# Patient Record
Sex: FEMALE | Race: WHITE | NOT HISPANIC OR LATINO | Employment: OTHER | URBAN - METROPOLITAN AREA
[De-identification: names, ages, dates, MRNs, and addresses within clinical notes are randomized per-mention and may not be internally consistent; named-entity substitution may affect disease eponyms.]

---

## 2017-01-17 ENCOUNTER — TELEPHONE (OUTPATIENT)
Dept: INTERNAL MEDICINE | Facility: CLINIC | Age: 82
End: 2017-01-17

## 2017-01-17 DIAGNOSIS — R35.0 URINARY FREQUENCY: Primary | ICD-10-CM

## 2017-01-17 NOTE — TELEPHONE ENCOUNTER
Has worn a pad for a long time to help with urine incontinence.  Usually goes thru 1 pad a day.    Lately going thru 2-3 pads. Seems to leak when bends  Over.    Ok to try rx for this?  Other?  Pharmacy verified.  She's not due back till April for ck up.    Please advise.  Thanks benedicto

## 2017-01-17 NOTE — TELEPHONE ENCOUNTER
----- Message from Nemesio De La Rosa sent at 1/17/2017  2:27 PM CST -----  Contact: Pt at [phone]  Patient would like to get medical advice.  Symptoms (please be specific):  Urine leakage  How long has patient had these symptoms:  About 6 months  Pharmacy name and phone #:  [facility]  [phone] (Phone)  [phone] (Fax)  Any drug allergies:  Tetanus Vaccines And ToxoidSwelling  Comments:

## 2017-01-19 ENCOUNTER — LAB VISIT (OUTPATIENT)
Dept: LAB | Facility: HOSPITAL | Age: 82
End: 2017-01-19
Attending: INTERNAL MEDICINE
Payer: MEDICARE

## 2017-01-19 DIAGNOSIS — R35.0 URINARY FREQUENCY: ICD-10-CM

## 2017-01-19 LAB
BACTERIA #/AREA URNS AUTO: NORMAL /HPF
BILIRUB UR QL STRIP: NEGATIVE
CLARITY UR REFRACT.AUTO: CLEAR
COLOR UR AUTO: YELLOW
GLUCOSE UR QL STRIP: NEGATIVE
HGB UR QL STRIP: NEGATIVE
KETONES UR QL STRIP: NEGATIVE
LEUKOCYTE ESTERASE UR QL STRIP: ABNORMAL
MICROSCOPIC COMMENT: NORMAL
NITRITE UR QL STRIP: NEGATIVE
NON-SQ EPI CELLS #/AREA URNS AUTO: <1 /HPF
PH UR STRIP: 6 [PH] (ref 5–8)
PROT UR QL STRIP: ABNORMAL
RBC #/AREA URNS AUTO: 2 /HPF (ref 0–4)
SP GR UR STRIP: 1.02 (ref 1–1.03)
SQUAMOUS #/AREA URNS AUTO: 2 /HPF
URN SPEC COLLECT METH UR: ABNORMAL
UROBILINOGEN UR STRIP-ACNC: NEGATIVE EU/DL
WBC #/AREA URNS AUTO: 4 /HPF (ref 0–5)

## 2017-01-19 PROCEDURE — 87086 URINE CULTURE/COLONY COUNT: CPT

## 2017-01-19 PROCEDURE — 81001 URINALYSIS AUTO W/SCOPE: CPT

## 2017-01-20 LAB — BACTERIA UR CULT: NORMAL

## 2017-01-23 ENCOUNTER — OFFICE VISIT (OUTPATIENT)
Dept: INTERNAL MEDICINE | Facility: CLINIC | Age: 82
End: 2017-01-23
Payer: MEDICARE

## 2017-01-23 VITALS
HEART RATE: 84 BPM | TEMPERATURE: 98 F | DIASTOLIC BLOOD PRESSURE: 62 MMHG | SYSTOLIC BLOOD PRESSURE: 114 MMHG | RESPIRATION RATE: 16 BRPM | BODY MASS INDEX: 25.2 KG/M2 | WEIGHT: 142.19 LBS | HEIGHT: 63 IN

## 2017-01-23 DIAGNOSIS — R32 INCONTINENCE OF URINE IN FEMALE: Primary | ICD-10-CM

## 2017-01-23 PROCEDURE — 1159F MED LIST DOCD IN RCRD: CPT | Mod: S$GLB,,, | Performed by: INTERNAL MEDICINE

## 2017-01-23 PROCEDURE — 99214 OFFICE O/P EST MOD 30 MIN: CPT | Mod: S$GLB,,, | Performed by: INTERNAL MEDICINE

## 2017-01-23 PROCEDURE — 1157F ADVNC CARE PLAN IN RCRD: CPT | Mod: S$GLB,,, | Performed by: INTERNAL MEDICINE

## 2017-01-23 PROCEDURE — 99999 PR PBB SHADOW E&M-EST. PATIENT-LVL III: CPT | Mod: PBBFAC,,, | Performed by: INTERNAL MEDICINE

## 2017-01-23 PROCEDURE — 1126F AMNT PAIN NOTED NONE PRSNT: CPT | Mod: S$GLB,,, | Performed by: INTERNAL MEDICINE

## 2017-01-23 PROCEDURE — 1160F RVW MEDS BY RX/DR IN RCRD: CPT | Mod: S$GLB,,, | Performed by: INTERNAL MEDICINE

## 2017-01-23 RX ORDER — ACETAMINOPHEN 325 MG/1
325 TABLET ORAL EVERY 6 HOURS PRN
COMMUNITY
End: 2018-07-05

## 2017-01-23 RX ORDER — OXYBUTYNIN CHLORIDE 5 MG/1
5 TABLET, EXTENDED RELEASE ORAL DAILY
Qty: 30 TABLET | Refills: 1 | Status: SHIPPED | OUTPATIENT
Start: 2017-01-23 | End: 2017-02-23 | Stop reason: SDUPTHER

## 2017-01-23 NOTE — PATIENT INSTRUCTIONS
Treating Incontinence in Women: Non-surgical Methods  The best treatment for you will depend on the type of incontinence you have. Your symptoms, age, and any underlying problems that are found also affect your treatment. While some types of incontinence may eventually require surgery, non-surgical treatments may be effective in many cases. Non-surgical treatments include lifestyle changes, muscle-strengthening exercises, and medications.  Treatment recommendations for stress urinary incontinence  Guidelines from the American College of Physicians for treating stress urinary incontinence include:  · Pelvic floor strengthening exercises (called Kegel exercises)  · Kegel exercises and bladder training  · Medications for urgency incontinence if bladder training has not helped  · Lifestyle changes such as weight loss and increased activity if incontinence is due to being overweight    Lifestyle changes  · Quitting smoking. Smoking can lead to a chronic cough that strains pelvic floor muscles. Smoking may also damage the bladder and urethra.  · Losing weight. Excess weight puts extra pressure on the pelvic floor muscles. Exercising and eating right can help you lose weight. This helps other treatments work better.  · Making certain diet changes. Some foods may make you need to urinate more, so it may be good to avoid them. These include caffeinated drinks and alcohol. Ask your doctor whether these or other diet changes might be helpful.    Kegel exercises  Kegel exercises help strengthen the pelvic floor muscles. The pelvic floor muscles act as a sling to help hold the bladder and urethra in place. These muscles also help keep the urethra closed. Weak pelvic floor muscles may allow urine to leak. To strengthen the pelvic floor muscles, do Kegel exercises daily. In a few months, the muscles will be stronger and tighter. This can help prevent urine leakage.  © 3798-2118 [facility]. [address] [place] [address],  [place] [address]. All rights reserved. This information is not intended as a substitute for professional medical care. Always follow your healthcare professional's instructions.

## 2017-01-23 NOTE — MR AVS SNAPSHOT
[place] - Internal Medicine   [facility]  [place] [address]  Phone: [phone]  Fax: [phone]                  Pau Cook   2017 9:40 AM   Office Visit    Description:  Female : 1929   Provider:  Getachew Orlando MD   Department:  [place] - Internal Medicine           Reason for Visit     Urinary Incontinence           Diagnoses this Visit        Comments    Incontinence of urine in female    -  Primary            To Do List           Goals (5 Years of Data)     None       These Medications        Disp Refills Start End    oxybutynin (DITROPAN-XL) 5 MG TR24 30 tablet 1 2017    Take 1 tablet (5 mg total) by mouth once daily. - Oral    Pharmacy: [facility]/pharmacy #[id number] - [place] - [address]  #: [phone]         [facility]     [facility] On Call Nurse Care Line -  Assistance  Registered nurses in the [facility] On Call Center provide clinical advisement, health education, appointment booking, and other advisory services.  Call for this free service at [phone].             Medications           Message regarding Medications     Verify the changes and/or additions to your medication regime listed below are the same as discussed with your clinician today.  If any of these changes or additions are incorrect, please notify your healthcare provider.        START taking these NEW medications        Refills    oxybutynin (DITROPAN-XL) 5 MG TR24 1    Sig: Take 1 tablet (5 mg total) by mouth once daily.    Class: Normal    Route: Oral           Verify that the below list of medications is an accurate representation of the medications you are currently taking.  If none reported, the list may be blank. If incorrect, please contact your healthcare provider. Carry this list with you in case of emergency.           Current Medications     acetaminophen (TYLENOL) 325 MG tablet Take 325 mg by mouth every 6 (six) hours as needed for  "Pain.    calcium-vitamin D3 (CALCIUM 500 + D) 500 mg(1,250mg) -200 unit per tablet Take 1 tablet by mouth 2 (two) times daily with meals.    FLAXSEED ORAL Take 1,000 mg by mouth once daily.     ketotifen (ZADITOR) 0.025 % (0.035 %) ophthalmic solution Place 1 drop into both eyes 2 (two) times daily.    MULTIVITAMIN W-MINERALS/LUTEIN (CENTRUM SILVER ORAL) Take by mouth.    NEXIUM 20 mg capsule Take 20 mg by mouth before breakfast.     psyllium (METAMUCIL) packet Take 1 packet by mouth once daily.    carboxymethylcellulose (REFRESH PLUS) 0.5 % Dpet 1 drop daily as needed.    estradiol (ESTRACE) 0.01 % (0.1 mg/gram) vaginal cream Place 1 gram in the nightly for one week, the twice a week.    hydrocodone-acetaminophen 5-325mg (NORCO) 5-325 mg per tablet Take 1 tablet by mouth every 6 (six) hours as needed for Pain.    oxybutynin (DITROPAN-XL) 5 MG TR24 Take 1 tablet (5 mg total) by mouth once daily.           Clinical Reference Information           Vital Signs - Last Recorded  Most recent update: 1/23/2017  9:30 AM by Kristina Dougherty LPN    BP Pulse Temp Resp Ht Wt    114/62 (BP Location: Right arm, Patient Position: Sitting, BP Method: Manual) 84 97.5 °F (36.4 °C) (Oral) 16 5' 3" (1.6 m) 64.5 kg (142 lb 3.2 oz)    BMI                25.19 kg/m2          Blood Pressure          Most Recent Value    BP  114/62      Allergies as of 1/23/2017     Tetanus Vaccines And Toxoid      Immunizations Administered on Date of Encounter - 1/23/2017     None      Instructions      Treating Incontinence in Women: Non-surgical Methods  The best treatment for you will depend on the type of incontinence you have. Your symptoms, age, and any underlying problems that are found also affect your treatment. While some types of incontinence may eventually require surgery, non-surgical treatments may be effective in many cases. Non-surgical treatments include lifestyle changes, muscle-strengthening exercises, and medications.  Treatment " recommendations for stress urinary incontinence  Guidelines from the American College of Physicians for treating stress urinary incontinence include:  · Pelvic floor strengthening exercises (called Kegel exercises)  · Kegel exercises and bladder training  · Medications for urgency incontinence if bladder training has not helped  · Lifestyle changes such as weight loss and increased activity if incontinence is due to being overweight    Lifestyle changes  · Quitting smoking. Smoking can lead to a chronic cough that strains pelvic floor muscles. Smoking may also damage the bladder and urethra.  · Losing weight. Excess weight puts extra pressure on the pelvic floor muscles. Exercising and eating right can help you lose weight. This helps other treatments work better.  · Making certain diet changes. Some foods may make you need to urinate more, so it may be good to avoid them. These include caffeinated drinks and alcohol. Ask your doctor whether these or other diet changes might be helpful.    Kegel exercises  Kegel exercises help strengthen the pelvic floor muscles. The pelvic floor muscles act as a sling to help hold the bladder and urethra in place. These muscles also help keep the urethra closed. Weak pelvic floor muscles may allow urine to leak. To strengthen the pelvic floor muscles, do Kegel exercises daily. In a few months, the muscles will be stronger and tighter. This can help prevent urine leakage.  © 3792-3698 [employer]. [address] [place] [address], [place] [address]. All rights reserved. This information is not intended as a substitute for professional medical care. Always follow your healthcare professional's instructions.

## 2017-01-24 ENCOUNTER — TELEPHONE (OUTPATIENT)
Dept: INTERNAL MEDICINE | Facility: CLINIC | Age: 82
End: 2017-01-24

## 2017-01-30 ENCOUNTER — TELEPHONE (OUTPATIENT)
Dept: INTERNAL MEDICINE | Facility: CLINIC | Age: 82
End: 2017-01-30

## 2017-01-30 DIAGNOSIS — G60.9 UNSPECIFIED HEREDITARY AND IDIOPATHIC PERIPHERAL NEUROPATHY: ICD-10-CM

## 2017-01-30 DIAGNOSIS — R39.81 FUNCTIONAL URINARY INCONTINENCE: ICD-10-CM

## 2017-01-30 DIAGNOSIS — K21.9 GASTROESOPHAGEAL REFLUX DISEASE, ESOPHAGITIS PRESENCE NOT SPECIFIED: Primary | ICD-10-CM

## 2017-01-30 DIAGNOSIS — E78.00 HIGH CHOLESTEROL: ICD-10-CM

## 2017-01-30 NOTE — TELEPHONE ENCOUNTER
----- Message from Nemesio De La Rosa sent at 1/30/2017  2:22 PM CST -----  Contact: Pt at [phone]  Doctor appointment and lab have been scheduled.  Please link lab orders to the lab appointment.  Date of doctor appointment:  4/17/17  Physical or EP:  physical  Date of lab appointment:  4/17/17  Comments: Pt requested to complete same day if possible.

## 2017-02-06 NOTE — PROGRESS NOTES
Subjective:       Patient ID: Pau Cook is a 87 y.o. female.    Chief Complaint: Urinary Incontinence  HISTORY OF PRESENT ILLNESS:  An 87-year-old white female patient of mine coming   in for incontinence that has gotten markedly more severe over the last two   months.  The patient is wanting to get on medication for this.  We did a urine   showing minimal reactive changes, no infection.  She has awareness that she is   urinating, but has no control.  She is due her full physical in a month.    PHYSICAL EXAMINATION:  VITAL SIGNS:  Today shows normal vital signs.  BACK:  No CVA tenderness.  ABDOMEN:  Not distended.  She has no suprapubic urgency or fullness with   pressure.    IMPRESSION:  Incontinence, probably related to her age.  I put her on Ditropan 5   as an experiment.  She had no infection and I will do a minimal pelvic exam on   the next visit to see if she has a urethral caruncle or evidence of prolapse.      OLIVER/SAMM  dd: 02/06/2017 00:56:43 (CST)  td: 02/06/2017 01:42:12 (CST)  Doc ID   #[id number]  Job ID #[id number]    CC:     [facility] [id number]  HPI  Review of Systems   Constitutional: Negative.    HENT: Negative for congestion, hearing loss, sinus pressure, sneezing, sore throat and voice change.    Eyes: Negative for visual disturbance.   Respiratory: Negative for cough, chest tightness and shortness of breath.    Cardiovascular: Negative.  Negative for chest pain, palpitations and leg swelling.   Gastrointestinal: Negative.    Genitourinary: Positive for enuresis and frequency. Negative for difficulty urinating, dysuria, flank pain, hematuria, menstrual problem, pelvic pain, urgency, vaginal bleeding and vaginal discharge.   Musculoskeletal: Negative.  Negative for neck pain and neck stiffness.   Skin: Negative.    Neurological: Negative.  Negative for dizziness, seizures, light-headedness, numbness and headaches.   Psychiatric/Behavioral: Negative for agitation, behavioral problems, confusion and sleep  disturbance. The patient is not nervous/anxious.        Objective:      Physical Exam   Constitutional: She is oriented to person, place, and time. She appears well-developed and well-nourished.   Eyes: EOM are normal. Pupils are equal, round, and reactive to light.   Neck: Normal range of motion. Neck supple. No JVD present. No thyromegaly present.   Cardiovascular: Normal rate, regular rhythm, normal heart sounds and intact distal pulses.  Exam reveals no gallop.    No murmur heard.  Pulmonary/Chest: Breath sounds normal. She has no wheezes. She has no rales.   Abdominal: Soft. Bowel sounds are normal. She exhibits no mass. There is no tenderness.   Musculoskeletal: Normal range of motion.   Lymphadenopathy:     She has no cervical adenopathy.   Neurological: She is alert and oriented to person, place, and time. She has normal reflexes. No cranial nerve deficit.   Skin: No rash noted. No erythema.   Psychiatric: She has a normal mood and affect. Judgment normal.       Assessment:       1. Incontinence of urine in female        Plan:

## 2017-02-21 ENCOUNTER — TELEPHONE (OUTPATIENT)
Dept: INTERNAL MEDICINE | Facility: CLINIC | Age: 82
End: 2017-02-21

## 2017-02-21 NOTE — TELEPHONE ENCOUNTER
Has been on ditropan for a month now.  Not helping urine incontinence on 5mg.   Had dry mouth before started meds and seems same.    Increase?  Change rx?  [facility].  Needs small pill to swallow.  Can't swallow large pills.    Please advise.    Thanks benedicto

## 2017-02-21 NOTE — TELEPHONE ENCOUNTER
----- Message from Holli Harmon sent at 2/21/2017  9:22 AM CST -----  Contact: patient [phone]  Pt would like to speak with  about the medication you prescribed 1 month ago for urine leakage. Pt states it is not helping at all.

## 2017-02-23 RX ORDER — OXYBUTYNIN CHLORIDE 10 MG/1
10 TABLET, EXTENDED RELEASE ORAL DAILY
Qty: 30 TABLET | Refills: 1 | Status: SHIPPED | OUTPATIENT
Start: 2017-02-23 | End: 2017-04-12

## 2017-02-23 NOTE — TELEPHONE ENCOUNTER
Left msg on her recorder with her voice/name.  Said  doubled dose of rx and sent to pharmacy.  Let us know if not h elping after 2 weeks.

## 2017-04-12 ENCOUNTER — OFFICE VISIT (OUTPATIENT)
Dept: INTERNAL MEDICINE | Facility: CLINIC | Age: 82
End: 2017-04-12
Payer: MEDICARE

## 2017-04-12 VITALS
RESPIRATION RATE: 15 BRPM | TEMPERATURE: 98 F | BODY MASS INDEX: 24.8 KG/M2 | HEART RATE: 82 BPM | DIASTOLIC BLOOD PRESSURE: 60 MMHG | SYSTOLIC BLOOD PRESSURE: 110 MMHG | WEIGHT: 140 LBS | HEIGHT: 63 IN

## 2017-04-12 DIAGNOSIS — Z00.00 ENCOUNTER FOR PREVENTIVE HEALTH EXAMINATION: Primary | ICD-10-CM

## 2017-04-12 DIAGNOSIS — M15.9 PRIMARY OSTEOARTHRITIS INVOLVING MULTIPLE JOINTS: ICD-10-CM

## 2017-04-12 DIAGNOSIS — I49.9 IRREGULAR HEART RHYTHM: ICD-10-CM

## 2017-04-12 DIAGNOSIS — R35.0 URINARY FREQUENCY: ICD-10-CM

## 2017-04-12 DIAGNOSIS — M85.80 OSTEOPENIA, UNSPECIFIED LOCATION: ICD-10-CM

## 2017-04-12 DIAGNOSIS — K21.9 GASTROESOPHAGEAL REFLUX DISEASE WITHOUT ESOPHAGITIS: ICD-10-CM

## 2017-04-12 DIAGNOSIS — G60.9 IDIOPATHIC PERIPHERAL NEUROPATHY: ICD-10-CM

## 2017-04-12 PROCEDURE — 99999 PR PBB SHADOW E&M-EST. PATIENT-LVL IV: CPT | Mod: PBBFAC,,, | Performed by: NURSE PRACTITIONER

## 2017-04-12 PROCEDURE — G0439 PPPS, SUBSEQ VISIT: HCPCS | Mod: S$GLB,,, | Performed by: NURSE PRACTITIONER

## 2017-04-12 NOTE — Clinical Note
Primary Care Providers: Getachew Orlando MD, MD (General)  Your patient was seen today for a HRA visit.  NO  Gap(s) in care (HEDIS gaps) have been identified during this visit that require additional testing and possible follow up.  No orders of the defined types were placed in this encounter.   . I have included a copy of my visit note; please review the note and feel free to contact me with any questions.   Thank you for allowing me to participate in the care of your patients. Anna Rubalcava NP

## 2017-04-12 NOTE — MR AVS SNAPSHOT
[facility] Internal Medicine   [facility]  [place] [address]  Phone: [phone]  Fax: [phone]                  Pau Cook   2017 8:30 AM   Office Visit    Description:  Female : 1929   Provider:  HRA, MET 1   Department:  [place] - Internal Medicine           Reason for Visit     Health Risk Assessment           Diagnoses this Visit        Comments    Encounter for preventive health examination    -  Primary     Gastroesophageal reflux disease without esophagitis         Idiopathic peripheral neuropathy         Irregular heart rhythm         Primary osteoarthritis involving multiple joints         Osteopenia, unspecified location         Urinary frequency                To Do List           Future Appointments        Provider Department Dept Phone    2017 8:30 AM LAB, [facility] - [facility] [phone]    2017 9:00 AM Getachew Orlando MD [facility] Medicine [phone]      Goals (5 Years of Data)     None      Follow-Up and Disposition     Return in about 3 months (around 2017).      [facility] On Call     [facility] On Call Nurse Care Line -  Assistance  Unless otherwise directed by your provider, please contact [facility] On-Call, our nurse care line that is available for  assistance.     Registered nurses in the [facility] On Call Center provide: appointment scheduling, clinical advisement, health education, and other advisory services.  Call: [phone] (toll free)               Medications           Message regarding Medications     Verify the changes and/or additions to your medication regime listed below are the same as discussed with your clinician today.  If any of these changes or additions are incorrect, please notify your healthcare provider.        STOP taking these medications     estradiol (ESTRACE) 0.01 % (0.1 mg/gram) vaginal cream Place 1 gram in the nightly for one week, the twice a week.     "hydrocodone-acetaminophen 5-325mg (NORCO) 5-325 mg per tablet Take 1 tablet by mouth every 6 (six) hours as needed for Pain.    oxybutynin (DITROPAN-XL) 10 MG 24 hr tablet Take 1 tablet (10 mg total) by mouth once daily.           Verify that the below list of medications is an accurate representation of the medications you are currently taking.  If none reported, the list may be blank. If incorrect, please contact your healthcare provider. Carry this list with you in case of emergency.           Current Medications     acetaminophen (TYLENOL) 325 MG tablet Take 325 mg by mouth every 6 (six) hours as needed for Pain.    calcium-vitamin D3 (CALCIUM 500 + D) 500 mg(1,250mg) -200 unit per tablet Take 1 tablet by mouth 2 (two) times daily with meals.    carboxymethylcellulose (REFRESH PLUS) 0.5 % Dpet 1 drop daily as needed.    conjugated estrogens (PREMARIN) vaginal cream Place 0.5 g vaginally once daily.    FLAXSEED ORAL Take 1,000 mg by mouth once daily.     ketotifen (ZADITOR) 0.025 % (0.035 %) ophthalmic solution Place 1 drop into both eyes 2 (two) times daily.    MULTIVITAMIN W-MINERALS/LUTEIN (CENTRUM SILVER ORAL) Take by mouth.    NEXIUM 20 mg capsule Take 20 mg by mouth before breakfast.     psyllium (METAMUCIL) packet Take 1 packet by mouth once daily.           Clinical Reference Information           Your Vitals Were     BP Pulse Temp Resp Height Weight    110/60 82 97.5 °F (36.4 °C) (Oral) 15 5' 3" (1.6 m) 63.5 kg (140 lb)    BMI                24.8 kg/m2          Blood Pressure          Most Recent Value    BP  110/60      Allergies as of 4/12/2017     Tetanus Vaccines And Toxoid      Immunizations Administered on Date of Encounter - 4/12/2017     None      [facility] Sign-Up     Activating your [facility] account is as easy as 1-2-3!     1) Visit [email or web address], select Sign Up Now, enter this activation code and your date of birth, then select Next.  Activation code not generated  Current Patient Portal " Status: Account disabled      2) Create a username and password to use when you visit [facility] in the future and select a security question in case you lose your password and select Next.    3) Enter your e-mail address and click Sign Up!    Additional Information  If you have questions, please e-mail [email or web address] or call [phone] to talk to our [facility] staff. Remember, [facility] is NOT to be used for urgent needs. For medical emergencies, dial 911.         Instructions      Counseling and Referral of Other Preventative  (Italic type indicates deductible and co-insurance are waived)    Patient Name: Pau Cook  Today's Date: 4/12/2017      SERVICE LIMITATIONS RECOMMENDATION    Vaccines    · Pneumococcal (once after 65)    · Influenza (annually)    · Hepatitis B (if medium/high risk)    · Prevnar 13      Hepatitis B medium/high risk factors:       - End-stage renal disease       - Hemophiliacs who received Factor VII or         IX concentrates       - Clients of institutions for the mentally             retarded       - Persons who live in the same house as          a HepB carrier       - Homosexual men       - Illicit injectable drug abusers     Pneumococcal: CURRENT     Influenza: CURRENT     Hepatitis B: CDC HANDOUT GIVEN     Prevnar 13: CURRENT    Mammogram (biennial age 50-74)  Annually (age 40 or over)  DECLINED    Pap (up to age 70 and after 70 if unknown history or abnormal study last 10 years)         N/A    Colorectal cancer screening (to age 75)    · Fecal occult blood test (annual)  · Flexible sigmoidoscopy (5y)  · Screening colonoscopy (10y)  · Barium enema   n/a- LAST ON 2/6/12    Diabetes self-management training (no USPSTF recommendations)  Requires referral by treating physician for patient with diabetes or renal disease. 10 hours of initial DSMT sessions of no less than 30 minutes each in a continuous 12-month period. 2 hours of follow-up DSMT in subsequent years.  n/a     Bone mass measurements (age 65 & older, biennial)  Requires diagnosis related to osteoporosis or estrogen deficiency. Biennial benefit unless patient has history of long-term glucocorticoid  LAST 8/2017- DUE IN 2017    Glaucoma screening (no USPSTF recommendation)  Diabetes mellitus, family history   , age 50 or over    American, age 65 or over  CURRENT -BY EXTERNAL EYE md    Medical nutrition therapy for diabetes or renal disease (no recommended schedule)  Requires referral by treating physician for patient with diabetes or renal disease or kidney transplant within the past 3 years.  Can be provided in same year as diabetes self-management training (DSMT), and CMS recommends medical nutrition therapy take place after DSMT. Up to 3 hours for initial year and 2 hours in subsequent years.  n/a    Cardiovascular screening blood tests (every 5 years)  · Fasting lipid panel  Order as a panel if possible  CURRENT    Diabetes screening tests (at least every 3 years, Medicare covers annually or at 6-month intervals for prediabetic patients)  · Fasting blood sugar (FBS) or glucose tolerance test (GTT)  Patient must be diagnosed with one of the following:       - Hypertension       - Dyslipidemia       - Obesity (BMI 30kg/m2)       - Previous elevated impaired FBS or GTT       ... or any two of the following:       - Overweight (BMI 25 but <30)       - Family history of diabetes       - Age 65 or older       - History of gestational diabetes or birth of baby weighing more than 9 pounds  CURRENT    Abdominal aortic aneurysm screening (once)  · Sonogram   Limited to patients who meet one of the following criteria:       - Men who are 65-75 years old and have smoked more than 100 cigarette in their lifetime       - Anyone with a family history of abdominal aortic aneurysm       - Anyone recommended for screening by the USPSTF  N/A- UNKNOWN    HIV screening (annually for increased risk patients)  · HIV-1  and HIV-2 by EIA, or RAAD, rapid antibody test or oral mucosa transudate  Patients must be at increased risk for HIV infection per USPSTF guidelines or pregnant. Tests covered annually for patient at increased risk or as requested by the patient. Pregnant patients may receive up to 3 tests during pregnancy.  Risks discussed, screening is DECLINED    Smoking cessation counseling (up to 8 sessions per year)  Patients must be asymptomatic of tobacco-related conditions to receive as a preventative service.  n/a    Subsequent annual wellness visit  At least 12 months since last AWV  Return in one year     The following information is provided to all patients.  This information is to help you find resources for any of the problems found today that may be affecting your health:                Living healthy guide: www.[facility].[place].[facility]      Understanding Diabetes: www.diabetes.org      Eating healthy: www.cdc.gov/healthyweight      [facility] home safety checklist: www.cdc.gov/steadi/patient.html      Agency on Aging: www.goea.[place].[facility]      Alcoholics anonymous (AA): www.aa.org      Physical Activity: www.gavino.nih.gov/ka7xyve      Tobacco use: www.quitwithusla.org          Language Assistance Services     ATTENTION: Language assistance services are available, free of charge. Please call [phone].      ATENCIÓN: Si habla español, tiene a ellis disposición servicios gratuitos de asistencia lingüística. Llame al [phone].     [facility] Ý: N?u b?n nói Ti?ng Vi?t, có các d?ch v? h? tr? ngôn ng? mi?n phí dành cho b?n. G?i s? [phone].         [facility] [facility] complies with applicable Federal civil rights laws and does not discriminate on the basis of race, color, national origin, age, disability, or sex.

## 2017-04-12 NOTE — PATIENT INSTRUCTIONS
Counseling and Referral of Other Preventative  (Italic type indicates deductible and co-insurance are waived)    Patient Name: Pau Cook  Today's Date: 4/12/2017      SERVICE LIMITATIONS RECOMMENDATION    Vaccines    · Pneumococcal (once after 65)    · Influenza (annually)    · Hepatitis B (if medium/high risk)    · Prevnar 13      Hepatitis B medium/high risk factors:       - End-stage renal disease       - Hemophiliacs who received Factor VII or         IX concentrates       - Clients of institutions for the mentally             retarded       - Persons who live in the same house as          a HepB carrier       - Homosexual men       - Illicit injectable drug abusers     Pneumococcal: CURRENT     Influenza: CURRENT     Hepatitis B: [facility] HANDOUT GIVEN     Prevnar 13: CURRENT    Mammogram (biennial age 50-74)  Annually (age 40 or over)  DECLINED    Pap (up to age 70 and after 70 if unknown history or abnormal study last 10 years)         N/A    Colorectal cancer screening (to age 75)    · Fecal occult blood test (annual)  · Flexible sigmoidoscopy (5y)  · Screening colonoscopy (10y)  · Barium enema   n/a- LAST ON 2/6/12    Diabetes self-management training (no USPSTF recommendations)  Requires referral by treating physician for patient with diabetes or renal disease. 10 hours of initial DSMT sessions of no less than 30 minutes each in a continuous 12-month period. 2 hours of follow-up DSMT in subsequent years.  n/a    Bone mass measurements (age 65 & older, biennial)  Requires diagnosis related to osteoporosis or estrogen deficiency. Biennial benefit unless patient has history of long-term glucocorticoid  LAST 8/2017- DUE IN 2017    Glaucoma screening (no USPSTF recommendation)  Diabetes mellitus, family history   , age 50 or over    American, age 65 or over  CURRENT -BY EXTERNAL EYE md    Medical nutrition therapy for diabetes or renal disease (no recommended schedule)  Requires  referral by treating physician for patient with diabetes or renal disease or kidney transplant within the past 3 years.  Can be provided in same year as diabetes self-management training (DSMT), and CMS recommends medical nutrition therapy take place after DSMT. Up to 3 hours for initial year and 2 hours in subsequent years.  n/a    Cardiovascular screening blood tests (every 5 years)  · Fasting lipid panel  Order as a panel if possible  CURRENT    Diabetes screening tests (at least every 3 years, Medicare covers annually or at 6-month intervals for prediabetic patients)  · Fasting blood sugar (FBS) or glucose tolerance test (GTT)  Patient must be diagnosed with one of the following:       - Hypertension       - Dyslipidemia       - Obesity (BMI 30kg/m2)       - Previous elevated impaired FBS or GTT       ... or any two of the following:       - Overweight (BMI 25 but <30)       - Family history of diabetes       - Age 65 or older       - History of gestational diabetes or birth of baby weighing more than 9 pounds  CURRENT    Abdominal aortic aneurysm screening (once)  · Sonogram   Limited to patients who meet one of the following criteria:       - Men who are 65-75 years old and have smoked more than 100 cigarette in their lifetime       - Anyone with a family history of abdominal aortic aneurysm       - Anyone recommended for screening by the USPSTF  N/A- UNKNOWN    HIV screening (annually for increased risk patients)  · HIV-1 and HIV-2 by EIA, or RAAD, rapid antibody test or oral mucosa transudate  Patients must be at increased risk for HIV infection per USPSTF guidelines or pregnant. Tests covered annually for patient at increased risk or as requested by the patient. Pregnant patients may receive up to 3 tests during pregnancy.  Risks discussed, screening is DECLINED    Smoking cessation counseling (up to 8 sessions per year)  Patients must be asymptomatic of tobacco-related conditions to receive as a  preventative service.  n/a    Subsequent annual wellness visit  At least 12 months since last AWV  Return in one year     The following information is provided to all patients.  This information is to help you find resources for any of the problems found today that may be affecting your health:                Living healthy guide: www.[facility].[place].[facility]      Understanding Diabetes: www.diabetes.org      Eating healthy: www.cdc.gov/healthyweight      CDC home safety checklist: www.cdc.gov/steadi/patient.html      Agency on Aging: www.goea.[place].[facility]      Alcoholics anonymous (AA): www.aa.org      Physical Activity: www.gavino.nih.gov/we4argp      Tobacco use: www.quitwithusla.org

## 2017-04-12 NOTE — PROGRESS NOTES
"Pau Cook presented for a  Medicare AWV and comprehensive Health Risk Assessment today. The following components were reviewed and updated:    · Medical history  · Family History  · Social history  · Allergies and Current Medications  · Health Risk Assessment  · Health Maintenance  · Care Team     Followed by external eye MD      ** See Completed Assessments for Annual Wellness Visit within the encounter summary.**       The following assessments were completed:  · Living Situation  · CAGE  · Depression Screening  · Timed Get Up and Go  · Whisper Test  · Cognitive Function Screening  · Nutrition Screening  · ADL Screening  · PAQ Screening    Vitals:    04/12/17 0835   BP: 110/60   Pulse: 82   Resp: 15   Temp: 97.5 °F (36.4 °C)   TempSrc: Oral   Weight: 63.5 kg (140 lb)   Height: 5' 3" (1.6 m)     Body mass index is 24.8 kg/(m^2).  Physical Exam   Constitutional: She is oriented to person, place, and time. She appears well-developed and well-nourished.   HENT:   Head: Normocephalic and atraumatic.   Cardiovascular: Normal rate, regular rhythm and normal heart sounds.    No murmur heard.  Pulmonary/Chest: Effort normal and breath sounds normal.   Abdominal: Soft. Bowel sounds are normal. There is no tenderness.   Musculoskeletal: She exhibits no edema.   Neurological: She is alert and oriented to person, place, and time.   Skin: Skin is warm and dry.   Psychiatric: She has a normal mood and affect. Her behavior is normal. Judgment and thought content normal.   Nursing note and vitals reviewed.        Diagnoses and health risks identified today and associated recommendations/orders:    1. Encounter for preventive health examination  Assessments completed  Preventative health recommendations reviewed       2. Gastroesophageal reflux disease without esophagitis  Stable-followed by PCP      3. Idiopathic peripheral neuropathy  Stable-followed by PCP    4. Irregular heart rhythm  Stable-followed by PCP    5. " Primary osteoarthritis involving multiple joints  Stable-followed by PCP    6. Osteopenia, unspecified location  Stable-followed by PCP    7. Urinary frequency  Stable-followed by PCP/GYN      Provided Pau NELSON with a 5-10 year written screening schedule and personal prevention plan. Recommendations were developed using the USPSTF age appropriate recommendations. Education, counseling, and referrals were provided as needed. After Visit Summary printed and given to patient which includes a list of additional screenings\tests needed.    Return in about 3 months (around 7/12/2017).    Anna Rubalcava NP

## 2017-04-17 ENCOUNTER — OFFICE VISIT (OUTPATIENT)
Dept: INTERNAL MEDICINE | Facility: CLINIC | Age: 82
End: 2017-04-17
Payer: MEDICARE

## 2017-04-17 ENCOUNTER — LAB VISIT (OUTPATIENT)
Dept: LAB | Facility: HOSPITAL | Age: 82
End: 2017-04-17
Attending: INTERNAL MEDICINE
Payer: MEDICARE

## 2017-04-17 ENCOUNTER — OFFICE VISIT (OUTPATIENT)
Dept: UROLOGY | Facility: CLINIC | Age: 82
End: 2017-04-17
Payer: MEDICARE

## 2017-04-17 VITALS
DIASTOLIC BLOOD PRESSURE: 68 MMHG | HEART RATE: 64 BPM | HEIGHT: 63 IN | SYSTOLIC BLOOD PRESSURE: 130 MMHG | WEIGHT: 142 LBS | BODY MASS INDEX: 25.16 KG/M2

## 2017-04-17 VITALS
DIASTOLIC BLOOD PRESSURE: 68 MMHG | HEART RATE: 64 BPM | WEIGHT: 142.63 LBS | HEIGHT: 63 IN | SYSTOLIC BLOOD PRESSURE: 130 MMHG | BODY MASS INDEX: 25.27 KG/M2 | TEMPERATURE: 98 F

## 2017-04-17 DIAGNOSIS — N39.3 SUI (STRESS URINARY INCONTINENCE, FEMALE): Primary | ICD-10-CM

## 2017-04-17 DIAGNOSIS — R35.1 NOCTURIA: ICD-10-CM

## 2017-04-17 DIAGNOSIS — R32 INCONTINENCE OF URINE IN FEMALE: ICD-10-CM

## 2017-04-17 DIAGNOSIS — R39.81 FUNCTIONAL URINARY INCONTINENCE: ICD-10-CM

## 2017-04-17 DIAGNOSIS — R35.0 URINARY FREQUENCY: ICD-10-CM

## 2017-04-17 DIAGNOSIS — G60.9 HEREDITARY AND IDIOPATHIC PERIPHERAL NEUROPATHY: ICD-10-CM

## 2017-04-17 DIAGNOSIS — E78.00 HIGH CHOLESTEROL: ICD-10-CM

## 2017-04-17 DIAGNOSIS — Z00.00 ANNUAL PHYSICAL EXAM: Primary | ICD-10-CM

## 2017-04-17 DIAGNOSIS — K21.9 GASTROESOPHAGEAL REFLUX DISEASE, ESOPHAGITIS PRESENCE NOT SPECIFIED: ICD-10-CM

## 2017-04-17 LAB
ALBUMIN SERPL BCP-MCNC: 3.8 G/DL
ALP SERPL-CCNC: 78 U/L
ALT SERPL W/O P-5'-P-CCNC: 29 U/L
ANION GAP SERPL CALC-SCNC: 8 MMOL/L
AST SERPL-CCNC: 42 U/L
BASOPHILS # BLD AUTO: 0.03 K/UL
BASOPHILS NFR BLD: 0.5 %
BILIRUB SERPL-MCNC: 0.5 MG/DL
BUN SERPL-MCNC: 13 MG/DL
CALCIUM SERPL-MCNC: 9 MG/DL
CHLORIDE SERPL-SCNC: 100 MMOL/L
CHOLEST/HDLC SERPL: 2.6 {RATIO}
CO2 SERPL-SCNC: 27 MMOL/L
CREAT SERPL-MCNC: 0.8 MG/DL
DIFFERENTIAL METHOD: NORMAL
EOSINOPHIL # BLD AUTO: 0.1 K/UL
EOSINOPHIL NFR BLD: 1.2 %
ERYTHROCYTE [DISTWIDTH] IN BLOOD BY AUTOMATED COUNT: 12.7 %
EST. GFR  (AFRICAN AMERICAN): >60 ML/MIN/1.73 M^2
EST. GFR  (NON AFRICAN AMERICAN): >60 ML/MIN/1.73 M^2
GLUCOSE SERPL-MCNC: 88 MG/DL
HCT VFR BLD AUTO: 37 %
HDL/CHOLESTEROL RATIO: 39 %
HDLC SERPL-MCNC: 182 MG/DL
HDLC SERPL-MCNC: 71 MG/DL
HGB BLD-MCNC: 12.4 G/DL
LDLC SERPL CALC-MCNC: 95.8 MG/DL
LYMPHOCYTES # BLD AUTO: 1.4 K/UL
LYMPHOCYTES NFR BLD: 22.8 %
MCH RBC QN AUTO: 28.8 PG
MCHC RBC AUTO-ENTMCNC: 33.5 %
MCV RBC AUTO: 86 FL
MONOCYTES # BLD AUTO: 0.5 K/UL
MONOCYTES NFR BLD: 9 %
NEUTROPHILS # BLD AUTO: 4 K/UL
NEUTROPHILS NFR BLD: 66.3 %
NONHDLC SERPL-MCNC: 111 MG/DL
PLATELET # BLD AUTO: 239 K/UL
PMV BLD AUTO: 9.3 FL
POTASSIUM SERPL-SCNC: 4.1 MMOL/L
PROT SERPL-MCNC: 7.4 G/DL
RBC # BLD AUTO: 4.31 M/UL
SODIUM SERPL-SCNC: 135 MMOL/L
T4 FREE SERPL-MCNC: 0.93 NG/DL
TRIGL SERPL-MCNC: 76 MG/DL
TSH SERPL DL<=0.005 MIU/L-ACNC: 1.25 UIU/ML
WBC # BLD AUTO: 6.02 K/UL

## 2017-04-17 PROCEDURE — 99999 PR PBB SHADOW E&M-EST. PATIENT-LVL III: CPT | Mod: PBBFAC,,, | Performed by: INTERNAL MEDICINE

## 2017-04-17 PROCEDURE — 1160F RVW MEDS BY RX/DR IN RCRD: CPT | Mod: S$GLB,,, | Performed by: UROLOGY

## 2017-04-17 PROCEDURE — 1157F ADVNC CARE PLAN IN RCRD: CPT | Mod: S$GLB,,, | Performed by: UROLOGY

## 2017-04-17 PROCEDURE — 1126F AMNT PAIN NOTED NONE PRSNT: CPT | Mod: S$GLB,,, | Performed by: UROLOGY

## 2017-04-17 PROCEDURE — 1159F MED LIST DOCD IN RCRD: CPT | Mod: S$GLB,,, | Performed by: UROLOGY

## 2017-04-17 PROCEDURE — 99204 OFFICE O/P NEW MOD 45 MIN: CPT | Mod: S$GLB,,, | Performed by: UROLOGY

## 2017-04-17 PROCEDURE — 99397 PER PM REEVAL EST PAT 65+ YR: CPT | Mod: S$GLB,,, | Performed by: INTERNAL MEDICINE

## 2017-04-17 PROCEDURE — 99999 PR PBB SHADOW E&M-EST. PATIENT-LVL III: CPT | Mod: PBBFAC,,, | Performed by: UROLOGY

## 2017-04-17 RX ORDER — DULOXETIN HYDROCHLORIDE 30 MG/1
30 CAPSULE, DELAYED RELEASE ORAL DAILY
Qty: 30 CAPSULE | Refills: 3 | Status: SHIPPED | OUTPATIENT
Start: 2017-04-17 | End: 2017-05-18

## 2017-04-17 NOTE — PATIENT INSTRUCTIONS
Kegel Exercises  Kegel exercises dont need special clothing or equipment. Theyre easy to learn and simple to do. And if you do them right, no one can tell youre doing them, so they can be done almost anywhere. Your healthcare provider, nurse, or physical therapist can answer any questions you have and help you get started.    A weak pelvic floor   The pelvic floor muscles may weaken due to aging, pregnancy and vaginal childbirth, injury, surgery, chronic cough, or lack of exercise. If the pelvic floor is weak, your bladder and other pelvic organs may sag out of place. The urethra may also open too easily and allow urine to leak out. Kegel exercises can help you strengthen your pelvic floor muscles. Then they can better support the pelvic organs and control urine flow.  How Kegel exercises are done  Try each of the Kegel exercises described below. When youre doing them, try not to move your leg, buttock, or stomach muscles.  · Contract as if you were stopping your urine stream. But do it when youre not urinating.  · Tighten your rectum as if trying not to pass gas. Contract your anus, but dont move your buttocks.  · You may place a finger or 2 in the vagina and squeeze your finger with your vagina to learn which muscles to tighten.  Try to hold each Kegel for a slow count to 5. You probably wont be able to hold them for that long at first. But keep practicing. It will get easier as your pelvic floor gets stronger. Eventually, special weights that you place in your vagina may be recommended to help make your Kegels even more effective. Visit your healthcare provider if you have difficulties doing Kegel exercises.  Helpful hints  Here are some tips to follow:  · Do your Kegels as often as you can. The more you do them, the faster youll feel the results.  · Pick an activity you do often as a reminder. For instance, do your Kegels every time you sit down.  · Tighten your pelvic floor before you sneeze, get up  from a chair, cough, laugh, or lift. This protects your pelvic floor from injury and can help prevent urine leakage.

## 2017-04-17 NOTE — MR AVS SNAPSHOT
[facility]   [facility]  [place] [address]  Phone: [phone]                  Pau Cook   2017 3:20 PM   Office Visit    Description:  Female : 1929   Provider:  Anurag Jasso MD   Department:  [facility]           Reason for Visit     Urinary Incontinence           Diagnoses this Visit        Comments    MUNIR (stress urinary incontinence, female)    -  Primary            To Do List           Goals (5 Years of Data)     None      Follow-Up and Disposition     Return for suds cysto.       These Medications        Disp Refills Start End    duloxetine (CYMBALTA) 30 MG capsule 30 capsule 3 2017    Take 1 capsule (30 mg total) by mouth once daily. - Oral    [id number] [address]  #: [phone]         [facility]     [facility] Care Line -  Assistance  Unless otherwise directed by your provider, please contact [facility], our nurse care line that is available for  assistance.     Registered nurses in the [facility] Center provide: appointment scheduling, clinical advisement, health education, and other advisory services.  Call: [phone] (toll free)               Medications           Message regarding Medications     Verify the changes and/or additions to your medication regime listed below are the same as discussed with your clinician today.  If any of these changes or additions are incorrect, please notify your healthcare provider.        START taking these NEW medications        Refills    duloxetine (CYMBALTA) 30 MG capsule 3    Sig: Take 1 capsule (30 mg total) by mouth once daily.    Class: Normal    Route: Oral           Verify that the below list of medications is an accurate representation of the medications you are currently taking.  If none reported, the list may be blank. If incorrect, please contact your healthcare provider. Carry  "this list with you in case of emergency.           Current Medications     acetaminophen (TYLENOL) 325 MG tablet Take 325 mg by mouth every 6 (six) hours as needed for Pain.    calcium-vitamin D3 (CALCIUM 500 + D) 500 mg(1,250mg) -200 unit per tablet Take 1 tablet by mouth 2 (two) times daily with meals.    carboxymethylcellulose (REFRESH PLUS) 0.5 % Dpet 1 drop daily as needed.    conjugated estrogens (PREMARIN) vaginal cream Place 0.5 g vaginally once daily.    FLAXSEED ORAL Take 1,000 mg by mouth once daily.     ketotifen (ZADITOR) 0.025 % (0.035 %) ophthalmic solution Place 1 drop into both eyes 2 (two) times daily.    MULTIVITAMIN W-MINERALS/LUTEIN (CENTRUM SILVER ORAL) Take by mouth.    NEXIUM 20 mg capsule Take 20 mg by mouth before breakfast.     psyllium (METAMUCIL) packet Take 1 packet by mouth once daily.    duloxetine (CYMBALTA) 30 MG capsule Take 1 capsule (30 mg total) by mouth once daily.           Clinical Reference Information           Your Vitals Were     BP Pulse Height Weight BMI    130/68 64 5' 3" (1.6 m) 64.4 kg (142 lb) 25.15 kg/m2      Blood Pressure          Most Recent Value    BP  130/68      Allergies as of 4/17/2017     Tetanus Vaccines And Toxoid      Immunizations Administered on Date of Encounter - 4/17/2017     None      [facility] Sign-Up     Activating your [facility] account is as easy as 1-2-3!     1) Visit [email or web address], select Sign Up Now, enter this activation code and your date of birth, then select Next.  Activation code not generated  Current Patient Portal Status: Account disabled      2) Create a username and password to use when you visit [facility] in the future and select a security question in case you lose your password and select Next.    3) Enter your e-mail address and click Sign Up!    Additional Information  If you have questions, please e-mail [email or web address] or call [phone] to talk to our [facility] staff. Remember, [facility] is NOT to be used for " urgent needs. For medical emergencies, dial 911.         Instructions    Kegel Exercises  Kegel exercises dont need special clothing or equipment. Theyre easy to learn and simple to do. And if you do them right, no one can tell youre doing them, so they can be done almost anywhere. Your healthcare provider, nurse, or physical therapist can answer any questions you have and help you get started.    A weak pelvic floor   The pelvic floor muscles may weaken due to aging, pregnancy and vaginal childbirth, injury, surgery, chronic cough, or lack of exercise. If the pelvic floor is weak, your bladder and other pelvic organs may sag out of place. The urethra may also open too easily and allow urine to leak out. Kegel exercises can help you strengthen your pelvic floor muscles. Then they can better support the pelvic organs and control urine flow.  How Kegel exercises are done  Try each of the Kegel exercises described below. When youre doing them, try not to move your leg, buttock, or stomach muscles.  · Contract as if you were stopping your urine stream. But do it when youre not urinating.  · Tighten your rectum as if trying not to pass gas. Contract your anus, but dont move your buttocks.  · You may place a finger or 2 in the vagina and squeeze your finger with your vagina to learn which muscles to tighten.  Try to hold each Kegel for a slow count to 5. You probably wont be able to hold them for that long at first. But keep practicing. It will get easier as your pelvic floor gets stronger. Eventually, special weights that you place in your vagina may be recommended to help make your Kegels even more effective. Visit your healthcare provider if you have difficulties doing Kegel exercises.  Helpful hints  Here are some tips to follow:  · Do your Kegels as often as you can. The more you do them, the faster youll feel the results.  · Pick an activity you do often as a reminder. For instance, do your Kegels every time  you sit down.  · Tighten your pelvic floor before you sneeze, get up from a chair, cough, laugh, or lift. This protects your pelvic floor from injury and can help prevent urine leakage.            Language Assistance Services     ATTENTION: Language assistance services are available, free of charge. Please call [phone].      ATENCIÓN: Si habla español, tiene a ellis disposición servicios gratuitos de asistencia lingüística. Llame al [phone].     CHÚ Ý: N?u b?n nói Ti?ng Vi?t, có các d?ch v? h? tr? ngôn ng? mi?n phí dành cho b?n. G?i s? [phone].         [facility] complies with applicable Federal civil rights laws and does not discriminate on the basis of race, color, national origin, age, disability, or sex.

## 2017-04-17 NOTE — PROGRESS NOTES
CC: urinary incontinence    Pau Cook is a 87 y.o. woman who is here for the evaluation of Urinary Incontinence (worse when she bends over. started around 2016. wears mini pads--2-3 a day.  took  oxybutynin 10mg day with no help-it did give her dry mouth )  a new pt referred by her PCP Dr. Orlando.  C/o 6 months hx of worsening MUNIR with bending over,  things and movement.  Denies urgency, frequency or urge incontinence.  Has been doing Kegel exercise and tried oxybutynin without much success.  Oxybutynin only resulted in severe dry mouth.   , vaginal delivery.  No prior hysterectomy.  Has two daughters who are in Medicare age.  Urination symptoms: Negative for frequency, urgency and straining.  Denies flank pain, dysuira, hematuria     Past Medical History:   Diagnosis Date    Allergy     Arthritis     Foot pain     Joint pain     Squamous cell carcinoma 2016    Right Lower Leg    MUNIR (stress urinary incontinence, female) 2017     Past Surgical History:   Procedure Laterality Date    APPENDECTOMY      COLON SURGERY      hemorhids       Social History   Substance Use Topics    Smoking status: Never Smoker    Smokeless tobacco: Never Used    Alcohol use No      Comment: rarely     Family History   Problem Relation Age of Onset    Adopted: Yes    Cancer Mother     Stroke Father     Cancer Sister     No Known Problems Daughter     No Known Problems Brother     No Known Problems Daughter     Diabetes Brother     Melanoma Neg Hx      Allergy:  Review of patient's allergies indicates:   Allergen Reactions    Tetanus vaccines and toxoid Swelling     Outpatient Encounter Prescriptions as of 2017   Medication Sig Dispense Refill    acetaminophen (TYLENOL) 325 MG tablet Take 325 mg by mouth every 6 (six) hours as needed for Pain.      calcium-vitamin D3 (CALCIUM 500 + D) 500 mg(1,250mg) -200 unit per tablet Take 1 tablet by mouth 2 (two) times daily with meals.       carboxymethylcellulose (REFRESH PLUS) 0.5 % Dpet 1 drop daily as needed.      conjugated estrogens (PREMARIN) vaginal cream Place 0.5 g vaginally once daily.      duloxetine (CYMBALTA) 30 MG capsule Take 1 capsule (30 mg total) by mouth once daily. 30 capsule 3    FLAXSEED ORAL Take 1,000 mg by mouth once daily.       ketotifen (ZADITOR) 0.025 % (0.035 %) ophthalmic solution Place 1 drop into both eyes 2 (two) times daily.      MULTIVITAMIN W-MINERALS/LUTEIN (CENTRUM SILVER ORAL) Take by mouth.      NEXIUM 20 mg capsule Take 20 mg by mouth before breakfast.       psyllium (METAMUCIL) packet Take 1 packet by mouth once daily.       No facility-administered encounter medications on file as of 4/17/2017.      Review of Systems   General ROS: GENERAL:  No weight gain or loss  SKIN:  No rashes or lacerations  HEAD:  No headaches  EYES:  No exophthalmos, jaundice or blindness  EARS:  No dizziness, tinnitus or hearing loss  NOSE:  No changes in smell  MOUTH & THROAT:  No dyskinetic movements or obvious goiter  CHEST:  No shortness of breath, hyperventilation or cough  CARDIOVASCULAR:  No tachycardia or chest pain  ABDOMEN:  No nausea, vomiting, pain, constipation or diarrhea  URINARY:  No frequency, dysuria or sexual dysfunction  ENDOCRINE:  No polydipsia, polyuria  MUSCULOSKELETAL:  No pain or stiffness of the joints  NEUROLOGIC:  No weakness, sensory changes, seizures, confusion, memory loss, tremor or other abnormal movements  Physical Exam     Vitals:    04/17/17 1506   BP: 130/68   Pulse: 64     Physical Examination:   General appearance - alert, well appearing, and in no distress  Mental status - alert, oriented to person, place, and time  Eyes - pupils equal and reactive, extraocular eye movements intact  Ears - bilateral TM's and external ear canals normal  Nose - normal and patent, no erythema, discharge or polyps  Mouth - mucous membranes moist, pharynx normal without lesions  Neck - supple, no  significant adenopathy  Chest - clear to auscultation, no wheezes, rales or rhonchi, symmetric air entry  Breast- no mass or lumps or tenderness  Heart - normal rate, regular rhythm, normal S1, S2, no murmurs, rubs, clicks or gallops  Abdomen - soft, nontender, nondistended, no masses or organomegaly of liver and spleen, no hernia noted.  Pelvic - normal external genitalia   Urethra normal meatus with no lesion or prolapse. No tenderness or discharge  Bladder- no tenderness  atrophic mucosa  Rectal - normal rectal, no masses  Back exam - full range of motion, no tenderness, palpable spasm or pain on motion  LE - No edema noted.  Neurological - alert, oriented, normal speech, no focal findings or movement disorder noted  Musculoskeletal - no joint tenderness, deformity or swelling    LABS:  Lab Results   Component Value Date    CREATININE 0.8 04/17/2017    CREATININE 0.9 07/05/2016    CREATININE 0.8 04/12/2016    CREATININE 0.8 04/12/2016     Urine Culture, Routine   Date Value Ref Range Status   01/19/2017 No significant growth  Final     UA clear    Assessment and Plan:  Pau NELSON was seen today for urinary incontinence.    Diagnoses and all orders for this visit:    MUNIR (stress urinary incontinence, female)  -     duloxetine (CYMBALTA) 30 MG capsule; Take 1 capsule (30 mg total) by mouth once daily.  -     Simple Urodynamics w/ Cysto; Future    continue Kegel exercise.  Stop oxybutynin.  Start cymbalta as MUNIR meds.  Further evaluation with SUDS cysto.  Continue premarin cream to the urethra.    Follow-up:  Return for suds cysto.

## 2017-04-17 NOTE — LETTER
April 17, 2017      Getachew Orlando MD  [address]  [place] [address]           [facility] [facility]  [address]  [place] [address]  Phone: [phone]          Patient: Pau Cook   MR Number: [id number]   YOB: 1929   Date of Visit: 4/17/2017       Dear Dr. Getachew Orlando:    Thank you for referring Pau Cook to me for evaluation. Attached you will find relevant portions of my assessment and plan of care.    If you have questions, please do not hesitate to call me. I look forward to following Pau Cook along with you.    Sincerely,    Anurag Jasso MD    Enclosure  CC:  No Recipients    If you would like to receive this communication electronically, please contact [email or web address] or [phone] to request more information on [employer] Link access.    For providers and/or their staff who would like to refer a patient to [facility], please contact us through our one-stop-shop provider referral line, [facility], at [phone].    If you feel you have received this communication in error or would no longer like to receive these types of communications, please e-mail [email or web address]

## 2017-04-17 NOTE — PROGRESS NOTES
Subjective:       Patient ID: Pau Cook is a 87 y.o. female.    Chief Complaint: No chief complaint on file.  An 87-year-old white female coming in for an annual review, and she is doing   well.  She has no complaints, except for problems with her urination which she   has had for some time.  She was tried on oxybutynin without any real   improvement, even on 10 mg.  She has a combination of nocturia and incontinence.    The patient finds that her sleep is disturbed as a result of it and she has   got early fatigue during the day.    PHYSICAL EXAMINATION:  VITAL SIGNS:  Normal.  SKIN:  Fair and healthy.  HEENT:  Clear.  NECK:  Shows no adenopathy, thyroid enlargement or bruit.  CHEST:  Clear.  HEART:  There is no murmur or gallop.  ABDOMEN:  Nontender, without any organomegaly.  EXTREMITIES:  Show normal muscles, joints, pulses.  NEUROLOGIC:  Normal.    IMPRESSION:  1.  Annual exam looks excellent.  She just did her blood work.  I added a urine   culture.  2.  Combination of incontinence and nocturia.  I have made arrangements for her   to see Dr. Jasso.  The patient does see Gynecology here.      JDC/HN  dd: 04/17/2017 09:40:09 (CDT)  td: 04/18/2017 01:17:07 (CDT)  Doc ID   #[id number]  Job ID #[id number]    CC:     [facility] [id number]  HPI  Review of Systems   Constitutional: Negative.    HENT: Negative for congestion, hearing loss, sinus pressure, sneezing, sore throat and voice change.    Eyes: Negative for visual disturbance.   Respiratory: Negative for cough, chest tightness and shortness of breath.    Cardiovascular: Negative.  Negative for chest pain, palpitations and leg swelling.   Gastrointestinal: Negative.    Genitourinary: Positive for difficulty urinating, enuresis, frequency and urgency. Negative for dysuria, flank pain, hematuria, menstrual problem, pelvic pain, vaginal bleeding and vaginal discharge.   Musculoskeletal: Negative.  Negative for neck pain and neck stiffness.   Skin: Negative.     Neurological: Negative.  Negative for dizziness, seizures, light-headedness, numbness and headaches.   Psychiatric/Behavioral: Positive for sleep disturbance. Negative for agitation, behavioral problems and confusion. The patient is not nervous/anxious.        Objective:      Physical Exam   Constitutional: She is oriented to person, place, and time. She appears well-developed and well-nourished.   Eyes: EOM are normal. Pupils are equal, round, and reactive to light.   Neck: Normal range of motion. Neck supple. No JVD present. No thyromegaly present.   Cardiovascular: Normal rate, regular rhythm, normal heart sounds and intact distal pulses.  Exam reveals no gallop.    No murmur heard.  Pulmonary/Chest: Breath sounds normal. She has no wheezes. She has no rales.   Abdominal: Soft. Bowel sounds are normal. She exhibits no mass. There is no tenderness.   Musculoskeletal: Normal range of motion.   Lymphadenopathy:     She has no cervical adenopathy.   Neurological: She is alert and oriented to person, place, and time. She has normal reflexes. No cranial nerve deficit.   Skin: No rash noted. No erythema.   Psychiatric: She has a normal mood and affect. Judgment normal.       Assessment:       1. Annual physical exam    2. Nocturia    3. Incontinence of urine in female    4. Urinary frequency        Plan:

## 2017-05-18 ENCOUNTER — PROCEDURE VISIT (OUTPATIENT)
Dept: UROLOGY | Facility: CLINIC | Age: 82
End: 2017-05-18
Payer: MEDICARE

## 2017-05-18 VITALS
DIASTOLIC BLOOD PRESSURE: 60 MMHG | HEIGHT: 64 IN | WEIGHT: 140 LBS | SYSTOLIC BLOOD PRESSURE: 140 MMHG | HEART RATE: 76 BPM | BODY MASS INDEX: 23.9 KG/M2 | TEMPERATURE: 98 F

## 2017-05-18 DIAGNOSIS — R33.9 INCOMPLETE BLADDER EMPTYING: Primary | ICD-10-CM

## 2017-05-18 DIAGNOSIS — N39.3 SUI (STRESS URINARY INCONTINENCE, FEMALE): ICD-10-CM

## 2017-05-18 PROCEDURE — 51797 INTRAABDOMINAL PRESSURE TEST: CPT | Mod: S$GLB,,, | Performed by: UROLOGY

## 2017-05-18 PROCEDURE — 52000 CYSTOURETHROSCOPY: CPT | Mod: 59,S$GLB,, | Performed by: UROLOGY

## 2017-05-18 PROCEDURE — 51784 ANAL/URINARY MUSCLE STUDY: CPT | Mod: 51,S$GLB,, | Performed by: UROLOGY

## 2017-05-18 PROCEDURE — 51741 ELECTRO-UROFLOWMETRY FIRST: CPT | Mod: 51,S$GLB,, | Performed by: UROLOGY

## 2017-05-18 PROCEDURE — 51728 CYSTOMETROGRAM W/VP: CPT | Mod: S$GLB,,, | Performed by: UROLOGY

## 2017-05-18 PROCEDURE — 87086 URINE CULTURE/COLONY COUNT: CPT

## 2017-05-18 RX ORDER — LIDOCAINE HYDROCHLORIDE 20 MG/ML
JELLY TOPICAL ONCE
Status: COMPLETED | OUTPATIENT
Start: 2017-05-18 | End: 2017-05-18

## 2017-05-18 RX ORDER — CIPROFLOXACIN 250 MG/1
500 TABLET, FILM COATED ORAL ONCE
Status: COMPLETED | OUTPATIENT
Start: 2017-05-18 | End: 2017-05-18

## 2017-05-18 RX ORDER — DOXAZOSIN 1 MG/1
1 TABLET ORAL NIGHTLY
Qty: 30 TABLET | Refills: 11 | Status: SHIPPED | OUTPATIENT
Start: 2017-05-18 | End: 2018-04-24

## 2017-05-18 RX ADMIN — LIDOCAINE HYDROCHLORIDE: 20 JELLY TOPICAL at 09:05

## 2017-05-18 RX ADMIN — CIPROFLOXACIN 500 MG: 250 TABLET, FILM COATED ORAL at 10:05

## 2017-05-18 NOTE — MR AVS SNAPSHOT
[facility] - Urology 4th Floor  [address]  [facility] [address]  Phone: [phone]                  Pau Cook   2017 8:00 AM   Procedure visit    Description:  Female : 1929   Provider:  SUDS, UROLOGY   Department:  [facility] - Urology 4th Floor           Diagnoses this Visit        Comments    MUNIR (stress urinary incontinence, female)                To Do List           Goals (5 Years of Data)     None      [facility]     [facility] Nurse Care Line -  Assistance  Unless otherwise directed by your provider, please contact [facility], our nurse care line that is available for  assistance.     Registered nurses in the [facility] provide: appointment scheduling, clinical advisement, health education, and other advisory services.  Call: [phone] (toll free)               Medications           Message regarding Medications     Verify the changes and/or additions to your medication regime listed below are the same as discussed with your clinician today.  If any of these changes or additions are incorrect, please notify your healthcare provider.        These medications were administered today        Dose Freq    lidocaine HCl 2% urojet  Once    Sig: Place into the urethra once.    Class: Normal    Route: Urethral           Verify that the below list of medications is an accurate representation of the medications you are currently taking.  If none reported, the list may be blank. If incorrect, please contact your healthcare provider. Carry this list with you in case of emergency.           Current Medications     acetaminophen (TYLENOL) 325 MG tablet Take 325 mg by mouth every 6 (six) hours as needed for Pain.    calcium-vitamin D3 (CALCIUM 500 + D) 500 mg(1,250mg) -200 unit per tablet Take 1 tablet by mouth 2 (two) times daily with meals.    carboxymethylcellulose (REFRESH PLUS) 0.5 % Dpet 1 drop daily as needed.    conjugated estrogens  "(PREMARIN) vaginal cream Place 0.5 g vaginally once daily.    FLAXSEED ORAL Take 1,000 mg by mouth once daily.     ketotifen (ZADITOR) 0.025 % (0.035 %) ophthalmic solution Place 1 drop into both eyes 2 (two) times daily.    MULTIVITAMIN W-MINERALS/LUTEIN (CENTRUM SILVER ORAL) Take by mouth.    NEXIUM 20 mg capsule Take 20 mg by mouth before breakfast.     psyllium (METAMUCIL) packet Take 1 packet by mouth once daily.    duloxetine (CYMBALTA) 30 MG capsule Take 1 capsule (30 mg total) by mouth once daily.           Clinical Reference Information           Your Vitals Were     BP Pulse Temp Height Weight BMI    126/62 84 97.9 °F (36.6 °C) (Oral) 5' 4" (1.626 m) 63.5 kg (140 lb) 24.03 kg/m2      Blood Pressure          Most Recent Value    BP  (P) 126/62      Allergies as of 5/18/2017     Tetanus Vaccines And Toxoid      Immunizations Administered on Date of Encounter - 5/18/2017     None      Orders Placed During Today's Visit      Normal Orders This Visit    Ambulatory Referral to Physical/Occupational Therapy     Simple Urodynamics w/ Cysto     Future Labs/Procedures Expected by Expires    Cystoscopy  As directed 5/18/2018      Administrations This Visit     lidocaine HCl 2% urojet     Admin Date Action Dose Route Administered By             05/18/2017 Given   Urethral LADONNA Landonsrobin Sign-Up     Activating your [facility] account is as easy as 1-2-3!     1) Visit [email or web address], select Sign Up Now, enter this activation code and your date of birth, then select Next.  Activation code not generated  Current Patient Portal Status: Account disabled      2) Create a username and password to use when you visit [facility] in the future and select a security question in case you lose your password and select Next.    3) Enter your e-mail address and click Sign Up!    Additional Information  If you have questions, please e-mail [email or web address] or call [phone] to talk to our " [facility] staff. Remember, [facility] is NOT to be used for urgent needs. For medical emergencies, dial 911.         Instructions    SIMPLE URODYNAMIC STUDY (SUDS) & CYSTOSCOPY  UROLOGY CLINIC DISCHARGE INSTRUCTIONS    You have had a procedure that will require time to properly heal. Follow the instructions you have been given on how to care for yourself once you are home. Below is additional information to help in your recovery.    ACTIVITY  · There are no restrictions in activity. Start doing again the things you did before the procedure.  · You may experience a slight burning sensation. You may notice a small amount of blood in your urine. This will clear up within a day. Call the clinic if this continues beyond 48 hours.    DIET  · Continue your normal diet. You may eat the same foods you ate before your procedure.  · Drink plenty of fluids during the first 24-48 hours following your procedure.    MEDICATIONS  · Resume all other previous medications from your prescribing physician.  · Continue any pre=procedure antibiotics until they are all gone.    SIGNS AND SYMPTOMS TO REPORT TO THE DOCTOR  · Chills or fever greater than 101° F within 24 hours of procedure.  · Changes in urination, such as increased bleeding, foul smell, cloudy urine, or painful urination.  · Call your doctor with any questions or concerns.    For any emergency situation, call 911 immediately or go to your nearest emergency room.    [facility]  [phone]      Instructed by_________________________________          Patient Signature___________________________________                                                                                                                                                                                             If any problems after hours or weekends, you may call [phone] and ask for the urology resident on call.       Language Assistance Services     ATTENTION: Language assistance  services are available, free of charge. Please call [phone].      ATENCIÓN: Si habla español, tiene a ellis disposición servicios gratuitos de asistencia lingüística. Llame al [phone].     CHÚ Ý: N?u b?n nói Ti?ng Vi?t, có các d?ch v? h? tr? ngôn ng? mi?n phí dành cho b?n. G?i s? [phone].         [facility] - Urology 4th Floor complies with applicable Federal civil rights laws and does not discriminate on the basis of race, color, national origin, age, disability, or sex.

## 2017-05-18 NOTE — PROCEDURES
Simple Urodynamics w/ Cysto  Date/Time: 5/18/2017 11:43 AM  Performed by: HIRAM HORNE.  Authorized by: HIRAM HORNE.   Comments: Procedure Date:  05/18/2017    Procedure:   Diagnostic Cystourethroscopy   Complex Cystometrogram   Voiding / Pressure Study with Intrarectal Balloon   Complex Uroflow   Electromyogram of Anal Sphincter.     Pre-OP Diagnosis:   Stress urinary incontinence   Post-OP Diagnosis:   same   Anesthesia:   Anesthesia Administered:   Intraurethral instillation of 10 mL 2% lidocaine (Xylocaine) jelly.   Findings:   --- Bladder ---   CYSTOMETROGRAM ( Filling Phase ):   Cystometric Numeric Data:   - First Desire (Sensation): 74 mL at 3cm of water.   - Normal Desire: 91mL at 4 cm of water.   - Strong Desire: 129 mL at 3 cm of water.   - Urgency (Imminent Void) : 130 mL at 12 cm of water.   - Maximum Cystometric Capacity: 322 mL.   Compliance:   - normal.   Leak Point Pressure:   - Valsalva ( Abdominal ) Leak Point Pressure: none.   UROFLOW:   - Voided Volume: 293 mL.   - Residual Urine: 100 mL.   - Maximum Flow Rate: 15 mL/sec.   - Flow Pattern: intermittent flow  VOIDING PRESSURE STUDY ( Voiding Phase ):   Detrusor Pressure:   - Maximum Detrusor Pressure: 25cm of water.   - Detrusor Pressure at Maximum Flow: 11 cm of water.   - Detrusor Contraction Characteristics: no significant Sustained contraction(s).   ELECTROMYOGRAM:   - incomplete relaxation of the sphincterl.     ---Diagnostic Cystourethroscopy ---   Normal urethra.   Width of Bladder Neck Opening: Approximately 18 Fr.   Normal bladder.   Normal ureteral orifices bilaterally.       Description of Procedure:   Informed Consent:   - Risks, benefits and alternatives of procedure discussed with   patient and informed consent obtained.   Patient Position:   - Supine.   --- Bladder ---   Prep and Drape:   - Patient prepped and draped in usual sterile fashion using povidone   iodine (Betadine).   --- Diagnostic Cystourethroscopy ---   Instruments:    - 16 Fr flexible cystoscope with 0 degree lens.   Procedure Details:   - Cystoscope passed under vision into bladder.   - Bladder and urethra examined in their entirety with findings as   above.   --- Urodynamic Studies ---   Procedure Details:   Cystometrogram:   - Catheter(s) passed into the bladder.   - Rectal balloon inserted.   - Catheter(s) connected to infusion medium and to pressure recording   device.   - Infusion Rate: 30 mL / min.   Electromyogram:   - Perineal electromyogram pad placed and connected to electromyogram   recording device.   Equipment:   - Catheters: Double lumen catheter.   - Medium: Liquid.   - Pressure Recording Device: Calibrated electronic equipment.   Complications:   No immediate complications.    CONCLUSIONS:   1. No demonstrable urine shown during the test.  No MUNIR seen.  Physical Therapy to see whether she can control her urine leak with bending and changing her position.    2. Sphincter dysfunction and weak detrusor contraction.  May try Flomax?  I will give her cardura 1 mg instead since she is very sensitive to medication ( unable to tolerate cymbalta).    Follow up in 4 months    Post-OP Plan:   Patient was discharged home in a stable condition.  Medications: cipro  Follow up:  4 months

## 2017-05-18 NOTE — PATIENT INSTRUCTIONS
SIMPLE URODYNAMIC STUDY (SUDS) & CYSTOSCOPY  UROLOGY CLINIC DISCHARGE INSTRUCTIONS    You have had a procedure that will require time to properly heal. Follow the instructions you have been given on how to care for yourself once you are home. Below is additional information to help in your recovery.    ACTIVITY  · There are no restrictions in activity. Start doing again the things you did before the procedure.  · You may experience a slight burning sensation. You may notice a small amount of blood in your urine. This will clear up within a day. Call the clinic if this continues beyond 48 hours.    DIET  · Continue your normal diet. You may eat the same foods you ate before your procedure.  · Drink plenty of fluids during the first 24-48 hours following your procedure.    MEDICATIONS  · Resume all other previous medications from your prescribing physician.  · Continue any pre=procedure antibiotics until they are all gone.    SIGNS AND SYMPTOMS TO REPORT TO THE DOCTOR  · Chills or fever greater than 101° F within 24 hours of procedure.  · Changes in urination, such as increased bleeding, foul smell, cloudy urine, or painful urination.  · Call your doctor with any questions or concerns.    For any emergency situation, call 231 immediately or go to your nearest emergency room.    [facility]  [phone]      Instructed by_________________________________          Patient Signature___________________________________                                                                                                                                                                                             If any problems after hours or weekends, you may call [phone] and ask for the urology resident on call.

## 2017-05-19 LAB — BACTERIA UR CULT: NORMAL

## 2017-06-08 ENCOUNTER — CLINICAL SUPPORT (OUTPATIENT)
Dept: REHABILITATION | Facility: HOSPITAL | Age: 82
End: 2017-06-08
Attending: UROLOGY
Payer: MEDICARE

## 2017-06-08 DIAGNOSIS — R53.1 WEAKNESS: ICD-10-CM

## 2017-06-08 DIAGNOSIS — N39.3 SUI (STRESS URINARY INCONTINENCE, FEMALE): Primary | ICD-10-CM

## 2017-06-08 PROCEDURE — G8990 OTHER PT/OT CURRENT STATUS: HCPCS | Mod: CJ,PO | Performed by: PHYSICAL THERAPIST

## 2017-06-08 PROCEDURE — 97110 THERAPEUTIC EXERCISES: CPT | Mod: PO | Performed by: PHYSICAL THERAPIST

## 2017-06-08 PROCEDURE — 97162 PT EVAL MOD COMPLEX 30 MIN: CPT | Mod: PO | Performed by: PHYSICAL THERAPIST

## 2017-06-08 PROCEDURE — G8991 OTHER PT/OT GOAL STATUS: HCPCS | Mod: CJ,PO | Performed by: PHYSICAL THERAPIST

## 2017-06-08 NOTE — PATIENT INSTRUCTIONS
"Home Exercise Program: 06/08/2017    JONH WHILE LAYING DOWN OR SITTING  1. Lay on your back OR sit comfortably with legs and buttocks relaxed.  2. Contract and LIFT the pelvic floor muscles as if you're trying to stop the stream of urine and passage of gas - doing a 50% squeeze.   3. Hold LIFT for 10 seconds without holding breath. You should be able to talk out loud the entire time.  4. Focus on squeezing around the vaginal opening (as if you're trying to close the vaginal opening).  5. Release and DROP the pelvic floor muscles for 10 seconds.  6. Repeat 10 times, 8-10 sets per day. Spread throughout the day.      FROM HEP2GO  Hooklying ER GTB 2x10  Hooklying add 2x10  Modified tandem stance 3x30" bilat    "

## 2017-06-08 NOTE — PROGRESS NOTES
"Patient: Pau Cook "Lotty"  Clinic #:  [id number]    Date of treatment: 06/08/2017   Time in: 10:00  Time out: 11:00  # Visits: 1/6  Auth: (25) 12/31/17  POC expiration: 9/8/17    Outpatient Physical Therapy   Initial Evaluation    Pau NELSON is a 87 y.o. female evaluated on 06/08/2017    Physician:  Anurag Jasso MD   Diagnosis:   Encounter Diagnoses   Name Primary?    Weakness     MUNIR (stress urinary incontinence, female) Yes        Treatment ordered: Physical Therapy     History     Medical History:   Past Medical History:   Diagnosis Date    Allergy     Arthritis     Foot pain     Joint pain     Squamous cell carcinoma 04/2016    Right Lower Leg    MUNIR (stress urinary incontinence, female) 4/17/2017        Surgical History:   Past Surgical History:   Procedure Laterality Date    APPENDECTOMY      COLON SURGERY      hemorhids          Medications:   Current Outpatient Prescriptions   Medication Sig    acetaminophen (TYLENOL) 325 MG tablet Take 325 mg by mouth every 6 (six) hours as needed for Pain.    calcium-vitamin D3 (CALCIUM 500 + D) 500 mg(1,250mg) -200 unit per tablet Take 1 tablet by mouth 2 (two) times daily with meals.    carboxymethylcellulose (REFRESH PLUS) 0.5 % Dpet 1 drop daily as needed.    conjugated estrogens (PREMARIN) vaginal cream Place 0.5 g vaginally twice a week.    doxazosin (CARDURA) 1 MG tablet Take 1 tablet (1 mg total) by mouth every evening.    FLAXSEED ORAL Take 1,000 mg by mouth once daily.     ketotifen (ZADITOR) 0.025 % (0.035 %) ophthalmic solution Place 1 drop into both eyes 2 (two) times daily.    MULTIVITAMIN W-MINERALS/LUTEIN (CENTRUM SILVER ORAL) Take by mouth.    NEXIUM 20 mg capsule Take 20 mg by mouth before breakfast.     psyllium (METAMUCIL) packet Take 1 packet by mouth once daily.     No current facility-administered medications for this visit.        Allergies:   Review of patient's allergies indicates:   Allergen Reactions    Tetanus " vaccines and toxoid Swelling        OB/GYN History: childbirth vaginal delivery, episiotomy, pushing phase over 2 hours, vaginal dryness and menopause,   Bladder/Bowel History: trouble initiating urine stream and trouble emptying bladder completely    Precautions: universal        Subjective     For a long time pt would have 1-2 drops of urinary leakage, but around 2016, it started significantly increasing. She only has MUNIR with bending over, like picking something up from the floor. No MUNIR with cough, sneeze, laugh. Pt denies urinary frequency, urgency, UUI. Pt has been doing kegels per the physician recommendation with minimal success. Pt states she drinks a lot of water.     Pain  Current: 0/10  Worst: 0/10  Best: 0/10    Previous treatment included none. No PT this year.     Frequency of Urination: Daytime: every 2 hours        Nighttime: 3, unsure if wakes to urge, pt wakes wet    Urine Stream: weak to strong    Bladder Leakage: Yes  Frequency of incidents: daily  Amount Leaked: teaspoons    Frequency of Bowel Movements: once a day  [place] Stool Chart: Type(s) 4 but can vary greatly     Colon Leakage: No    Form of Protection: pantyliner  Number of Pads required in 24 hours: 2 (1 during day and 1 over night)    Types of Fluid Intake: water, coffee   Current Exercise: stationary bike x1/week, walk 1 block x2-3/week, sit ups x3/week    Occupation: Pt is retired. 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     PLOF: Pt was independent with all ADLs and iADLs without pain, ambulating without pain or deviation, driving independently, trace UI.     Patient's Goals: resolve MUNIR with bending over     Objective     ORTHO SCREEN  Posture: decreased lumbar lordosis, forward head and flexed posture     Pelvic Alignment: pelvic obliquity noted: L ASIS elevated compared to R  SFMA Screen: Notable for gait - pt amb with wide UE, small steps, cautious and decreased vicenta, wide lateral  sway    ABDOMINALS  Scarring: midline vertical scar below umbilicus with superficial (inf>sup) and deep restrictions (inf glide). R ant lat abdomen scar without restriction.    Palpation: WNL     Diastasis: 1.5/1.5/1.5 finger widths   Abdominal strength: Rectus 2/5     Transverse palpable       VAGINAL PELVIC FLOOR EXAM  EXTERNAL ASSESSMENT  Skin Condition: redness noted throughout vulva  Scarring: none  Sensation: WNL  Pain: none  Introitus: stenotic   Voluntary Contraction: visible lift  Voluntary Relaxation: visible drop  Involuntary Contraction: bulge  Bearing Down: excursion  Perineal Descent: present      INTERNAL ASSESSMENT  Palpation: de-estrogenated vaginal tissues, restricted introitus mobility in all directions without significant pain  Pain: none  Sensation: able to sense generalized pressure, unable to identify location    Vaginal Vault: roomy  Muscle Bulk: hypertonus (min), muscle atrophy with small muscle belly  Muscle Power: 3/5 of iliococcygeus only, 2/5 of pubococcygeus/puborectalis with tapping  Muscle Endurance: 10 sec  # Reps To Fatigue: DNA    Fast Contractions: DNA     Involuntary Contraction: DNA  Bearing Down: excursion  Quality of Contraction: slow rise, decreased hold, slow relaxation and incomplete relaxation  Specificity: patient contracts: add, gluts   Coordination: tends to hold breath during PFM contration  Prolapse Check: stage 1 urethrocystocele        SEMG EVALUATION: Deferred due to time constraints        Functional Limitations Reports - MIGUELINA-6  Score: 7/18  Current: 39%  Goal: 4/18   <22%  Category: Other    FOTO COMPLETED    G-Code Modifiers  CH  0% Impaired, limited, or restricted    CI  19% - 1%  Impaired, limited, or restricted    CJ  20% - 39% Impaired, limited, or restricted    CK  40% - 59% Impaired, limited, or restricted    CL  60% - 79% Impaired, limited, or restricted    CM  80% - 99% Impaired, limited, or restricted    CN  100% Impaired, limited, or restricted   "      TREATMENT  Therapeutic Exercise x10 min  Kegel in supine 15x10" - 50% contraction with tactile cuing of prox pubococcygeus and verbal cueing "squeeze vaginal opening"  Hooklying ER GTB   Hooklying add  Modified tandem stance 1x30" bilat      Education: Instructed on general anatomy/physiology of urinary/bowel system; discussed plan of care with patient; instructed in purpose of physical therapy and the  benefits/risks of treatment; instructed in risks of refusing treatment. Patient agreed to treatment plan. Haircurt OMAR demonstrated and verbalized understanding of all instruction and was provided with a handout of HEP (see Patient Instructions). Provided pt with GTB for HEP compliance.     Will instruct double voiding next visit.    Assessment     This is a 87 y.o. female referred to outpatient physical therapy and presents with a medical diagnosis of MUNIR and demonstrates limitations as described in the problem list. Pt presented today with significant weakness of pubococcygeus and poor coordination of PFMs. Pt will benefit from physcial therapy services in order to maximize pain free functional independence. The following goals were discussed with the patient and patient is in agreement with them as to be addressed in the treatment plan. Pt was given a HEP as described in Patient Instruction. Pt verbally understood the instructions as they were given and demonstrated proper form and technique during therapy. Pt was advise to perform these exercises free of pain and to stop performing them if pain occurs.     Prognosis: good    Medical necessity is demonstrated by the following IMPAIRMENTS/PROBLEM LIST:   altered posture, pelvid asymmetry, adhered abdominal scar, poor trunk stability, decreased pelvic muscle strength, decreased endurance of the pelvic muscles, decreased phasic ability of the pelvid cmuscles, increased tension of the pelvic muscles, poor quality of pelvic muscle contraction and poor coordination " of pelvic floor muscles during ADL's leading to urinary or fecal leakage    Co-morbidities and personal factors: advanced age and vaginal atrophy , hemorrhoidectomy   Activity Limitations: bending over  Participation restrictions: home chores, dressing shoes/socks/compression stockings  Clinical presentation: evolving  Complexity: moderate    Barriers to Learning: none  Educational/Spiritual/Cultural needs: none  Environmental Barriers: none noted  Abuse/Neglect: no signs  Nutritional Status: well developed, well nourished  Fall Risk: present    GOALS  Short Term Goals: 1 month  1. Pt will verbalize improved awareness of PFM activity as palpated by PT in order to improve activity involvement with HEP.  2. Pt will be able to maintain a normal breathing pattern during PFM contractions to prevent adverse affects to adjacent structures.   3. Pt will tolerate HEP to improve impairments and independence with ADL's.    Long Term Goals: 3 months  1. Pt will report <22% on MIGUELINA-6 to demonstrate a decrease in disability and improvement in independence with functional activities.   2. Patient able to perform basic ADL with less leaking.,   3. Pt will be independent in double voiding to ensure full bladder emptying.   4. Pt to be I with self mgmt. of symptoms.    5. Pt to be I with HEP.    Plan     Pt will be treated by physical therapy 1 time(s) every 2 weeks for 3 months for Pt Education, HEP, therapeutic exercises, neuromuscular re-education, therapeutic activity, gait training, manual therapy, and modalities (including SEMG) PRN to achieve established goals.

## 2017-06-12 PROBLEM — R53.1 WEAKNESS: Status: ACTIVE | Noted: 2017-06-12

## 2017-06-23 ENCOUNTER — CLINICAL SUPPORT (OUTPATIENT)
Dept: REHABILITATION | Facility: HOSPITAL | Age: 82
End: 2017-06-23
Attending: UROLOGY
Payer: MEDICARE

## 2017-06-23 DIAGNOSIS — N39.3 SUI (STRESS URINARY INCONTINENCE, FEMALE): ICD-10-CM

## 2017-06-23 DIAGNOSIS — R53.1 WEAKNESS: ICD-10-CM

## 2017-06-23 PROCEDURE — 97110 THERAPEUTIC EXERCISES: CPT | Mod: PO | Performed by: PHYSICAL THERAPIST

## 2017-06-23 PROCEDURE — 97112 NEUROMUSCULAR REEDUCATION: CPT | Mod: PO | Performed by: PHYSICAL THERAPIST

## 2017-07-07 ENCOUNTER — CLINICAL SUPPORT (OUTPATIENT)
Dept: REHABILITATION | Facility: HOSPITAL | Age: 82
End: 2017-07-07
Attending: UROLOGY
Payer: MEDICARE

## 2017-07-07 DIAGNOSIS — N39.3 SUI (STRESS URINARY INCONTINENCE, FEMALE): ICD-10-CM

## 2017-07-07 DIAGNOSIS — R53.1 WEAKNESS: ICD-10-CM

## 2017-07-07 PROCEDURE — G8991 OTHER PT/OT GOAL STATUS: HCPCS | Mod: CJ,PO | Performed by: PHYSICAL THERAPIST

## 2017-07-07 PROCEDURE — G8990 OTHER PT/OT CURRENT STATUS: HCPCS | Mod: CJ,PO | Performed by: PHYSICAL THERAPIST

## 2017-07-07 PROCEDURE — 97112 NEUROMUSCULAR REEDUCATION: CPT | Mod: PO | Performed by: PHYSICAL THERAPIST

## 2017-07-07 PROCEDURE — 97110 THERAPEUTIC EXERCISES: CPT | Mod: PO | Performed by: PHYSICAL THERAPIST

## 2017-07-07 NOTE — PATIENT INSTRUCTIONS
Home Exercise Program: 07/07/2017    JONH WHILE SITTING  1. Sit comfortably with legs and buttocks relaxed.  2. Contract and LIFT the pelvic floor muscles as if you're trying to stop the stream of urine and passage of gas - doing a 50% squeeze.   3. Hold LIFT for 10 seconds without holding breath. You should be able to talk out loud the entire time.  4. Focus on squeezing around the vaginal opening (as if you're trying to close the vaginal opening).  5. Release and DROP the pelvic floor muscles for 10 seconds.  6. Repeat 10 times, 8-10 sets per day. Spread throughout the day.

## 2017-07-07 NOTE — PROGRESS NOTES
"Patient: Pau Cook "Lotty"  Clinic #: [id number]   Diagnosis:   Encounter Diagnoses   Name Primary?    Weakness     MUNIR (stress urinary incontinence, female)       Date of start of care: 6/8/17 (PN 7/7/17)  Date of treatment: 07/07/2017   Time in: 8:55  Time out: 9:50  Billable time: 55 min  # Visits: 3/6  Auth: (25) 12/31/17  POC expiration: 9/8/17  Outpatient Physical Therapy   Progress Note    Subjective     Pt finds that her "flow is a little better" (stronger urine stream). Pt still finds that her urinary leakage is about the same with bending over being the worst. Pt feels as though she's emptying her bladder all the way with double voiding      Pain: Current: 0/10 vaginal    Objective     Functional Limitations Reports - MIGUELINA-6  Score: 7/18  Current: 39%  Goal: 4/18   <22%  Category: Other     G-Code Modifiers  CH  0% Impaired, limited, or restricted    CI  19% - 1%  Impaired, limited, or restricted    CJ  20% - 39% Impaired, limited, or restricted    CK  40% - 59% Impaired, limited, or restricted    CL  60% - 79% Impaired, limited, or restricted    CM  80% - 99% Impaired, limited, or restricted    CN  100% Impaired, limited, or restricted            TREATMENT  Therapeutic Exercise x40 min  Hooklying bilat ER GTB 15x5" - pt count out loud  Hooklying add with pillow 15x5" - pt count out loud    WITH SEMG  PF Electrode: external perianal  Pt. Position: semi-Merchant's and unsupported sitting  Kegels 10"W/10"R x10 in each position          Neuromuscular Reeducation x10 min  Narrowed modified tandem stance 3x45" bilat - close SBA         Education: Discussed progression of plan of care with patient; educated pt in activity modification; reviewed HEP with pt. Pt demonstrated and verbalized understanding of all instruction and was provided with a handout of HEP (see Patient Instructions).    Assessment     Pt tolerated treatment well with no visible adverse affects. No skin irritation, errythemia, or other " adverse affects observed from electrode placement. Kegels performed with good WR rise, fair/good holding, very timely. Pt demo'd elevated resting phase in sitting, however this was more related to movement of the electrodes due to pt frequently changing positions. Pt had greatly improved in her ability to maintain normal breathing pattern during kegels. Pt was very fatigued by today's exercises, requiring increased rest breaks between exercises. Encouraged pt to perform entire HEP daily for improvements. Will continue to progress pt to tolerance to improve PFM strength, control, and endurance in order to improve her urinary incontinence. Pt met 3/3 STGs with remaining goals still appropriate.     GOALS  Short Term Goals: 1 month  1. Pt will verbalize improved awareness of PFM activity as palpated by PT in order to improve activity involvement with HEP.  2. Pt will be able to maintain a normal breathing pattern during PFM contractions to prevent adverse affects to adjacent structures.   3. Pt will tolerate HEP to improve impairments and independence with ADL's.  ALL GOALS MET 7/7/17     Long Term Goals: 3 months  1. Pt will report <22% on MIGUELINA-6 to demonstrate a decrease in disability and improvement in independence with functional activities.   2. Patient able to perform basic ADL with less leaking.,   3. Pt will be independent in double voiding to ensure full bladder emptying.   4. Pt to be I with self mgmt. of symptoms.    5. Pt to be I with HEP.    Plan     Continue with established POC, working toward PT goals.

## 2017-07-21 ENCOUNTER — CLINICAL SUPPORT (OUTPATIENT)
Dept: REHABILITATION | Facility: HOSPITAL | Age: 82
End: 2017-07-21
Attending: UROLOGY
Payer: MEDICARE

## 2017-07-21 DIAGNOSIS — N39.3 SUI (STRESS URINARY INCONTINENCE, FEMALE): ICD-10-CM

## 2017-07-21 DIAGNOSIS — R53.1 WEAKNESS: ICD-10-CM

## 2017-07-21 PROCEDURE — 97110 THERAPEUTIC EXERCISES: CPT | Mod: PO | Performed by: PHYSICAL THERAPIST

## 2017-07-21 NOTE — PATIENT INSTRUCTIONS
Home Exercise Program: 07/21/2017    JONH WHILE STANDING  1. Stand comfortably with legs and buttocks relaxed.  2. Contract and LIFT the pelvic floor muscles as if you're trying to stop the stream of urine and passage of gas - doing a 50% squeeze.   3. Hold LIFT for 10 seconds without holding breath. You should be able to talk out loud the entire time.  4. Focus on squeezing around the vaginal opening (as if you're trying to close the vaginal opening).  5. Release and DROP the pelvic floor muscles for 10 seconds.  6. Repeat 10 times, 8-10 sets per day. Spread throughout the day.

## 2017-07-21 NOTE — PROGRESS NOTES
"Patient: Pau Cook "Lotty"  Clinic #: [id number]   Diagnosis:   Encounter Diagnoses   Name Primary?    Weakness     MUNIR (stress urinary incontinence, female)       Date of start of care: 6/8/17 (PN 7/7/17)  Date of treatment: 07/21/2017   Time in: 9:00  Time out: 9:45  Billable time: 45 min  # Visits: 4/6  Auth: (25) 12/31/17  POC expiration: 9/8/17  Outpatient Physical Therapy   Treatment Note    Subjective     Pt reports that she has "definitely" noticed an improvement. She isn't leaking as much during the day and even less at night.     Pain: Current: 0/10 vaginal    Objective     TREATMENT  Therapeutic Exercise with SEMG x30 min  PF Electrode: external perianal  Pt. Position: unsupported sitting, supported standing, unsupported standing  Kegels 10"W/10"R x10 in each position            Did not perform:  Hooklying bilat ER GTB 15x5" - pt count out loud  Hooklying add with pillow 15x5" - pt count out loud  Narrowed modified tandem stance 3x45" bilat - close SBA       Education: Discussed progression of plan of care with patient; educated pt in activity modification; reviewed HEP with pt. Pt demonstrated and verbalized understanding of all instruction and was provided with a handout of HEP (see Patient Instructions).    Assessment     Pt tolerated treatment well with no visible adverse affects. No skin irritation, errythemia, or other adverse affects observed from electrode placement. Kegels performed with good WR rise, fair/good holding, very timely. When cued to correct pt's flexed posture in standing, pt demo'd a slightly elevated PFM resting baseline. Pt denied urinary incontinence with today's exercises. Pt had better endurance with today's exercises than at previous visit. Will continue to progress pt to tolerance to improve PFM strength, control, and endurance in order to improve her urinary incontinence.     Plan     Continue with established POC, working toward PT goals.        "

## 2017-08-04 ENCOUNTER — CLINICAL SUPPORT (OUTPATIENT)
Dept: REHABILITATION | Facility: HOSPITAL | Age: 82
End: 2017-08-04
Attending: UROLOGY
Payer: MEDICARE

## 2017-08-04 DIAGNOSIS — N39.3 SUI (STRESS URINARY INCONTINENCE, FEMALE): ICD-10-CM

## 2017-08-04 DIAGNOSIS — R53.1 WEAKNESS: ICD-10-CM

## 2017-08-04 PROCEDURE — 97110 THERAPEUTIC EXERCISES: CPT | Mod: PO | Performed by: PHYSICAL THERAPIST

## 2017-08-04 PROCEDURE — G8991 OTHER PT/OT GOAL STATUS: HCPCS | Mod: CJ,PO | Performed by: PHYSICAL THERAPIST

## 2017-08-04 PROCEDURE — G8992 OTHER PT/OT  D/C STATUS: HCPCS | Mod: CJ,PO | Performed by: PHYSICAL THERAPIST

## 2017-08-04 NOTE — PROGRESS NOTES
"Patient: Pau Cook "Lotty"  Clinic #: [id number]   Diagnosis:   Encounter Diagnoses   Name Primary?    Weakness     MUNIR (stress urinary incontinence, female)       Date of start of care: 6/8/17 (PN 7/7/17, 8/4/17)  Date of treatment: 08/04/2017   Time in: 9:00  Time out: 9:45  Billable time: 45 min  # Visits: 5/7  Auth: (25) 12/31/17  POC expiration: 9/8/17  Outpatient Physical Therapy   Discharge Note    Subjective     Pt reports she has improved so much that she is back to using the thin incontinence pads instead of the thick ones she has been using. She still finds that she is going to the bathroom every 2 hours. Pt doesn't think she's emptying all the way because she will feel a little drop come out sometimes. She thinks this could also be some of the premarin cream coming off with the urine - pt is unsure. Pt thinks she can continue these exercises on her own and would like today to be her last visit.     Pain: Current: 0/10 vaginal    Objective     Functional Limitations Reports - MIGUELINA-6  Score: 6/18  Current: 33%  Goal: 4/18   <22%  Category: Other     G-Code Modifiers  CH  0% Impaired, limited, or restricted    CI  19% - 1%  Impaired, limited, or restricted    CJ  20% - 39% Impaired, limited, or restricted    CK  40% - 59% Impaired, limited, or restricted    CL  60% - 79% Impaired, limited, or restricted    CM  80% - 99% Impaired, limited, or restricted    CN  100% Impaired, limited, or restricted         TREATMENT  Therapeutic Exercise with SEMG x40 min  PF Electrode: external perianal  Pt. Position: unsupported standing  Kegels 10"W/10"R x5 in supported sitting  Kegel x5 sitting in chair bending over  Kegels 10"W/10"R x5 in unsupported standing        Kegels x5 standing and bending over  Kegels with plie x10       Did not perform:  Hooklying bilat ER GTB 15x5" - pt count out loud  Hooklying add with pillow 15x5" - pt count out loud  Narrowed modified tandem stance 3x45" bilat - close SBA "       Education: Discussed progression of plan of care with patient; educated pt in activity modification; reviewed HEP with pt. Pt demonstrated and verbalized understanding of all instruction and was provided with a handout of HEP (see Patient Instructions).    Assessment     Pt tolerated treatment well with no visible adverse affects. No skin irritation, errythemia, or other adverse affects observed from electrode placement. Pt demo'd kegels with good WR rise, fair holding with movement, and timely and full derecruitment. Pt was able to verbalized appropriate awareness. Pt continues to demo accessory muscle use, worse of adductors, which made it difficult for pt to bend over or perform plie with good holding of PFMs. Pt reports confidence to continue HEP independently. Pt has met 3/3 STGs and 4/5 LTGs. With completion of full POC, it is believed that this pt would have achieved all goals. Will d/c at this time at pt's request.     GOALS  Short Term Goals: 1 month  1. Pt will verbalize improved awareness of PFM activity as palpated by PT in order to improve activity involvement with HEP.  2. Pt will be able to maintain a normal breathing pattern during PFM contractions to prevent adverse affects to adjacent structures.   3. Pt will tolerate HEP to improve impairments and independence with ADL's.  ALL GOALS MET 7/7/17     Long Term Goals: 3 months  1. Pt will report <22% on MIGUELINA-6 to demonstrate a decrease in disability and improvement in independence with functional activities. Not met  2. Patient able to perform basic ADL with less leaking.,   3. Pt will be independent in double voiding to ensure full bladder emptying.   4. Pt to be I with self mgmt. of symptoms.    5. Pt to be I with HEP.  ALL GOALS MET 8/4/17, except where indicated    Plan     D/C from PT.

## 2017-08-04 NOTE — PATIENT INSTRUCTIONS
Home Exercise Program: 08/04/2017    DOUBLE VOIDING - to be done after the initial urine stream has ended to try to get a second urine stream started    1. Sit comfortably on toilet.  2. Do a body scan - make sure your legs, buttocks, and abdominals are relaxed.  3. Take a couple deep, slow breaths to encourage your pelvic floor muscles to DROP.  4. Wait at least 2 minutes to see if a second urine stream begins.     Do not stop your urine stream or push/strain!      KEGELS IN STANDING  1. Stand comfortably with legs and buttocks relaxed.  2. Contract and LIFT the pelvic floor muscles as if you're trying to stop the stream of urine and passage of gas.  3. Hold LIFT for 10 seconds without holding breath. You should be able to talk out loud the entire time.  4. Release and DROP completely for 10 seconds.  5. Repeat 10 times, 4-6 sets per day. Spread throughout the day.    No Kegels while urinating!      Kegel + plie x10 TID

## 2017-08-22 ENCOUNTER — TELEPHONE (OUTPATIENT)
Dept: INTERNAL MEDICINE | Facility: CLINIC | Age: 82
End: 2017-08-22

## 2017-08-22 DIAGNOSIS — L82.1 SEBORRHEIC KERATOSES: Primary | ICD-10-CM

## 2017-08-22 NOTE — TELEPHONE ENCOUNTER
----- Message from Radha Oswald sent at 8/22/2017 10:33 AM CDT -----  Contact: Home: [phone]   Sophy patient want to know the name of the Dermatologist your  uses?

## 2017-08-22 NOTE — TELEPHONE ENCOUNTER
She use to see dr prakash but cost more out of pocket with [employer].   I gave her dr madsen name and put in referral for   Seborrheic keratoses

## 2017-08-31 ENCOUNTER — TELEPHONE (OUTPATIENT)
Dept: DERMATOLOGY | Facility: CLINIC | Age: 82
End: 2017-08-31

## 2017-08-31 NOTE — TELEPHONE ENCOUNTER
Left voicemail message for pt to give me a call back.    ----- Message from Aileen Garner sent at 8/31/2017  8:45 AM CDT -----  Contact: pt   SHAILApt- pt is calling to speak with the nurse pt needs to be seen before her 10-31 appt pt has a consult appt  pt has a sore on her back pt said it hurts and bothers her pt said its sore all around it. Can you please call pt at [phone] jc

## 2017-09-05 ENCOUNTER — TELEPHONE (OUTPATIENT)
Dept: DERMATOLOGY | Facility: CLINIC | Age: 82
End: 2017-09-05

## 2017-09-05 NOTE — TELEPHONE ENCOUNTER
Returned pt call. Informed her can get her on our wait list for a sooner appointment. Pt states she wants to be added to the wait list.     ----- Message from Aileen Garner sent at 8/31/2017  1:39 PM CDT -----  Contact: pt   ABRAM-pt- pt is returning the nurses phone call can you please call pt at [phone] jc

## 2017-09-28 ENCOUNTER — OFFICE VISIT (OUTPATIENT)
Dept: INTERNAL MEDICINE | Facility: CLINIC | Age: 82
End: 2017-09-28
Payer: MEDICARE

## 2017-09-28 VITALS
RESPIRATION RATE: 16 BRPM | BODY MASS INDEX: 23.54 KG/M2 | WEIGHT: 141.31 LBS | HEART RATE: 77 BPM | DIASTOLIC BLOOD PRESSURE: 68 MMHG | TEMPERATURE: 98 F | SYSTOLIC BLOOD PRESSURE: 123 MMHG | HEIGHT: 65 IN

## 2017-09-28 DIAGNOSIS — R35.0 URINARY FREQUENCY: ICD-10-CM

## 2017-09-28 DIAGNOSIS — R32 INCONTINENCE OF URINE IN FEMALE: ICD-10-CM

## 2017-09-28 DIAGNOSIS — H92.01 EAR PAIN, RIGHT: ICD-10-CM

## 2017-09-28 DIAGNOSIS — M54.50 CHRONIC MIDLINE LOW BACK PAIN WITHOUT SCIATICA: ICD-10-CM

## 2017-09-28 DIAGNOSIS — G89.29 CHRONIC MIDLINE LOW BACK PAIN WITHOUT SCIATICA: ICD-10-CM

## 2017-09-28 DIAGNOSIS — G60.9 IDIOPATHIC PERIPHERAL NEUROPATHY: ICD-10-CM

## 2017-09-28 DIAGNOSIS — L29.9 PRURITUS: Primary | ICD-10-CM

## 2017-09-28 PROCEDURE — 1126F AMNT PAIN NOTED NONE PRSNT: CPT | Mod: ,,, | Performed by: INTERNAL MEDICINE

## 2017-09-28 PROCEDURE — 99999 PR PBB SHADOW E&M-EST. PATIENT-LVL III: CPT | Mod: PBBFAC,,, | Performed by: INTERNAL MEDICINE

## 2017-09-28 PROCEDURE — 1159F MED LIST DOCD IN RCRD: CPT | Mod: ,,, | Performed by: INTERNAL MEDICINE

## 2017-09-28 PROCEDURE — 3008F BODY MASS INDEX DOCD: CPT | Mod: ,,, | Performed by: INTERNAL MEDICINE

## 2017-09-28 PROCEDURE — 99214 OFFICE O/P EST MOD 30 MIN: CPT | Mod: S$GLB,,, | Performed by: INTERNAL MEDICINE

## 2017-09-29 ENCOUNTER — TELEPHONE (OUTPATIENT)
Dept: INTERNAL MEDICINE | Facility: CLINIC | Age: 82
End: 2017-09-29

## 2017-09-29 DIAGNOSIS — L82.1 SEBORRHEIC KERATOSES: Primary | ICD-10-CM

## 2017-09-29 NOTE — TELEPHONE ENCOUNTER
----- Message from Holli Harmon sent at 9/29/2017 10:01 AM CDT -----  Contact: patient [phone]   told pt to purchase vitamin B-12 but she doesn't know what strength to get. Please call to clarify.

## 2017-09-29 NOTE — TELEPHONE ENCOUNTER
Instructed patient she can start with 100mg of B12 daily and will contact Dr Orlando for further instructions.

## 2017-10-01 PROBLEM — L29.9 PRURITUS: Status: ACTIVE | Noted: 2017-10-01

## 2017-10-01 PROBLEM — H92.01 EAR PAIN, RIGHT: Status: ACTIVE | Noted: 2017-10-01

## 2017-10-01 PROBLEM — R32 INCONTINENCE OF URINE IN FEMALE: Status: ACTIVE | Noted: 2017-10-01

## 2017-10-01 NOTE — PROGRESS NOTES
Subjective:       Patient ID: Pau Cook is a 88 y.o. female.    Chief Complaint: Rash and Back Pain  HISTORY OF PRESENT ILLNESS:  An 88-year-old white female who came in with a list   of things that were bothering her, primarily a rash on her back.  This has been   pruritic.  She has been scratching that area, but not wanting to.  The lesion   is around her bra line and the upper and lower part of the back.    She had seen her ENT for right earache and was told that exam was normal.  The   pain occurs generally when she sleeps on that side of her head and she awakens   with pain in the morning, goes away during the day.    She also has lower back pain, which she gets when she is up for long periods of   time and tends to get better when she sits or lies down, it goes away   completely.  She has had no injury to her back.  She has had a longstanding   idiopathic neuropathy in her feet that she again mentioned this time.    PHYSICAL EXAMINATION:  VITAL SIGNS:  Normal.  SKIN:  Shows some reddened areas from scratching or rubbing and then a few very   minimally denuded areas on her back that are widely spread.  There is no rash   elsewhere.  She has fair skin.  HEENT:  Right ear looks normal.  She has a tender prominence of calculi at the   base of the ear near the inferior portion of the ear canal that is tender and   reproduces the pain.  MUSCULOSKELETAL:  The patient has lordosis.  She tends to sit in a slumped   position having the back pain and feels better when she sits up as straight as   she can, though she is still lordotic.  Pain is in the paraspinal muscle area.    She has no deformity of her spine.  She has no radicular character.    IMPRESSION:  1.  Back pain secondary to a combination of her age, the lordosis and her   posture.  We talked about exercises, walking more upright.  2.  The pain in her ear is secondary to the cartilage that is prominent that she   lies on while she is sleeping.  3.   Problem with her skin is dry skin rash.  I talked to her about using coconut   oil nightly.  4.  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  I will see her again at   her regularly scheduled time.      JDC/HN  dd: 10/01/2017 16:20:27 (CDT)  td: 10/02/2017 02:56:21 (CDT)  Doc ID   #[id number]  Job ID #[id number]    CC:     Dict [id number]  HPI  Review of Systems   Constitutional: Negative.    HENT: Positive for ear pain. Negative for congestion, hearing loss, sinus pressure, sneezing, sore throat and voice change.    Eyes: Negative for visual disturbance.   Respiratory: Negative for cough, chest tightness and shortness of breath.    Cardiovascular: Negative.  Negative for chest pain, palpitations and leg swelling.   Gastrointestinal: Negative.    Genitourinary: Negative for difficulty urinating, dysuria, flank pain, frequency, hematuria, menstrual problem, pelvic pain, urgency, vaginal bleeding and vaginal discharge.   Musculoskeletal: Positive for back pain. Negative for neck pain and neck stiffness.   Skin: Positive for rash.   Neurological: Positive for numbness. Negative for dizziness, seizures, light-headedness and headaches.   Psychiatric/Behavioral: Negative for agitation, behavioral problems, confusion and sleep disturbance. The patient is not nervous/anxious.        Objective:      Physical Exam   Constitutional: She is oriented to person, place, and time. She appears well-developed and well-nourished.   Eyes: EOM are normal. Pupils are equal, round, and reactive to light.   Neck: Normal range of motion. Neck supple. No JVD present. No thyromegaly present.   Cardiovascular: Normal rate, regular rhythm, normal heart sounds and intact distal pulses.  Exam reveals no gallop.    No murmur heard.  Pulmonary/Chest: Breath sounds normal. She has no wheezes. She has no rales.   Abdominal: Soft. Bowel sounds are normal. She exhibits no mass. There is no tenderness.   Musculoskeletal: Normal range of motion. She exhibits  tenderness.   Lymphadenopathy:     She has no cervical adenopathy.   Neurological: She is alert and oriented to person, place, and time. She has normal reflexes. No cranial nerve deficit.   Skin: No rash noted. There is erythema.   Psychiatric: She has a normal mood and affect. Judgment normal.       Assessment:       No diagnosis found.    Plan:

## 2017-10-02 NOTE — TELEPHONE ENCOUNTER
I told her dr recommendation.  She is booked to see derm at [facility] at end of the month but wants to return to dr david prakash.  I told her i'd put in referral and let her know outcome of approval.    Sent daiana message to let patient know outcome.

## 2017-10-18 NOTE — TELEPHONE ENCOUNTER
Received phone call from Ellen Michel who stated the authorization request for the patient to see Dr Machelle Ponce was given to the medical director and denied. Dr Ponce is not in network with the patient's [facility] SNP plan. A denial letter is being sent to Dr Orlando and to the patient with an explanation of the reason for the denial and instructions for an appeal.

## 2017-11-06 ENCOUNTER — OFFICE VISIT (OUTPATIENT)
Dept: DERMATOLOGY | Facility: CLINIC | Age: 82
End: 2017-11-06
Payer: MEDICARE

## 2017-11-06 DIAGNOSIS — L82.1 SEBORRHEIC KERATOSES: Primary | ICD-10-CM

## 2017-11-06 DIAGNOSIS — L57.0 ACTINIC KERATOSIS: ICD-10-CM

## 2017-11-06 DIAGNOSIS — Z85.828 PERSONAL HISTORY OF MALIGNANT NEOPLASM OF SKIN: ICD-10-CM

## 2017-11-06 DIAGNOSIS — D48.5 NEOPLASM OF UNCERTAIN BEHAVIOR OF SKIN: ICD-10-CM

## 2017-11-06 PROCEDURE — 88341 IMHCHEM/IMCYTCHM EA ADD ANTB: CPT | Mod: 26,,, | Performed by: PATHOLOGY

## 2017-11-06 PROCEDURE — 99999 PR PBB SHADOW E&M-EST. PATIENT-LVL III: CPT | Mod: PBBFAC,,, | Performed by: DERMATOLOGY

## 2017-11-06 PROCEDURE — 17000 DESTRUCT PREMALG LESION: CPT | Mod: S$GLB,,, | Performed by: DERMATOLOGY

## 2017-11-06 PROCEDURE — 88342 IMHCHEM/IMCYTCHM 1ST ANTB: CPT | Mod: 26,,, | Performed by: PATHOLOGY

## 2017-11-06 PROCEDURE — 88305 TISSUE EXAM BY PATHOLOGIST: CPT | Performed by: PATHOLOGY

## 2017-11-06 PROCEDURE — 99213 OFFICE O/P EST LOW 20 MIN: CPT | Mod: 25,S$GLB,, | Performed by: DERMATOLOGY

## 2017-11-06 PROCEDURE — 11100 PR BIOPSY OF SKIN LESION: CPT | Mod: 59,S$GLB,, | Performed by: DERMATOLOGY

## 2017-11-06 RX ORDER — MULTIVIT WITH MINERALS/HERBS
1 TABLET ORAL DAILY
COMMUNITY
End: 2018-04-24

## 2017-11-06 NOTE — PROGRESS NOTES
Subjective:       Patient ID:  Pau Cook is a 88 y.o. female who presents for   Chief Complaint   Patient presents with    Mass     back, x 3-4 months, irritating, painful, itchy, tx cortisone cream     Mass  - Initial  Affected locations: back  Duration: 4 months  Signs / symptoms: itching, pain and irritated  Relieving factors/Treatments tried: OTC hydrocortisone      Derm Hx: SCC R lower leg in 4/2016  Past Medical History:   Diagnosis Date    Allergy     Arthritis     Foot pain     Joint pain     Squamous cell carcinoma 04/2016    Right Lower Leg    MUNIR (stress urinary incontinence, female) 4/17/2017     Review of Systems   Constitutional: Positive for fatigue. Negative for fever, chills and malaise.   Skin: Positive for activity-related sunscreen use. Negative for daily sunscreen use and recent sunburn.   Hematologic/Lymphatic: Does not bruise/bleed easily.        Objective:    Physical Exam   Constitutional: She appears well-developed and well-nourished. No distress.   Neurological: She is alert and oriented to person, place, and time. She is not disoriented.   Psychiatric: She has a normal mood and affect.   Skin:   Areas Examined (abnormalities noted in diagram):   Scalp / Hair Palpated and Inspected  Head / Face Inspection Performed  Neck Inspection Performed  Back Inspection Performed  RUE Inspected  LUE Inspection Performed                       Diagram Legend     Erythematous scaling macule/papule c/w actinic keratosis       Vascular papule c/w angioma      Pigmented verrucoid papule/plaque c/w seborrheic keratosis      Yellow umbilicated papule c/w sebaceous hyperplasia      Irregularly shaped tan macule c/w lentigo     1-2 mm smooth white papules consistent with Milia      Movable subcutaneous cyst with punctum c/w epidermal inclusion cyst      Subcutaneous movable cyst c/w pilar cyst      Firm pink to brown papule c/w dermatofibroma      Pedunculated fleshy papule(s) c/w skin tag(s)       Evenly pigmented macule c/w junctional nevus     Mildly variegated pigmented, slightly irregular-bordered macule c/w mildly atypical nevus      Flesh colored to evenly pigmented papule c/w intradermal nevus       Pink pearly papule/plaque c/w basal cell carcinoma      Erythematous hyperkeratotic cursted plaque c/w SCC      Surgical scar with no sign of skin cancer recurrence      Open and closed comedones      Inflammatory papules and pustules      Verrucoid papule consistent consistent with wart     Erythematous eczematous patches and plaques     Dystrophic onycholytic nail with subungual debris c/w onychomycosis     Umbilicated papule    Erythematous-base heme-crusted tan verrucoid plaque consistent with inflamed seborrheic keratosis     Erythematous Silvery Scaling Plaque c/w Psoriasis     See annotation      Assessment / Plan:      Pathology Orders:      Normal Orders This Visit    Tissue Specimen To Pathology, Dermatology     Questions:    Directional Terms:  Other(comment)    Clinical information:  ISK vs SCC vs other    Specific Site:  mid back        Neoplasm of uncertain behavior of skin  -     Tissue Specimen To Pathology, Dermatology  Shave biopsy procedure note:    Shave biopsy performed after verbal consent including risk of infection, scar, recurrence, need for additional treatment of site. Area prepped with alcohol, anesthetized with approximately 1.0cc of 1% lidocaine with epinephrine. Lesional tissue shaved with razor blade. Hemostasis achieved with application of aluminum chloride. No complications. Dressing applied. Wound care explained.    Seborrheic keratoses  These are benign inherited growths without a malignant potential. Reassurance given to patient. No treatment is necessary.     Personal history of malignant neoplasm of skin  Skin examination performed today including at least 6 points as noted in physical examination. 1 lesion suspicious for malignancy noted.    Actinic  keratosis  Cryosurgery Procedure Note    Verbal consent from the patient is obtained and the patient is aware of the precancerous quality and need for treatment of these lesions. Liquid nitrogen cryosurgery is applied to the 1 actinic keratosis, as detailed in the physical exam, to produce a freeze injury. The patient is aware that blisters may form and is instructed on wound care with gentle cleansing and use of vaseline ointment to keep moist until healed. The patient is supplied a handout on cryosurgery and is instructed to call if lesions do not completely resolve.           Return for pending Pathology.

## 2017-11-06 NOTE — LETTER
November 6, 2017      Getachew Orlando MD  [address] [facility] [address]  [place] [address]           [facility]  [address]  [place] [address]  Phone: [phone]  Fax: [phone]          Patient: Pau Cook   MR Number: [id number]   YOB: 1929   Date of Visit: 11/6/2017       Dear Dr. Getachew Orlando:    Thank you for referring Pau Cook to me for evaluation. Attached you will find relevant portions of my assessment and plan of care.    If you have questions, please do not hesitate to call me. I look forward to following Pau Cook along with you.    Sincerely,    Daiana Gray MD    Enclosure  CC:  No Recipients    If you would like to receive this communication electronically, please contact [email or web address] or [phone] to request more information on [employer] Link access.    For providers and/or their staff who would like to refer a patient to [facility], please contact us through our one-stop-shop provider referral line, [facility], at [phone].    If you feel you have received this communication in error or would no longer like to receive these types of communications, please e-mail [email or web address]

## 2017-11-23 ENCOUNTER — HOSPITAL ENCOUNTER (EMERGENCY)
Facility: HOSPITAL | Age: 82
Discharge: HOME | End: 2017-11-23
Attending: EMERGENCY MEDICINE
Payer: MEDICARE

## 2017-11-23 VITALS
HEART RATE: 79 BPM | RESPIRATION RATE: 18 BRPM | BODY MASS INDEX: 24.8 KG/M2 | OXYGEN SATURATION: 98 % | TEMPERATURE: 98 F | WEIGHT: 140 LBS | SYSTOLIC BLOOD PRESSURE: 153 MMHG | HEIGHT: 63 IN | DIASTOLIC BLOOD PRESSURE: 68 MMHG

## 2017-11-23 DIAGNOSIS — S00.81XA ABRASION OF FACE, INITIAL ENCOUNTER: Primary | ICD-10-CM

## 2017-11-23 PROCEDURE — 99283 EMERGENCY DEPT VISIT LOW MDM: CPT

## 2017-11-23 PROCEDURE — 25000003 PHARM REV CODE 250: Performed by: EMERGENCY MEDICINE

## 2017-11-23 RX ADMIN — BACITRACIN ZINC, NEOMYCIN SULFATE, POLYMYXIN B SULFATE 1 EACH: 3.5; 5000; 4 OINTMENT TOPICAL at 12:11

## 2017-11-23 NOTE — ED PROVIDER NOTES
Encounter Date: 11/23/2017       History     Chief Complaint   Patient presents with    Fall     Pt states was walking and tripped in driveway landing on concrete, states + glasses in place, cresent shaped abrasion/laceration noted to left orbit.  + skin tearing noted to area.  Denies LOC.      The history is provided by the patient.   Fall   The accident occurred just prior to arrival. The fall occurred while recreating/playing and while walking. She fell from a height of 3 to 5 ft. She landed on concrete. The point of impact was the face. The pain is present in the face. The pain is at a severity of 4/10. She was ambulatory at the scene. There was no entrapment after the fall. There was no drug use involved in the accident. There was no alcohol use involved in the accident. Pertinent negatives include no loss of consciousness.     Review of patient's allergies indicates:   Allergen Reactions    Tetanus vaccines and toxoid Swelling     Past Medical History:   Diagnosis Date    Allergy     Arthritis     Foot pain     Joint pain     Squamous cell carcinoma 04/2016    Right Lower Leg    MUNIR (stress urinary incontinence, female) 4/17/2017     Past Surgical History:   Procedure Laterality Date    APPENDECTOMY      COLON SURGERY      hemorhids       Family History   Problem Relation Age of Onset    Adopted: Yes    Cancer Mother     Stroke Father     Cancer Sister     No Known Problems Daughter     No Known Problems Brother     No Known Problems Daughter     Diabetes Brother     Melanoma Neg Hx      Social History   Substance Use Topics    Smoking status: Never Smoker    Smokeless tobacco: Never Used    Alcohol use No      Comment: rarely     Review of Systems   Skin: Positive for wound.   Neurological: Negative for loss of consciousness.   All other systems reviewed and are negative.      Physical Exam     Initial Vitals [11/23/17 1115]   BP Pulse Resp Temp SpO2   (!) 153/68 79 18 97.6 °F (36.4 °C)  98 %      MAP       96.33         Physical Exam    Nursing note and vitals reviewed.  Constitutional: She appears well-developed and well-nourished.   HENT:   Head: Normocephalic.       Eyes: EOM are normal.   Neck: Normal range of motion. Neck supple.   Cardiovascular: Normal rate, regular rhythm, normal heart sounds and intact distal pulses.   Pulmonary/Chest: Breath sounds normal.   Abdominal: Soft.   Musculoskeletal: Normal range of motion.   Neurological: She is alert and oriented to person, place, and time.   Skin: Skin is warm and dry. Capillary refill takes less than 2 seconds.   Psychiatric: She has a normal mood and affect. Her behavior is normal. Judgment and thought content normal.         ED Course   Procedures  Labs Reviewed - No data to display                            ED Course      Clinical Impression:   The encounter diagnosis was Abrasion of face, initial encounter.    Disposition:   Disposition: Discharged  Condition: Stable                        Tonya Field MD  11/23/17 1019

## 2017-12-13 ENCOUNTER — OFFICE VISIT (OUTPATIENT)
Dept: INTERNAL MEDICINE | Facility: CLINIC | Age: 82
End: 2017-12-13
Payer: MEDICARE

## 2017-12-13 ENCOUNTER — HOSPITAL ENCOUNTER (OUTPATIENT)
Dept: RADIOLOGY | Facility: HOSPITAL | Age: 82
Discharge: HOME | End: 2017-12-13
Attending: INTERNAL MEDICINE
Payer: MEDICARE

## 2017-12-13 VITALS
DIASTOLIC BLOOD PRESSURE: 61 MMHG | BODY MASS INDEX: 24.46 KG/M2 | HEIGHT: 64 IN | TEMPERATURE: 98 F | WEIGHT: 143.31 LBS | HEART RATE: 80 BPM | SYSTOLIC BLOOD PRESSURE: 127 MMHG | RESPIRATION RATE: 14 BRPM

## 2017-12-13 DIAGNOSIS — R29.3 POOR POSTURE: ICD-10-CM

## 2017-12-13 DIAGNOSIS — M54.50 CHRONIC MIDLINE LOW BACK PAIN WITHOUT SCIATICA: Primary | ICD-10-CM

## 2017-12-13 DIAGNOSIS — G89.29 CHRONIC MIDLINE LOW BACK PAIN WITHOUT SCIATICA: ICD-10-CM

## 2017-12-13 DIAGNOSIS — G89.29 CHRONIC MIDLINE LOW BACK PAIN WITHOUT SCIATICA: Primary | ICD-10-CM

## 2017-12-13 DIAGNOSIS — M54.50 CHRONIC MIDLINE LOW BACK PAIN WITHOUT SCIATICA: ICD-10-CM

## 2017-12-13 PROCEDURE — 99999 PR PBB SHADOW E&M-EST. PATIENT-LVL III: CPT | Mod: PBBFAC,,, | Performed by: INTERNAL MEDICINE

## 2017-12-13 PROCEDURE — 72100 X-RAY EXAM L-S SPINE 2/3 VWS: CPT | Mod: 26,,, | Performed by: RADIOLOGY

## 2017-12-13 PROCEDURE — 72100 X-RAY EXAM L-S SPINE 2/3 VWS: CPT | Mod: TC,PO

## 2017-12-13 PROCEDURE — 99214 OFFICE O/P EST MOD 30 MIN: CPT | Mod: S$GLB,,, | Performed by: INTERNAL MEDICINE

## 2017-12-13 RX ORDER — METHYLPREDNISOLONE 4 MG/1
TABLET ORAL
Qty: 21 TABLET | Refills: 0 | Status: SHIPPED | OUTPATIENT
Start: 2017-12-13 | End: 2018-01-23 | Stop reason: ALTCHOICE

## 2017-12-13 RX ORDER — TIZANIDINE 2 MG/1
2 TABLET ORAL EVERY 8 HOURS PRN
Qty: 30 TABLET | Refills: 0 | Status: SHIPPED | OUTPATIENT
Start: 2017-12-13 | End: 2017-12-23

## 2017-12-13 RX ORDER — DICLOFENAC SODIUM 10 MG/G
2 GEL TOPICAL 3 TIMES DAILY
Qty: 200 G | Refills: 1 | Status: SHIPPED | OUTPATIENT
Start: 2017-12-13 | End: 2018-07-05

## 2017-12-13 NOTE — PROGRESS NOTES
Subjective:       Patient ID: Pau Cook is a 88 y.o. female.    Chief Complaint: Back Pain    HPI     88-year-old female here for evaluation of back pain.  She has had an intermittent back ache for 6 months.  This morning, she had such severe pain that she was almost unable to get out of bed.  She fell on Thanksgiving day.  She had severe pain around to her breast.  The pain in the last 6 months has been in her hips bilaterally, and this am shot from her back into her chest.  She took an OT Tonny pill this am.  She used some penetrex as well.      Review of Systems    Objective:      Physical Exam   Constitutional: She is oriented to person, place, and time. She appears well-developed and well-nourished.   HENT:   Head: Normocephalic and atraumatic.   Mouth/Throat: No oropharyngeal exudate.   Eyes: EOM are normal. Pupils are equal, round, and reactive to light. Right eye exhibits no discharge. Left eye exhibits no discharge. No scleral icterus.   Neck: Normal range of motion. Neck supple. No tracheal deviation present. No thyromegaly present.   Cardiovascular: Normal rate, regular rhythm and normal heart sounds.  Exam reveals no gallop and no friction rub.    No murmur heard.  Pulmonary/Chest: Effort normal and breath sounds normal. No respiratory distress. She has no wheezes. She has no rales. She exhibits no tenderness.   Abdominal: Soft. Bowel sounds are normal. She exhibits no distension and no mass. There is no tenderness. There is no rebound and no guarding.   Musculoskeletal: She exhibits no edema or tenderness.        Right hip: She exhibits decreased range of motion.        Left hip: She exhibits decreased range of motion.        Lumbar back: She exhibits decreased range of motion.   Neurological: She is alert and oriented to person, place, and time.   Skin: Skin is warm and dry. No rash noted. No erythema. No pallor.   Psychiatric: She has a normal mood and affect. Her behavior is normal.   Vitals  reviewed.      Assessment:       1. Chronic midline low back pain without sciatica    2. Poor posture        Plan:       1/2.   Refer to physical therapy.  Check lumbar spine x-ray.  Zanaflex 2 mg 3 times a day when necessary.  Advised this can make her drowsy.  Medrol Dosepak prescribed.

## 2017-12-18 ENCOUNTER — TELEPHONE (OUTPATIENT)
Dept: INTERNAL MEDICINE | Facility: CLINIC | Age: 82
End: 2017-12-18

## 2017-12-18 DIAGNOSIS — R32 FEMALE INCONTINENCE: ICD-10-CM

## 2017-12-18 DIAGNOSIS — G62.9 PERIPHERAL POLYNEUROPATHY: ICD-10-CM

## 2017-12-18 DIAGNOSIS — R53.83 FATIGUE, UNSPECIFIED TYPE: ICD-10-CM

## 2017-12-18 DIAGNOSIS — R79.9 ABNORMAL FINDING OF BLOOD CHEMISTRY: ICD-10-CM

## 2017-12-18 DIAGNOSIS — K21.00 REFLUX ESOPHAGITIS: Primary | ICD-10-CM

## 2018-01-10 ENCOUNTER — OFFICE VISIT (OUTPATIENT)
Dept: INTERNAL MEDICINE | Facility: CLINIC | Age: 83
End: 2018-01-10
Payer: MEDICARE

## 2018-01-10 VITALS
OXYGEN SATURATION: 99 % | BODY MASS INDEX: 26.09 KG/M2 | DIASTOLIC BLOOD PRESSURE: 64 MMHG | HEIGHT: 62 IN | HEART RATE: 100 BPM | SYSTOLIC BLOOD PRESSURE: 102 MMHG | TEMPERATURE: 98 F | RESPIRATION RATE: 16 BRPM | WEIGHT: 141.75 LBS

## 2018-01-10 DIAGNOSIS — L03.032 PARONYCHIA OF TOENAIL OF LEFT FOOT: Primary | ICD-10-CM

## 2018-01-10 DIAGNOSIS — B35.1 ONYCHOMYCOSIS: ICD-10-CM

## 2018-01-10 PROCEDURE — 99214 OFFICE O/P EST MOD 30 MIN: CPT | Mod: S$GLB,,, | Performed by: INTERNAL MEDICINE

## 2018-01-10 PROCEDURE — 99999 PR PBB SHADOW E&M-EST. PATIENT-LVL IV: CPT | Mod: PBBFAC,,, | Performed by: INTERNAL MEDICINE

## 2018-01-10 RX ORDER — MUPIROCIN 20 MG/G
OINTMENT TOPICAL 3 TIMES DAILY
Qty: 30 G | Refills: 1 | Status: SHIPPED | OUTPATIENT
Start: 2018-01-10 | End: 2018-04-24

## 2018-01-10 NOTE — PROGRESS NOTES
Subjective:       Patient ID: Pau Cook is a 88 y.o. female.    Chief Complaint: Nail Problem (big toe LT foot)    HPI   Patient presenting with L big toe infection for last 2 days.  She notes it was red and bleeding so she soaked it last night.  She notes improvement this am.  She denies any fevers or chills.  She has tried neosporin with mild relief.  She notes thickening of nails.  She denies any trauma.        Review of Systems   Constitutional: Negative for activity change, chills and fever.   HENT: Negative for congestion, sinus pain, sinus pressure and sore throat.    Eyes: Negative for pain and redness.   Respiratory: Negative for cough and shortness of breath.    Cardiovascular: Negative for chest pain.   Gastrointestinal: Negative for abdominal pain, constipation, nausea and vomiting.   Genitourinary: Negative for difficulty urinating.   Musculoskeletal: Positive for arthralgias (toe pain). Negative for joint swelling.   Skin: Negative for rash.   Neurological: Negative for dizziness and light-headedness.   Psychiatric/Behavioral: Negative for dysphoric mood and sleep disturbance.     Objective:     Vitals:    01/10/18 0834   BP: 102/64   Pulse: 100   Resp: 16   Temp: 98.1 °F (36.7 °C)    Body mass index is 25.93 kg/m².  Physical Exam   Constitutional: She is oriented to person, place, and time. She appears well-developed and well-nourished.   HENT:   Head: Normocephalic and atraumatic.   Mouth/Throat: Oropharynx is clear and moist.   Eyes: Conjunctivae are normal. Pupils are equal, round, and reactive to light.   Neck: Normal range of motion. No tracheal deviation present. No thyromegaly present.   Musculoskeletal: She exhibits no edema.   Multiple superficial varicosities in b/l LE    L first toe with hyperkeratotic toenail with some necrotic nail and redness around the nail bed    No edema or redness beyond the nail bed, no warmth or tenderness    Thickening of all nails in feet    Neurological: She is alert and oriented to person, place, and time.   Skin: Skin is warm and dry. No rash noted.   Psychiatric: She has a normal mood and affect. Judgment normal.     Assessment:     1. Paronychia of toenail of left foot    2. Onychomycosis      Plan:   1. Paronychia of toenail of left foot  - continue soaks  - Ambulatory referral to Podiatry  - mupirocin (BACTROBAN) 2 % ointment; Apply topically 3 (three) times daily.  Dispense: 30 g; Refill: 1    2. Onychomycosis      Patient is to call or return to clinic if symptoms worsen or fail to improve.

## 2018-01-10 NOTE — PATIENT INSTRUCTIONS
Paronychia of the Finger or Toe  Paronychia is an infection near a fingernail or toenail. It usually occurs when an opening in the cuticle or an ingrown toenail lets bacteria under the skin.  The infection will need to be drained if pus is present. If the infection has been caught early, you may need only antibiotic treatment. Healing will take about 1 to 2 weeks.  Home care  Follow these guidelines when caring for yourself at home:  · Clean and soak the toe or finger. Do this 2 times a day for the first 3 days. To do so:  ¨ Soak your foot or hand in a tub of warm water for 5 minutes. Or hold your toe or finger under a faucet of warm running water for 5 minutes.  ¨ Clean any crust away with soap and water using a cotton swab.  ¨ Put antibiotic ointment on the infected area.  · Change the dressing daily or any time it gets dirty.  · If you were given antibiotics, take them as directed until they are all gone.  · If your infection is on a toe, wear comfortable shoes with a lot of toe room. You can also wear open-toed sandals while your toe heals.  · You may use over-the-counter medicine (acetaminophen or ibuprofen to help with pain, unless another medicine was prescribed. If you have chronic liver or kidney disease, talk with your healthcare provider before using these medicines. Also talk with your provider if you've had a stomach ulcer or GI (gastrointestinal) bleeding.  Prevention  The following can prevent paronychia:  · Avoid cutting or playing with your cuticles at home.  · Don't bite your nails.  · Don't suck on your thumbs or fingers.  Follow-up care  Follow up with your healthcare provider, or as advised.  When to seek medical advice  Call your healthcare provider right away if any of these occur:  · Redness, pain, or swelling of the finger or toe gets worse  · Red streaks in the skin leading away from the wound  · Pus or fluid draining from the nail area  · Fever of 100.4ºF (38ºC) or higher, or as directed  by your provider  Date Last Reviewed: 8/1/2016  © 4122-4902 [employer]. [address] [place] [address], [place] [address]. All rights reserved. This information is not intended as a substitute for professional medical care. Always follow your healthcare professional's instructions.

## 2018-01-15 ENCOUNTER — OFFICE VISIT (OUTPATIENT)
Dept: PODIATRY | Facility: CLINIC | Age: 83
End: 2018-01-15
Payer: MEDICARE

## 2018-01-15 VITALS — BODY MASS INDEX: 26.11 KG/M2 | HEIGHT: 62 IN | WEIGHT: 141.88 LBS

## 2018-01-15 DIAGNOSIS — I73.9 PERIPHERAL VASCULAR DISEASE: Primary | ICD-10-CM

## 2018-01-15 DIAGNOSIS — G60.9 IDIOPATHIC PERIPHERAL NEUROPATHY: ICD-10-CM

## 2018-01-15 DIAGNOSIS — B35.1 ONYCHOMYCOSIS DUE TO DERMATOPHYTE: ICD-10-CM

## 2018-01-15 PROCEDURE — 99499 UNLISTED E&M SERVICE: CPT | Mod: S$GLB,,,

## 2018-01-15 PROCEDURE — 11721 DEBRIDE NAIL 6 OR MORE: CPT | Mod: Q9,S$GLB,,

## 2018-01-15 PROCEDURE — 99213 OFFICE O/P EST LOW 20 MIN: CPT | Mod: 25,S$GLB,,

## 2018-01-15 PROCEDURE — 99999 PR PBB SHADOW E&M-EST. PATIENT-LVL III: CPT | Mod: PBBFAC,,,

## 2018-01-15 NOTE — LETTER
January 15, 2018      Anita Zaman MD  [address] [facility] [address]           [facility]  [address] [facility] [address]  Phone: [phone]          Patient: Pau Cook   MR Number: [id number]   YOB: 1929   Date of Visit: 1/15/2018       Dear Dr. Anita Zaman:    Thank you for referring Pau Cook to me for evaluation. Attached you will find relevant portions of my assessment and plan of care.    If you have questions, please do not hesitate to call me. I look forward to following Pau Cook along with you.    Sincerely,    Gael Prieto, JOANNA    Enclosure  CC:  No Recipients    If you would like to receive this communication electronically, please contact [email or web address] or [phone] to request more information on [email or web address] Link access.    For providers and/or their staff who would like to refer a patient to [facility], please contact us through our one-stop-shop provider referral line, [facility], at [phone].    If you feel you have received this communication in error or would no longer like to receive these types of communications, please e-mail [email or web address]

## 2018-01-23 ENCOUNTER — TELEPHONE (OUTPATIENT)
Dept: INTERNAL MEDICINE | Facility: CLINIC | Age: 83
End: 2018-01-23

## 2018-01-23 RX ORDER — CODEINE PHOSPHATE AND GUAIFENESIN 10; 100 MG/5ML; MG/5ML
5 SOLUTION ORAL 3 TIMES DAILY PRN
Qty: 240 ML | Refills: 0 | Status: SHIPPED | OUTPATIENT
Start: 2018-01-23 | End: 2018-02-02

## 2018-01-23 RX ORDER — AZITHROMYCIN 250 MG/1
TABLET, FILM COATED ORAL
Qty: 6 TABLET | Refills: 0 | Status: SHIPPED | OUTPATIENT
Start: 2018-01-23 | End: 2018-04-24

## 2018-01-23 NOTE — TELEPHONE ENCOUNTER
tussi organidin #[id number] to [phone] recorder as dr yen approved.    Patient advised what was sent pharmacy and be careful with use of tussi organdin, may make groggy and don't want her to fall.  She expressed understanding.

## 2018-01-23 NOTE — TELEPHONE ENCOUNTER
productive cough for a week. Drip in throat and headache from coughing so much.  denies fever  and wheezing.   On mucinex twice daily already.  She was coughing a lot on phone.  Needs syrup.    We made appt to see you Thursday and she is hoping you can rx over phone in meantime.  Didn't want to see anyone else.    Braxton buchanan cough syrup can be called in if ok to order with antibiotic.  Pharmacy verified.    Please advise today.  Thanks [email or web address]

## 2018-01-23 NOTE — TELEPHONE ENCOUNTER
----- Message from Trevin Oviedo sent at 1/23/2018 11:28 AM CST -----  Contact: self [phone]  Patient would like to get medical advice.  Symptoms (please be specific):  persistent cough   How long has patient had these symptoms: 1 week    Pharmacy name and phone #:  [facility] [id number]   [phone] (Phone)  [phone] (Fax)  Any drug allergies:  None   Comments:

## 2018-01-25 ENCOUNTER — TELEPHONE (OUTPATIENT)
Dept: INTERNAL MEDICINE | Facility: CLINIC | Age: 83
End: 2018-01-25

## 2018-01-25 ENCOUNTER — OFFICE VISIT (OUTPATIENT)
Dept: INTERNAL MEDICINE | Facility: CLINIC | Age: 83
End: 2018-01-25
Payer: MEDICARE

## 2018-01-25 ENCOUNTER — HOSPITAL ENCOUNTER (OUTPATIENT)
Dept: RADIOLOGY | Facility: HOSPITAL | Age: 83
Discharge: HOME | End: 2018-01-25
Attending: INTERNAL MEDICINE
Payer: MEDICARE

## 2018-01-25 VITALS
HEIGHT: 62 IN | WEIGHT: 141.31 LBS | HEART RATE: 90 BPM | BODY MASS INDEX: 26.01 KG/M2 | SYSTOLIC BLOOD PRESSURE: 121 MMHG | DIASTOLIC BLOOD PRESSURE: 72 MMHG | RESPIRATION RATE: 16 BRPM | TEMPERATURE: 98 F

## 2018-01-25 DIAGNOSIS — M54.50 ACUTE BILATERAL LOW BACK PAIN WITHOUT SCIATICA: ICD-10-CM

## 2018-01-25 DIAGNOSIS — S09.90XS: ICD-10-CM

## 2018-01-25 DIAGNOSIS — J11.1 FLU SYNDROME: ICD-10-CM

## 2018-01-25 DIAGNOSIS — J11.1 FLU SYNDROME: Primary | ICD-10-CM

## 2018-01-25 LAB
FLUAV AG SPEC QL IA: NEGATIVE
FLUBV AG SPEC QL IA: NEGATIVE
SPECIMEN SOURCE: NORMAL

## 2018-01-25 PROCEDURE — 99999 PR PBB SHADOW E&M-EST. PATIENT-LVL IV: CPT | Mod: PBBFAC,,, | Performed by: INTERNAL MEDICINE

## 2018-01-25 PROCEDURE — 71046 X-RAY EXAM CHEST 2 VIEWS: CPT | Mod: 26,,, | Performed by: RADIOLOGY

## 2018-01-25 PROCEDURE — 71046 X-RAY EXAM CHEST 2 VIEWS: CPT | Mod: TC,PO

## 2018-01-25 PROCEDURE — 87400 INFLUENZA A/B EACH AG IA: CPT | Mod: PO

## 2018-01-25 PROCEDURE — 99214 OFFICE O/P EST MOD 30 MIN: CPT | Mod: S$GLB,,, | Performed by: INTERNAL MEDICINE

## 2018-01-25 RX ORDER — OSELTAMIVIR PHOSPHATE 75 MG/1
75 CAPSULE ORAL 2 TIMES DAILY
Qty: 10 CAPSULE | Refills: 0 | Status: SHIPPED | OUTPATIENT
Start: 2018-01-25 | End: 2018-01-30

## 2018-01-25 NOTE — PROGRESS NOTES
Subjective:       Patient ID: Pau Cook is a 88 y.o. female.    Chief Complaint: Cough  An 88-year-old white female who comes in having started with a respiratory   infection approximately Thanksgiving, which is now almost two months ago.  She,   following that, fell on her face on a trip at [facility],  was taken to the Emergency   Room in [place], [place], had a laceration on her face on the left side from   her glasses cutting into her skin.  The patient then had low back problems for a   week later saw someone for that, received a steroid Dosepak and was advised but   did not do physical therapy.  The patient, I believe, saw Dr. Jerel Christianson for that   visit and since about a month ago, she once again developed respiratory   infection symptoms, was getting better, then 10 days ago, got worse with the   freeze in [place] at which time she lost her heat as well and her water.    Since she has developed this, she has been constipated and that is not normally   her.  She had gotten the flu shot.  We checked a flu prep on her when she was   here, which was negative and she had a chest x-ray done that showed no   pneumonia.    PHYSICAL EXAMINATION:  VITAL SIGNS:  Normal including temperature.  SKIN:  Fair and healthy.  She is coughing while I examined her.  HEENT:  Clear except for some wax in her ears.  NECK:  Showed no stiffness or adenopathy.  CHEST:  Clear.    IMPRESSION:  1.  Flu-like illness.  2.  Back pain secondary to a fall in November.  3.  Head injury involving the left face with good recovery on the same fall.      The patient was told the flu is around and I thought she should be treated for   both flu and bacterial infection.  She was placed on Z-DEACON prior to this visit   by me, given Robitussin-AC at that point and at the time of this visit, we   placed her on Tamiflu.  I also gave her instructions to drink plenty of fluids   and flush her nose with saline, use Mucinex.      OLIVER/SAMM  dd:  01/27/2018 14:02:04 (CST)  td: 01/28/2018 05:30:35 (CST)  Doc ID   #[id number]  Job ID #[id number]    CC:     [facility] [id number]  [facility]  Review of Systems   Constitutional: Positive for chills, fatigue and fever.   HENT: Positive for congestion, postnasal drip and sinus pressure. Negative for hearing loss, sneezing, sore throat and voice change.    Eyes: Negative for visual disturbance.   Respiratory: Positive for cough. Negative for chest tightness and shortness of breath.    Cardiovascular: Negative.  Negative for chest pain, palpitations and leg swelling.   Gastrointestinal: Positive for constipation.   Genitourinary: Negative for difficulty urinating, dysuria, flank pain, frequency, hematuria, menstrual problem, pelvic pain, urgency, vaginal bleeding and vaginal discharge.   Musculoskeletal: Positive for back pain. Negative for neck pain and neck stiffness.   Skin: Negative.    Neurological: Negative.  Negative for dizziness, seizures, light-headedness, numbness and headaches.   Psychiatric/Behavioral: Negative for agitation, behavioral problems, confusion and sleep disturbance. The patient is not nervous/anxious.        Objective:      Physical Exam   Constitutional: She is oriented to person, place, and time. She appears well-developed and well-nourished.   Eyes: EOM are normal. Pupils are equal, round, and reactive to light.   Neck: Normal range of motion. Neck supple. No JVD present. No thyromegaly present.   Cardiovascular: Normal rate, regular rhythm, normal heart sounds and intact distal pulses.  Exam reveals no gallop.    No murmur heard.  Pulmonary/Chest: Breath sounds normal. She has no wheezes. She has no rales.   Abdominal: Soft. Bowel sounds are normal. She exhibits no mass. There is no tenderness.   Musculoskeletal: Normal range of motion.   Lymphadenopathy:     She has no cervical adenopathy.   Neurological: She is alert and oriented to person, place, and time. She has normal reflexes. No cranial nerve deficit.    Skin: No rash noted. No erythema.   Psychiatric: She has a normal mood and affect. Judgment normal.       Assessment:       1. Flu syndrome    2. Acute bilateral low back pain without sciatica    3. Injury, head, sequela        Plan:

## 2018-01-25 NOTE — TELEPHONE ENCOUNTER
Flu and cxr are good/  I reviewed with dr yen.    Patient called and advised and told tamiflu rx not needed.   Just take meds as given. Let us know if not improving.  I told her to continue mucinex as long as congested.

## 2018-02-01 ENCOUNTER — TELEPHONE (OUTPATIENT)
Dept: INTERNAL MEDICINE | Facility: CLINIC | Age: 83
End: 2018-02-01

## 2018-02-01 NOTE — TELEPHONE ENCOUNTER
----- Message from Radha Oswald sent at 2/1/2018 10:59 AM CST -----  Contact: Self...Home: [phone]   Sophy patient want to speak with you to see if she should see a specialist for her cough.

## 2018-02-01 NOTE — TELEPHONE ENCOUNTER
"Spoke to pt. Pt stated that she has been having a cough for about 2 weeks. She was diagnosed with "flu-like illness".   Pt advised that the cough tends to linger a few weeks. May take a while for it to go away. Pt advised to continue present cough treatment. Advised that if cough continues past 6 weeks, then to let us know. May need to see specialist then.   "

## 2018-02-08 ENCOUNTER — TELEPHONE (OUTPATIENT)
Dept: INTERNAL MEDICINE | Facility: CLINIC | Age: 83
End: 2018-02-08

## 2018-02-08 RX ORDER — AZITHROMYCIN 250 MG/1
TABLET, FILM COATED ORAL
Qty: 6 TABLET | Refills: 0 | Status: SHIPPED | OUTPATIENT
Start: 2018-02-08 | End: 2018-04-24

## 2018-02-08 NOTE — TELEPHONE ENCOUNTER
----- Message from Krista Nuñez sent at 2/8/2018  9:41 AM CST -----  Contact: [phone]  Patient would like a call back from the nurse she want to know if she can do anything else for a cough that she's been having for three weeks. Please advise pt.

## 2018-02-08 NOTE — TELEPHONE ENCOUNTER
Finish zpac from visit on 1/25.  Nagging cough still lingering.   Still on mucuinex and saline spray. Pushing fluids too.    Never got totally well.  phlem is clear , no temp,   cough sometimes make her choke.     Rx?  Pharmacy correct.    Please advise.    Thanks benedicto

## 2018-03-06 ENCOUNTER — PES CALL (OUTPATIENT)
Dept: ADMINISTRATIVE | Facility: CLINIC | Age: 83
End: 2018-03-06

## 2018-04-12 ENCOUNTER — LAB VISIT (OUTPATIENT)
Dept: LAB | Facility: HOSPITAL | Age: 83
End: 2018-04-12
Attending: INTERNAL MEDICINE
Payer: MEDICARE

## 2018-04-12 DIAGNOSIS — K21.00 REFLUX ESOPHAGITIS: ICD-10-CM

## 2018-04-12 DIAGNOSIS — R79.9 ABNORMAL FINDING OF BLOOD CHEMISTRY: ICD-10-CM

## 2018-04-12 DIAGNOSIS — R53.83 FATIGUE, UNSPECIFIED TYPE: ICD-10-CM

## 2018-04-12 DIAGNOSIS — R32 FEMALE INCONTINENCE: ICD-10-CM

## 2018-04-12 LAB
ALBUMIN SERPL BCP-MCNC: 3.8 G/DL
ALP SERPL-CCNC: 70 U/L
ALT SERPL W/O P-5'-P-CCNC: 20 U/L
ANION GAP SERPL CALC-SCNC: 9 MMOL/L
AST SERPL-CCNC: 28 U/L
BASOPHILS # BLD AUTO: 0.04 K/UL
BASOPHILS NFR BLD: 0.8 %
BILIRUB SERPL-MCNC: 0.5 MG/DL
BUN SERPL-MCNC: 11 MG/DL
CALCIUM SERPL-MCNC: 9.3 MG/DL
CHLORIDE SERPL-SCNC: 102 MMOL/L
CHOLEST SERPL-MCNC: 189 MG/DL
CHOLEST/HDLC SERPL: 2.7 {RATIO}
CO2 SERPL-SCNC: 28 MMOL/L
CREAT SERPL-MCNC: 0.8 MG/DL
DIFFERENTIAL METHOD: ABNORMAL
EOSINOPHIL # BLD AUTO: 0.1 K/UL
EOSINOPHIL NFR BLD: 2.6 %
ERYTHROCYTE [DISTWIDTH] IN BLOOD BY AUTOMATED COUNT: 13.2 %
EST. GFR  (AFRICAN AMERICAN): >60 ML/MIN/1.73 M^2
EST. GFR  (NON AFRICAN AMERICAN): >60 ML/MIN/1.73 M^2
GLUCOSE SERPL-MCNC: 103 MG/DL
HCT VFR BLD AUTO: 40.4 %
HDLC SERPL-MCNC: 70 MG/DL
HDLC SERPL: 37 %
HGB BLD-MCNC: 12.7 G/DL
IMM GRANULOCYTES # BLD AUTO: 0 K/UL
IMM GRANULOCYTES NFR BLD AUTO: 0 %
LDLC SERPL CALC-MCNC: 105.6 MG/DL
LYMPHOCYTES # BLD AUTO: 1.7 K/UL
LYMPHOCYTES NFR BLD: 31.8 %
MCH RBC QN AUTO: 28.1 PG
MCHC RBC AUTO-ENTMCNC: 31.4 G/DL
MCV RBC AUTO: 89 FL
MONOCYTES # BLD AUTO: 0.5 K/UL
MONOCYTES NFR BLD: 8.9 %
NEUTROPHILS # BLD AUTO: 3 K/UL
NEUTROPHILS NFR BLD: 55.9 %
NONHDLC SERPL-MCNC: 119 MG/DL
NRBC BLD-RTO: 0 /100 WBC
PLATELET # BLD AUTO: 274 K/UL
PMV BLD AUTO: 9.5 FL
POTASSIUM SERPL-SCNC: 4.3 MMOL/L
PROT SERPL-MCNC: 7.3 G/DL
RBC # BLD AUTO: 4.52 M/UL
SODIUM SERPL-SCNC: 139 MMOL/L
T4 FREE SERPL-MCNC: 0.9 NG/DL
TRIGL SERPL-MCNC: 67 MG/DL
TSH SERPL DL<=0.005 MIU/L-ACNC: 1.3 UIU/ML
WBC # BLD AUTO: 5.31 K/UL

## 2018-04-12 PROCEDURE — 84439 ASSAY OF FREE THYROXINE: CPT

## 2018-04-12 PROCEDURE — 84443 ASSAY THYROID STIM HORMONE: CPT

## 2018-04-12 PROCEDURE — 36415 COLL VENOUS BLD VENIPUNCTURE: CPT | Mod: PO

## 2018-04-12 PROCEDURE — 85025 COMPLETE CBC W/AUTO DIFF WBC: CPT

## 2018-04-12 PROCEDURE — 80061 LIPID PANEL: CPT

## 2018-04-12 PROCEDURE — 80053 COMPREHEN METABOLIC PANEL: CPT

## 2018-04-24 ENCOUNTER — OFFICE VISIT (OUTPATIENT)
Dept: INTERNAL MEDICINE | Facility: CLINIC | Age: 83
End: 2018-04-24
Payer: MEDICARE

## 2018-04-24 VITALS
OXYGEN SATURATION: 97 % | RESPIRATION RATE: 15 BRPM | HEART RATE: 82 BPM | DIASTOLIC BLOOD PRESSURE: 60 MMHG | TEMPERATURE: 99 F | HEIGHT: 63 IN | BODY MASS INDEX: 23.92 KG/M2 | SYSTOLIC BLOOD PRESSURE: 108 MMHG | WEIGHT: 135 LBS

## 2018-04-24 VITALS
DIASTOLIC BLOOD PRESSURE: 70 MMHG | TEMPERATURE: 98 F | SYSTOLIC BLOOD PRESSURE: 144 MMHG | HEIGHT: 64 IN | RESPIRATION RATE: 18 BRPM | WEIGHT: 138.88 LBS | HEART RATE: 72 BPM | BODY MASS INDEX: 23.71 KG/M2

## 2018-04-24 DIAGNOSIS — M85.80 OSTEOPENIA, UNSPECIFIED LOCATION: ICD-10-CM

## 2018-04-24 DIAGNOSIS — Z00.00 ANNUAL PHYSICAL EXAM: ICD-10-CM

## 2018-04-24 DIAGNOSIS — M54.50 ACUTE BILATERAL LOW BACK PAIN WITHOUT SCIATICA: Primary | ICD-10-CM

## 2018-04-24 DIAGNOSIS — Z91.81 RISK FOR FALLS: ICD-10-CM

## 2018-04-24 DIAGNOSIS — K21.9 GASTROESOPHAGEAL REFLUX DISEASE WITHOUT ESOPHAGITIS: ICD-10-CM

## 2018-04-24 DIAGNOSIS — I70.0 ATHEROSCLEROSIS OF AORTA: ICD-10-CM

## 2018-04-24 DIAGNOSIS — I73.9 PERIPHERAL VASCULAR DISEASE: ICD-10-CM

## 2018-04-24 DIAGNOSIS — I49.9 IRREGULAR HEART RHYTHM: ICD-10-CM

## 2018-04-24 DIAGNOSIS — Z80.3 FAMILY HISTORY OF BREAST CANCER IN SISTER: ICD-10-CM

## 2018-04-24 DIAGNOSIS — M15.9 PRIMARY OSTEOARTHRITIS INVOLVING MULTIPLE JOINTS: ICD-10-CM

## 2018-04-24 DIAGNOSIS — L98.9 FINGER LESION: ICD-10-CM

## 2018-04-24 DIAGNOSIS — Z12.39 SCREENING FOR BREAST CANCER: ICD-10-CM

## 2018-04-24 DIAGNOSIS — L57.0 AK (ACTINIC KERATOSIS): ICD-10-CM

## 2018-04-24 DIAGNOSIS — M81.6 LOCALIZED OSTEOPOROSIS WITHOUT CURRENT PATHOLOGICAL FRACTURE: ICD-10-CM

## 2018-04-24 DIAGNOSIS — G60.9 IDIOPATHIC PERIPHERAL NEUROPATHY: ICD-10-CM

## 2018-04-24 DIAGNOSIS — M85.9 DISORDER OF BONE DENSITY AND STRUCTURE, UNSPECIFIED: ICD-10-CM

## 2018-04-24 DIAGNOSIS — Z12.39 BREAST CANCER SCREENING: ICD-10-CM

## 2018-04-24 DIAGNOSIS — M79.671 RIGHT FOOT PAIN: ICD-10-CM

## 2018-04-24 DIAGNOSIS — R32 INCONTINENCE OF URINE IN FEMALE: ICD-10-CM

## 2018-04-24 DIAGNOSIS — Z00.00 ENCOUNTER FOR PREVENTIVE HEALTH EXAMINATION: Primary | ICD-10-CM

## 2018-04-24 PROBLEM — R35.0 URINARY FREQUENCY: Status: RESOLVED | Noted: 2017-04-12 | Resolved: 2018-04-24

## 2018-04-24 PROBLEM — R33.9 INCOMPLETE BLADDER EMPTYING: Status: RESOLVED | Noted: 2017-05-18 | Resolved: 2018-04-24

## 2018-04-24 PROBLEM — L29.9 PRURITUS: Status: RESOLVED | Noted: 2017-10-01 | Resolved: 2018-04-24

## 2018-04-24 PROBLEM — H92.01 EAR PAIN, RIGHT: Status: RESOLVED | Noted: 2017-10-01 | Resolved: 2018-04-24

## 2018-04-24 PROCEDURE — 99397 PER PM REEVAL EST PAT 65+ YR: CPT | Mod: S$GLB,,, | Performed by: INTERNAL MEDICINE

## 2018-04-24 PROCEDURE — G0439 PPPS, SUBSEQ VISIT: HCPCS | Mod: S$GLB,,, | Performed by: NURSE PRACTITIONER

## 2018-04-24 PROCEDURE — 99999 PR PBB SHADOW E&M-EST. PATIENT-LVL IV: CPT | Mod: PBBFAC,,, | Performed by: INTERNAL MEDICINE

## 2018-04-24 PROCEDURE — 99499 UNLISTED E&M SERVICE: CPT | Mod: S$PBB,,, | Performed by: INTERNAL MEDICINE

## 2018-04-24 PROCEDURE — 99999 PR PBB SHADOW E&M-EST. PATIENT-LVL V: CPT | Mod: PBBFAC,,, | Performed by: NURSE PRACTITIONER

## 2018-04-24 PROCEDURE — 99499 UNLISTED E&M SERVICE: CPT | Mod: S$PBB,,, | Performed by: NURSE PRACTITIONER

## 2018-04-24 RX ORDER — ACETAMINOPHEN 500 MG/1
CAPSULE, LIQUID FILLED ORAL
COMMUNITY
Start: 2018-03-15 | End: 2018-07-05

## 2018-04-24 RX ORDER — LANOLIN ALCOHOL/MO/W.PET/CERES
500 CREAM (GRAM) TOPICAL DAILY
COMMUNITY

## 2018-04-24 RX ORDER — MENTHOL 5.8 MG/1
LOZENGE ORAL
COMMUNITY
Start: 2018-03-15 | End: 2018-04-24 | Stop reason: SDUPTHER

## 2018-04-24 NOTE — PATIENT INSTRUCTIONS
Counseling and Referral of Other Preventative  (Italic type indicates deductible and co-insurance are waived)    Patient Name: Pau Cook  Today's Date: 4/24/2018    Health Maintenance       Date Due Completion Date    Lipid Panel 04/12/2019 4/12/2018    TETANUS VACCINE 04/12/2027 4/12/2017 (Not Clinical)    Override on 4/12/2017: Not Clinically Appropriate (TETANUS ALLERGY)          Need mammogram & bone density -please schedule & follow up with Dr Orlando      The following information is provided to all patients.  This information is to help you find resources for any of the problems found today that may be affecting your health:                Living healthy guide: www.[facility].[facility]      Understanding Diabetes: www.diabetes.org      Eating healthy: www.cdc.gov/healthyweight      [facility] home safety checklist: www.cdc.gov/steadi/patient.html      Agency on Aging: www.goea.[place].[facility]      Alcoholics anonymous (AA): www.aa.org      Physical Activity: www.gavino.nih.gov/zb8gmrx      Tobacco use: www.quitwithusla.org     Preventing Falls in the Home  An adult or child can fall for many reasons. If you are an older adult, you may fall because your reaction time slows down. Your muscles and joints may get stiff, weak, or less flexible because of illness, medicines, or a physical condition. These things can also make a child more likely to fall or be injured in a fall.  Other health problems that make falls more likely include:  · Arthritis  · Dizziness or lightheadedness when you get out of bed (orthostatic hypotension)  · History of a stroke  · Dizziness  · Anemia  · Certain medicines taken for mental illness  · Problems with balance or gait  · History of falls with or without an injury  · Changes in vision (vision impairment)  · Changes in thinking skills and memory (cognitive impairment)  Injuries from a fall can include broken bones, dislocated joints, and cuts. When these injuries are serious enough,  they can make it impossible for you or a child who is injured in a fall to live on his or her own.  Prevention tips  To help prevent falls and fall-related injuries, follow the tips below.   Floors  Make floors safer by doing the following:   · Put nonskid pads under area rugs.  · Remove throw rugs.  · Replace worn floor coverings.  · Tack carpets firmly to each step on carpeted stairs. Put nonskid strips on the edges of uncarpeted stairs.  · Keep floors and stairs free of clutter and cords.  · Arrange furniture so there are clear pathways.  · Clean up any spills right away.  · Wear shoes that fit.  Bathrooms    Make bathrooms safer by doing the following:   · Install grab bars in the tub or shower.  · Apply nonskid strips or put a nonskid rubber mat in the tub or shower.  · Sit on a bath chair to bathe.  · Use bathmats with nonskid backing.  Lighting and the environment  Improve lighting in your home by doing the following:   · Keep a flashlight in each room. Or put a lamp next to the bed within easy reach.  · Put nightlights in the bedrooms, hallways, kitchen, and bathrooms.  · Make sure all stairways have good lighting.  · Take your time when going up and down stairs.  · Put handrails on both sides of stairs and in walkways for more support. To prevent injury to your wrist or arm, dont use handrails to pull yourself up.  · Install grab bars to pull yourself up.  · Move or rearrange items that you use often. This will make them easier to find or reach.  · Look at your home to find any safety hazards. Especially look at doorways, walkways, and the driveway. Remove or repair any safety problems that you find.  Date Last Reviewed: 8/1/2016  © 0266-0891 [employer]. [address] [place] [address], [place] [address]. All rights reserved. This information is not intended as a substitute for professional medical care. Always follow your healthcare professional's instructions.        Preventing Falls: Making  Changes in Your Living Space  Is your living space filled with hazards that could cause you to fall? Changes can make you safer. They could even save your life. Take a careful look around your home. Change what you can on your own. Hire someone or ask friends or family to help with harder tasks.      Be sure to add a nonslip mat to the inside of your shower or bathtub. Always keep a nightlight on. Keep a clear path from your bed to the bathroom. Move items from higher shelves to lower ones.   Remove hazards  · Remove things that can trip you, like throw rugs, boxes, piles of paper, or cords.  · Nail down rugs or carpeting if you don't want to remove them.  · Don't store items on stairs.  · Keep walkways clear.  · Clean up spills right away.  · Replace glass tables with wooden ones. They're safer if you fall.  Add safety devices  · Add handrails to both sides of stairs.  · Buy a raised toilet seat.  · Add grab bars near the toilet and in the shower.  · Get grabbers to help you reach things and avoid climbing.  Improve lighting  · Add nightlights to halls, bedrooms, and bathrooms.   · Put light switches at the top and bottom of stairs.  · Be sure each room and flight of stairs has proper lighting.  · Use shades or curtains to cut glare from windows.  · Put flashlights in each room. Replace burned-out bulbs.  · Get glowing light switches for room entrances.  Take other precautions  · Use nonskid floor wax.  · Buy a nonslip mat and a liquid soap dispenser for the shower.  · Tack down carpets or use slip-resistant backing.  · Put most-used items within easy reach.  · Add bright paint or tape on the top front edge of steps.  · Save big jobs, such as moving furniture or other heavy objects, for family or friends.  · Get professional help installing grab bars. They can be unsafe if not installed the right way.  Fix riskier rooms first  Don't tackle everything at once. Focus on one room at a time. The bathroom is a common  spot for falls, so you may start there. Or start with a room you spend lots of time in, such as your bedroom. Make only a few changes at once. This will give you time to adjust to them.     Outside your home  You might arrange for these changes yourself, or you might need to talk to your  or homeowners' association about them.  · Have loose boards on porches or damaged stairs repaired.  · Have rough edges, holes, or large cracks in sidewalks or driveways repaired.  · Have hazards that could trip you, such as hoses or kwame, removed.  · Use high-wattage light bulbs (100 or greater) near outside doors and stairs.  · Get handrails added to outside stairs. Have them extend beyond the bottom step.  · Get help in winter weather with ice or snow removal.   Date Last Reviewed: 6/12/2015  © 4758-2477 [employer]. [address] [place] [address], [place] [address]. All rights reserved. This information is not intended as a substitute for professional medical care. Always follow your healthcare professional's instructions.

## 2018-04-24 NOTE — Clinical Note
Primary Care Providers: Getachew Orlando MD, MD (General)  Your patient was seen today for a HRA visit. Gap(s) in care (HEDIS gaps) have been identified during this visit that require additional testing and possible follow up.  Orders Placed This Encounter     DXA Bone Density Spine And Hip         Standing Status: Future         Standing Expiration Date: 4/24/2021         Order Specific Question: May the Radiologist modify the order per protocol to meet the clinical needs of the patient?         Answer: Yes     Mammo Digital Screening Bilat with Tomosynthesis_CAD         Standing Status: Future         Standing Expiration Date: 6/24/2019         Order Specific Question: May the Radiologist modify the order per protocol to meet the clinical needs of the patient?         Answer: Yes     Ambulatory Referral to Dermatology         Referral Priority:Routine         Referral Type:Consultation         Referral Reason:Specialty Services Required         Requested Specialty:Dermatology         Nu

## 2018-04-24 NOTE — PROGRESS NOTES
"Pau Cook presented for a  Medicare AWV and comprehensive Health Risk Assessment today. The following components were reviewed and updated:    · Medical history  · Family History  · Social history  · Allergies and Current Medications  · Health Risk Assessment  · Health Maintenance  · Care Team     ** See Completed Assessments for Annual Wellness Visit within the encounter summary.**       The following assessments were completed:  · Living Situation  · CAGE  · Depression Screening  · Timed Get Up and Go  · Whisper Test  · Cognitive Function Screening  · Nutrition Screening  · ADL Screening  · PAQ Screening    Vitals:    04/24/18 1205   BP: 108/60   Pulse: 82   Resp: 15   Temp: 98.5 °F (36.9 °C)   TempSrc: Oral   SpO2: 97%   Weight: 61.2 kg (135 lb)   Height: 5' 3" (1.6 m)     Body mass index is 23.91 kg/m².  Physical Exam   Constitutional: She is oriented to person, place, and time. She appears well-developed and well-nourished.   HENT:   Head: Normocephalic and atraumatic.   Cardiovascular: Normal rate, regular rhythm and normal heart sounds.    No murmur heard.  Pulmonary/Chest: Effort normal and breath sounds normal.   Abdominal: Soft. Bowel sounds are normal. There is no tenderness.   Musculoskeletal: She exhibits no edema.   Neurological: She is alert and oriented to person, place, and time.   Skin: Skin is warm and dry.   Right 5th DIP region with papuletype lesion   Psychiatric: She has a normal mood and affect. Her behavior is normal. Judgment and thought content normal.   Nursing note and vitals reviewed.        Diagnoses and health risks identified today and associated recommendations/orders:    1. Encounter for preventive health examination  Assessments completed  Preventative health recommendations reviewed       2. Peripheral vascular disease  Stable- followed by PCP    3. Gastroesophageal reflux disease without esophagitis  Stable- followed by PCP    4. Primary osteoarthritis involving " multiple joints  Stable- followed by PCP    5. Idiopathic peripheral neuropathy  Stable- followed by PCP    6. Osteopenia, unspecified location  Stable- followed by PCP    7. Irregular heart rhythm  Stable- followed by PCP    8. Incontinence of urine in female  Stable- followed by urology,PCP    9. Atherosclerosis of aorta  Stable- followed by PCP    10. osteopenia  Stable- followed by PCP    11. Disorder of bone density and structure, unspecified  Stable- followed by PCP  - DXA Bone Density Spine And Hip; Future    12. Localized osteoporosis without current pathological fracture  Stable- followed by PCP  - DXA Bone Density Spine And Hip; Future    13. Family history of breast cancer in sister  Stable- followed by PCP  - Mammo Digital Screening Bilat with Tomosynthesis_CAD; Future    14. Screening for breast cancer  Stable- followed by PCP  - Mammo Digital Screening Bilat with Tomosynthesis_CAD; Future    15. Finger lesion- right hand 5th finger   Stable- followed by PCP- pt request referral for evaluation with specialist  - Ambulatory Referral to Dermatology    16. Right foot pain  Stable- followed by PCP    17. Risk for falls  Stable- followed by PCP      Provided Pau NELSON with a 5-10 year written screening schedule and personal prevention plan. Recommendations were developed using the USPSTF age appropriate recommendations. Education, counseling, and referrals were provided as needed. After Visit Summary printed and given to patient which includes a list of additional screenings\tests needed. She will schedule mammogram, DXA & referrals as requested. Better with urinary incontinence- using premarin & performing exercises-helping    Follow-up in about 1 year (around 4/24/2019) for HRA.    Anna Rubalcava NP

## 2018-04-26 NOTE — PROGRESS NOTES
Subjective:       Patient ID: Pau Cook is a 88 y.o. female.    Chief Complaint: Annual Exam and Foot Pain (right)  HISTORY OF PRESENT ILLNESS:  A 63-year-old white female well known to me, in for   an annual review and overall is doing well.  She does complain, however, of a   problem with her right fifth toe being painful when she just touches it or walks   on.  She has had a little nodule off to the side of her PIP joint.  The patient   had lab done prior to this showing reactive changes in her urine, for which a   urine culture was done and was negative.  The patient has normal thyroid   function, CBC, lipids, chemistries.    PHYSICAL EXAMINATION:  VITAL SIGNS:  Normal.  SKIN:  Fair, healthy.  HEENT:  Clear.  NECK:  Shows no adenopathy, thyroid enlargement or bruit.  CHEST:  Clear.  HEART:  There is no murmur or gallop.  BREASTS:  Examined.  She has no mass to palpation.  ABDOMEN:  Nontender without any organomegaly.  EXTREMITIES:  Show normal muscles, joints, pulses, but degenerative changes in   her joints.  She has a small nodule of the right fifth toe laterally that may be   ganglion cyst or a plantar's wart. I have made arrangements for her to see   Dermatology for that.      I ordered a mammogram.      I will see her again in one year.      JJANES/HN  dd: 04/29/2018 11:09:33 (CDT)  td: 04/30/2018 02:04:43 (CDT)  Doc ID   #[id number]  Job ID #[id number]    CC:     [facility] [id number]  [facility]  Review of Systems   Constitutional: Negative.    HENT: Negative for congestion, hearing loss, postnasal drip, sinus pressure, sneezing, sore throat and voice change.    Eyes: Negative for visual disturbance.   Respiratory: Negative for cough, chest tightness and shortness of breath.    Cardiovascular: Negative.  Negative for chest pain, palpitations and leg swelling.   Gastrointestinal: Negative.    Genitourinary: Negative for difficulty urinating, dysuria, flank pain, frequency, hematuria, menstrual problem, pelvic pain,  urgency, vaginal bleeding and vaginal discharge.   Musculoskeletal: Positive for arthralgias and joint swelling. Negative for neck pain and neck stiffness.   Skin: Negative.    Neurological: Negative.  Negative for dizziness, seizures, light-headedness, numbness and headaches.   Psychiatric/Behavioral: Negative for agitation, behavioral problems, confusion and sleep disturbance. The patient is not nervous/anxious.        Objective:      Physical Exam   Constitutional: She is oriented to person, place, and time. She appears well-developed and well-nourished.   Eyes: EOM are normal. Pupils are equal, round, and reactive to light.   Neck: Normal range of motion. Neck supple. No JVD present. No thyromegaly present.   Cardiovascular: Normal rate, regular rhythm, normal heart sounds and intact distal pulses.  Exam reveals no gallop.    No murmur heard.  Pulmonary/Chest: Breath sounds normal. She has no wheezes. She has no rales.   Abdominal: Soft. Bowel sounds are normal. She exhibits no mass. There is no tenderness.   Musculoskeletal: Normal range of motion.   Lymphadenopathy:     She has no cervical adenopathy.   Neurological: She is alert and oriented to person, place, and time. She has normal reflexes. No cranial nerve deficit.   Skin: No rash noted. No erythema.   Psychiatric: She has a normal mood and affect. Judgment normal.       Assessment:       1. Acute bilateral low back pain without sciatica    2. Annual physical exam    3. Atherosclerosis of aorta    4. AK (actinic keratosis)    5. Breast cancer screening        Plan:

## 2018-05-03 ENCOUNTER — HOSPITAL ENCOUNTER (OUTPATIENT)
Dept: RADIOLOGY | Facility: HOSPITAL | Age: 83
Discharge: HOME | End: 2018-05-03
Attending: INTERNAL MEDICINE
Payer: MEDICARE

## 2018-05-03 DIAGNOSIS — Z12.39 BREAST CANCER SCREENING: ICD-10-CM

## 2018-05-03 PROCEDURE — 77067 SCR MAMMO BI INCL CAD: CPT | Mod: 26,,, | Performed by: RADIOLOGY

## 2018-05-03 PROCEDURE — 77067 SCR MAMMO BI INCL CAD: CPT | Mod: TC,PO

## 2018-05-03 PROCEDURE — 77063 BREAST TOMOSYNTHESIS BI: CPT | Mod: 26,,, | Performed by: RADIOLOGY

## 2018-05-08 ENCOUNTER — TELEPHONE (OUTPATIENT)
Dept: INTERNAL MEDICINE | Facility: CLINIC | Age: 83
End: 2018-05-08

## 2018-05-08 NOTE — TELEPHONE ENCOUNTER
Pt notified of osteopenia on DXA- on calcium & vit d- recommended her to F/U with Dr Orlando t determine if additional treatment options necessary. She verbalizes understanding. Results mailed.

## 2018-05-09 ENCOUNTER — TELEPHONE (OUTPATIENT)
Dept: RADIOLOGY | Facility: HOSPITAL | Age: 83
End: 2018-05-09

## 2018-05-09 ENCOUNTER — TELEPHONE (OUTPATIENT)
Dept: INTERNAL MEDICINE | Facility: CLINIC | Age: 83
End: 2018-05-09

## 2018-05-09 NOTE — TELEPHONE ENCOUNTER
Spoke with patient and explained mammogram findings.Patient expressed understanding of results. Patient is scheduled for a abnormal mammogram follow up appointment at [facility] on 5/11/18.

## 2018-05-09 NOTE — TELEPHONE ENCOUNTER
She wanted to make sure we agreed she should have the other studies done since mammo abnormal.  I confirmed to do testing.  She was worried insurance wouldn't cover, I reassured her.    She has tansey phone to call and set up appts.  She expressed understanding.

## 2018-05-09 NOTE — TELEPHONE ENCOUNTER
----- Message from Sophycj Roach sent at 5/9/2018 10:16 AM CDT -----  Contact: patient- [phone]  Patient would like to speak with nurse about mammogram received a letter stating mammogram needed further study. Please call to advise.

## 2018-05-11 ENCOUNTER — HOSPITAL ENCOUNTER (OUTPATIENT)
Dept: RADIOLOGY | Facility: HOSPITAL | Age: 83
Discharge: HOME | End: 2018-05-11
Attending: INTERNAL MEDICINE
Payer: MEDICARE

## 2018-05-11 DIAGNOSIS — R92.8 ABNORMAL MAMMOGRAM: ICD-10-CM

## 2018-05-11 PROCEDURE — 77065 DX MAMMO INCL CAD UNI: CPT | Mod: 26,,, | Performed by: RADIOLOGY

## 2018-05-11 PROCEDURE — 77061 BREAST TOMOSYNTHESIS UNI: CPT | Mod: 26,,, | Performed by: RADIOLOGY

## 2018-05-11 PROCEDURE — 76642 ULTRASOUND BREAST LIMITED: CPT | Mod: 26,RT,, | Performed by: RADIOLOGY

## 2018-05-11 PROCEDURE — 77061 BREAST TOMOSYNTHESIS UNI: CPT | Mod: TC,PO

## 2018-05-11 PROCEDURE — 76642 ULTRASOUND BREAST LIMITED: CPT | Mod: TC,PO,RT

## 2018-05-11 PROCEDURE — 77065 DX MAMMO INCL CAD UNI: CPT | Mod: TC,PO

## 2018-05-16 ENCOUNTER — TELEPHONE (OUTPATIENT)
Dept: INTERNAL MEDICINE | Facility: CLINIC | Age: 83
End: 2018-05-16

## 2018-05-16 ENCOUNTER — OFFICE VISIT (OUTPATIENT)
Dept: OTOLARYNGOLOGY | Facility: CLINIC | Age: 83
End: 2018-05-16
Payer: MEDICARE

## 2018-05-16 VITALS — DIASTOLIC BLOOD PRESSURE: 75 MMHG | HEART RATE: 78 BPM | SYSTOLIC BLOOD PRESSURE: 124 MMHG

## 2018-05-16 DIAGNOSIS — K21.9 LPRD (LARYNGOPHARYNGEAL REFLUX DISEASE): Primary | ICD-10-CM

## 2018-05-16 PROCEDURE — 99204 OFFICE O/P NEW MOD 45 MIN: CPT | Mod: 25,S$GLB,, | Performed by: OTOLARYNGOLOGY

## 2018-05-16 PROCEDURE — 31575 DIAGNOSTIC LARYNGOSCOPY: CPT | Mod: S$GLB,,, | Performed by: OTOLARYNGOLOGY

## 2018-05-16 PROCEDURE — 99999 PR PBB SHADOW E&M-EST. PATIENT-LVL II: CPT | Mod: PBBFAC,,, | Performed by: OTOLARYNGOLOGY

## 2018-05-16 NOTE — TELEPHONE ENCOUNTER
----- Message from Jessica Chilel sent at 5/15/2018 12:58 PM CDT -----  Contact: [facility] [employer] [phone]  Will is calling in regarding a Mammo the patient will receive. Case # [id number]

## 2018-05-16 NOTE — TELEPHONE ENCOUNTER
left msg on abebe' confidential voicemail  - stated abnormal reading of mammo and reason for need of diag mammoand ultrasound.  I said to call if needs more information.

## 2018-05-17 NOTE — CONSULTS
Ms. Cook presents for consultation.    VITAL SIGNS:  Per nurses' notes.    CHIEF COMPLAINT:  Cough and globus sensation.    HISTORY OF PRESENT ILLNESS:  This is an 88-year-old white female who states that   over the last four to five months she has had increasing coughing and   occasional sinus congestion as well as a globus feeling, coughing up clear   mucus.  She also has some hoarseness.  She denies any history of sinus   infections.  No purulent discharge either through her nose or throat.  She does   have a history of reflux when she leans over and is on Nexium 20 mg a day.  In   addition, she has nasal obstruction, occasional snoring and difficulty sleeping.    REVIEW OF SYSTEMS:  CONSTITUTIONAL: Weight loss or weight gain: Negative.  ALLERGY/IMMUNOLOGIC: Negative.  ENT/Mouth:  Hearing Loss/Dizziness/Tinnitus: Negative.  Ear Infections/Otalgia: Negative.  Rhinitis/Sinusitis/Epistaxis: Negative.  Headache/Facial Pain: Negative.  Nasal Obstruction/Snoring/CHERY: Negative.  Throat: Infections/Pain: Negative.  Hoarseness/Speech Disturbance: Negative.  Salivary Glands Disorder: Negative.  Trauma: Hx: Negative.    Cardiovascular:  MI/Angina: Negative.  Hypertension: Negative.  Endo: DM/Steroids: Negative.  Eyes: Negative.  GI: Dysphagia/Reflux: Negative.  : GYN Pregnancy: Negative.      Renal: Dialysis: Negative.  Lymph: Neck Mass/Lymphadenopathy: Negative.  Musculoskeletal: Negative.  Hem: Bleeding Disorders/Anemia: Negative.  Neuro: Cranial/Neuralgia: Negative.  Pulm: Asthma/SOB/Cough: Negative.  Skin/Breast: Negative.    PAST MEDICAL/FAMILY/SOCIAL HISTORY:    Past Medical History   ENT Surgery: Negative.   Occupational Exposure: Negative.    Problems: Negative.   Cancer: Negative.   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  Past surgeries include colon   surgery, hemorrhoidectomy and appendectomy.    Past Family  History   Family history hearing loss: Negative.   Family history cancer: Positive for breast cancer.   Positive for diabetes and stroke.    Past Social History   Tobacco: She is a nonsmoker.   Alcohol: Rare social drinker.    MEDICATIONS:  Per MedCard.    ALLERGIES:  Per MedCard.    EXAMINATION:  General Appearance:  Well-developed, well-nourished elderly white female in no   apparent distress.  Communication Ability:  Good.  EARS, NOSE, THROAT, MOUTH;  EARS: Clear.   External auditory canals: Clear.   Hearing: Grossly Intact.   Tympanic membranes: Clear.  NOSE:   External: Normal.   Intranasal: She has a marked septal deviation to the right with 2+ turbinates   creating approximately 90% obstruction.  On flexible fiberoptic exam with scope   #[id number], her vocal cords show normal mobility and no masses or lesions.  She   does show marked edema and erythema of the aryepiglottic folds consistent with   LPR.  MOUTH:   Intraorally: Lips, teeth and gums: Normal.   Oropharynx: Normal.   Mucosa: Normal.  THROAT:   Tongue: Normal.   Palate: Normal.   Tonsils: Minimum.   Posterior pharynx: Normal.  HEAD/FACE INSPECTION: Normal and atraumatic.   Palpation/Percussion:  Non tender.   Facial Strength: Normal and symmetric.   Salivary glands: Normal.    NECK: Supple.  THYROID: No masses.  LYMPHATICS: No nodes.  RESPIRATORY:   Effort: Normal.  EYES:   Ocular Mobility: Normal.   Vision: Grossly intact.  NEURO/PSYCH:   Cranial nerves: 2-12 grossly intact.   Orientation: x3.   Mood/Affect: Normal.    IMPRESSION:  An 88-year-old white female with hoarseness, cough and globus   sensation with endoscopic evidence of laryngopharyngeal reflux.    RECOMMENDATION:  I have discussed my findings with her in detail as well as my   recommendations for treatment.  I have given her a reflux precaution sheet and   we have reviewed this.  We have also discussed changing her dosage of Nexium to   20 mg twice a day.  I have suggested that she follow  up with her GI physician to   follow her for this.  She will return to clinic [place]      HG/HN  dd: 05/16/2018 15:00:49 (CDT)  td: 05/17/2018 04:08:56 (CDT)  Doc ID   #[id number]  Job ID #[id number]    CC:

## 2018-06-07 ENCOUNTER — TELEPHONE (OUTPATIENT)
Dept: INTERNAL MEDICINE | Facility: CLINIC | Age: 83
End: 2018-06-07

## 2018-06-07 NOTE — TELEPHONE ENCOUNTER
I wasn't able to reach her yesterday. No answer or cell.  I reached her today.  Told her referral is already in for derm appt.

## 2018-06-07 NOTE — TELEPHONE ENCOUNTER
----- Message from Nakia Gutierres sent at 6/6/2018  3:31 PM CDT -----  Contact: Pt [phone]  Patient would like to speak with you about making an appointment for a dermatologist. I asked her if she would like to be transferred to the dermatology department but she said she would rather speak with you about if she should get a referral or not?

## 2018-06-11 ENCOUNTER — TELEPHONE (OUTPATIENT)
Dept: ORTHOPEDICS | Facility: CLINIC | Age: 83
End: 2018-06-11

## 2018-06-11 ENCOUNTER — TELEPHONE (OUTPATIENT)
Dept: INTERNAL MEDICINE | Facility: CLINIC | Age: 83
End: 2018-06-11

## 2018-06-11 ENCOUNTER — INITIAL CONSULT (OUTPATIENT)
Dept: DERMATOLOGY | Facility: CLINIC | Age: 83
End: 2018-06-11
Payer: MEDICARE

## 2018-06-11 ENCOUNTER — TELEPHONE (OUTPATIENT)
Dept: DERMATOLOGY | Facility: CLINIC | Age: 83
End: 2018-06-11

## 2018-06-11 VITALS — BODY MASS INDEX: 23.69 KG/M2 | WEIGHT: 138 LBS

## 2018-06-11 DIAGNOSIS — L82.1 SEBORRHEIC KERATOSES: Primary | ICD-10-CM

## 2018-06-11 DIAGNOSIS — M67.449 DIGITAL MUCOUS CYST: ICD-10-CM

## 2018-06-11 DIAGNOSIS — M79.641 RIGHT HAND PAIN: Primary | ICD-10-CM

## 2018-06-11 DIAGNOSIS — L57.0 MULTIPLE ACTINIC KERATOSES: ICD-10-CM

## 2018-06-11 PROCEDURE — 99999 PR PBB SHADOW E&M-EST. PATIENT-LVL III: CPT | Mod: PBBFAC,,, | Performed by: DERMATOLOGY

## 2018-06-11 PROCEDURE — 99213 OFFICE O/P EST LOW 20 MIN: CPT | Mod: 25,S$GLB,, | Performed by: DERMATOLOGY

## 2018-06-11 PROCEDURE — 17003 DESTRUCT PREMALG LES 2-14: CPT | Mod: S$GLB,,, | Performed by: DERMATOLOGY

## 2018-06-11 PROCEDURE — 17000 DESTRUCT PREMALG LESION: CPT | Mod: S$GLB,,, | Performed by: DERMATOLOGY

## 2018-06-11 RX ORDER — SULFAMETHOXAZOLE AND TRIMETHOPRIM 800; 160 MG/1; MG/1
TABLET ORAL
COMMUNITY
Start: 2018-06-05 | End: 2018-07-05 | Stop reason: ALTCHOICE

## 2018-06-11 NOTE — TELEPHONE ENCOUNTER
Spoke w/pt about scheduling an appointment to see Dr Gonzales. Pt does not want to come to see LS or PA at [facility]. Patient is okay with going to [facility], appt scheduled and added to wait list to see Dr Martinez in [place]. Appt slip in mail to patient.     10minutes and 20seconds spent on this call.

## 2018-06-11 NOTE — TELEPHONE ENCOUNTER
----- Message from Charis Hinds MD sent at 6/11/2018 10:44 AM CDT -----  Digital mucous cyst right 5th digit needs removal

## 2018-06-11 NOTE — TELEPHONE ENCOUNTER
----- Message from Charis Hinds MD sent at 6/11/2018 11:57 AM CDT -----  Contact: pt at [phone]  I sent in a referral for her, they will call her.  ----- Message -----  From: Prachi Zendejas MA  Sent: 6/11/2018  11:31 AM  To: MD Dr. Guzman White what's the hand Doctor name so I can give to Mrs. Cook  ----- Message -----  From: Amparo Corbett  Sent: 6/11/2018  11:19 AM  To: [facility] Staff    [facility] pt-pt was seen today and [facility] referred pt to a hand specialist and doesn't know who to see. Pt can be reached at above number.  Doesn't know who rto see.  Leave name of the docotr if she is not  There.  tryn to call pt before 1200 a she will be leaving at 1200.

## 2018-06-11 NOTE — PROGRESS NOTES
Subjective:       Patient ID:  Pua Cook is a 88 y.o. female who presents for   Chief Complaint   Patient presents with    Lesion     right forehead    Skin Check     UBSE     History of Present Illness: The patient presents with chief complaint of spot.  Location: right 5th digit  Duration: weeks  Signs/Symptoms: sore    Prior treatments: I and d elsewhere          Review of Systems   Constitutional: Negative for fever.   Skin: Negative for itching and rash.   Hematologic/Lymphatic: Does not bruise/bleed easily.        Objective:    Physical Exam   Constitutional: She appears well-developed and well-nourished. No distress.   Neurological: She is alert and oriented to person, place, and time. She is not disoriented.   Psychiatric: She has a normal mood and affect.   Skin:   Areas Examined (abnormalities noted in diagram):   Head / Face Inspection Performed  Neck Inspection Performed  Chest / Axilla Inspection Performed  Abdomen Inspection Performed  Back Inspection Performed  RUE Inspected  LUE Inspection Performed  RLE Inspected  LLE Inspection Performed  Nails and Digits Inspection Performed                       Diagram Legend     Erythematous scaling macule/papule c/w actinic keratosis       Vascular papule c/w angioma      Pigmented verrucoid papule/plaque c/w seborrheic keratosis      Yellow umbilicated papule c/w sebaceous hyperplasia      Irregularly shaped tan macule c/w lentigo     1-2 mm smooth white papules consistent with Milia      Movable subcutaneous cyst with punctum c/w epidermal inclusion cyst      Subcutaneous movable cyst c/w pilar cyst      Firm pink to brown papule c/w dermatofibroma      Pedunculated fleshy papule(s) c/w skin tag(s)      Evenly pigmented macule c/w junctional nevus     Mildly variegated pigmented, slightly irregular-bordered macule c/w mildly atypical nevus      Flesh colored to evenly pigmented papule c/w intradermal nevus       Pink pearly papule/plaque c/w  basal cell carcinoma      Erythematous hyperkeratotic cursted plaque c/w SCC      Surgical scar with no sign of skin cancer recurrence      Open and closed comedones      Inflammatory papules and pustules      Verrucoid papule consistent consistent with wart     Erythematous eczematous patches and plaques     Dystrophic onycholytic nail with subungual debris c/w onychomycosis     Umbilicated papule    Erythematous-base heme-crusted tan verrucoid plaque consistent with inflamed seborrheic keratosis     Erythematous Silvery Scaling Plaque c/w Psoriasis     See annotation      Assessment / Plan:        Seborrheic keratoses  reassurance      Multiple actinic keratoses   Cryosurgery Procedure Note    Verbal consent from the patient is obtained and the patient is aware of the precancerous quality and need for treatment of these lesions. Liquid nitrogen cryosurgery is applied to the 2 actinic keratoses, as detailed in the physical exam, to produce a freeze injury.      Digital mucous cyst  Refer for removal             Follow-up in about 6 months (around 12/11/2018).

## 2018-06-11 NOTE — LETTER
June 11, 2018      Getachew Orlando MD  [address]  [place] [address]           [place] [facility]  [address]  [place] [address]  Phone: [phone]  Fax: [phone]          Patient: Pau Cook   MR Number: [id number]   YOB: 1929   Date of Visit: 6/11/2018       Dear Dr. Getachew Orlando:    Thank you for referring Pau Cook to me for evaluation. Attached you will find relevant portions of my assessment and plan of care.    If you have questions, please do not hesitate to call me. I look forward to following Pau Cook along with you.    Sincerely,    Charis Hinds MD    Enclosure  CC:  No Recipients    If you would like to receive this communication electronically, please contact [email or web address] or [phone] to request more information on [employer] access.    For providers and/or their staff who would like to refer a patient to [facility], please contact us through our one-stop-shop provider referral line, [facility], at [phone].    If you feel you have received this communication in error or would no longer like to receive these types of communications, please e-mail [email or web address]

## 2018-06-19 ENCOUNTER — TELEPHONE (OUTPATIENT)
Dept: INTERNAL MEDICINE | Facility: CLINIC | Age: 83
End: 2018-06-19

## 2018-06-19 ENCOUNTER — TELEPHONE (OUTPATIENT)
Dept: ORTHOPEDICS | Facility: CLINIC | Age: 83
End: 2018-06-19

## 2018-06-19 DIAGNOSIS — M67.441 MUCOUS CYST OF DIGIT OF RIGHT HAND: Primary | ICD-10-CM

## 2018-06-19 DIAGNOSIS — M79.641 PAIN OF RIGHT HAND: ICD-10-CM

## 2018-06-19 NOTE — TELEPHONE ENCOUNTER
Cyst on finger.   She went to urgent care and they gave her bactrim and she finished. It is has not helped finger at all.    Dr mckeon saw her for this, Her 6/11/18 note says : erythematous tender papule mucus cyst      she told patient to see orthoped and dr ortiz can't see till next month. ( I sent staff msg to see if they can fit in sooner)    Any suggestions to take for pain?    Tried bactroban cream and hasn't helped.  Stomach ache and nausea on advil or tylenol.    Any suggestions?  Says it wakes her up.

## 2018-06-19 NOTE — TELEPHONE ENCOUNTER
----- Message from Holli Harmon sent at 6/19/2018  8:15 AM CDT -----  Contact: patient [phone]  Pt asked to speak with Sophy about a cyst on her pinky on r hand and it throbs all night and keeps her up . Pt isn't scheduled to see  until 7/16 and wants to speak with Sophy to ask if you can try to get her in sooner to see .

## 2018-06-19 NOTE — TELEPHONE ENCOUNTER
----- Message from Sophy Bagley MA sent at 6/19/2018 11:02 AM CDT -----  patient saw derm for cyst finger  and they referred to yall.  She has appt mid July.    Finger is really bothering her.  We are hoping dr ortiz or someone else can See her sooner.    She can't tolerate otc for pain.    Please contact patient with another option for sooner appt.    Thanks so much.  Sophy bagley w/ pcp dr vidal yen

## 2018-06-19 NOTE — TELEPHONE ENCOUNTER
Dr mckeon , since you saw this patient. Is this normal that it hurt so much? Any recommendations?  I have sent dr ortiz a message to see her sooner than mid July.    Please advise.  Thanks benedicto sheth/ dr vidal yen

## 2018-06-19 NOTE — TELEPHONE ENCOUNTER
Spoke with pt, stated surgeon was found elsewhere for cyst and wanted to stick with that person. Understanding verbalized.

## 2018-06-20 NOTE — TELEPHONE ENCOUNTER
She called me back.  She saw dr jara at  today.  She didn't think she needed a referral.  i told her I would put in a request and get it approved to try and avoid her getting billed since has [facility]medicare    Julia given referral print out.

## 2018-06-20 NOTE — TELEPHONE ENCOUNTER
----- Message from Mandie White sent at 6/20/2018 10:32 AM CDT -----  Contact: Self [phone] or [phone]  Patient would like a call back regarding her hand.  Patient has a few questions about getting a hand doctor.

## 2018-06-20 NOTE — TELEPHONE ENCOUNTER
Charis Hinds MD   You 16 hours ago (4:49 PM)      They can be painful, agree needs sooner appt in ortho.  (Routing comment       I left ms telling patient dr hinds comment.  Looks like she cancelled with dr ortiz when staff called her to move appt up stating she will see someone else..  I asked her to call me with physician name and phone so I can get a referral put in.

## 2018-06-22 ENCOUNTER — HOSPITAL ENCOUNTER (OUTPATIENT)
Dept: RADIOLOGY | Facility: HOSPITAL | Age: 83
Discharge: HOME | End: 2018-06-22
Attending: ORTHOPAEDIC SURGERY
Payer: MEDICARE

## 2018-06-22 DIAGNOSIS — D49.2 NEOPLASM OF UNSPECIFIED BEHAVIOR OF BONE, SOFT TISSUE, AND SKIN: ICD-10-CM

## 2018-06-22 LAB
CREAT SERPL-MCNC: 0.9 MG/DL (ref 0.5–1.4)
SAMPLE: NORMAL

## 2018-06-22 PROCEDURE — 25500020 PHARM REV CODE 255: Performed by: ORTHOPAEDIC SURGERY

## 2018-06-22 PROCEDURE — 73220 MRI UPPR EXTREMITY W/O&W/DYE: CPT | Mod: TC,RT

## 2018-06-22 PROCEDURE — 73220 MRI UPPR EXTREMITY W/O&W/DYE: CPT | Mod: 26,RT,, | Performed by: RADIOLOGY

## 2018-06-22 PROCEDURE — A9585 GADOBUTROL INJECTION: HCPCS | Performed by: ORTHOPAEDIC SURGERY

## 2018-06-22 RX ORDER — GADOBUTROL 604.72 MG/ML
6 INJECTION INTRAVENOUS
Status: COMPLETED | OUTPATIENT
Start: 2018-06-22 | End: 2018-06-22

## 2018-06-22 RX ADMIN — GADOBUTROL 6 ML: 604.72 INJECTION INTRAVENOUS at 02:06

## 2018-06-27 ENCOUNTER — TELEPHONE (OUTPATIENT)
Dept: INTERNAL MEDICINE | Facility: CLINIC | Age: 83
End: 2018-06-27

## 2018-06-27 NOTE — TELEPHONE ENCOUNTER
----- Message from Mandie White sent at 6/27/2018  8:55 AM CDT -----  Contact: Self [phone]  Patient would like a call back regarding her finger. Patient did not give any details. Patient stated Sophy would know what's the problem.

## 2018-06-27 NOTE — TELEPHONE ENCOUNTER
She saw derm and they sent her to hand dr.  Hand dr saw her once and ordered mri and has appt next week to see her.    It is hurting her and otc upsets her stomach.  antib given by urgent care didn't help either.    I told her to call their office and see if they can see her sooner or recommend what she can take.  She will be moving jan 2019 to [facility].

## 2018-06-29 ENCOUNTER — TELEPHONE (OUTPATIENT)
Dept: INTERNAL MEDICINE | Facility: CLINIC | Age: 83
End: 2018-06-29

## 2018-06-29 NOTE — TELEPHONE ENCOUNTER
----- Message from Armando Wallace sent at 6/29/2018 10:57 AM CDT -----  Contact: Self [phone]  Pt will like a call from the nurse in regarding right hand pinky finger.

## 2018-07-09 PROBLEM — D49.2 GENERALIZED SKIN TUMORS: Status: ACTIVE | Noted: 2018-07-09

## 2018-07-16 ENCOUNTER — TELEPHONE (OUTPATIENT)
Dept: INTERNAL MEDICINE | Facility: CLINIC | Age: 83
End: 2018-07-16

## 2018-08-13 ENCOUNTER — OFFICE VISIT (OUTPATIENT)
Dept: DERMATOLOGY | Facility: CLINIC | Age: 83
End: 2018-08-13
Payer: MEDICARE

## 2018-08-13 DIAGNOSIS — L57.0 ACTINIC KERATOSIS: Primary | ICD-10-CM

## 2018-08-13 PROCEDURE — 99999 PR PBB SHADOW E&M-EST. PATIENT-LVL III: CPT | Mod: PBBFAC,,, | Performed by: DERMATOLOGY

## 2018-08-13 PROCEDURE — 17000 DESTRUCT PREMALG LESION: CPT | Mod: S$GLB,,, | Performed by: DERMATOLOGY

## 2018-08-13 PROCEDURE — 99499 UNLISTED E&M SERVICE: CPT | Mod: S$GLB,,, | Performed by: DERMATOLOGY

## 2018-08-13 PROCEDURE — 17003 DESTRUCT PREMALG LES 2-14: CPT | Mod: S$GLB,,, | Performed by: DERMATOLOGY

## 2018-08-13 NOTE — PROGRESS NOTES
Subjective:       Patient ID:  Pau Cook is a 89 y.o. female who presents for   Chief Complaint   Patient presents with    Skin Check     TBSE    Lesion     left wrist     Pt is a pt of [facility] - last seen 6/11/18.   Patient complains of lesion(s)  Location: left wrist  Duration: 2 months  Symptoms: scales  Relieving factors/Previous treatments: cryo    H/o scc right 5th digit - s/p excision - now getting radiation    This is a high risk patient here to check for the development of new lesions.            Review of Systems   Skin: Positive for daily sunscreen use and activity-related sunscreen use. Negative for recent sunburn.   Hematologic/Lymphatic: Does not bruise/bleed easily.        Objective:    Physical Exam   Constitutional: She appears well-developed and well-nourished. No distress.   Neurological: She is alert and oriented to person, place, and time. She is not disoriented.   Psychiatric: She has a normal mood and affect.   Skin:   Areas Examined (abnormalities noted in diagram):   Head / Face Inspection Performed  LUE Inspection Performed  RLE Inspected  Nails and Digits Inspection Performed                   Diagram Legend     Erythematous scaling macule/papule c/w actinic keratosis       Vascular papule c/w angioma      Pigmented verrucoid papule/plaque c/w seborrheic keratosis      Yellow umbilicated papule c/w sebaceous hyperplasia      Irregularly shaped tan macule c/w lentigo     1-2 mm smooth white papules consistent with Milia      Movable subcutaneous cyst with punctum c/w epidermal inclusion cyst      Subcutaneous movable cyst c/w pilar cyst      Firm pink to brown papule c/w dermatofibroma      Pedunculated fleshy papule(s) c/w skin tag(s)      Evenly pigmented macule c/w junctional nevus     Mildly variegated pigmented, slightly irregular-bordered macule c/w mildly atypical nevus      Flesh colored to evenly pigmented papule c/w intradermal nevus       Pink pearly papule/plaque c/w  basal cell carcinoma      Erythematous hyperkeratotic cursted plaque c/w SCC      Surgical scar with no sign of skin cancer recurrence      Open and closed comedones      Inflammatory papules and pustules      Verrucoid papule consistent consistent with wart     Erythematous eczematous patches and plaques     Dystrophic onycholytic nail with subungual debris c/w onychomycosis     Umbilicated papule    Erythematous-base heme-crusted tan verrucoid plaque consistent with inflamed seborrheic keratosis     Erythematous Silvery Scaling Plaque c/w Psoriasis     See annotation      Assessment / Plan:        Actinic keratosis  Cryosurgery Procedure Note    Verbal consent from the patient is obtained including, but not limited to, risk of hypopigmentation/hyperpigmentation, scar, recurrence of lesion. The patient is aware of the precancerous quality and need for treatment of these lesions. Liquid nitrogen cryosurgery is applied to the 2 actinic keratoses, as detailed in the physical exam, to produce a freeze injury. The patient is aware that blisters may form and is instructed on wound care with gentle cleansing and use of vaseline ointment to keep moist until healed. The patient is supplied a handout on cryosurgery and is instructed to call if lesions do not completely resolve.               Follow-up in about 1 year (around 8/13/2019).

## 2018-08-13 NOTE — PATIENT INSTRUCTIONS

## 2018-09-20 ENCOUNTER — OFFICE VISIT (OUTPATIENT)
Dept: INTERNAL MEDICINE | Facility: CLINIC | Age: 83
End: 2018-09-20
Payer: MEDICARE

## 2018-09-20 VITALS
SYSTOLIC BLOOD PRESSURE: 124 MMHG | WEIGHT: 131.63 LBS | TEMPERATURE: 98 F | RESPIRATION RATE: 18 BRPM | HEIGHT: 63 IN | DIASTOLIC BLOOD PRESSURE: 70 MMHG | BODY MASS INDEX: 23.32 KG/M2 | HEART RATE: 79 BPM

## 2018-09-20 DIAGNOSIS — M67.441 MUCOUS CYST OF DIGIT OF RIGHT HAND: ICD-10-CM

## 2018-09-20 DIAGNOSIS — D04.61: ICD-10-CM

## 2018-09-20 DIAGNOSIS — T30.0 RADIATION BURN: Primary | ICD-10-CM

## 2018-09-20 PROCEDURE — 3288F FALL RISK ASSESSMENT DOCD: CPT | Mod: CPTII,,, | Performed by: INTERNAL MEDICINE

## 2018-09-20 PROCEDURE — 1100F PTFALLS ASSESS-DOCD GE2>/YR: CPT | Mod: CPTII,,, | Performed by: INTERNAL MEDICINE

## 2018-09-20 PROCEDURE — 99214 OFFICE O/P EST MOD 30 MIN: CPT | Mod: S$PBB,,, | Performed by: INTERNAL MEDICINE

## 2018-09-20 PROCEDURE — 99999 PR PBB SHADOW E&M-EST. PATIENT-LVL III: CPT | Mod: PBBFAC,,, | Performed by: INTERNAL MEDICINE

## 2018-09-20 PROCEDURE — 99213 OFFICE O/P EST LOW 20 MIN: CPT | Mod: PBBFAC,PO | Performed by: INTERNAL MEDICINE

## 2018-09-20 PROCEDURE — 90662 IIV NO PRSV INCREASED AG IM: CPT | Mod: PBBFAC,PO

## 2018-09-22 NOTE — PROGRESS NOTES
Subjective:       Patient ID: Pau Cook is a 89 y.o. female.    Chief Complaint: Finger Pain (pinky finger right hand)   HISTORY OF PRESENT ILLNESS:  An 89-year-old white female patient of mine who had   a cystic lesion on her right fifth finger, which was biopsied and found to be a   squamous cell cancer on 07/08 at [facility].  Surgery was   done by Dr. Seay.  She then went to [facility], got   20 treatments and finished that on 09/03/2018.  She saw Dr. Seay again a few   days prior to my seeing her.  She is seeing me because she has had fusiform   redness, swelling and pain in her finger, particularly in an area where it is   denuded.  The area where it is denuded is where she had her cystic lesion that   was radiated.    PHYSICAL EXAMINATION:  VITAL SIGNS:  Normal including temperature of the finger.  EXTREMITIES:  It is swollen, red and is tender.  Redness looks like burning with   fusiform swelling.  She has a Band-Aid covering a raw area on the dorsum   overlying the PIP joint with some serous coating.  She has intact sensation.    She can move the finger, though reduced because of the swelling.    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  I told her I thought that   was the problem.  She did not want any additional pain medication.  She did   want to get the flu shot, which is available, so we gave to her at her request.    I will see her again at regularly scheduled time.      [facility]/HN  dd: 09/23/2018 13:00:51 (CDT)  td: 09/24/2018 02:11:35 (CDT)  Doc ID   #[id number]  Job ID #[id number]    CC:     Dict [id number]  HPI  Review of Systems   Constitutional: Negative.    HENT: Negative for congestion, hearing loss, sinus pressure, sneezing, sore throat and voice change.    Eyes: Negative for visual disturbance.   Respiratory: Negative for cough, chest tightness and shortness of breath.    Cardiovascular: Negative.  Negative for chest pain,  palpitations and leg swelling.   Gastrointestinal: Negative.    Genitourinary: Negative for difficulty urinating, dysuria, flank pain, frequency, hematuria, menstrual problem, pelvic pain, urgency, vaginal bleeding and vaginal discharge.   Musculoskeletal: Positive for arthralgias, joint swelling and myalgias. Negative for neck pain and neck stiffness.   Skin: Positive for color change and wound.   Neurological: Negative.  Negative for dizziness, seizures, light-headedness, numbness and headaches.   Psychiatric/Behavioral: Negative for agitation, behavioral problems, confusion and sleep disturbance. The patient is not nervous/anxious.        Objective:      Physical Exam   Constitutional: She is oriented to person, place, and time. She appears well-developed and well-nourished.   Eyes: EOM are normal. Pupils are equal, round, and reactive to light.   Neck: Normal range of motion. Neck supple. No JVD present. No thyromegaly present.   Cardiovascular: Normal rate, regular rhythm, normal heart sounds and intact distal pulses. Exam reveals no gallop.   No murmur heard.  Pulmonary/Chest: Breath sounds normal. She has no wheezes. She has no rales.   Abdominal: Soft. Bowel sounds are normal. She exhibits no mass. There is no tenderness.   Musculoskeletal: Normal range of motion. She exhibits tenderness.   Lymphadenopathy:     She has no cervical adenopathy.   Neurological: She is alert and oriented to person, place, and time. She has normal reflexes. No cranial nerve deficit.   Skin: Rash noted. There is erythema.   Psychiatric: She has a normal mood and affect. Judgment normal.       Assessment:       No diagnosis found.    Plan:

## 2018-11-02 ENCOUNTER — TELEPHONE (OUTPATIENT)
Dept: INTERNAL MEDICINE | Facility: CLINIC | Age: 83
End: 2018-11-02

## 2018-11-02 DIAGNOSIS — C44.622 SQUAMOUS CELL CARCINOMA OF SKIN OF RIGHT UPPER LIMB, INCLUDING SHOULDER: Primary | ICD-10-CM

## 2018-11-13 ENCOUNTER — OFFICE VISIT (OUTPATIENT)
Dept: INTERNAL MEDICINE | Facility: CLINIC | Age: 83
End: 2018-11-13
Payer: MEDICARE

## 2018-11-13 VITALS
DIASTOLIC BLOOD PRESSURE: 64 MMHG | WEIGHT: 136.44 LBS | BODY MASS INDEX: 24.18 KG/M2 | SYSTOLIC BLOOD PRESSURE: 122 MMHG | HEIGHT: 63 IN | TEMPERATURE: 98 F | HEART RATE: 89 BPM | RESPIRATION RATE: 18 BRPM

## 2018-11-13 DIAGNOSIS — I70.0 ATHEROSCLEROSIS OF AORTA: ICD-10-CM

## 2018-11-13 DIAGNOSIS — Z11.1 SCREENING FOR TUBERCULOSIS: ICD-10-CM

## 2018-11-13 DIAGNOSIS — T30.0 RADIATION BURN: ICD-10-CM

## 2018-11-13 DIAGNOSIS — C44.622 SQUAMOUS CELL CARCINOMA OF SKIN OF RIGHT UPPER LIMB, INCLUDING SHOULDER: Primary | ICD-10-CM

## 2018-11-13 PROCEDURE — 1101F PT FALLS ASSESS-DOCD LE1/YR: CPT | Mod: CPTII,S$GLB,, | Performed by: INTERNAL MEDICINE

## 2018-11-13 PROCEDURE — 99215 OFFICE O/P EST HI 40 MIN: CPT | Mod: S$GLB,,, | Performed by: INTERNAL MEDICINE

## 2018-11-13 PROCEDURE — 86580 TB INTRADERMAL TEST: CPT | Mod: S$GLB,,, | Performed by: INTERNAL MEDICINE

## 2018-11-13 PROCEDURE — 99499 UNLISTED E&M SERVICE: CPT | Mod: S$GLB,,, | Performed by: INTERNAL MEDICINE

## 2018-11-13 PROCEDURE — 99999 PR PBB SHADOW E&M-EST. PATIENT-LVL III: CPT | Mod: PBBFAC,,, | Performed by: INTERNAL MEDICINE

## 2018-11-13 NOTE — PROGRESS NOTES
Subjective:       Patient ID: Pau Cook is a 89 y.o. female.    Chief Complaint: paperwork (going into an assisted living facility) and Foot Problem (keeps her awake at night)  HISTORY OF PRESENT ILLNESS:  An 89-year-old white female whom I had seen a   couple of months ago.  She had radiation treatment to a cancerous lesion on her   finger, quite a bit of reaction from that, but that has much improved.  She has   moved to an assisted living center at [facility] in [place].  She needs a TB   test for that experience.  The patient complains of some tightness in her feet   and tingling in her feet at night, but she wakes up in the morning that is not   present.  She does not have any ankle edema.    PHYSICAL EXAMINATION:  VITAL SIGNS:  Normal.  SKIN:  Fair, healthy.  CHEST:  Clear.  HEART:  There is no murmur or gallop.  ABDOMEN:  Nontender.  EXTREMITIES:  Show normal muscles, joints and pulses.  She has no edema.  She   has good pulses in her feet and good capillary filling.  She has no apparent   neurologic problems in her feet currently.    IMPRESSION:  1.  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  2.  Radiation burn that is improving.  3.  Aortic atherosclerosis.  4.  The sensation in her feet, probably she has some edema at the end of the   day.  I do not discern that currently.  TB test was placed.  She is going to   transfer to a doctor in [place] since she will be living there.      JDC/HN  dd: 11/28/2018 19:28:48 (CST)  td: 11/29/2018 12:11:50 (CST)  Doc ID   #[id number]  Job ID #[id number]    CC:     Dict [id number]  HPI  Review of Systems   Constitutional: Negative.    HENT: Negative for congestion, hearing loss, sinus pressure, sneezing, sore throat and voice change.    Eyes: Negative for visual disturbance.   Respiratory: Negative for cough, chest tightness and shortness of breath.    Cardiovascular: Negative.  Negative for chest pain, palpitations and leg swelling.   Gastrointestinal: Negative.     Genitourinary: Negative for difficulty urinating, dysuria, flank pain, frequency, hematuria, menstrual problem, pelvic pain, urgency, vaginal bleeding and vaginal discharge.   Musculoskeletal: Positive for arthralgias. Negative for neck pain and neck stiffness.   Skin: Positive for color change and wound.   Neurological: Negative.  Negative for dizziness, seizures, light-headedness, numbness and headaches.   Psychiatric/Behavioral: Negative for agitation, behavioral problems, confusion and sleep disturbance. The patient is not nervous/anxious.        Objective:      Physical Exam   Constitutional: She is oriented to person, place, and time. She appears well-developed and well-nourished.   Eyes: EOM are normal. Pupils are equal, round, and reactive to light.   Neck: Normal range of motion. Neck supple. No JVD present. No thyromegaly present.   Cardiovascular: Normal rate, regular rhythm, normal heart sounds and intact distal pulses. Exam reveals no gallop.   No murmur heard.  Pulmonary/Chest: Breath sounds normal. She has no wheezes. She has no rales.   Abdominal: Soft. Bowel sounds are normal. She exhibits no mass. There is no tenderness.   Musculoskeletal: Normal range of motion. She exhibits tenderness.   Lymphadenopathy:     She has no cervical adenopathy.   Neurological: She is alert and oriented to person, place, and time. She has normal reflexes. No cranial nerve deficit.   Skin: No rash noted. No erythema.   Psychiatric: She has a normal mood and affect. Judgment normal.       Assessment:       1. 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 Radiation burn    3. Atherosclerosis of aorta    4. Screening for tuberculosis        Plan:

## 2018-11-15 ENCOUNTER — CLINICAL SUPPORT (OUTPATIENT)
Dept: INTERNAL MEDICINE | Facility: CLINIC | Age: 83
End: 2018-11-15
Payer: MEDICARE

## 2018-11-15 LAB
TB INDURATION - 48 HR READ: 0 MM
TB INDURATION - 72 HR READ: 0 MM
TB SKIN TEST - 48 HR READ: NEGATIVE
TB SKIN TEST - 72 HR READ: NEGATIVE

## 2018-11-15 NOTE — PROGRESS NOTES
Tb skin test normal.  Read by dr yen.    Patient given original form for [facility] residence in [place].    I will fax paperwork to madeline zapata fax

## 2018-11-20 ENCOUNTER — OFFICE VISIT (OUTPATIENT)
Dept: OBSTETRICS AND GYNECOLOGY | Facility: CLINIC | Age: 83
End: 2018-11-20
Payer: MEDICARE

## 2018-11-20 ENCOUNTER — HOSPITAL ENCOUNTER (OUTPATIENT)
Dept: RADIOLOGY | Facility: HOSPITAL | Age: 83
Discharge: HOME | End: 2018-11-20
Attending: NURSE PRACTITIONER
Payer: MEDICARE

## 2018-11-20 ENCOUNTER — TELEPHONE (OUTPATIENT)
Dept: OBSTETRICS AND GYNECOLOGY | Facility: CLINIC | Age: 83
End: 2018-11-20

## 2018-11-20 VITALS
SYSTOLIC BLOOD PRESSURE: 118 MMHG | BODY MASS INDEX: 23.9 KG/M2 | HEIGHT: 63 IN | DIASTOLIC BLOOD PRESSURE: 72 MMHG | WEIGHT: 134.88 LBS

## 2018-11-20 DIAGNOSIS — N95.0 POSTMENOPAUSAL BLEEDING: ICD-10-CM

## 2018-11-20 DIAGNOSIS — N95.0 POSTMENOPAUSAL BLEEDING: Primary | ICD-10-CM

## 2018-11-20 DIAGNOSIS — N95.2 VAGINAL ATROPHY: ICD-10-CM

## 2018-11-20 DIAGNOSIS — N83.8 OVARIAN MASS, LEFT: Primary | ICD-10-CM

## 2018-11-20 PROCEDURE — 99213 OFFICE O/P EST LOW 20 MIN: CPT | Mod: S$GLB,,, | Performed by: NURSE PRACTITIONER

## 2018-11-20 PROCEDURE — 76830 TRANSVAGINAL US NON-OB: CPT | Mod: 26,,, | Performed by: RADIOLOGY

## 2018-11-20 PROCEDURE — 99999 PR PBB SHADOW E&M-EST. PATIENT-LVL III: CPT | Mod: PBBFAC,,, | Performed by: NURSE PRACTITIONER

## 2018-11-20 PROCEDURE — 76856 US EXAM PELVIC COMPLETE: CPT | Mod: TC

## 2018-11-20 PROCEDURE — 1101F PT FALLS ASSESS-DOCD LE1/YR: CPT | Mod: CPTII,S$GLB,, | Performed by: NURSE PRACTITIONER

## 2018-11-20 PROCEDURE — 76856 US EXAM PELVIC COMPLETE: CPT | Mod: 26,,, | Performed by: RADIOLOGY

## 2018-11-20 PROCEDURE — 76830 TRANSVAGINAL US NON-OB: CPT | Mod: TC

## 2018-11-20 NOTE — TELEPHONE ENCOUNTER
PHONE CALL: Advised of thickened EMS and left adnexal mass.  for Friday and come in Tuesday at 11 AM for embx. Ingrid Duarte NP

## 2018-11-20 NOTE — PROGRESS NOTES
HISTORY OF PRESENT ILLNESS:    Pau Cook is a 89 y.o. female , presents for a routine exam and reports vaginal bleeding since yesterday.  -Wears a daily pad and  noticed a few tiny drops of blood and then yesterday the drops were bigger.   -No bleeding today and is sure the blood was vaginal because she put tissue in her vagina to confirm it.  -Denies fever, cramping, vaginal pain, odor, itching, UTI sx.  -Has chronic MUNIR, unchanged, with bending over.  -No longer using Premarin cream.    Past Medical History:   Diagnosis Date    Allergy     Arthritis     Breast cyst     Foot pain     GERD (gastroesophageal reflux disease)     Joint pain     Peripheral vascular disease 1/15/2018    Squamous cell carcinoma 2016    Right Lower Leg    MUNIR (stress urinary incontinence, female) 2017       Past Surgical History:   Procedure Laterality Date    APPENDECTOMY      COLON SURGERY      removed about 10 inch    EXCISION, MASS, FINGER Right 2018    Performed by Fabio Seay MD at [facility]    FINGER MASS EXCISION Right 2018    Procedure: EXCISION, MASS, FINGER;  Surgeon: Fabio Seay MD;  Location: [facility] OR;  Service: Orthopedics;  Laterality: Right;    hemorhids          MEDICATIONS AND ALLERGIES:      Current Outpatient Medications:     acetaminophen (TYLENOL ARTHRITIS ORAL), Take 2 tablets by mouth every 8 (eight) hours as needed., Disp: , Rfl:     calcium citrate/vitamin D3 (CITRACAL REGULAR ORAL), Take by mouth., Disp: , Rfl:     calcium-vitamin D3 (CALCIUM 500 + D) 500 mg(1,250mg) -200 unit per tablet, Take 1 tablet by mouth 2 (two) times daily with meals., Disp: , Rfl:     carboxymethylcellulose (REFRESH PLUS) 0.5 % Dpet, 1 drop daily as needed., Disp: , Rfl:     conjugated estrogens (PREMARIN) vaginal cream, Place 0.5 g vaginally twice a week. (Patient taking differently: Place 0.5 g vaginally once as needed. ), Disp: 30 g, Rfl: 5    cyanocobalamin (VITAMIN  B-12) 1000 MCG tablet, Take 100 mcg by mouth once daily., Disp: , Rfl:     FLAXSEED ORAL, Take 1,000 mg by mouth once daily. , Disp: , Rfl:     ketotifen (ZADITOR) 0.025 % (0.035 %) ophthalmic solution, Place 1 drop into both eyes 2 (two) times daily as needed. , Disp: , Rfl:     MULTIVITAMIN W-MINERALS/LUTEIN (CENTRUM SILVER ORAL), Take by mouth., Disp: , Rfl:     NEXIUM 20 mg capsule, Take 20 mg by mouth before breakfast. , Disp: , Rfl:     Review of patient's allergies indicates:   Allergen Reactions    Pcn [penicillins]      Can not remember reaction     Tetanus vaccines and toxoid Swelling     Localized swelling to injection site       Family History   Adopted: Yes   Problem Relation Age of Onset    Cancer Mother     Stroke Father     Breast cancer Sister     No Known Problems Daughter     No Known Problems Brother     No Known Problems Daughter     Diabetes Brother     Colon cancer Neg Hx     Ovarian cancer Neg Hx        Social History     Socioeconomic History    Marital status:      Spouse name: Tegan    Number of children: Not on file    Years of education: Not on file    Highest education level: Not on file   Social Needs    Financial resource strain: Not on file    Food insecurity - worry: Not on file    Food insecurity - inability: Not on file    Transportation needs - medical: Not on file    Transportation needs - non-medical: Not on file   Occupational History    Occupation: retired   Tobacco Use    Smoking status: Never Smoker    Smokeless tobacco: Never Used   Substance and Sexual Activity    Alcohol use: No     Alcohol/week: 0.0 oz    Drug use: No    Sexual activity: No   Other Topics Concern    Are you pregnant or think you may be? Not Asked    Breast-feeding Not Asked   Social History Narrative    Not on file       OB HISTORY: Number of vaginal deliveries:2    COMPREHENSIVE GYN HISTORY:  PAP History:  Denies abnormal Paps.  Infection History: Denies STDs.  "Denies PID.  Benign History: Denies uterine fibroids. Denies ovarian cysts. Denies endometriosis. Denies other conditions.  Cancer History: Denies cervical cancer. Denies uterine cancer or hyperplasia. Denies ovarian cancer. Denies vulvar cancer or pre-cancer. Denies vaginal cancer or pre-cancer. Denies breast cancer. Denies colon cancer.  Sexual Activity History: Denies currently being sexually active  Menstrual History: Denies menses. Pt is  not on HRT.     ROS:  GENERAL: No weight changes. No swelling. No fatigue. No fever.  ABDOMEN: No pain. No nausea. No vomiting. No diarrhea. No constipation.  REPRODUCTIVE: + AUB.   VULVA: No pain. No lesions. No itching.  VAGINA: No relaxation. No itching. No odor. No discharge. No lesions.  URINARY: + MUNIR / PAD USE. No nocturia. No frequency. No dysuria.    /72   Ht 5' 3" (1.6 m)   Wt 61.2 kg (134 lb 14.4 oz)   BMI 23.90 kg/m²     PE:  APPEARANCE: Well nourished, well developed, in no acute distress.  AFFECT: WNL, alert and oriented x 3.  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 Normal hair distribution. Adequate perineal body, normal urethral meatus. VAGINA DRY / ATROPHIC without lesions, lacerations or discharge. NO BLOOD SEEN. CERVIX STENOTIC without lesions, discharge or tenderness. No significant cystocele or rectocele. Bimanual exam shows uterus to be normal size, regular, mobile and nontender. Adnexa without masses or tenderness.  RECTAL: Rectovaginal exam confirms above with normal sphincter tone, no masses.    DIAGNOSIS:  1. History of postmenopausal bleeding    2. Vaginal atrophy        PLAN:    Orders Placed This Encounter    US Pelvis Complete Non OB   Up to date on Mammogram, BMD and Colonoscopy      COUNSELING:  The patient was counseled today on:  -work up for  to include a pelvic ultrasound and embx if EMS thickened;  -bleeding possibly from atrophy and if EMS thin, will confirm this and should restart the " Premarin cream.    FOLLOW-UP with me pending test results.

## 2018-11-23 ENCOUNTER — LAB VISIT (OUTPATIENT)
Dept: LAB | Facility: HOSPITAL | Age: 83
End: 2018-11-23
Payer: MEDICARE

## 2018-11-23 ENCOUNTER — OFFICE VISIT (OUTPATIENT)
Dept: DERMATOLOGY | Facility: CLINIC | Age: 83
End: 2018-11-23
Payer: MEDICARE

## 2018-11-23 DIAGNOSIS — L57.0 ACTINIC KERATOSIS: Primary | ICD-10-CM

## 2018-11-23 DIAGNOSIS — N83.8 OVARIAN MASS, LEFT: ICD-10-CM

## 2018-11-23 LAB — CANCER AG125 SERPL-ACNC: 70 U/ML

## 2018-11-23 PROCEDURE — 86304 IMMUNOASSAY TUMOR CA 125: CPT

## 2018-11-23 PROCEDURE — 99499 UNLISTED E&M SERVICE: CPT | Mod: S$GLB,,, | Performed by: DERMATOLOGY

## 2018-11-23 PROCEDURE — 36415 COLL VENOUS BLD VENIPUNCTURE: CPT

## 2018-11-23 PROCEDURE — 99999 PR PBB SHADOW E&M-EST. PATIENT-LVL II: CPT | Mod: PBBFAC,,, | Performed by: DERMATOLOGY

## 2018-11-23 PROCEDURE — 17000 DESTRUCT PREMALG LESION: CPT | Mod: S$GLB,,, | Performed by: DERMATOLOGY

## 2018-11-23 PROCEDURE — 17003 DESTRUCT PREMALG LES 2-14: CPT | Mod: S$GLB,,, | Performed by: DERMATOLOGY

## 2018-11-23 NOTE — PATIENT INSTRUCTIONS

## 2018-11-23 NOTE — PROGRESS NOTES
Subjective:       Patient ID:  Pau Cook is a 89 y.o. female who presents for   Chief Complaint   Patient presents with    Skin Check     specific areas scaly red spots      Patient complains of lesion(s)  Location: left cheek, left 5th digit, and BL lower legs  Duration: cheek (x3 months), digit (x1-2 yrs), legs (x2-3 months)  Symptoms: itchy, dry, scaly  Relieving factors/Previous treatments: OTC cortisone crm and lamisil crm to itching spots; using Gold Bond eczema cream with some relief    Pt has a hx of SCC.        Review of Systems   Skin: Positive for itching, dry skin, daily sunscreen use and activity-related sunscreen use. Negative for recent sunburn.   Hematologic/Lymphatic: Does not bruise/bleed easily.        Objective:    Physical Exam   Constitutional: She appears well-developed and well-nourished. No distress.   Neurological: She is alert and oriented to person, place, and time. She is not disoriented.   Psychiatric: She has a normal mood and affect.   Skin:   Areas Examined (abnormalities noted in diagram):   Head / Face Inspection Performed  LUE Inspection Performed  RLE Inspected  LLE Inspection Performed  Nails and Digits Inspection Performed                       Diagram Legend     Erythematous scaling macule/papule c/w actinic keratosis       Vascular papule c/w angioma      Pigmented verrucoid papule/plaque c/w seborrheic keratosis      Yellow umbilicated papule c/w sebaceous hyperplasia      Irregularly shaped tan macule c/w lentigo     1-2 mm smooth white papules consistent with Milia      Movable subcutaneous cyst with punctum c/w epidermal inclusion cyst      Subcutaneous movable cyst c/w pilar cyst      Firm pink to brown papule c/w dermatofibroma      Pedunculated fleshy papule(s) c/w skin tag(s)      Evenly pigmented macule c/w junctional nevus     Mildly variegated pigmented, slightly irregular-bordered macule c/w mildly atypical nevus      Flesh colored to evenly pigmented  papule c/w intradermal nevus       Pink pearly papule/plaque c/w basal cell carcinoma      Erythematous hyperkeratotic cursted plaque c/w SCC      Surgical scar with no sign of skin cancer recurrence      Open and closed comedones      Inflammatory papules and pustules      Verrucoid papule consistent consistent with wart     Erythematous eczematous patches and plaques     Dystrophic onycholytic nail with subungual debris c/w onychomycosis     Umbilicated papule    Erythematous-base heme-crusted tan verrucoid plaque consistent with inflamed seborrheic keratosis     Erythematous Silvery Scaling Plaque c/w Psoriasis     See annotation      Assessment / Plan:        Actinic keratosis  Cryosurgery Procedure Note    Verbal consent from the patient is obtained including, but not limited to, risk of hypopigmentation/hyperpigmentation, scar, recurrence of lesion. The patient is aware of the precancerous quality and need for treatment of these lesions. Liquid nitrogen cryosurgery is applied to the 2 actinic keratoses, as detailed in the physical exam, to produce a freeze injury. The patient is aware that blisters may form and is instructed on wound care with gentle cleansing and use of vaseline ointment to keep moist until healed. The patient is supplied a handout on cryosurgery and is instructed to call if lesions do not completely resolve.               Follow-up if symptoms worsen or fail to improve.

## 2018-11-26 ENCOUNTER — TELEPHONE (OUTPATIENT)
Dept: OBSTETRICS AND GYNECOLOGY | Facility: CLINIC | Age: 83
End: 2018-11-26

## 2018-11-26 DIAGNOSIS — R93.89 ENDOMETRIAL THICKENING ON ULTRASOUND: ICD-10-CM

## 2018-11-26 DIAGNOSIS — R97.1 ELEVATED CA-125: ICD-10-CM

## 2018-11-26 DIAGNOSIS — N94.89 ADNEXAL MASS: Primary | ICD-10-CM

## 2018-11-26 DIAGNOSIS — N95.0 POST-MENOPAUSAL BLEEDING: ICD-10-CM

## 2018-11-26 NOTE — TELEPHONE ENCOUNTER
PHONE CALL: Advised of elevated  and that I discussed her case with Dr Carrizales. Cancelled her appointment with me for tomorrow and messaged Dr. Rose to get pt in for a cancer consult. Pt understands. Ingrid Duarte NP

## 2018-11-26 NOTE — TELEPHONE ENCOUNTER
----- Message from Amee Rose MD sent at 11/26/2018  5:19 PM CST -----  Happy to see her. I've let me staff know to reach out and schedule an appointment with me. Thank you for sending her.     ----- Message -----  From: BEE Duarte NP  Sent: 11/26/2018   9:40 AM  To: Amee Rose MD    Please have your nurse call this patient for a surgery consult. She is 88 yo, with  bleeding, thickened EMS on US, solid left adnexal mass and  of 70. I was going to try to do an EMBX, but Dr Carrizales said send her to Gyn Onc. Thanks, Ingrid Duarte NP    Thanks, GH

## 2018-12-04 ENCOUNTER — INITIAL CONSULT (OUTPATIENT)
Dept: GYNECOLOGIC ONCOLOGY | Facility: CLINIC | Age: 83
End: 2018-12-04
Payer: MEDICARE

## 2018-12-04 VITALS
SYSTOLIC BLOOD PRESSURE: 150 MMHG | HEIGHT: 63 IN | WEIGHT: 134.5 LBS | HEART RATE: 82 BPM | BODY MASS INDEX: 23.83 KG/M2 | DIASTOLIC BLOOD PRESSURE: 70 MMHG

## 2018-12-04 DIAGNOSIS — R19.00 PELVIC MASS: ICD-10-CM

## 2018-12-04 DIAGNOSIS — R97.8 OTHER ABNORMAL TUMOR MARKERS: ICD-10-CM

## 2018-12-04 DIAGNOSIS — N95.0 POSTMENOPAUSAL BLEEDING: Primary | ICD-10-CM

## 2018-12-04 PROCEDURE — 88305 TISSUE EXAM BY PATHOLOGIST: CPT | Performed by: PATHOLOGY

## 2018-12-04 PROCEDURE — 99999 PR PBB SHADOW E&M-EST. PATIENT-LVL III: CPT | Mod: PBBFAC,,, | Performed by: OBSTETRICS & GYNECOLOGY

## 2018-12-04 PROCEDURE — 99205 OFFICE O/P NEW HI 60 MIN: CPT | Mod: 25,S$GLB,, | Performed by: OBSTETRICS & GYNECOLOGY

## 2018-12-04 PROCEDURE — 1101F PT FALLS ASSESS-DOCD LE1/YR: CPT | Mod: CPTII,S$GLB,, | Performed by: OBSTETRICS & GYNECOLOGY

## 2018-12-04 PROCEDURE — 88342 IMHCHEM/IMCYTCHM 1ST ANTB: CPT | Mod: 26,,, | Performed by: PATHOLOGY

## 2018-12-04 PROCEDURE — 58100 BIOPSY OF UTERUS LINING: CPT | Mod: S$GLB,,, | Performed by: OBSTETRICS & GYNECOLOGY

## 2018-12-04 PROCEDURE — 88342 IMHCHEM/IMCYTCHM 1ST ANTB: CPT | Performed by: PATHOLOGY

## 2018-12-04 PROCEDURE — 88305 TISSUE EXAM BY PATHOLOGIST: CPT | Mod: 26,,, | Performed by: PATHOLOGY

## 2018-12-04 NOTE — LETTER
December 15, 2018      BEE Duarte, CELIA  [address]  [facility] [address]           [facility] - Gynecologic Oncology  [address], [address]  [facility] [address]  Phone: [phone]  Fax: [phone]          Patient: Pau Cook   MR Number: [id number]   YOB: 1929   Date of Visit: 12/4/2018       Dear BEE Duarte:    Thank you for referring Pau Cook to me for evaluation. Attached you will find relevant portions of my assessment and plan of care.    If you have questions, please do not hesitate to call me. I look forward to following Pau Cook along with you.    Sincerely,    Amee Rose MD    Enclosure  CC:  MD Aneta Coppola MD    If you would like to receive this communication electronically, please contact [email or web address] or [phone] to request more information on [employer] Link access.    For providers and/or their staff who would like to refer a patient to [facility], please contact us through our one-stop-shop provider referral line, [facility], at [phone].    If you feel you have received this communication in error or would no longer like to receive these types of communications, please e-mail [email or web address]

## 2018-12-05 ENCOUNTER — TELEPHONE (OUTPATIENT)
Dept: GYNECOLOGIC ONCOLOGY | Facility: CLINIC | Age: 83
End: 2018-12-05

## 2018-12-05 DIAGNOSIS — N95.0 PMB (POSTMENOPAUSAL BLEEDING): Primary | ICD-10-CM

## 2018-12-15 DIAGNOSIS — R19.00 PELVIC MASS: Primary | ICD-10-CM

## 2018-12-15 RX ORDER — SODIUM CHLORIDE 9 MG/ML
INJECTION, SOLUTION INTRAVENOUS CONTINUOUS
Status: CANCELLED | OUTPATIENT
Start: 2018-12-15

## 2018-12-15 RX ORDER — LIDOCAINE HYDROCHLORIDE 10 MG/ML
1 INJECTION, SOLUTION EPIDURAL; INFILTRATION; INTRACAUDAL; PERINEURAL ONCE
Status: CANCELLED | OUTPATIENT
Start: 2018-12-15 | End: 2018-12-15

## 2018-12-15 NOTE — PROCEDURES
Biopsy (Gynecological)  Date/Time: 12/4/2018 2:12 PM  Performed by: Amee Rose MD  Authorized by: Amee Rose MD     Consent Done?:  Yes (Written)    Biopsy Location:  Uterus  Estimated blood loss (cc):  0   Patient tolerated the procedure well with no immediate complications.     Uterus sound to 7cm. Multiple passes with pipelle. Scant tissue obtained.

## 2018-12-15 NOTE — H&P (VIEW-ONLY)
"Subjective:      Patient ID: Pau Cook is a 89 y.o. female.    Chief Complaint: Consult (ref by Dr Duarte/ solid left adnexa mass)      HPI  Referred by Dr. Aneta Carrizales and Ingrid Duarte, NP for PMB and solid adnexal mass.     Pelvic US 18  Uterus: Size: 6.5 x 3.1 x 4.0 cm  Endometrium: Mildly thickened in this post menopausal patient, measuring 8 mm.  Right ovary: Not definitively seen.  Left ovary: Size: 6.9 x 4.3 x 4.5 cm  Appearance: Left adnexal mass identified which appears to arise from the ovary with scattered calcifications, measuring 3.0 x 3.3 x 2.4 cm.  Findings are concerning for malignancy.     70    Prior abdominal surgeries include colectomy for "excessive intestines" and diverticular disease, open appendectomy.   Last colonoscopy 2012 normal.     Family history significant for sister with breast cancer, mom  with cancer in the abdomen of unknown origin.   Review of Systems   Constitutional: Negative for appetite change, chills, fatigue and fever.   HENT: Negative for mouth sores.    Respiratory: Negative for cough and shortness of breath.    Cardiovascular: Negative for leg swelling.   Gastrointestinal: Negative for abdominal pain, blood in stool, constipation and diarrhea.   Endocrine: Negative for cold intolerance.   Genitourinary: Negative for dysuria and vaginal bleeding.   Musculoskeletal: Negative for myalgias.   Skin: Negative for rash.   Allergic/Immunologic: Negative.    Neurological: Negative for weakness and numbness.   Hematological: Negative for adenopathy. Does not bruise/bleed easily.   Psychiatric/Behavioral: Negative for confusion.       Past Medical History:   Diagnosis Date    Allergy     Arthritis     Breast cyst     Foot pain     GERD (gastroesophageal reflux disease)     Joint pain     Peripheral vascular disease 1/15/2018    Squamous cell carcinoma 2016    Right Lower Leg    MUNIR (stress urinary incontinence, female) 2017     Past " Surgical History:   Procedure Laterality Date    APPENDECTOMY      COLON SURGERY      removed about 10 inch    EXCISION, MASS, FINGER Right 7/9/2018    Performed by Faibo Seay MD at [facility] OR    FINGER MASS EXCISION Right 7/9/2018    Procedure: EXCISION, MASS, FINGER;  Surgeon: Fabio Seay MD;  Location: [facility] OR;  Service: Orthopedics;  Laterality: Right;    hemorhids       Family History   Adopted: Yes   Problem Relation Age of Onset    Cancer Mother     Stroke Father     Breast cancer Sister     No Known Problems Daughter     No Known Problems Brother     No Known Problems Daughter     Diabetes Brother     Colon cancer Neg Hx     Ovarian cancer Neg Hx      Social History     Socioeconomic History    Marital status:      Spouse name: Tegan    Number of children: Not on file    Years of education: Not on file    Highest education level: Not on file   Social Needs    Financial resource strain: Not on file    Food insecurity - worry: Not on file    Food insecurity - inability: Not on file    Transportation needs - medical: Not on file    Transportation needs - non-medical: Not on file   Occupational History    Occupation: retired   Tobacco Use    Smoking status: Never Smoker    Smokeless tobacco: Never Used   Substance and Sexual Activity    Alcohol use: No     Alcohol/week: 0.0 oz    Drug use: No    Sexual activity: No   Other Topics Concern    Are you pregnant or think you may be? No    Breast-feeding No   Social History Narrative    Not on file     Current Outpatient Medications   Medication Sig    acetaminophen (TYLENOL ARTHRITIS ORAL) Take 2 tablets by mouth every 8 (eight) hours as needed.    calcium-vitamin D3 (CALCIUM 500 + D) 500 mg(1,250mg) -200 unit per tablet Take 1 tablet by mouth 2 (two) times daily with meals.    carboxymethylcellulose (REFRESH PLUS) 0.5 % Dpet 1 drop daily as needed.    conjugated estrogens (PREMARIN) vaginal cream Place 0.5 g  vaginally twice a week. (Patient taking differently: Place 0.5 g vaginally once as needed. )    cyanocobalamin (VITAMIN B-12) 1000 MCG tablet Take 100 mcg by mouth once daily.    FLAXSEED ORAL Take 1,000 mg by mouth once daily.     ketotifen (ZADITOR) 0.025 % (0.035 %) ophthalmic solution Place 1 drop into both eyes 2 (two) times daily as needed.     MULTIVITAMIN W-MINERALS/LUTEIN (CENTRUM SILVER ORAL) Take by mouth.    NEXIUM 20 mg capsule Take 20 mg by mouth before breakfast.      No current facility-administered medications for this visit.      Review of patient's allergies indicates:   Allergen Reactions    Pcn [penicillins]      Can not remember reaction     Tetanus vaccines and toxoid Swelling     Localized swelling to injection site       Objective:   Physical Exam:   Constitutional: She is oriented to person, place, and time. She appears well-developed and well-nourished.    HENT:   Head: Normocephalic and atraumatic.    Eyes: EOM are normal. Pupils are equal, round, and reactive to light.    Neck: Normal range of motion. Neck supple. No thyromegaly present.    Cardiovascular: Normal rate, regular rhythm and intact distal pulses.     Pulmonary/Chest: Effort normal and breath sounds normal. No respiratory distress. She has no wheezes.        Abdominal: Soft. Bowel sounds are normal. She exhibits no distension, no ascites and no mass. There is no tenderness.     Genitourinary: Rectum normal, vagina normal and uterus normal. Pelvic exam was performed with patient supine. There is no lesion on the right labia. There is no lesion on the left labia. Cervix is normal.   Genitourinary Comments: EMB obtained.            Musculoskeletal: Normal range of motion and moves all extremeties.      Lymphadenopathy:     She has no cervical adenopathy.        Right: No inguinal and no supraclavicular adenopathy present.        Left: No inguinal and no supraclavicular adenopathy present.    Neurological: She is alert and  oriented to person, place, and time.    Skin: Skin is warm and dry. No rash noted.    Psychiatric: She has a normal mood and affect.       Assessment:     1. Postmenopausal bleeding    2. Other abnormal tumor markers     3. Pelvic mass        Plan:     Orders Placed This Encounter   Procedures    Biopsy (Gynecological)    CT Chest Abdoment Pelvis With Contrast    CEA    CREATININE, SERUM       I discussed with the patient and her family the constellation of findings including elevated , adnexal mass, and postmenopausal bleeding with thickened endometrial stripe. I expressed concerns for malignant process. EMB was obtained today. Will also plan for CEA and CT CAP for further work up and evaluation. Recommend surgical exploration with RTLH/BSO/possible staging based on intraoperative findings and pending results. She desires to proceed. Aware of possible need for conversion to laparotomy.     The risks, benefits, and indications of the procedure were discussed with the patient and her family members if present.  These included bleeding, transfusion, infection, damage to surrounding tissues (bowel, bladder, ureter), wound separation, perioperative cardiac events, VTE, pneumonia, and possible death.  She voiced understanding, all questions were answered and consents were signed.    Surgery planned for 12/28/18  Pre op anesthesia  Pending CEA, EMB, CT    I spent approximately 60 minutes reviewing the available records and evaluating the patient, out of which over 50% of the time was spent face to face with the patient in counseling and coordinating this patient's care.

## 2018-12-15 NOTE — PROGRESS NOTES
"Subjective:      Patient ID: Pau Cook is a 89 y.o. female.    Chief Complaint: Consult (ref by Dr Duarte/ solid left adnexa mass)      HPI  Referred by Dr. Aneta Carrizales and Ingrid Duarte, NP for PMB and solid adnexal mass.     Pelvic US 18  Uterus: Size: 6.5 x 3.1 x 4.0 cm  Endometrium: Mildly thickened in this post menopausal patient, measuring 8 mm.  Right ovary: Not definitively seen.  Left ovary: Size: 6.9 x 4.3 x 4.5 cm  Appearance: Left adnexal mass identified which appears to arise from the ovary with scattered calcifications, measuring 3.0 x 3.3 x 2.4 cm.  Findings are concerning for malignancy.     70    Prior abdominal surgeries include colectomy for "excessive intestines" and diverticular disease, open appendectomy.   Last colonoscopy 2012 normal.     Family history significant for sister with breast cancer, mom  with cancer in the abdomen of unknown origin.   Review of Systems   Constitutional: Negative for appetite change, chills, fatigue and fever.   HENT: Negative for mouth sores.    Respiratory: Negative for cough and shortness of breath.    Cardiovascular: Negative for leg swelling.   Gastrointestinal: Negative for abdominal pain, blood in stool, constipation and diarrhea.   Endocrine: Negative for cold intolerance.   Genitourinary: Negative for dysuria and vaginal bleeding.   Musculoskeletal: Negative for myalgias.   Skin: Negative for rash.   Allergic/Immunologic: Negative.    Neurological: Negative for weakness and numbness.   Hematological: Negative for adenopathy. Does not bruise/bleed easily.   Psychiatric/Behavioral: Negative for confusion.       Past Medical History:   Diagnosis Date    Allergy     Arthritis     Breast cyst     Foot pain     GERD (gastroesophageal reflux disease)     Joint pain     Peripheral vascular disease 1/15/2018    Squamous cell carcinoma 2016    Right Lower Leg    MUNIR (stress urinary incontinence, female) 2017     Past " Surgical History:   Procedure Laterality Date    APPENDECTOMY      COLON SURGERY      removed about 10 inch    EXCISION, MASS, FINGER Right 7/9/2018    Performed by Fabio Seay MD at [facility] OR    FINGER MASS EXCISION Right 7/9/2018    Procedure: EXCISION, MASS, FINGER;  Surgeon: Fabio Seay MD;  Location: [facility] OR;  Service: Orthopedics;  Laterality: Right;    hemorhids       Family History   Adopted: Yes   Problem Relation Age of Onset    Cancer Mother     Stroke Father     Breast cancer Sister     No Known Problems Daughter     No Known Problems Brother     No Known Problems Daughter     Diabetes Brother     Colon cancer Neg Hx     Ovarian cancer Neg Hx      Social History     Socioeconomic History    Marital status:      Spouse name: Tegan    Number of children: Not on file    Years of education: Not on file    Highest education level: Not on file   Social Needs    Financial resource strain: Not on file    Food insecurity - worry: Not on file    Food insecurity - inability: Not on file    Transportation needs - medical: Not on file    Transportation needs - non-medical: Not on file   Occupational History    Occupation: retired   Tobacco Use    Smoking status: Never Smoker    Smokeless tobacco: Never Used   Substance and Sexual Activity    Alcohol use: No     Alcohol/week: 0.0 oz    Drug use: No    Sexual activity: No   Other Topics Concern    Are you pregnant or think you may be? No    Breast-feeding No   Social History Narrative    Not on file     Current Outpatient Medications   Medication Sig    acetaminophen (TYLENOL ARTHRITIS ORAL) Take 2 tablets by mouth every 8 (eight) hours as needed.    calcium-vitamin D3 (CALCIUM 500 + D) 500 mg(1,250mg) -200 unit per tablet Take 1 tablet by mouth 2 (two) times daily with meals.    carboxymethylcellulose (REFRESH PLUS) 0.5 % Dpet 1 drop daily as needed.    conjugated estrogens (PREMARIN) vaginal cream Place 0.5 g  vaginally twice a week. (Patient taking differently: Place 0.5 g vaginally once as needed. )    cyanocobalamin (VITAMIN B-12) 1000 MCG tablet Take 100 mcg by mouth once daily.    FLAXSEED ORAL Take 1,000 mg by mouth once daily.     ketotifen (ZADITOR) 0.025 % (0.035 %) ophthalmic solution Place 1 drop into both eyes 2 (two) times daily as needed.     MULTIVITAMIN W-MINERALS/LUTEIN (CENTRUM SILVER ORAL) Take by mouth.    NEXIUM 20 mg capsule Take 20 mg by mouth before breakfast.      No current facility-administered medications for this visit.      Review of patient's allergies indicates:   Allergen Reactions    Pcn [penicillins]      Can not remember reaction     Tetanus vaccines and toxoid Swelling     Localized swelling to injection site       Objective:   Physical Exam:   Constitutional: She is oriented to person, place, and time. She appears well-developed and well-nourished.    HENT:   Head: Normocephalic and atraumatic.    Eyes: EOM are normal. Pupils are equal, round, and reactive to light.    Neck: Normal range of motion. Neck supple. No thyromegaly present.    Cardiovascular: Normal rate, regular rhythm and intact distal pulses.     Pulmonary/Chest: Effort normal and breath sounds normal. No respiratory distress. She has no wheezes.        Abdominal: Soft. Bowel sounds are normal. She exhibits no distension, no ascites and no mass. There is no tenderness.     Genitourinary: Rectum normal, vagina normal and uterus normal. Pelvic exam was performed with patient supine. There is no lesion on the right labia. There is no lesion on the left labia. Cervix is normal.   Genitourinary Comments: EMB obtained.            Musculoskeletal: Normal range of motion and moves all extremeties.      Lymphadenopathy:     She has no cervical adenopathy.        Right: No inguinal and no supraclavicular adenopathy present.        Left: No inguinal and no supraclavicular adenopathy present.    Neurological: She is alert and  oriented to person, place, and time.    Skin: Skin is warm and dry. No rash noted.    Psychiatric: She has a normal mood and affect.       Assessment:     1. Postmenopausal bleeding    2. Other abnormal tumor markers     3. Pelvic mass        Plan:     Orders Placed This Encounter   Procedures    Biopsy (Gynecological)    CT Chest Abdoment Pelvis With Contrast    CEA    CREATININE, SERUM       I discussed with the patient and her family the constellation of findings including elevated , adnexal mass, and postmenopausal bleeding with thickened endometrial stripe. I expressed concerns for malignant process. EMB was obtained today. Will also plan for CEA and CT CAP for further work up and evaluation. Recommend surgical exploration with RTLH/BSO/possible staging based on intraoperative findings and pending results. She desires to proceed. Aware of possible need for conversion to laparotomy.     The risks, benefits, and indications of the procedure were discussed with the patient and her family members if present.  These included bleeding, transfusion, infection, damage to surrounding tissues (bowel, bladder, ureter), wound separation, perioperative cardiac events, VTE, pneumonia, and possible death.  She voiced understanding, all questions were answered and consents were signed.    Surgery planned for 12/28/18  Pre op anesthesia  Pending CEA, EMB, CT    I spent approximately 60 minutes reviewing the available records and evaluating the patient, out of which over 50% of the time was spent face to face with the patient in counseling and coordinating this patient's care.

## 2018-12-19 ENCOUNTER — HOSPITAL ENCOUNTER (OUTPATIENT)
Dept: RADIOLOGY | Facility: HOSPITAL | Age: 83
Discharge: HOME | End: 2018-12-19
Attending: OBSTETRICS & GYNECOLOGY
Payer: MEDICARE

## 2018-12-19 DIAGNOSIS — N95.0 POSTMENOPAUSAL BLEEDING: ICD-10-CM

## 2018-12-19 PROCEDURE — 74177 CT ABD & PELVIS W/CONTRAST: CPT | Mod: TC

## 2018-12-19 PROCEDURE — 25500020 PHARM REV CODE 255: Performed by: OBSTETRICS & GYNECOLOGY

## 2018-12-19 PROCEDURE — 74177 CT ABD & PELVIS W/CONTRAST: CPT | Mod: 26,,, | Performed by: RADIOLOGY

## 2018-12-19 PROCEDURE — 71260 CT THORAX DX C+: CPT | Mod: 26,,, | Performed by: RADIOLOGY

## 2018-12-19 RX ADMIN — IOHEXOL 75 ML: 350 INJECTION, SOLUTION INTRAVENOUS at 10:12

## 2018-12-19 RX ADMIN — IOHEXOL 15 ML: 350 INJECTION, SOLUTION INTRAVENOUS at 09:12

## 2018-12-20 ENCOUNTER — TELEPHONE (OUTPATIENT)
Dept: GYNECOLOGIC ONCOLOGY | Facility: CLINIC | Age: 83
End: 2018-12-20

## 2018-12-20 DIAGNOSIS — R97.0 ELEVATED CEA: Primary | ICD-10-CM

## 2018-12-20 LAB
CREAT SERPL-MCNC: 0.8 MG/DL (ref 0.5–1.4)
SAMPLE: NORMAL

## 2018-12-20 NOTE — TELEPHONE ENCOUNTER
----- Message from Marina Zendejas sent at 12/20/2018 12:36 PM CST -----  Contact: June  Name of Who is Calling: Kendra patient's daughter       What is the request in detail: [place] patient's daughter is requesting to speak with Trinity Orlando in regards to to the patient being seen at the [place] location. Please contact to further discuss and advise      Can the clinic reply by [facility]: No       What Number to Call Back if not in [facility]: [phone]

## 2018-12-20 NOTE — TELEPHONE ENCOUNTER
Spoke with pt daughter June. She was informed schedulers will contact [facility] at 1 pm in regards to getting colonoscopy scheduled. She voiced understanding

## 2018-12-20 NOTE — TELEPHONE ENCOUNTER
----- Message from Trinity Orlando NP sent at 12/20/2018 11:40 AM CST -----  Ms. Cook has an elevated CEA and liver metastasis. She is scheduled for robot 12/28 but Dr. Rose wants to delay her surgery until she has a colonoscopy. I called patient so she knows pre op tomorrow is cancelled and surgery will be delayed. She wants her colonoscopy in [place]. I placed the order but want to check if there is a way to expedite getting her scheduled.  Thanks,  Trinity    ----- Message -----  From: Amee Rose MD  Sent: 12/20/2018  10:24 AM  To: Trinity Orlando NP    Just contact her and schedule colonoscopy with one of the CRS surgeons. Does not need to see them for a visit unless something on scope I think.   ----- Message -----  From: Trinity Orlando NP  Sent: 12/20/2018  10:10 AM  To: Amee Rose MD    Happy to. Do you just want me to contact her and schedule/order colonoscopy or do you want her to be evaluated by GI or CRS and have them order?    ----- Message -----  From: Amee Rose MD  Sent: 12/20/2018   9:52 AM  To: Trinity Orlando NP     to CEA ratio would suggest that these findings could possibly represent a GI primary versus ovarian. It's also less likely for ovary to have these liver mets primarily. Her last colonoscopy was in 2012.     We need to delay surgery for GI work up with colonoscopy.     Would you mind helping facilitate that while I am out?     Thank you.   ----- Message -----  From: Trinity Orlando NP  Sent: 12/19/2018   3:42 PM  To: Amee Rose MD    Scheduled for robot 12/28 and CEA is also elevated at 10. Let me know if there is anything you need me to do.

## 2018-12-20 NOTE — TELEPHONE ENCOUNTER
Spoke with patient and her daughter. [place] was able to schedule for colonoscopy on 12/26.     ----- Message from Mary Stauffer sent at 12/20/2018  1:42 PM CST -----  RE: ALDO NEGRON [[id number]]     Dr Rose,   I am on phone with GI specialist office in [place] & I am being told that they can not see Ms Negron until after New Year    I was told that Ms Negron would benefit being seen at [facility]/[place] only has one GI provider at this time

## 2018-12-20 NOTE — TELEPHONE ENCOUNTER
----- Message from Amee Rose MD sent at 12/20/2018  9:52 AM CST -----   to CEA ratio would suggest that these findings could possibly represent a GI primary versus ovarian. It's also less likely for ovary to have these liver mets primarily. Her last colonoscopy was in 2012.     We need to delay surgery for GI work up with colonoscopy.     Would you mind helping facilitate that while I am out?     Thank you.   ----- Message -----  From: Trinity Orlando NP  Sent: 12/19/2018   3:42 PM  To: Amee Rose MD    Scheduled for robot 12/28 and CEA is also elevated at 10. Let me know if there is anything you need me to do.

## 2018-12-21 ENCOUNTER — OFFICE VISIT (OUTPATIENT)
Dept: GASTROENTEROLOGY | Facility: CLINIC | Age: 83
End: 2018-12-21
Payer: MEDICARE

## 2018-12-21 VITALS
WEIGHT: 131.81 LBS | HEART RATE: 85 BPM | HEIGHT: 63 IN | SYSTOLIC BLOOD PRESSURE: 130 MMHG | BODY MASS INDEX: 23.36 KG/M2 | DIASTOLIC BLOOD PRESSURE: 63 MMHG

## 2018-12-21 DIAGNOSIS — N94.89 ADNEXAL MASS: ICD-10-CM

## 2018-12-21 DIAGNOSIS — R93.5 ABNORMAL ABDOMINAL CT SCAN: Primary | ICD-10-CM

## 2018-12-21 DIAGNOSIS — R97.0 ELEVATED CEA: ICD-10-CM

## 2018-12-21 PROCEDURE — 1101F PT FALLS ASSESS-DOCD LE1/YR: CPT | Mod: CPTII,S$GLB,, | Performed by: INTERNAL MEDICINE

## 2018-12-21 PROCEDURE — 99999 PR PBB SHADOW E&M-EST. PATIENT-LVL III: CPT | Mod: PBBFAC,,, | Performed by: INTERNAL MEDICINE

## 2018-12-21 PROCEDURE — 99205 OFFICE O/P NEW HI 60 MIN: CPT | Mod: S$GLB,,, | Performed by: INTERNAL MEDICINE

## 2018-12-21 NOTE — H&P (VIEW-ONLY)
"Subjective:       Patient ID: Pau Cook is a 89 y.o. female.    This patient is new to me.  Referring MD:  Dr. Rose for elevated CEA/abnormal CT scan.      Chief Complaint: Abnormal CT scan    Patient seen for abnormal CT scan, recent onset, associated with vaginal spotting which prompted her visit, but no GI symptoms whatsoever.  No alleviating/exacerbating factors.  Findings characterized by left adnexal mass which seems ovarian in origin.  Per notes from Dr. Rose, CEA done and elevated at 10.1, thus raising concern for possible colonic process.  Last colonoscopy was normal in 2012 with Dr. Fernandez.  No change in bowel habits, GI bleeding, weight loss, personal history of colon polyps, or family history of colon cancer.  Patient's daughter was present throughout the visit.        Review of Systems   Constitutional: Negative for chills, fatigue and fever.   HENT: Negative for sore throat and trouble swallowing.    Respiratory: Negative for cough, shortness of breath and wheezing.    Cardiovascular: Negative for chest pain and palpitations.   Gastrointestinal: Negative for abdominal pain, blood in stool, nausea and vomiting.   Genitourinary: Positive for vaginal bleeding (minor spotting was initial presentation ). Negative for dysuria and hematuria.   Musculoskeletal: Negative for arthralgias and myalgias.   Skin: Negative for color change and rash.   Neurological: Negative for dizziness and headaches.   Hematological: Negative for adenopathy.   Psychiatric/Behavioral: Negative for confusion. The patient is not nervous/anxious.    All other systems reviewed and are negative.      Objective:         Vitals:    12/21/18 0858   BP: 130/63   Pulse: 85   Weight: 59.8 kg (131 lb 13.4 oz)   Height: 5' 3" (1.6 m)       Physical Exam   Constitutional: She appears well-developed and well-nourished.   Elderly female     HENT:   Head: Normocephalic and atraumatic.   Eyes: Pupils are equal, round, and reactive to light. " No scleral icterus.   Neck: Normal range of motion.   Cardiovascular: Normal rate and regular rhythm.   No murmur heard.  Pulmonary/Chest: Effort normal and breath sounds normal. She has no wheezes.   Abdominal: Soft. Bowel sounds are normal. She exhibits no distension. There is tenderness (mild in LLQ with deep palpation and no obvious mass palpated).   Musculoskeletal: She exhibits no edema or tenderness.   Lymphadenopathy:     She has no cervical adenopathy.   Neurological: She is alert.   Skin: Skin is warm and dry. No rash noted.   Vitals reviewed.        Lab Results   Component Value Date    WBC 5.31 04/12/2018    HGB 12.7 04/12/2018    HCT 40.4 04/12/2018    MCV 89 04/12/2018     04/12/2018       CMP  Sodium   Date Value Ref Range Status   04/12/2018 139 136 - 145 mmol/L Final     Potassium   Date Value Ref Range Status   04/12/2018 4.3 3.5 - 5.1 mmol/L Final     Chloride   Date Value Ref Range Status   04/12/2018 102 95 - 110 mmol/L Final     CO2   Date Value Ref Range Status   04/12/2018 28 23 - 29 mmol/L Final     Glucose   Date Value Ref Range Status   04/12/2018 103 70 - 110 mg/dL Final     BUN, Bld   Date Value Ref Range Status   04/12/2018 11 8 - 23 mg/dL Final     Creatinine   Date Value Ref Range Status   12/19/2018 0.8 0.5 - 1.4 mg/dL Final     Calcium   Date Value Ref Range Status   04/12/2018 9.3 8.7 - 10.5 mg/dL Final     Total Protein   Date Value Ref Range Status   04/12/2018 7.3 6.0 - 8.4 g/dL Final     Albumin   Date Value Ref Range Status   04/12/2018 3.8 3.5 - 5.2 g/dL Final     Total Bilirubin   Date Value Ref Range Status   04/12/2018 0.5 0.1 - 1.0 mg/dL Final     Comment:     For infants and newborns, interpretation of results should be based  on gestational age, weight and in agreement with clinical  observations.  Premature Infant recommended reference ranges:  Up to 24 hours.............<8.0 mg/dL  Up to 48 hours............<12.0 mg/dL  3-5 days..................<15.0  mg/dL  6-29 days.................<15.0 mg/dL       Alkaline Phosphatase   Date Value Ref Range Status   04/12/2018 70 55 - 135 U/L Final     AST   Date Value Ref Range Status   04/12/2018 28 10 - 40 U/L Final     ALT   Date Value Ref Range Status   04/12/2018 20 10 - 44 U/L Final     Anion Gap   Date Value Ref Range Status   04/12/2018 9 8 - 16 mmol/L Final     eGFR if    Date Value Ref Range Status   12/19/2018 >60.0 >60 mL/min/1.73 m^2 Final     eGFR if non    Date Value Ref Range Status   12/19/2018 >60.0 >60 mL/min/1.73 m^2 Final     Comment:     Calculation used to obtain the estimated glomerular filtration  rate (eGFR) is the CKD-EPI equation.          Lab Results   Component Value Date    CEA 10.1 (H) 12/19/2018     CT scan was independently visualized and reviewed by me and showed left adnexal mass.    Old records from Dr. Rose reviewed and are as summarized above in the HPI.      Further history was obtained from the patient's daughter who was present throughout the interview and states that she is otherwise in good health for her age.  History is otherwise as above in the HPI.     MDM:  New problem, further workup.  Data as above.  High risk given age and frailty.  Endoscopy planned.      Assessment:       1. Abnormal abdominal CT scan    2. Adnexal mass    3. Elevated CEA        Plan:       1.  Colonoscopy to rule out colon lesion  2.  Elevated CEA may be due to ovarian process and colon primary would be less likely given normal colonoscopy in 2012 and absence of GI symptoms, however will proceed as above, and, if negative, recommend surgery as planned by OB/GYN.  3.  Further recommendations to follow after above.  4.  Communication will be sent to the referring MD, Dr. Rose regarding my assessment and plan on this patient via EPIC.

## 2018-12-21 NOTE — LETTER
December 21, 2018      Amee Rose MD  [address]  [facility] [address]           [facility]  [address]  [facility] [address]  Phone: [phone]          Patient: Pau Cook   MR Number: [id number]   YOB: 1929   Date of Visit: 12/21/2018       Dear Dr. Amee Rose:    Thank you for referring Pau Cook to me for evaluation. Attached you will find relevant portions of my assessment and plan of care.    If you have questions, please do not hesitate to call me. I look forward to following Pau Cook along with you.    Sincerely,    Tde Goins LPN    Enclosure  CC:  No Recipients    If you would like to receive this communication electronically, please contact [email or web address] or [phone] to request more information on [employer] Link access.    For providers and/or their staff who would like to refer a patient to [facility], please contact us through our one-stop-shop provider referral line, [facility], at [phone].    If you feel you have received this communication in error or would no longer like to receive these types of communications, please e-mail [email or web address]

## 2018-12-24 ENCOUNTER — ANESTHESIA (OUTPATIENT)
Dept: ENDOSCOPY | Facility: HOSPITAL | Age: 83
End: 2018-12-24
Payer: MEDICARE

## 2018-12-24 ENCOUNTER — ANESTHESIA EVENT (OUTPATIENT)
Dept: ENDOSCOPY | Facility: HOSPITAL | Age: 83
End: 2018-12-24
Payer: MEDICARE

## 2018-12-24 ENCOUNTER — HOSPITAL ENCOUNTER (OUTPATIENT)
Facility: HOSPITAL | Age: 83
Discharge: HOME | End: 2018-12-24
Attending: INTERNAL MEDICINE | Admitting: INTERNAL MEDICINE
Payer: MEDICARE

## 2018-12-24 VITALS
HEART RATE: 80 BPM | SYSTOLIC BLOOD PRESSURE: 133 MMHG | RESPIRATION RATE: 20 BRPM | DIASTOLIC BLOOD PRESSURE: 69 MMHG | OXYGEN SATURATION: 100 % | TEMPERATURE: 98 F

## 2018-12-24 DIAGNOSIS — R93.89 ABNORMAL CT SCAN: ICD-10-CM

## 2018-12-24 DIAGNOSIS — K57.30 DIVERTICULOSIS OF LARGE INTESTINE WITHOUT HEMORRHAGE: Primary | ICD-10-CM

## 2018-12-24 PROCEDURE — 88305 TISSUE SPECIMEN TO PATHOLOGY - SURGERY: ICD-10-PCS | Mod: 26,,, | Performed by: PATHOLOGY

## 2018-12-24 PROCEDURE — 45380 PR COLONOSCOPY,BIOPSY: ICD-10-PCS | Mod: ,,, | Performed by: INTERNAL MEDICINE

## 2018-12-24 PROCEDURE — D9220A PRA ANESTHESIA: Mod: ,,, | Performed by: ANESTHESIOLOGY

## 2018-12-24 PROCEDURE — 63600175 PHARM REV CODE 636 W HCPCS: Performed by: NURSE ANESTHETIST, CERTIFIED REGISTERED

## 2018-12-24 PROCEDURE — 37000008 HC ANESTHESIA 1ST 15 MINUTES: Performed by: INTERNAL MEDICINE

## 2018-12-24 PROCEDURE — 37000009 HC ANESTHESIA EA ADD 15 MINS: Performed by: INTERNAL MEDICINE

## 2018-12-24 PROCEDURE — 45380 COLONOSCOPY AND BIOPSY: CPT | Mod: ,,, | Performed by: INTERNAL MEDICINE

## 2018-12-24 PROCEDURE — 88305 TISSUE EXAM BY PATHOLOGIST: CPT | Performed by: PATHOLOGY

## 2018-12-24 PROCEDURE — 45380 COLONOSCOPY AND BIOPSY: CPT | Performed by: INTERNAL MEDICINE

## 2018-12-24 PROCEDURE — 27201012 HC FORCEPS, HOT/COLD, DISP: Performed by: INTERNAL MEDICINE

## 2018-12-24 PROCEDURE — 25000003 PHARM REV CODE 250: Performed by: INTERNAL MEDICINE

## 2018-12-24 RX ORDER — LIDOCAINE HCL/PF 100 MG/5ML
SYRINGE (ML) INTRAVENOUS
Status: DISCONTINUED | OUTPATIENT
Start: 2018-12-24 | End: 2018-12-24

## 2018-12-24 RX ORDER — SODIUM CHLORIDE 9 MG/ML
INJECTION, SOLUTION INTRAVENOUS CONTINUOUS
Status: DISCONTINUED | OUTPATIENT
Start: 2018-12-24 | End: 2018-12-24 | Stop reason: HOSPADM

## 2018-12-24 RX ORDER — PROPOFOL 10 MG/ML
VIAL (ML) INTRAVENOUS
Status: DISCONTINUED | OUTPATIENT
Start: 2018-12-24 | End: 2018-12-24

## 2018-12-24 RX ADMIN — PROPOFOL 30 MG: 10 INJECTION, EMULSION INTRAVENOUS at 10:12

## 2018-12-24 RX ADMIN — LIDOCAINE HYDROCHLORIDE 50 MG: 20 INJECTION, SOLUTION INTRAVENOUS at 10:12

## 2018-12-24 RX ADMIN — SODIUM CHLORIDE 1000 ML: 0.9 INJECTION, SOLUTION INTRAVENOUS at 10:12

## 2018-12-24 RX ADMIN — PROPOFOL 100 MG: 10 INJECTION, EMULSION INTRAVENOUS at 10:12

## 2018-12-24 NOTE — OR NURSING
Have you had a colonoscopy LESS THAN 3 years ago? No  2011  * If YES, answer these questions*:    1. Did patient have a prior colonic polyp in a previous surveillance/diagnostic colonoscopy and is 18 years or older on date of encounter?    2. Documentation of < 3 year interval since the patients last colonoscopy due to medical reasons (eg., last colonoscopy incomplete, last colonoscopy had inadequate prep, piecemeal removal of adenomas, or last colonoscopy found > 10 adenomas) ?

## 2018-12-24 NOTE — TRANSFER OF CARE
Anesthesia Transfer of Care Note    Patient: Pau Cook    Procedure(s) Performed: Procedure(s) (LRB):  COLONOSCOPY (N/A)    Patient location: GI    Anesthesia Type: general    Transport from OR: Transported from OR on 2-3 L/min O2 by NC with adequate spontaneous ventilation    Post pain: adequate analgesia    Post assessment: no apparent anesthetic complications    Post vital signs: stable    Level of consciousness: awake    Nausea/Vomiting: no nausea/vomiting    Complications: none    Transfer of care protocol was followed      Last vitals:   Visit Vitals  BP (!) 147/66   Breastfeeding? No

## 2018-12-24 NOTE — ANESTHESIA PREPROCEDURE EVALUATION
12/24/2018  Pau Cook is a 89 y.o., female.    Anesthesia Evaluation    I have reviewed the Patient Summary Reports.    I have reviewed the Nursing Notes.      Review of Systems  Anesthesia Hx:  No problems with previous Anesthesia    Hepatic/GI:   GERD, well controlled        Physical Exam  General:  Well nourished    Airway/Jaw/Neck:  Airway Findings: Mallampati: II                Anesthesia Plan  Type of Anesthesia, risks & benefits discussed:  Anesthesia Type:  general  Patient's Preference:   Intra-op Monitoring Plan:   Intra-op Monitoring Plan Comments:   Post Op Pain Control Plan:   Post Op Pain Control Plan Comments:   Induction:    Beta Blocker:  Patient is not currently on a Beta-Blocker (No further documentation required).       Informed Consent: Patient understands risks and agrees with Anesthesia plan.  Questions answered. Anesthesia consent signed with patient.  ASA Score: 2     Day of Surgery Review of History & Physical:    H&P update referred to the surgeon.         Ready For Surgery From Anesthesia Perspective.

## 2018-12-24 NOTE — ANESTHESIA POSTPROCEDURE EVALUATION
Anesthesia Post Evaluation    Patient: Pau Cook    Procedure(s) Performed: Procedure(s) (LRB):  COLONOSCOPY (N/A)    Final Anesthesia Type: general  Patient location during evaluation: PACU  Patient participation: Yes- Able to Participate  Level of consciousness: awake and alert  Post-procedure vital signs: reviewed and stable  Pain management: adequate  Airway patency: patent  PONV status at discharge: No PONV  Anesthetic complications: no      Cardiovascular status: hemodynamically stable  Respiratory status: unassisted and room air  Hydration status: euvolemic  Follow-up not needed.        Visit Vitals  /69   Pulse 80   Temp 36.4 °C (97.5 °F) (Temporal)   Resp 20   SpO2 100%   Breastfeeding? No       Pain/Florian Score: Florian Score: 10 (12/24/2018 11:35 AM)

## 2018-12-24 NOTE — DISCHARGE INSTRUCTIONS

## 2018-12-24 NOTE — [employer] PATIENT INSTRUCTIONS
Discharge Summary/Instructions after an Endoscopic Procedure  Patient Name: Pau Cook  Patient MRN: [id number]  Patient YOB: 1929 Monday, December 24, 2018  Nirmal Santana MD  RESTRICTIONS:  During your procedure today, you received medications for sedation.  These   medications may affect your judgment, balance and coordination.  Therefore,   for 24 hours, you have the following restrictions:   - DO NOT drive a car, operate machinery, make legal/financial decisions,   sign important papers or drink alcohol.    ACTIVITY:  Today: no heavy lifting, straining or running due to procedural   sedation/anesthesia.  The following day: return to full activity including work.  DIET:  Eat and drink normally unless instructed otherwise.     TREATMENT FOR COMMON SIDE EFFECTS:  - Mild abdominal pain, nausea, belching, bloating or excessive gas:  rest,   eat lightly and use a heating pad.  - Sore Throat: treat with throat lozenges and/or gargle with warm salt   water.  - Because air was used during the procedure, expelling large amounts of air   from your rectum or belching is normal.  - If a bowel prep was taken, you may not have a bowel movement for 1-3 days.    This is normal.  SYMPTOMS TO WATCH FOR AND REPORT TO YOUR PHYSICIAN:  1. Abdominal pain or bloating, other than gas cramps.  2. Chest pain.  3. Back pain.  4. Signs of infection such as: chills or fever occurring within 24 hours   after the procedure.  5. Rectal bleeding, which would show as bright red, maroon, or black stools.   (A tablespoon of blood from the rectum is not serious, especially if   hemorrhoids are present.)  6. Vomiting.  7. Weakness or dizziness.  GO DIRECTLY TO THE NEAREST EMERGENCY ROOM IF YOU HAVE ANY OF THE FOLLOWING:      Difficulty breathing              Chills and/or fever over 101 F   Persistent vomiting and/or vomiting blood   Severe abdominal pain   Severe chest pain   Black, tarry stools   Bleeding- more than one  tablespoon   Any other symptom or condition that you feel may need urgent attention  Your doctor recommends these additional instructions:  If any biopsies were taken, your doctors clinic will contact you in 1 to 2   weeks with any results.  - Patient has a contact number available for emergencies.  The signs and   symptoms of potential delayed complications were discussed with the   patient.  Return to normal activities tomorrow.  Written discharge   instructions were provided to the patient.   - High fiber diet.   - Continue present medications.   - Await pathology results.   - No repeat colonoscopy due to age.   - Discharge patient to home (ambulatory).   - Return to referring physician. No GI contraindication to surgery.  For questions, problems or results please call your physician - Nirmal Santana MD at Work:  [phone].  [facility], EMERGENCY ROOM PHONE NUMBER: [phone]  IF A COMPLICATION OR EMERGENCY SITUATION ARISES AND YOU ARE UNABLE TO REACH   YOUR PHYSICIAN - GO DIRECTLY TO THE EMERGENCY ROOM.  Nirmal Santana MD  12/24/2018 11:01:52 AM  This report has been verified and signed electronically.  [employer]

## 2018-12-26 ENCOUNTER — TELEPHONE (OUTPATIENT)
Dept: GYNECOLOGIC ONCOLOGY | Facility: CLINIC | Age: 83
End: 2018-12-26

## 2018-12-26 DIAGNOSIS — N95.0 PMB (POSTMENOPAUSAL BLEEDING): Primary | ICD-10-CM

## 2018-12-26 NOTE — TELEPHONE ENCOUNTER
Left voice mail message for patient to return call regarding post op appt set for 12/27. Patient stated she would have to find a ride to appt. CINTHIA

## 2018-12-27 ENCOUNTER — ANESTHESIA EVENT (OUTPATIENT)
Dept: SURGERY | Facility: OTHER | Age: 83
DRG: 737 | End: 2018-12-27
Payer: MEDICARE

## 2018-12-27 ENCOUNTER — HOSPITAL ENCOUNTER (OUTPATIENT)
Dept: PREADMISSION TESTING | Facility: OTHER | Age: 83
Discharge: HOME | DRG: 737 | End: 2018-12-27
Attending: OBSTETRICS & GYNECOLOGY
Payer: MEDICARE

## 2018-12-27 VITALS
TEMPERATURE: 98 F | OXYGEN SATURATION: 99 % | SYSTOLIC BLOOD PRESSURE: 116 MMHG | BODY MASS INDEX: 23.19 KG/M2 | RESPIRATION RATE: 16 BRPM | DIASTOLIC BLOOD PRESSURE: 71 MMHG | HEART RATE: 70 BPM | WEIGHT: 126 LBS | HEIGHT: 62 IN

## 2018-12-27 DIAGNOSIS — Z01.818 PREOP TESTING: ICD-10-CM

## 2018-12-27 LAB
ABO + RH BLD: NORMAL
ANION GAP SERPL CALC-SCNC: 10 MMOL/L
BASOPHILS # BLD AUTO: 0.01 K/UL
BASOPHILS NFR BLD: 0.2 %
BLD GP AB SCN CELLS X3 SERPL QL: NORMAL
BUN SERPL-MCNC: 8 MG/DL
CALCIUM SERPL-MCNC: 9.6 MG/DL
CHLORIDE SERPL-SCNC: 98 MMOL/L
CO2 SERPL-SCNC: 28 MMOL/L
CREAT SERPL-MCNC: 0.8 MG/DL
DIFFERENTIAL METHOD: ABNORMAL
EOSINOPHIL # BLD AUTO: 0 K/UL
EOSINOPHIL NFR BLD: 0.6 %
ERYTHROCYTE [DISTWIDTH] IN BLOOD BY AUTOMATED COUNT: 13.1 %
EST. GFR  (AFRICAN AMERICAN): >60 ML/MIN/1.73 M^2
EST. GFR  (NON AFRICAN AMERICAN): >60 ML/MIN/1.73 M^2
GLUCOSE SERPL-MCNC: 91 MG/DL
HCT VFR BLD AUTO: 40.1 %
HGB BLD-MCNC: 13.1 G/DL
LYMPHOCYTES # BLD AUTO: 1.1 K/UL
LYMPHOCYTES NFR BLD: 17.5 %
MCH RBC QN AUTO: 28.9 PG
MCHC RBC AUTO-ENTMCNC: 32.7 G/DL
MCV RBC AUTO: 89 FL
MONOCYTES # BLD AUTO: 0.6 K/UL
MONOCYTES NFR BLD: 9.1 %
NEUTROPHILS # BLD AUTO: 4.6 K/UL
NEUTROPHILS NFR BLD: 72.4 %
PLATELET # BLD AUTO: 240 K/UL
PMV BLD AUTO: 9.2 FL
POTASSIUM SERPL-SCNC: 4.6 MMOL/L
RBC # BLD AUTO: 4.53 M/UL
SODIUM SERPL-SCNC: 136 MMOL/L
WBC # BLD AUTO: 6.28 K/UL

## 2018-12-27 PROCEDURE — 80048 BASIC METABOLIC PNL TOTAL CA: CPT

## 2018-12-27 PROCEDURE — 86850 RBC ANTIBODY SCREEN: CPT

## 2018-12-27 PROCEDURE — 36415 COLL VENOUS BLD VENIPUNCTURE: CPT

## 2018-12-27 PROCEDURE — 93005 ELECTROCARDIOGRAM TRACING: CPT

## 2018-12-27 PROCEDURE — 85025 COMPLETE CBC W/AUTO DIFF WBC: CPT

## 2018-12-27 PROCEDURE — 93010 ELECTROCARDIOGRAM REPORT: CPT | Mod: ,,, | Performed by: INTERNAL MEDICINE

## 2018-12-27 RX ORDER — SODIUM CHLORIDE, SODIUM LACTATE, POTASSIUM CHLORIDE, CALCIUM CHLORIDE 600; 310; 30; 20 MG/100ML; MG/100ML; MG/100ML; MG/100ML
INJECTION, SOLUTION INTRAVENOUS CONTINUOUS
Status: CANCELLED | OUTPATIENT
Start: 2018-12-27

## 2018-12-27 NOTE — ANESTHESIA PREPROCEDURE EVALUATION
12/27/2018  Pau Cook is a 89 y.o., female.    Anesthesia Evaluation    I have reviewed the Patient Summary Reports.    I have reviewed the Nursing Notes.   I have reviewed the Medications.     Review of Systems  Anesthesia Hx:  No problems with previous Anesthesia  Denies Family Hx of Anesthesia complications.   Denies Personal Hx of Anesthesia complications.   Social:  Non-Smoker    Hematology/Oncology:  Hematology Normal       -- Cancer in past history:  Oncology Comments: SCC     EENT/Dental:EENT/Dental Normal   Cardiovascular:   Exercise tolerance: good Dysrhythmias PVD    Pulmonary:  Pulmonary Normal    Renal/:  Renal/ Normal     Hepatic/GI:   GERD    Musculoskeletal:   Arthritis     Neurological:   Idiopathic neuropathy  Peripheral Neuropathy    Endocrine:  Endocrine Normal    Dermatological:  Skin Normal    Psych:  Psychiatric Normal           Physical Exam  General:  Well nourished    Airway/Jaw/Neck:  Airway Findings: Mouth Opening: Normal Tongue: Normal  General Airway Assessment: Adult  Mallampati: I  TM Distance: Normal, at least 6 cm  Jaw/Neck Findings:  Neck ROM: Normal ROM      Dental:  Dental Findings: In tact             Anesthesia Plan  Type of Anesthesia, risks & benefits discussed:  Anesthesia Type:  general  Patient's Preference:   Intra-op Monitoring Plan:   Intra-op Monitoring Plan Comments:   Post Op Pain Control Plan:   Post Op Pain Control Plan Comments:   Induction:   IV  Beta Blocker:         Informed Consent: Patient understands risks and agrees with Anesthesia plan.  Questions answered. Anesthesia consent signed with patient.  ASA Score: 3     Day of Surgery Review of History & Physical:    H&P update referred to the surgeon.         Ready For Surgery From Anesthesia Perspective.

## 2018-12-27 NOTE — DISCHARGE INSTRUCTIONS
PRE-ADMIT TESTING -  [phone]    [address]  [place] [facility]          Your surgery has been scheduled at [facility]. We are pleased to have the opportunity to serve you. For Further Information please call [phone].    On the day of surgery please report to the Information Desk on the 1st floor.    · CONTACT YOUR PHYSICIAN'S OFFICE THE DAY PRIOR TO YOUR SURGERY TO OBTAIN YOUR ARRIVAL TIME.     · The evening before surgery do not eat anything after 9 p.m. ( this includes hard candy, chewing gum and mints).  You may only have GATORADE, POWERADE AND WATER  from 9 p.m. until you leave your home.   DO NOT DRINK ANY LIQUIDS ON THE WAY TO THE HOSPITAL.      SPECIAL MEDICATION INSTRUCTIONS: TAKE medications checked off by the Anesthesiologist on your Medication List.    Angiogram Patients: Take medications as instructed by your physician, including aspirin.     Surgery Patients:    If you take ASPIRIN - Your PHYSICIAN/SURGEON will need to inform you IF/OR when you need to stop taking aspirin prior to your surgery.     Do Not take any medications containing IBUPROFEN.  Do Not Wear any make-up or dark nail polish   (especially eye make-up) to surgery. If you come to surgery with makeup on you will be required to remove the makeup or nail polish.    Do not shave your surgical area at least 5 days prior to your surgery. The surgical prep will be performed at the hospital according to Infection Control regulations.    Leave all valuables at home.   Do Not wear any jewelry or watches, including any metal in body piercings.  Contact Lens must be removed before surgery. Either do not wear the contact lens or bring a case and solution for storage.  Please bring a container for eyeglasses or dentures as required.  Bring any paperwork your physician has provided, such as consent forms,  history and physicals, doctor's orders, etc.   Bring comfortable clothes that are loose fitting to wear upon  discharge. Take into consideration the type of surgery being performed.  Maintain your diet as advised per your physician the day prior to surgery.      Adequate rest the night before surgery is advised.   Park in the Parking lot behind the hospital or in the [facility] across the street from the parking lot. Parking is complimentary.  If you will be discharged the same day as your procedure, please arrange for a responsible adult to drive you home or to accompany you if traveling by taxi.   YOU WILL NOT BE PERMITTED TO DRIVE OR TO LEAVE THE HOSPITAL ALONE AFTER SURGERY.   It is strongly recommended that you arrange for someone to remain with you for the first 24 hrs following your surgery.       Thank you for your cooperation.  The Staff of [facility].        Bathing Instructions                                                                 Please shower the evening before and morning of your procedure with    ANTIBACTERIAL SOAP. ( DIAL, etc )  Concentrate on the surgical area   for at least 3 minutes and rinse completely. Dry off as usual.   Do not use any deodorant, powder, body lotions, perfume, after shave or    cologne.

## 2018-12-28 ENCOUNTER — HOSPITAL ENCOUNTER (INPATIENT)
Facility: OTHER | Age: 83
LOS: 3 days | Discharge: HOME HEALTH | DRG: 737 | End: 2018-12-31
Attending: OBSTETRICS & GYNECOLOGY | Admitting: OBSTETRICS & GYNECOLOGY
Payer: MEDICARE

## 2018-12-28 ENCOUNTER — ANESTHESIA (OUTPATIENT)
Dept: SURGERY | Facility: OTHER | Age: 83
DRG: 737 | End: 2018-12-28
Payer: MEDICARE

## 2018-12-28 DIAGNOSIS — Z90.722 S/P TAH-BSO: ICD-10-CM

## 2018-12-28 DIAGNOSIS — Z90.79 S/P TAH-BSO: ICD-10-CM

## 2018-12-28 DIAGNOSIS — Z90.710 S/P TAH-BSO: ICD-10-CM

## 2018-12-28 DIAGNOSIS — R19.00 PELVIC MASS: ICD-10-CM

## 2018-12-28 DIAGNOSIS — M15.9 PRIMARY OSTEOARTHRITIS INVOLVING MULTIPLE JOINTS: Primary | ICD-10-CM

## 2018-12-28 LAB — POCT GLUCOSE: 100 MG/DL (ref 70–110)

## 2018-12-28 PROCEDURE — 27000221 HC OXYGEN, UP TO 24 HOURS

## 2018-12-28 PROCEDURE — S0077 INJECTION, CLINDAMYCIN PHOSP: HCPCS | Performed by: STUDENT IN AN ORGANIZED HEALTH CARE EDUCATION/TRAINING PROGRAM

## 2018-12-28 PROCEDURE — 25000003 PHARM REV CODE 250: Performed by: OBSTETRICS & GYNECOLOGY

## 2018-12-28 PROCEDURE — 49203 PR EXCISION/DESTRUCTION OPEN ABDOMINAL TUMORS 5 CM: CPT | Mod: ,,, | Performed by: OBSTETRICS & GYNECOLOGY

## 2018-12-28 PROCEDURE — 88342 TISSUE SPECIMEN TO PATHOLOGY - SURGERY: ICD-10-PCS | Mod: 26,,, | Performed by: PATHOLOGY

## 2018-12-28 PROCEDURE — 63600175 PHARM REV CODE 636 W HCPCS: Performed by: NURSE ANESTHETIST, CERTIFIED REGISTERED

## 2018-12-28 PROCEDURE — P9045 ALBUMIN (HUMAN), 5%, 250 ML: HCPCS | Mod: JG | Performed by: NURSE ANESTHETIST, CERTIFIED REGISTERED

## 2018-12-28 PROCEDURE — 86920 COMPATIBILITY TEST SPIN: CPT

## 2018-12-28 PROCEDURE — 49203 PR EXCISION/DESTRUCTION OPEN ABDOMINAL TUMORS 5 CM: ICD-10-PCS | Mod: ,,, | Performed by: OBSTETRICS & GYNECOLOGY

## 2018-12-28 PROCEDURE — 88307 TISSUE EXAM BY PATHOLOGIST: CPT | Performed by: PATHOLOGY

## 2018-12-28 PROCEDURE — 63600175 PHARM REV CODE 636 W HCPCS: Performed by: STUDENT IN AN ORGANIZED HEALTH CARE EDUCATION/TRAINING PROGRAM

## 2018-12-28 PROCEDURE — 25000003 PHARM REV CODE 250: Performed by: NURSE ANESTHETIST, CERTIFIED REGISTERED

## 2018-12-28 PROCEDURE — 63600175 PHARM REV CODE 636 W HCPCS: Performed by: ANESTHESIOLOGY

## 2018-12-28 PROCEDURE — 88341 IMHCHEM/IMCYTCHM EA ADD ANTB: CPT | Mod: 26,,, | Performed by: PATHOLOGY

## 2018-12-28 PROCEDURE — 36000713 HC OR TIME LEV V EA ADD 15 MIN: Performed by: OBSTETRICS & GYNECOLOGY

## 2018-12-28 PROCEDURE — 88307 TISSUE EXAM BY PATHOLOGIST: CPT | Mod: 26,,, | Performed by: PATHOLOGY

## 2018-12-28 PROCEDURE — C9290 INJ, BUPIVACAINE LIPOSOME: HCPCS | Performed by: OBSTETRICS & GYNECOLOGY

## 2018-12-28 PROCEDURE — 88341 PR IHC OR ICC EACH ADD'L SINGLE ANTIBODY  STAINPR: ICD-10-PCS | Mod: 26,,, | Performed by: PATHOLOGY

## 2018-12-28 PROCEDURE — 36000712 HC OR TIME LEV V 1ST 15 MIN: Performed by: OBSTETRICS & GYNECOLOGY

## 2018-12-28 PROCEDURE — 94761 N-INVAS EAR/PLS OXIMETRY MLT: CPT

## 2018-12-28 PROCEDURE — 63600175 PHARM REV CODE 636 W HCPCS: Performed by: OBSTETRICS & GYNECOLOGY

## 2018-12-28 PROCEDURE — 58150 PR TOTAL ABDOM HYSTERECTOMY: ICD-10-PCS | Mod: 22,51,, | Performed by: OBSTETRICS & GYNECOLOGY

## 2018-12-28 PROCEDURE — 37000009 HC ANESTHESIA EA ADD 15 MINS: Performed by: OBSTETRICS & GYNECOLOGY

## 2018-12-28 PROCEDURE — 88342 IMHCHEM/IMCYTCHM 1ST ANTB: CPT | Performed by: PATHOLOGY

## 2018-12-28 PROCEDURE — 25000003 PHARM REV CODE 250: Performed by: ANESTHESIOLOGY

## 2018-12-28 PROCEDURE — 37000008 HC ANESTHESIA 1ST 15 MINUTES: Performed by: OBSTETRICS & GYNECOLOGY

## 2018-12-28 PROCEDURE — 11000001 HC ACUTE MED/SURG PRIVATE ROOM

## 2018-12-28 PROCEDURE — 25000003 PHARM REV CODE 250: Performed by: STUDENT IN AN ORGANIZED HEALTH CARE EDUCATION/TRAINING PROGRAM

## 2018-12-28 PROCEDURE — 58150 TOTAL HYSTERECTOMY: CPT | Mod: 22,51,, | Performed by: OBSTETRICS & GYNECOLOGY

## 2018-12-28 PROCEDURE — 71000033 HC RECOVERY, INTIAL HOUR: Performed by: OBSTETRICS & GYNECOLOGY

## 2018-12-28 PROCEDURE — 99900035 HC TECH TIME PER 15 MIN (STAT)

## 2018-12-28 PROCEDURE — 27201423 OPTIME MED/SURG SUP & DEVICES STERILE SUPPLY: Performed by: OBSTETRICS & GYNECOLOGY

## 2018-12-28 PROCEDURE — 88342 IMHCHEM/IMCYTCHM 1ST ANTB: CPT | Mod: 26,,, | Performed by: PATHOLOGY

## 2018-12-28 PROCEDURE — 88307 TISSUE SPECIMEN TO PATHOLOGY - SURGERY: ICD-10-PCS | Mod: 26,,, | Performed by: PATHOLOGY

## 2018-12-28 PROCEDURE — S5010 5% DEXTROSE AND 0.45% SALINE: HCPCS | Performed by: OBSTETRICS & GYNECOLOGY

## 2018-12-28 PROCEDURE — 71000039 HC RECOVERY, EACH ADD'L HOUR: Performed by: OBSTETRICS & GYNECOLOGY

## 2018-12-28 RX ORDER — ACETAMINOPHEN 10 MG/ML
INJECTION, SOLUTION INTRAVENOUS
Status: DISCONTINUED | OUTPATIENT
Start: 2018-12-28 | End: 2018-12-28

## 2018-12-28 RX ORDER — ENOXAPARIN SODIUM 100 MG/ML
40 INJECTION SUBCUTANEOUS EVERY 24 HOURS
Status: DISCONTINUED | OUTPATIENT
Start: 2018-12-29 | End: 2018-12-29

## 2018-12-28 RX ORDER — OXYCODONE HYDROCHLORIDE 5 MG/1
10 TABLET ORAL EVERY 4 HOURS PRN
Status: DISCONTINUED | OUTPATIENT
Start: 2018-12-28 | End: 2018-12-31 | Stop reason: HOSPADM

## 2018-12-28 RX ORDER — OXYCODONE HYDROCHLORIDE 5 MG/1
5 TABLET ORAL
Status: DISCONTINUED | OUTPATIENT
Start: 2018-12-28 | End: 2018-12-28 | Stop reason: HOSPADM

## 2018-12-28 RX ORDER — IBUPROFEN 400 MG/1
400 TABLET ORAL EVERY 6 HOURS
Status: DISCONTINUED | OUTPATIENT
Start: 2018-12-29 | End: 2018-12-30

## 2018-12-28 RX ORDER — HYDROMORPHONE HYDROCHLORIDE 1 MG/ML
0.5 INJECTION, SOLUTION INTRAMUSCULAR; INTRAVENOUS; SUBCUTANEOUS EVERY 4 HOURS PRN
Status: DISCONTINUED | OUTPATIENT
Start: 2018-12-28 | End: 2018-12-31 | Stop reason: HOSPADM

## 2018-12-28 RX ORDER — ONDANSETRON 2 MG/ML
4 INJECTION INTRAMUSCULAR; INTRAVENOUS DAILY PRN
Status: DISCONTINUED | OUTPATIENT
Start: 2018-12-28 | End: 2018-12-28 | Stop reason: HOSPADM

## 2018-12-28 RX ORDER — DOCUSATE SODIUM 100 MG/1
100 CAPSULE, LIQUID FILLED ORAL DAILY
Status: DISCONTINUED | OUTPATIENT
Start: 2018-12-29 | End: 2018-12-31 | Stop reason: HOSPADM

## 2018-12-28 RX ORDER — MUPIROCIN 20 MG/G
1 OINTMENT TOPICAL 2 TIMES DAILY
Status: DISCONTINUED | OUTPATIENT
Start: 2018-12-28 | End: 2018-12-31 | Stop reason: HOSPADM

## 2018-12-28 RX ORDER — ONDANSETRON 8 MG/1
8 TABLET, ORALLY DISINTEGRATING ORAL EVERY 8 HOURS PRN
Status: DISCONTINUED | OUTPATIENT
Start: 2018-12-28 | End: 2018-12-31 | Stop reason: HOSPADM

## 2018-12-28 RX ORDER — BUPIVACAINE HYDROCHLORIDE 2.5 MG/ML
INJECTION, SOLUTION EPIDURAL; INFILTRATION; INTRACAUDAL
Status: DISCONTINUED | OUTPATIENT
Start: 2018-12-28 | End: 2018-12-28 | Stop reason: HOSPADM

## 2018-12-28 RX ORDER — ENOXAPARIN SODIUM 100 MG/ML
40 INJECTION SUBCUTANEOUS DAILY
Qty: 8.4 ML | Refills: 0 | Status: SHIPPED | OUTPATIENT
Start: 2018-12-28 | End: 2019-01-18

## 2018-12-28 RX ORDER — LIDOCAINE HCL/PF 100 MG/5ML
SYRINGE (ML) INTRAVENOUS
Status: DISCONTINUED | OUTPATIENT
Start: 2018-12-28 | End: 2018-12-28

## 2018-12-28 RX ORDER — SIMETHICONE 80 MG
1 TABLET,CHEWABLE ORAL 3 TIMES DAILY PRN
Status: DISCONTINUED | OUTPATIENT
Start: 2018-12-28 | End: 2018-12-31 | Stop reason: HOSPADM

## 2018-12-28 RX ORDER — SODIUM CHLORIDE 0.9 % (FLUSH) 0.9 %
3 SYRINGE (ML) INJECTION
Status: DISCONTINUED | OUTPATIENT
Start: 2018-12-28 | End: 2018-12-28

## 2018-12-28 RX ORDER — ACETAMINOPHEN 10 MG/ML
1000 INJECTION, SOLUTION INTRAVENOUS EVERY 8 HOURS
Status: COMPLETED | OUTPATIENT
Start: 2018-12-28 | End: 2018-12-29

## 2018-12-28 RX ORDER — OXYCODONE HYDROCHLORIDE 5 MG/1
5 TABLET ORAL EVERY 4 HOURS PRN
Status: DISCONTINUED | OUTPATIENT
Start: 2018-12-28 | End: 2018-12-31 | Stop reason: HOSPADM

## 2018-12-28 RX ORDER — ONDANSETRON HYDROCHLORIDE 2 MG/ML
INJECTION, SOLUTION INTRAMUSCULAR; INTRAVENOUS
Status: DISCONTINUED | OUTPATIENT
Start: 2018-12-28 | End: 2018-12-28

## 2018-12-28 RX ORDER — PROPOFOL 10 MG/ML
VIAL (ML) INTRAVENOUS
Status: DISCONTINUED | OUTPATIENT
Start: 2018-12-28 | End: 2018-12-28

## 2018-12-28 RX ORDER — ALBUMIN HUMAN 50 G/1000ML
SOLUTION INTRAVENOUS CONTINUOUS PRN
Status: DISCONTINUED | OUTPATIENT
Start: 2018-12-28 | End: 2018-12-28

## 2018-12-28 RX ORDER — DEXAMETHASONE SODIUM PHOSPHATE 4 MG/ML
INJECTION, SOLUTION INTRA-ARTICULAR; INTRALESIONAL; INTRAMUSCULAR; INTRAVENOUS; SOFT TISSUE
Status: DISCONTINUED | OUTPATIENT
Start: 2018-12-28 | End: 2018-12-28

## 2018-12-28 RX ORDER — FENTANYL CITRATE 50 UG/ML
INJECTION, SOLUTION INTRAMUSCULAR; INTRAVENOUS
Status: DISCONTINUED | OUTPATIENT
Start: 2018-12-28 | End: 2018-12-28

## 2018-12-28 RX ORDER — CLINDAMYCIN PHOSPHATE 900 MG/50ML
900 INJECTION, SOLUTION INTRAVENOUS
Status: COMPLETED | OUTPATIENT
Start: 2018-12-28 | End: 2018-12-28

## 2018-12-28 RX ORDER — SODIUM CHLORIDE, SODIUM LACTATE, POTASSIUM CHLORIDE, CALCIUM CHLORIDE 600; 310; 30; 20 MG/100ML; MG/100ML; MG/100ML; MG/100ML
INJECTION, SOLUTION INTRAVENOUS CONTINUOUS
Status: DISCONTINUED | OUTPATIENT
Start: 2018-12-28 | End: 2018-12-28

## 2018-12-28 RX ORDER — MEPERIDINE HYDROCHLORIDE 50 MG/ML
12.5 INJECTION INTRAMUSCULAR; INTRAVENOUS; SUBCUTANEOUS ONCE AS NEEDED
Status: DISCONTINUED | OUTPATIENT
Start: 2018-12-28 | End: 2018-12-28 | Stop reason: HOSPADM

## 2018-12-28 RX ORDER — FENTANYL CITRATE 50 UG/ML
25 INJECTION, SOLUTION INTRAMUSCULAR; INTRAVENOUS EVERY 5 MIN PRN
Status: DISCONTINUED | OUTPATIENT
Start: 2018-12-28 | End: 2018-12-28 | Stop reason: HOSPADM

## 2018-12-28 RX ORDER — DEXTROSE MONOHYDRATE AND SODIUM CHLORIDE 5; .45 G/100ML; G/100ML
INJECTION, SOLUTION INTRAVENOUS CONTINUOUS
Status: DISCONTINUED | OUTPATIENT
Start: 2018-12-28 | End: 2018-12-31 | Stop reason: HOSPADM

## 2018-12-28 RX ORDER — ROCURONIUM BROMIDE 10 MG/ML
INJECTION, SOLUTION INTRAVENOUS
Status: DISCONTINUED | OUTPATIENT
Start: 2018-12-28 | End: 2018-12-28

## 2018-12-28 RX ORDER — PANTOPRAZOLE SODIUM 40 MG/1
40 TABLET, DELAYED RELEASE ORAL DAILY
Status: DISCONTINUED | OUTPATIENT
Start: 2018-12-29 | End: 2018-12-30

## 2018-12-28 RX ORDER — GLYCOPYRROLATE 0.2 MG/ML
INJECTION INTRAMUSCULAR; INTRAVENOUS
Status: DISCONTINUED | OUTPATIENT
Start: 2018-12-28 | End: 2018-12-28

## 2018-12-28 RX ADMIN — ROCURONIUM BROMIDE 10 MG: 10 INJECTION INTRAVENOUS at 02:12

## 2018-12-28 RX ADMIN — FENTANYL CITRATE 50 MCG: 50 INJECTION, SOLUTION INTRAMUSCULAR; INTRAVENOUS at 02:12

## 2018-12-28 RX ADMIN — ACETAMINOPHEN 1000 MG: 10 INJECTION, SOLUTION INTRAVENOUS at 09:12

## 2018-12-28 RX ADMIN — GENTAMICIN SULFATE 264 MG: 40 INJECTION, SOLUTION INTRAMUSCULAR; INTRAVENOUS at 12:12

## 2018-12-28 RX ADMIN — ROCURONIUM BROMIDE 10 MG: 10 INJECTION INTRAVENOUS at 03:12

## 2018-12-28 RX ADMIN — LIDOCAINE HYDROCHLORIDE 50 MG: 20 INJECTION, SOLUTION INTRAVENOUS at 12:12

## 2018-12-28 RX ADMIN — SODIUM CHLORIDE, SODIUM LACTATE, POTASSIUM CHLORIDE, AND CALCIUM CHLORIDE: 600; 310; 30; 20 INJECTION, SOLUTION INTRAVENOUS at 02:12

## 2018-12-28 RX ADMIN — SUGAMMADEX 200 MG: 100 INJECTION, SOLUTION INTRAVENOUS at 04:12

## 2018-12-28 RX ADMIN — MUPIROCIN 1 G: 20 OINTMENT TOPICAL at 09:12

## 2018-12-28 RX ADMIN — PROMETHAZINE HYDROCHLORIDE 6.25 MG: 25 INJECTION, SOLUTION INTRAMUSCULAR; INTRAVENOUS at 05:12

## 2018-12-28 RX ADMIN — PROPOFOL 150 MG: 10 INJECTION, EMULSION INTRAVENOUS at 12:12

## 2018-12-28 RX ADMIN — DEXTROSE MONOHYDRATE AND SODIUM CHLORIDE: 5; .45 INJECTION, SOLUTION INTRAVENOUS at 05:12

## 2018-12-28 RX ADMIN — ONDANSETRON 4 MG: 2 INJECTION INTRAMUSCULAR; INTRAVENOUS at 05:12

## 2018-12-28 RX ADMIN — ALBUMIN (HUMAN): 2.5 SOLUTION INTRAVENOUS at 12:12

## 2018-12-28 RX ADMIN — ROCURONIUM BROMIDE 10 MG: 10 INJECTION INTRAVENOUS at 01:12

## 2018-12-28 RX ADMIN — FENTANYL CITRATE 100 MCG: 50 INJECTION, SOLUTION INTRAMUSCULAR; INTRAVENOUS at 12:12

## 2018-12-28 RX ADMIN — DEXAMETHASONE SODIUM PHOSPHATE 8 MG: 4 INJECTION, SOLUTION INTRAMUSCULAR; INTRAVENOUS at 12:12

## 2018-12-28 RX ADMIN — FENTANYL CITRATE 50 MCG: 50 INJECTION, SOLUTION INTRAMUSCULAR; INTRAVENOUS at 01:12

## 2018-12-28 RX ADMIN — SODIUM CHLORIDE, SODIUM LACTATE, POTASSIUM CHLORIDE, AND CALCIUM CHLORIDE: 600; 310; 30; 20 INJECTION, SOLUTION INTRAVENOUS at 11:12

## 2018-12-28 RX ADMIN — ACETAMINOPHEN 1000 MG: 10 INJECTION, SOLUTION INTRAVENOUS at 12:12

## 2018-12-28 RX ADMIN — ONDANSETRON 4 MG: 2 INJECTION, SOLUTION INTRAMUSCULAR; INTRAVENOUS at 03:12

## 2018-12-28 RX ADMIN — CLINDAMYCIN PHOSPHATE 900 MG: 18 INJECTION, SOLUTION INTRAVENOUS at 12:12

## 2018-12-28 RX ADMIN — ROCURONIUM BROMIDE 50 MG: 10 INJECTION INTRAVENOUS at 12:12

## 2018-12-28 RX ADMIN — ALBUMIN (HUMAN): 2.5 SOLUTION INTRAVENOUS at 02:12

## 2018-12-28 RX ADMIN — FENTANYL CITRATE 50 MCG: 50 INJECTION, SOLUTION INTRAMUSCULAR; INTRAVENOUS at 12:12

## 2018-12-28 RX ADMIN — GLYCOPYRROLATE 0.2 MG: 0.2 INJECTION, SOLUTION INTRAMUSCULAR; INTRAVENOUS at 12:12

## 2018-12-28 RX ADMIN — ROCURONIUM BROMIDE 20 MG: 10 INJECTION INTRAVENOUS at 01:12

## 2018-12-28 NOTE — ANESTHESIA POSTPROCEDURE EVALUATION
"Anesthesia Post Evaluation    Patient: Pau Cook    Procedure(s) Performed: Procedure(s) (LRB):  XI ROBOTIC HYSTERECTOMY (N/A)  XI ROBOTIC SALPINGO-OOPHORECTOMY (Bilateral)  HYSTERECTOMY, TOTAL, ABDOMINAL, WITH BILATERAL SALPINGO-OOPHORECTOMY (Bilateral)  LYSIS, ADHESIONS (N/A)    Final Anesthesia Type: general  Patient location during evaluation: PACU  Patient participation: Yes- Able to Participate  Level of consciousness: oriented and awake  Post-procedure vital signs: reviewed and stable  Pain management: adequate  Airway patency: patent  PONV status at discharge: No PONV  Anesthetic complications: no      Cardiovascular status: hemodynamically stable  Respiratory status: unassisted, spontaneous ventilation and room air  Hydration status: euvolemic  Follow-up not needed.        Visit Vitals  BP (!) 155/65   Pulse 83   Temp 36.4 °C (97.5 °F) (Oral)   Resp 16   Ht 5' 2" (1.575 m)   Wt 57.2 kg (125 lb 16 oz)   SpO2 100%   Breastfeeding? No   BMI 23.05 kg/m²       Pain/Florian Score: Florian Score: 8 (12/28/2018  4:41 PM)        "

## 2018-12-28 NOTE — OR NURSING
Continues to c/o nausea. Attempted to give promethazine in lt hand,  Slight swelling when infusing.  Stopped.  Iv to left  hand slightly swollen.  IV removed and restarted to rt AC. With #20 jelco. Very sleepy.  Falls asleep immediately when unstimulated.

## 2018-12-28 NOTE — INTERVAL H&P NOTE
The patient has been examined and the H&P has been reviewed:    I concur with the findings and no changes have occurred since H&P was written.    Surgery risks, benefits and alternative options discussed and understood by patient/family.          Active Hospital Problems    Diagnosis  POA    Pelvic mass [R19.00]  Yes      Resolved Hospital Problems   No resolved problems to display.

## 2018-12-28 NOTE — TRANSFER OF CARE
"Anesthesia Transfer of Care Note    Patient: Pau Cook    Procedure(s) Performed: Procedure(s) (LRB):  XI ROBOTIC HYSTERECTOMY (N/A)  XI ROBOTIC SALPINGO-OOPHORECTOMY (Bilateral)  HYSTERECTOMY, TOTAL, ABDOMINAL, WITH BILATERAL SALPINGO-OOPHORECTOMY (Bilateral)  LYSIS, ADHESIONS (N/A)    Patient location: PACU    Anesthesia Type: general    Transport from OR: Transported from OR on 2-3 L/min O2 by NC with adequate spontaneous ventilation. Continuous SpO2 monitoring in transport    Post pain: adequate analgesia    Post assessment: no apparent anesthetic complications and tolerated procedure well    Post vital signs: stable    Level of consciousness: awake and alert    Nausea/Vomiting: no nausea/vomiting    Complications: none    Transfer of care protocol was followed      Last vitals:   Visit Vitals  /62 (BP Location: Left arm, Patient Position: Lying)   Pulse 79   Temp 36.6 °C (97.9 °F) (Oral)   Resp 18   Ht 5' 2" (1.575 m)   Wt 57.2 kg (125 lb 16 oz)   SpO2 99%   Breastfeeding? No   BMI 23.05 kg/m²     "

## 2018-12-28 NOTE — OR NURSING
Very sleepy upon arrival to pacu.  Response is very slow and delayed.  Nods head no to pain.  C/o having to urinate. Explained that she has a catheter.

## 2018-12-29 LAB
ANION GAP SERPL CALC-SCNC: 10 MMOL/L
BASOPHILS # BLD AUTO: 0 K/UL
BASOPHILS NFR BLD: 0 %
BUN SERPL-MCNC: 8 MG/DL
CALCIUM SERPL-MCNC: 8.4 MG/DL
CHLORIDE SERPL-SCNC: 97 MMOL/L
CO2 SERPL-SCNC: 24 MMOL/L
CREAT SERPL-MCNC: 0.7 MG/DL
DIFFERENTIAL METHOD: ABNORMAL
EOSINOPHIL # BLD AUTO: 0 K/UL
EOSINOPHIL NFR BLD: 0 %
ERYTHROCYTE [DISTWIDTH] IN BLOOD BY AUTOMATED COUNT: 12.7 %
EST. GFR  (AFRICAN AMERICAN): >60 ML/MIN/1.73 M^2
EST. GFR  (NON AFRICAN AMERICAN): >60 ML/MIN/1.73 M^2
GLUCOSE SERPL-MCNC: 166 MG/DL
HCT VFR BLD AUTO: 34.1 %
HGB BLD-MCNC: 11.3 G/DL
LYMPHOCYTES # BLD AUTO: 0.8 K/UL
LYMPHOCYTES NFR BLD: 8.4 %
MCH RBC QN AUTO: 28.8 PG
MCHC RBC AUTO-ENTMCNC: 33.1 G/DL
MCV RBC AUTO: 87 FL
MONOCYTES # BLD AUTO: 0.8 K/UL
MONOCYTES NFR BLD: 8.5 %
NEUTROPHILS # BLD AUTO: 8 K/UL
NEUTROPHILS NFR BLD: 82.9 %
PLATELET # BLD AUTO: 228 K/UL
PMV BLD AUTO: 9.4 FL
POTASSIUM SERPL-SCNC: 4.2 MMOL/L
RBC # BLD AUTO: 3.92 M/UL
SODIUM SERPL-SCNC: 131 MMOL/L
WBC # BLD AUTO: 9.64 K/UL

## 2018-12-29 PROCEDURE — 63600175 PHARM REV CODE 636 W HCPCS: Performed by: OBSTETRICS & GYNECOLOGY

## 2018-12-29 PROCEDURE — 25000003 PHARM REV CODE 250: Performed by: OBSTETRICS & GYNECOLOGY

## 2018-12-29 PROCEDURE — 94799 UNLISTED PULMONARY SVC/PX: CPT

## 2018-12-29 PROCEDURE — 99024 PR POST-OP FOLLOW-UP VISIT: ICD-10-PCS | Mod: ,,, | Performed by: OBSTETRICS & GYNECOLOGY

## 2018-12-29 PROCEDURE — 80048 BASIC METABOLIC PNL TOTAL CA: CPT

## 2018-12-29 PROCEDURE — 11000001 HC ACUTE MED/SURG PRIVATE ROOM

## 2018-12-29 PROCEDURE — 94761 N-INVAS EAR/PLS OXIMETRY MLT: CPT

## 2018-12-29 PROCEDURE — 85025 COMPLETE CBC W/AUTO DIFF WBC: CPT

## 2018-12-29 PROCEDURE — S5010 5% DEXTROSE AND 0.45% SALINE: HCPCS | Performed by: OBSTETRICS & GYNECOLOGY

## 2018-12-29 PROCEDURE — 99024 POSTOP FOLLOW-UP VISIT: CPT | Mod: ,,, | Performed by: OBSTETRICS & GYNECOLOGY

## 2018-12-29 PROCEDURE — 36415 COLL VENOUS BLD VENIPUNCTURE: CPT

## 2018-12-29 RX ADMIN — PROMETHAZINE HYDROCHLORIDE 6.25 MG: 25 INJECTION INTRAMUSCULAR; INTRAVENOUS at 06:12

## 2018-12-29 RX ADMIN — IBUPROFEN 800 MG: 800 INJECTION INTRAVENOUS at 12:12

## 2018-12-29 RX ADMIN — ONDANSETRON 8 MG: 8 TABLET, ORALLY DISINTEGRATING ORAL at 05:12

## 2018-12-29 RX ADMIN — PANTOPRAZOLE SODIUM 40 MG: 40 TABLET, DELAYED RELEASE ORAL at 09:12

## 2018-12-29 RX ADMIN — MUPIROCIN 1 G: 20 OINTMENT TOPICAL at 09:12

## 2018-12-29 RX ADMIN — DEXTROSE MONOHYDRATE AND SODIUM CHLORIDE: 5; .45 INJECTION, SOLUTION INTRAVENOUS at 06:12

## 2018-12-29 RX ADMIN — DOCUSATE SODIUM 100 MG: 100 CAPSULE, LIQUID FILLED ORAL at 09:12

## 2018-12-29 RX ADMIN — ACETAMINOPHEN 1000 MG: 10 INJECTION, SOLUTION INTRAVENOUS at 05:12

## 2018-12-29 RX ADMIN — IBUPROFEN 800 MG: 800 INJECTION INTRAVENOUS at 06:12

## 2018-12-29 RX ADMIN — IBUPROFEN 800 MG: 800 INJECTION INTRAVENOUS at 11:12

## 2018-12-29 RX ADMIN — ONDANSETRON 8 MG: 8 TABLET, ORALLY DISINTEGRATING ORAL at 10:12

## 2018-12-29 RX ADMIN — DEXTROSE MONOHYDRATE AND SODIUM CHLORIDE: 5; .45 INJECTION, SOLUTION INTRAVENOUS at 04:12

## 2018-12-29 RX ADMIN — ACETAMINOPHEN 1000 MG: 10 INJECTION, SOLUTION INTRAVENOUS at 02:12

## 2018-12-29 RX ADMIN — MUPIROCIN 1 G: 20 OINTMENT TOPICAL at 08:12

## 2018-12-29 NOTE — PLAN OF CARE
Problem: Adult Inpatient Plan of Care  Goal: Plan of Care Review  Outcome: Ongoing (interventions implemented as appropriate)  Pt in no distress on 2L NC, unable to instruct on IS because pt sleeping. Will continue to monitor.

## 2018-12-29 NOTE — PLAN OF CARE
Problem: Adult Inpatient Plan of Care  Goal: Plan of Care Review  Outcome: Ongoing (interventions implemented as appropriate)  Pt rested well this shift, winn in place, medipore to midline C/D/I, VSS. Pt family at bedside, pain controlled with medication, positions self in bed. Pt in low locked bed,c all light in reach POC reviewed with patient questions answered, will continue to monitor.

## 2018-12-29 NOTE — PROGRESS NOTES
Progress Note  Gynecology    Admit Date: 12/28/2018  LOS: 1    Reason for Admission:  S/P MY-BSO    SUBJECTIVE:     Pau Cook is a 89 y.o. POD 1 s/p converetd ex lap/MY/BSO/LUX/Pelvic mass resection. Originally scheduled as robotic but given dense adhesions patient's abdomen had to be opened. Pain controlled this morning. Has not ambulated, winn in place. Tolerating liquids and food.       OBJECTIVE:     Vital Signs   Temp:  [97.5 °F (36.4 °C)-98.4 °F (36.9 °C)] 97.7 °F (36.5 °C)  Pulse:  [78-86] 80  Resp:  [16-18] 18  SpO2:  [97 %-100 %] 100 %  BP: (129-170)/(61-77) 129/61      Intake/Output Summary (Last 24 hours) at 12/29/2018 0453  Last data filed at 12/29/2018 0009  Gross per 24 hour   Intake 3600 ml   Output 2330 ml   Net 1270 ml       Physical Exam:  Gen: A&Ox3, NAD  CV: RRR  Pulm: LCTAB  Abd: active bowel sounds, soft, non-distended, non-tender to palpation without rebound or guarding  Inc: Large vertical incision c/d/i  Ext: PPP, no peripheral edema, TEDs/SCDs in place    Laboratory:  Recent Results (from the past 24 hour(s))   POCT glucose    Collection Time: 12/28/18 10:20 AM   Result Value Ref Range    POCT Glucose 100 70 - 110 mg/dL   Prepare RBC 2 Units; emergency    Collection Time: 12/28/18  2:41 PM   Result Value Ref Range    UNIT NUMBER [id number]     PRODUCT CODE W1405S55     DISPENSE STATUS CROSSMATCHED     CODING SYSTEM TAII476     Unit Blood Type Code 9500     Unit Blood Type O NEG     Unit Expiration [id number]     UNIT NUMBER [id number]     PRODUCT CODE I2550E53     DISPENSE STATUS CROSSMATCHED     CODING SYSTEM WDXT228     Unit Blood Type Code 9500     Unit Blood Type O NEG     Unit Expiration [id number]        Diagnostic Results:      ASSESSMENT/PLAN:     Active Hospital Problems    Diagnosis  POA    *S/P MY-BSO [Z90.710, Z90.722, Z90.79]  Not Applicable    Pelvic mass [R19.00]  Yes      Resolved Hospital Problems   No resolved problems to display.        Assessment: 89 y.o. POD 1 s/p ex lap/MY/BSO    Plan:   - Oral pain meds  - AM labs pending  - Rosa removed, passive voiding trial  - Regular diet  - Encourage ambulation      Meng Joseph MD  OB/GYN PGY-3  Pager: [phone]

## 2018-12-30 LAB
BLD PROD TYP BPU: NORMAL
BLD PROD TYP BPU: NORMAL
BLOOD UNIT EXPIRATION DATE: NORMAL
BLOOD UNIT EXPIRATION DATE: NORMAL
BLOOD UNIT TYPE CODE: 9500
BLOOD UNIT TYPE CODE: 9500
BLOOD UNIT TYPE: NORMAL
BLOOD UNIT TYPE: NORMAL
CODING SYSTEM: NORMAL
CODING SYSTEM: NORMAL
DISPENSE STATUS: NORMAL
DISPENSE STATUS: NORMAL
TRANS ERYTHROCYTES VOL PATIENT: NORMAL ML
TRANS ERYTHROCYTES VOL PATIENT: NORMAL ML

## 2018-12-30 PROCEDURE — 25000003 PHARM REV CODE 250: Performed by: OBSTETRICS & GYNECOLOGY

## 2018-12-30 PROCEDURE — 94761 N-INVAS EAR/PLS OXIMETRY MLT: CPT

## 2018-12-30 PROCEDURE — 63600175 PHARM REV CODE 636 W HCPCS: Performed by: OBSTETRICS & GYNECOLOGY

## 2018-12-30 PROCEDURE — 11000001 HC ACUTE MED/SURG PRIVATE ROOM

## 2018-12-30 PROCEDURE — 63600175 PHARM REV CODE 636 W HCPCS: Performed by: STUDENT IN AN ORGANIZED HEALTH CARE EDUCATION/TRAINING PROGRAM

## 2018-12-30 PROCEDURE — C9113 INJ PANTOPRAZOLE SODIUM, VIA: HCPCS | Performed by: OBSTETRICS & GYNECOLOGY

## 2018-12-30 PROCEDURE — 94799 UNLISTED PULMONARY SVC/PX: CPT

## 2018-12-30 PROCEDURE — 99024 PR POST-OP FOLLOW-UP VISIT: ICD-10-PCS | Mod: ,,, | Performed by: OBSTETRICS & GYNECOLOGY

## 2018-12-30 PROCEDURE — 25000003 PHARM REV CODE 250: Performed by: STUDENT IN AN ORGANIZED HEALTH CARE EDUCATION/TRAINING PROGRAM

## 2018-12-30 PROCEDURE — 99024 POSTOP FOLLOW-UP VISIT: CPT | Mod: ,,, | Performed by: OBSTETRICS & GYNECOLOGY

## 2018-12-30 RX ORDER — METOCLOPRAMIDE HYDROCHLORIDE 5 MG/ML
10 INJECTION INTRAMUSCULAR; INTRAVENOUS EVERY 6 HOURS
Status: DISCONTINUED | OUTPATIENT
Start: 2018-12-30 | End: 2018-12-30

## 2018-12-30 RX ORDER — ENOXAPARIN SODIUM 100 MG/ML
40 INJECTION SUBCUTANEOUS EVERY 24 HOURS
Status: DISCONTINUED | OUTPATIENT
Start: 2018-12-30 | End: 2018-12-31

## 2018-12-30 RX ORDER — ACETAMINOPHEN 10 MG/ML
1000 INJECTION, SOLUTION INTRAVENOUS EVERY 8 HOURS
Status: COMPLETED | OUTPATIENT
Start: 2018-12-30 | End: 2018-12-31

## 2018-12-30 RX ORDER — METOCLOPRAMIDE HYDROCHLORIDE 5 MG/ML
5 INJECTION INTRAMUSCULAR; INTRAVENOUS EVERY 6 HOURS
Status: DISCONTINUED | OUTPATIENT
Start: 2018-12-30 | End: 2018-12-30

## 2018-12-30 RX ORDER — METOCLOPRAMIDE HYDROCHLORIDE 5 MG/ML
10 INJECTION INTRAMUSCULAR; INTRAVENOUS EVERY 6 HOURS
Status: DISCONTINUED | OUTPATIENT
Start: 2018-12-30 | End: 2018-12-31 | Stop reason: HOSPADM

## 2018-12-30 RX ORDER — PANTOPRAZOLE SODIUM 40 MG/10ML
40 INJECTION, POWDER, LYOPHILIZED, FOR SOLUTION INTRAVENOUS DAILY
Status: DISCONTINUED | OUTPATIENT
Start: 2018-12-30 | End: 2018-12-31 | Stop reason: HOSPADM

## 2018-12-30 RX ADMIN — HYDROMORPHONE HYDROCHLORIDE 0.5 MG: 1 INJECTION, SOLUTION INTRAMUSCULAR; INTRAVENOUS; SUBCUTANEOUS at 12:12

## 2018-12-30 RX ADMIN — METOCLOPRAMIDE 10 MG: 5 INJECTION, SOLUTION INTRAMUSCULAR; INTRAVENOUS at 05:12

## 2018-12-30 RX ADMIN — MUPIROCIN 1 G: 20 OINTMENT TOPICAL at 08:12

## 2018-12-30 RX ADMIN — IBUPROFEN 800 MG: 800 INJECTION INTRAVENOUS at 09:12

## 2018-12-30 RX ADMIN — ACETAMINOPHEN 1000 MG: 10 INJECTION, SOLUTION INTRAVENOUS at 05:12

## 2018-12-30 RX ADMIN — DOCUSATE SODIUM 100 MG: 100 CAPSULE, LIQUID FILLED ORAL at 08:12

## 2018-12-30 RX ADMIN — METOCLOPRAMIDE 10 MG: 5 INJECTION, SOLUTION INTRAMUSCULAR; INTRAVENOUS at 11:12

## 2018-12-30 RX ADMIN — PANTOPRAZOLE SODIUM 40 MG: 40 INJECTION, POWDER, FOR SOLUTION INTRAVENOUS at 08:12

## 2018-12-30 RX ADMIN — ACETAMINOPHEN 1000 MG: 10 INJECTION, SOLUTION INTRAVENOUS at 09:12

## 2018-12-30 RX ADMIN — HYDROMORPHONE HYDROCHLORIDE 0.5 MG: 1 INJECTION, SOLUTION INTRAMUSCULAR; INTRAVENOUS; SUBCUTANEOUS at 09:12

## 2018-12-30 RX ADMIN — PANTOPRAZOLE SODIUM 40 MG: 40 INJECTION, POWDER, FOR SOLUTION INTRAVENOUS at 01:12

## 2018-12-30 RX ADMIN — METOCLOPRAMIDE 5 MG: 5 INJECTION, SOLUTION INTRAMUSCULAR; INTRAVENOUS at 11:12

## 2018-12-30 RX ADMIN — METOCLOPRAMIDE 5 MG: 5 INJECTION, SOLUTION INTRAMUSCULAR; INTRAVENOUS at 05:12

## 2018-12-30 RX ADMIN — MUPIROCIN 1 G: 20 OINTMENT TOPICAL at 09:12

## 2018-12-30 RX ADMIN — ENOXAPARIN SODIUM 40 MG: 100 INJECTION SUBCUTANEOUS at 09:12

## 2018-12-30 RX ADMIN — METOCLOPRAMIDE 10 MG: 5 INJECTION, SOLUTION INTRAMUSCULAR; INTRAVENOUS at 12:12

## 2018-12-30 NOTE — PROGRESS NOTES
MD to bedside for PM rounds.    Patient reports continued nausea today. Unable to tolerate for than a few bites at a time. She had a few bites of applesauce. Also reports some surgical pain today. She states that she has been refusing PO ibuprofen because she's worried it will make her nauseated. No other pain medications have been given since 0015 this AM.     Patient has been on scheduled 5 mg reglan q6 hours with some improvement, but nausea persists.     Temp:  [98.5 °F (36.9 °C)-99 °F (37.2 °C)] 99 °F (37.2 °C)  Pulse:  [] 105  Resp:  [16-18] 18  SpO2:  [93 %-99 %] 96 %  BP: (146-185)/(75-89) 165/77     PE:  Abdomen soft, mild distention, excellent bowel sounds; mild TTP with minimal palpation  CV: RRR  Resp: good air movement bilaterally, but able to auscultate rhonchorous sounds associated with mucus    Plan:  Patient has not received pain medication today  She has only received scheduled reglan 5 mg q6 with inadequate benefit  Increasing reglan to 10 mg q6 hours for continued nausea  PO pain medications returned to IV for improved pain control    - with better pain control, patient may be able to clear upper respiratory tract  Nebulizer treatment ordered to improve breathing  Patient is ambulating  Family is supportive at bedside  RN informed of order changes and instructed to call if patient fails to respond    Leslee Duran MD, PhD  OBGYN, PGY-3

## 2018-12-30 NOTE — PROGRESS NOTES
MD to bedside to evaluate patient, who is now POD#2 from an ExLap/MY/BSO with extensive LUX. Patient awake, sitting up in bed. Reports multiple episodes of vomiting this evening after eating soup and jello. States she has a history of GERD, but this feels more severe than normal. Denies abdominal pain, but does report discomfort with movement and vomiting. She is ambulating without difficulty. She is not passing flatus. Voiding spontaneously.      Temp:  [97.7 °F (36.5 °C)-99 °F (37.2 °C)] 98.5 °F (36.9 °C)  Pulse:  [80-99] 99  Resp:  [16-18] 16  SpO2:  [94 %-100 %] 94 %  BP: (129-185)/(61-89) 185/89    Physical Exam   Constitutional: She is oriented to person, place, and time. She appears well-developed and well-nourished. No distress.   Cardiovascular: Normal rate and regular rhythm.   Pulmonary/Chest: Effort normal. No respiratory distress.   Abdominal: Soft. Bowel sounds are normal. She exhibits no distension. There is no tenderness (minimal, appropriate post-op tenderness). There is no guarding.   Midline vertical incision with island dressing in place, clean and dry.   Neurological: She is alert and oriented to person, place, and time.   Skin: Skin is warm and dry.   Vitals reviewed.    A/P    Blood pressure elevated on most recent vital check. Patient denies hx of HTN. Likely secondary to pain and vomiting. Will give 0.5mg Dilaudid at this time.  Discontinue Phenergan and start IV Reglan    Protonix 40mg IV daily  Continue D5 1/2NS IVF @ 125ml/hr  Will continue to monitor

## 2018-12-30 NOTE — ASSESSMENT & PLAN NOTE
- Oral and IV pain meds PRN  - Zofran PRN, added scheduled Reglan due to vomiting  - Protonix daily for GERD  - AM CBC stable  - BMP wnl  - Rosa removed, voiding spontaneously  - Regular diet, however vomiting last night so maintenance fluids running at this time  - Encourage ambulation  - Will start Lovenox for DVT ppx this am  - SCDs in place

## 2018-12-30 NOTE — PROGRESS NOTES
[facility]  Obstetrics & Gynecology  Progress Note    Patient Name: Pau Cook  MRN: [id number]  Admission Date: 12/28/2018  Primary Care Provider: Getachew Orlando MD  Principal Problem: S/P MY-BSO    Subjective:     HPI:  Presents for scheduled RALH/BSO for pelvic mass and elevated CEA.       Hospital Course:  12/30/2018 POD#2 s/p ExLap/MY/BSO/LUX. Patient with vomiting and GERD last night. Received Protonix IV and Reglan, with an improvement in symptoms.      Interval History: Patient doing well this morning. States pain much improved after receiving Dilaudid 0.5mg overnight. Ate only a minimal amount yesterday, and then vomited her soup and jello in the evening. However reports feeling much better since receiving Reglan and Protonix.  She is voiding spontaneously and ambulating without difficulty. No flatus.     Scheduled Meds:   docusate sodium  100 mg Oral Daily    ibuprofen  400 mg Oral Q6H    metoclopramide HCl  5 mg Intravenous Q6H    mupirocin  1 g Nasal BID    pantoprazole  40 mg Intravenous Daily     Continuous Infusions:   dextrose 5 % and 0.45 % NaCl 125 mL/hr at 12/29/18 1633     PRN Meds:HYDROmorphone, ondansetron, oxyCODONE, oxyCODONE, simethicone    Review of patient's allergies indicates:   Allergen Reactions    Pcn [penicillins]      Can not remember reaction     Tetanus vaccines and toxoid Swelling     Localized swelling to injection site       Objective:     Vital Signs (Most Recent):  Temp: 98.8 °F (37.1 °C) (12/30/18 0439)  Pulse: (!) 112 (12/30/18 0439)  Resp: 16 (12/30/18 0439)  BP: (!) 146/82 (12/30/18 0439)  SpO2: 97 % (12/30/18 0439) Vital Signs (24h Range):  Temp:  [98.5 °F (36.9 °C)-99 °F (37.2 °C)] 98.8 °F (37.1 °C)  Pulse:  [] 112  Resp:  [16-18] 16  SpO2:  [94 %-100 %] 97 %  BP: (137-185)/(61-89) 146/82     Weight: 57.2 kg (125 lb 16 oz)  Body mass index is 23.05 kg/m².  No LMP recorded. Patient is postmenopausal.    I&O (Last  24H):    Intake/Output Summary (Last 24 hours) at 12/30/2018 0654  Last data filed at 12/30/2018 0400  Gross per 24 hour   Intake 600 ml   Output 1200 ml   Net -600 ml       Physical Exam:   Constitutional: She is oriented to person, place, and time. She appears well-developed and well-nourished. No distress.    HENT:   Head: Normocephalic and atraumatic.    Eyes: Conjunctivae and EOM are normal.    Neck: Normal range of motion. Neck supple. No thyromegaly present.    Cardiovascular: Normal rate and regular rhythm.     Pulmonary/Chest: Effort normal. No respiratory distress.        Abdominal: Soft. She exhibits abdominal incision (vertical midline incision, dressing in place, C/D/I). She exhibits no distension. There is no tenderness.             Musculoskeletal: Normal range of motion and moves all extremeties. She exhibits no edema or tenderness.   SCDs in place       Neurological: She is alert and oriented to person, place, and time.    Skin: Skin is warm and dry. No rash noted.    Psychiatric: She has a normal mood and affect. Her behavior is normal.       Laboratory:  CBC:   Recent Labs   Lab 12/29/18  0810   WBC 9.64   RBC 3.92*   HGB 11.3*   HCT 34.1*      MCV 87   MCH 28.8   MCHC 33.1       Diagnostic Results:  Labs: Reviewed       Assessment/Plan:     * S/P MY-BSO    - Oral and IV pain meds PRN  - Zofran PRN, added scheduled Reglan due to vomiting  - Protonix daily for GERD  - AM CBC stable  - BMP wnl  - Rosa removed, voiding spontaneously  - Regular diet, however vomiting last night so maintenance fluids running at this time  - Encourage ambulation  - Will start Lovenox for DVT ppx this am  - SCDs in place               Jazmín Silva MD  Obstetrics & Gynecology  [facility]

## 2018-12-30 NOTE — SUBJECTIVE & OBJECTIVE
Interval History: Patient doing well this morning. States pain much improved after receiving Dilaudid 0.5mg overnight. Ate only a minimal amount yesterday, and then vomited her soup and jello in the evening. However reports feeling much better since receiving Reglan and Protonix.  She is voiding spontaneously and ambulating without difficulty. No flatus.     Scheduled Meds:   docusate sodium  100 mg Oral Daily    ibuprofen  400 mg Oral Q6H    metoclopramide HCl  5 mg Intravenous Q6H    mupirocin  1 g Nasal BID    pantoprazole  40 mg Intravenous Daily     Continuous Infusions:   dextrose 5 % and 0.45 % NaCl 125 mL/hr at 12/29/18 1633     PRN Meds:HYDROmorphone, ondansetron, oxyCODONE, oxyCODONE, simethicone    Review of patient's allergies indicates:   Allergen Reactions    Pcn [penicillins]      Can not remember reaction     Tetanus vaccines and toxoid Swelling     Localized swelling to injection site       Objective:     Vital Signs (Most Recent):  Temp: 98.8 °F (37.1 °C) (12/30/18 0439)  Pulse: (!) 112 (12/30/18 0439)  Resp: 16 (12/30/18 0439)  BP: (!) 146/82 (12/30/18 0439)  SpO2: 97 % (12/30/18 0439) Vital Signs (24h Range):  Temp:  [98.5 °F (36.9 °C)-99 °F (37.2 °C)] 98.8 °F (37.1 °C)  Pulse:  [] 112  Resp:  [16-18] 16  SpO2:  [94 %-100 %] 97 %  BP: (137-185)/(61-89) 146/82     Weight: 57.2 kg (125 lb 16 oz)  Body mass index is 23.05 kg/m².  No LMP recorded. Patient is postmenopausal.    I&O (Last 24H):    Intake/Output Summary (Last 24 hours) at 12/30/2018 0654  Last data filed at 12/30/2018 0400  Gross per 24 hour   Intake 600 ml   Output 1200 ml   Net -600 ml       Physical Exam:   Constitutional: She is oriented to person, place, and time. She appears well-developed and well-nourished. No distress.    HENT:   Head: Normocephalic and atraumatic.    Eyes: Conjunctivae and EOM are normal.    Neck: Normal range of motion. Neck supple. No thyromegaly present.    Cardiovascular: Normal rate and  regular rhythm.     Pulmonary/Chest: Effort normal. No respiratory distress.        Abdominal: Soft. She exhibits abdominal incision (vertical midline incision, dressing in place, C/D/I). She exhibits no distension. There is no tenderness.             Musculoskeletal: Normal range of motion and moves all extremeties. She exhibits no edema or tenderness.   SCDs in place       Neurological: She is alert and oriented to person, place, and time.    Skin: Skin is warm and dry. No rash noted.    Psychiatric: She has a normal mood and affect. Her behavior is normal.       Laboratory:  CBC:   Recent Labs   Lab 12/29/18  0810   WBC 9.64   RBC 3.92*   HGB 11.3*   HCT 34.1*      MCV 87   MCH 28.8   MCHC 33.1       Diagnostic Results:  Labs: Reviewed

## 2018-12-30 NOTE — PLAN OF CARE
Discharge planner met with patient at the bedside, accompanied by her daughters; Ellen Greer [phone] and Kendra Mo, both has MPOA, copied provided to CM, placed in blue chart.  Patient denies any inpatient admissions less than 30 days.    Verified PCP as:   Getachew Orlando MD   Preferred pharmacy:   [facility] - [place], [place] - [address]  [address] [facility] [address] [facility] [address]  Phone: [phone] Fax: [phone]    [id number] - [place], [place] - [address] [place] [address] AT [facility] [address] [place] [address]  [address] [place] [address]  [place] [address]  Phone: [phone] Fax: [phone]    [id number] - [place], [place] - [address]  [address] [address]  Phone: [phone] Fax: [phone]    Denies dialysis care  Denies Coumadin for home use; most likely discharging home on Lovenox injections  Denies active home health services; Family requesting home health services at the time of discharge;  Daughter signed Patient's Choice Disclosure Form choosing [facility] as preferred provider of choice.    Denies DME for home use. Requesting a walker at the time of discharge for home use  Dc plan is back to [facility] with home health services. Patient may temporarily discharge to daughter's home address at [address] [place] [address]. CM to continue to assist patient and family with needs. Family or friend to provide transportation home at the time of discharge.   12/30/18 1137   Discharge Assessment   Assessment Type Discharge Planning Assessment   Confirmed/corrected address and phone number on face sheet? Yes   Assessment information obtained from? Patient; Daughter   Communicated expected length of stay with patient/caregiver Yes, 3-5 days    Type of Healthcare Directive Received Medical Power Of    If Healthcare Directive is received, is it scanned into Epic? Yes    Prior to hospitalization  cognitive status: Alert/Oriented   Prior to hospitalization functional status: Independent    Current cognitive status: Alert/Oriented    Current Functional Status: Independent    Arrived From home or self-care   Lives With Alone    Able to Return to Prior Arrangements Yes    Is patient able to care for self after discharge? Yes    Do you have any financial concerns preventing you from receiving the healthcare you need? No   Does the patient have transportation to healthcare appointments? Yes   Transportation Available family or friend will provide   Patient's perception of discharge disposition Home Health   Discharge Plan A Home with Home Health    Discharge Plan B Home with family    Patient/Family In Agreement With Plan Yes

## 2018-12-30 NOTE — HOSPITAL COURSE
12/30/2018 POD#2 s/p ExLap/MY/BSO/LUX. Patient with vomiting and GERD last night. Received Protonix IV and Reglan, with an improvement in symptoms.    12/31/2018 Patient with mild nausea and pain throughout the day yesterday. Antiemetic dose increased. IV tylenol/ibuprofen scheduled for improved control. Nebulizer treatment ordered last evening for some secretions.  Resting comfortably this AM. Pain well controlled. No new labs/imaging. Pathology pending. No fever, chills, nausea, vomiting overnight. Yesterday tolerated small bites, but no solid foods yet. Voiding and ambulating well. Passing flatus.

## 2018-12-30 NOTE — PLAN OF CARE
Problem: Adult Inpatient Plan of Care  Goal: Plan of Care Review  Outcome: Ongoing (interventions implemented as appropriate)  Plan of Care reviewed with patient and daughter. Pt free from falls or injury. Pt up with assist to bathroom. Voiding adequately without difficulty. Pt c/o nausea. Zofran not effective. Phenergan 6.25mg IV given. Purposeful rounding done. Family @ bedside. Abdominal dsg with dried scant serosanguineous drainage noted. SAfety maintained. Bed in lowest position and locked. Call light in reach.

## 2018-12-30 NOTE — OP NOTE
DATE OF PROCEDURE:  12/28/18     SURGEON:  Amee Rose M.D.     ASSISTANTS:  Jazmín Silva MD (RES) and Areli Solorzano MD (RES)      PREOPERATIVE DIAGNOSES:  1. Pelvic mass  2. Elevated   3. Postmenopausal bleeding  4. Hepatic lesions concerning for metastatic disease     POSTOPERATIVE DIAGNOSES:  1. Pelvic mass  2. Elevated   3. Postmenopausal bleeding  4. Hepatic lesions concerning for metastatic disease     PROCEDURES PERFORMED: Robotic coverted to open total abdominal hysterectomy, bilateral salpingo-oophorectomy/pelvic mass resection, extensive lysis of adhesions     ANESTHESIA:  General endotracheal anesthesia.     SPECIMENS REMOVED:  1.  Uterus   2.  Cervix  3.  Left fallopian tube and ovary/pelvic mass  4.  Right fallopian tube and ovary     ESTIMATED BLOOD LOSS:  400 mL.     COMPLICATIONS:  None.     FINDINGS:   1. 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 5cm solid/cystic pelvic mass originating from the left ovary densley adherent to the left pelvic sidewall and rectosigmoid colon  3. 4 week size uterus, cervix, and right fallopian tube and ovary normal in appearance  4. No ascites, no obvious intraperitoneal disease, no adenopathy       PROCEDURE IN DETAIL:  The patient was taken to the Operating Room.  Informed consent had been obtained.  She underwent general endotracheal anesthesia  without difficulty and was prepped and draped in the normal sterile fashion in the dorsal lithotomy position.  Timeout was performed.  All parties agreed to the planned procedure. Perioperative antibiotics were administered.  Rosa catheter was placed under sterile conditions. A V-care uterine manipulator was secured in place in the standard fashion. Gloves were changed and attention was turned to the patient's abdomen.      Given multiple prior abdominal surgeries we  proceeded with a cut down approach in the left upper quadrant. Skin incision was made and the fascia was identified tented up with two hemostats and entered sharply with scissors. We then identified the peritoneum and this was entered sharply as well. With some sharp dissection we identified a window into the peritoneal cavity. Rocotic trocar was placed and abdomen was insufflated. Camera was introduced into abdomen. Significant adhesive disease as noted above, unable to identify pelvic structures, no safe window for additional trocar placements for robotic procedure. Thus we converted to open approach. The fascia of the left upper quadrant incision was closed with 0-vicryl suture in a running fashion.     Midline vertical skin incision was made with a scalpel and carried down to the underlying layer of fascia. The fascia was incised in the midline and extended superiorly and inferiorly.  The rectus muscles were divided in the midline.  The peritoneum was identified, tented up with two tonsil clamps, and entered sharply with Metzenbaum scissors. The peritoneal incision was extended superiorly and inferiorly. We performed extensive lysis of adhesions of the omentum, large and small intestine in order to restore normal anatomy requiring >1h of dissection.      Once we restored anatomy and had adequate exposure of the pelvic structures bookwalter retractor was assembled and bowel was packed away with moist laparotomy sponges. We then identified the round ligament on the left, cauterized and transected it. This facilitated access to the retroperitoneum.  The retroperitoneum was then opened. The left IP ligament was then skeletonized, doubly clamped with Johanna hysterectomy clamps, transected, and doubly suture ligated with good visualization of the ureter beneath. The left ovarian mass was firm and densely adherent to the left pelvic sidewall and rectosigmoid colon which was freed with gentle blunt and sharp dissection.  The remainder of the peritoneal attachments of the left ovary/mass were released to the level of the uterine cornua. The left uterine-ovarian artery and ligament were then doubly clamped, transected and suture ligated. Left ovarian mass was handed off to pathology.      We then turned our attention to the remainder of the hysterectomy. The round ligament of the right was identified, suture ligated and transected. The retroperitoneum was then opened. The right IP ligament was then skeletonized, doubly clamped with Johanna hysterectomy clamps, transected, and doubly suture ligated with good visualization of the ureter beneath. The peritoneal attachments of the right ovary were released to the level of the uterine cornua. The vesicouterine peritoneum was tented up, proper plane for the bladder flap was identified, and the bladder was gently reflected off the anterior aspect of the uterus and cervix to the level of the cervicovaginal junction. The bilateral uterine arteries were then skeletonized.  These were clamped with Johanna hysterectomy clamps, transected, and suture ligated. The remainder of the uterosacral and cardinal ligaments were then also serially clamped, transected, and suture ligated. We then used two curved Zeplin hysterectomy clamps to come across the upper vagina. The uterus, cervix, and right fallopian tube and ovary were then amputated and handed off to pathology. The vaginal cuff was closed with an 0 Vicryl suture in a running locked fashion.  The abdomen and pelvis were copiously irrigated, cleared of all clot and debris, and rendered hemostatic. Ureters were reinspected and both noted to be vermiculating equally and briskly. We elected not to do an omentectomy given severe adhesive disease, liver lesions, and elderly frail status. Thus the procedure was deemed complete.      Laparotomy sponges were removed from the abdomen.  Bookwalter retractor was taken down.  Exparel was placed superiorly and  inferiorly to the fascia.  The fascia was reapproximated with a #1 looped PDS in a running fashion.  Subcutaneous tissue was irrigated, cleared of all clot and debris, and rendered hemostatic. Skin incision was closed with 4-0 Monocryl in a subcuticular fashion and topped with sterile dressing, including the left upper quadrant incision. The patient tolerated the procedure well. Sponge, lap, needle, and instrument counts were correct x2 as reported by the circulating nurse. She was awakened from anesthesia and taken to recovery in stable condition.

## 2018-12-30 NOTE — PLAN OF CARE
Problem: Adult Inpatient Plan of Care  Goal: Plan of Care Review  Outcome: Ongoing (interventions implemented as appropriate)  Pt rested well this shift, ambulated to RR, medipore to midline C/D/I, VSS. Pt family at bedside, pain controlled with medication, positions self in bed. Pt in low locked bed,call light in reach POC reviewed with patient questions answered, will continue to monitor. N/V controlled with medication.

## 2018-12-31 VITALS
WEIGHT: 126 LBS | TEMPERATURE: 98 F | DIASTOLIC BLOOD PRESSURE: 66 MMHG | HEART RATE: 104 BPM | SYSTOLIC BLOOD PRESSURE: 150 MMHG | HEIGHT: 62 IN | BODY MASS INDEX: 23.19 KG/M2 | RESPIRATION RATE: 16 BRPM | OXYGEN SATURATION: 99 %

## 2018-12-31 DIAGNOSIS — G89.18 POST-OP PAIN: Primary | ICD-10-CM

## 2018-12-31 PROBLEM — K11.7 INCREASED OROPHARYNGEAL SECRETIONS: Status: ACTIVE | Noted: 2018-12-31

## 2018-12-31 PROCEDURE — 99024 POSTOP FOLLOW-UP VISIT: CPT | Mod: ,,, | Performed by: OBSTETRICS & GYNECOLOGY

## 2018-12-31 PROCEDURE — 25000003 PHARM REV CODE 250: Performed by: OBSTETRICS & GYNECOLOGY

## 2018-12-31 PROCEDURE — 97530 THERAPEUTIC ACTIVITIES: CPT

## 2018-12-31 PROCEDURE — 25000003 PHARM REV CODE 250: Performed by: STUDENT IN AN ORGANIZED HEALTH CARE EDUCATION/TRAINING PROGRAM

## 2018-12-31 PROCEDURE — 94799 UNLISTED PULMONARY SVC/PX: CPT

## 2018-12-31 PROCEDURE — C9113 INJ PANTOPRAZOLE SODIUM, VIA: HCPCS | Performed by: OBSTETRICS & GYNECOLOGY

## 2018-12-31 PROCEDURE — 63600175 PHARM REV CODE 636 W HCPCS: Performed by: OBSTETRICS & GYNECOLOGY

## 2018-12-31 PROCEDURE — 99024 PR POST-OP FOLLOW-UP VISIT: ICD-10-PCS | Mod: ,,, | Performed by: OBSTETRICS & GYNECOLOGY

## 2018-12-31 PROCEDURE — 97161 PT EVAL LOW COMPLEX 20 MIN: CPT

## 2018-12-31 PROCEDURE — 63600175 PHARM REV CODE 636 W HCPCS: Performed by: STUDENT IN AN ORGANIZED HEALTH CARE EDUCATION/TRAINING PROGRAM

## 2018-12-31 PROCEDURE — 94761 N-INVAS EAR/PLS OXIMETRY MLT: CPT

## 2018-12-31 RX ORDER — OXYCODONE AND ACETAMINOPHEN 5; 325 MG/1; MG/1
1 TABLET ORAL EVERY 4 HOURS PRN
Qty: 30 TABLET | Refills: 0 | Status: SHIPPED | OUTPATIENT
Start: 2018-12-31 | End: 2019-01-23

## 2018-12-31 RX ORDER — IBUPROFEN 600 MG/1
600 TABLET ORAL EVERY 6 HOURS PRN
Qty: 60 TABLET | Refills: 0 | Status: SHIPPED | OUTPATIENT
Start: 2018-12-31 | End: 2019-02-05

## 2018-12-31 RX ORDER — ENOXAPARIN SODIUM 100 MG/ML
40 INJECTION SUBCUTANEOUS EVERY 24 HOURS
Status: DISCONTINUED | OUTPATIENT
Start: 2018-12-31 | End: 2018-12-31 | Stop reason: HOSPADM

## 2018-12-31 RX ADMIN — OXYCODONE HYDROCHLORIDE 10 MG: 5 TABLET ORAL at 04:12

## 2018-12-31 RX ADMIN — ENOXAPARIN SODIUM 40 MG: 100 INJECTION SUBCUTANEOUS at 08:12

## 2018-12-31 RX ADMIN — METOCLOPRAMIDE 10 MG: 5 INJECTION, SOLUTION INTRAMUSCULAR; INTRAVENOUS at 05:12

## 2018-12-31 RX ADMIN — METOCLOPRAMIDE 10 MG: 5 INJECTION, SOLUTION INTRAMUSCULAR; INTRAVENOUS at 11:12

## 2018-12-31 RX ADMIN — IBUPROFEN 800 MG: 800 INJECTION INTRAVENOUS at 05:12

## 2018-12-31 RX ADMIN — OXYCODONE HYDROCHLORIDE 10 MG: 5 TABLET ORAL at 11:12

## 2018-12-31 RX ADMIN — PANTOPRAZOLE SODIUM 40 MG: 40 INJECTION, POWDER, FOR SOLUTION INTRAVENOUS at 08:12

## 2018-12-31 RX ADMIN — ONDANSETRON 8 MG: 8 TABLET, ORALLY DISINTEGRATING ORAL at 11:12

## 2018-12-31 RX ADMIN — MUPIROCIN 1 G: 20 OINTMENT TOPICAL at 08:12

## 2018-12-31 RX ADMIN — ACETAMINOPHEN 1000 MG: 10 INJECTION, SOLUTION INTRAVENOUS at 06:12

## 2018-12-31 RX ADMIN — DOCUSATE SODIUM 100 MG: 100 CAPSULE, LIQUID FILLED ORAL at 08:12

## 2018-12-31 NOTE — PLAN OF CARE
Problem: Adult Inpatient Plan of Care  Goal: Plan of Care Review  Outcome: Ongoing (interventions implemented as appropriate)  Patient in no apparent distress. Sat's  97 % on room air. IS done . Will continue to monitor.

## 2018-12-31 NOTE — PLAN OF CARE
Problem: Adult Inpatient Plan of Care  Goal: Plan of Care Review  Outcome: Ongoing (interventions implemented as appropriate)  Pt remains on RA. No resp distress noted. IS done

## 2018-12-31 NOTE — PLAN OF CARE
See previous discharge planning assessment completed by SURESH Fox     12/30/18 9456   Discharge Assessment   Assessment Type Discharge Planning Assessment

## 2018-12-31 NOTE — NURSING
New orders for discharge received, patient and family at bedside agreeable with discharge at this time. PIV removed, catheter tip intact. Dressing applied. Dr Rose went over discharge instructions, medications and signs and symptoms to look for. Nurse reinforced these with discharge paperwork at bedside with family. Pt to d/c home with own walker and all belongings.

## 2018-12-31 NOTE — ASSESSMENT & PLAN NOTE
- IV tylenol/ibuprofen scheduled for pain meds   - Zofran PRN, scheduled Reglan increased to 10 mg for improved nausea control  - Protonix daily for GERD  - AM CBC stable  - BMP wnl  - Voiding spontaneously  - Regular diet, however only tolerating some clears; ADAT slowly  - Encourage ambulation  - Lovenox ppx daily: lovenox teaching has been ordered for continued home use on discharge  - SCDs in place, ambulating well

## 2018-12-31 NOTE — SUBJECTIVE & OBJECTIVE
Interval History:   Patient with mild nausea and pain throughout the day yesterday. Antiemetic dose increased. IV tylenol/ibuprofen scheduled for improved control. Nebulizer treatment ordered last evening for some secretions.  Resting comfortably this AM. Pain well controlled. No new labs/imaging. Pathology pending. No fever, chills, nausea, vomiting overnight. Yesterday tolerated small bites, but no solid foods yet. Voiding and ambulating well. Passing flatus.     Scheduled Meds:   acetaminophen  1,000 mg Intravenous Q8H    docusate sodium  100 mg Oral Daily    enoxaparin  40 mg Subcutaneous Daily    ibuprofen  800 mg Intravenous Q8H    metoclopramide HCl  10 mg Intravenous Q6H    mupirocin  1 g Nasal BID    pantoprazole  40 mg Intravenous Daily     Continuous Infusions:   dextrose 5 % and 0.45 % NaCl 100 mL/hr at 12/30/18 0950     PRN Meds:HYDROmorphone, ondansetron, oxyCODONE, oxyCODONE, simethicone    Review of patient's allergies indicates:   Allergen Reactions    Pcn [penicillins]      Can not remember reaction     Tetanus vaccines and toxoid Swelling     Localized swelling to injection site       Objective:     Vital Signs (Most Recent):  Temp: 97.9 °F (36.6 °C) (12/30/18 2347)  Pulse: 96 (12/30/18 2347)  Resp: 14 (12/30/18 2347)  BP: (!) 157/74 (12/30/18 2347)  SpO2: 96 % (12/30/18 2347) Vital Signs (24h Range):  Temp:  [97.9 °F (36.6 °C)-99 °F (37.2 °C)] 97.9 °F (36.6 °C)  Pulse:  [] 96  Resp:  [14-20] 14  SpO2:  [93 %-97 %] 96 %  BP: (146-174)/(74-84) 157/74     Weight: 57.2 kg (125 lb 16 oz)  Body mass index is 23.05 kg/m².  No LMP recorded. Patient is postmenopausal.    I&O (Last 24H):    Intake/Output Summary (Last 24 hours) at 12/31/2018 0306  Last data filed at 12/30/2018 1825  Gross per 24 hour   Intake 480 ml   Output 1450 ml   Net -970 ml       Physical Exam:   Constitutional: She is oriented to person, place, and time. She appears well-developed and well-nourished. No distress.     HENT:   Head: Normocephalic and atraumatic.    Eyes: Conjunctivae and EOM are normal. Pupils are equal, round, and reactive to light.    Neck: Normal range of motion. Neck supple. No thyromegaly present.    Cardiovascular: Normal rate, regular rhythm and normal heart sounds.     Pulmonary/Chest: Effort normal and breath sounds normal. No respiratory distress.   Still with some secretions noted on auscultation, but generally clear breath sounds        Abdominal: Soft. Bowel sounds are normal. She exhibits abdominal incision (vertical midline incision, dressing in place, C/D/I). She exhibits no distension. There is no tenderness.             Musculoskeletal: Normal range of motion and moves all extremeties. She exhibits no edema or tenderness.   SCDs in place       Neurological: She is alert and oriented to person, place, and time.    Skin: Skin is warm and dry. No rash noted.    Psychiatric: She has a normal mood and affect. Her behavior is normal. Judgment and thought content normal.     Laboratory:  Recent Labs   Lab 12/27/18  1221 12/29/18  0810   WBC 6.28 9.64   HGB 13.1 11.3*   HCT 40.1 34.1*   MCV 89 87    228       Recent Labs   Lab 12/27/18  1221 12/29/18  0810    131*   K 4.6 4.2   CL 98 97   CO2 28 24   BUN 8 8   CREATININE 0.8 0.7   GLU 91 166*      Diagnostic Results:  Pathology pending

## 2018-12-31 NOTE — PROGRESS NOTES
[facility]  Obstetrics & Gynecology  Progress Note    Patient Name: Pau Cook  MRN: [id number]  Admission Date: 12/28/2018  Primary Care Provider: Getachew Orlando MD  Principal Problem: S/P MY-BSO    Subjective:     HPI:  Presents for scheduled RALH/BSO for pelvic mass and elevated CEA.     Interval History:   Patient with mild nausea and pain throughout the day yesterday. Antiemetic dose increased. IV tylenol/ibuprofen scheduled for improved control. Nebulizer treatment ordered last evening for some secretions.  Resting comfortably this AM. Pain much better controlled. No new labs/imaging. Pathology pending. No fever, chills, nausea, vomiting overnight. Yesterday tolerated small bites, but no solid foods yet. Voiding and ambulating well. Passing flatus.     Scheduled Meds:   acetaminophen  1,000 mg Intravenous Q8H    docusate sodium  100 mg Oral Daily    enoxaparin  40 mg Subcutaneous Daily    ibuprofen  800 mg Intravenous Q8H    metoclopramide HCl  10 mg Intravenous Q6H    mupirocin  1 g Nasal BID    pantoprazole  40 mg Intravenous Daily     Continuous Infusions:   dextrose 5 % and 0.45 % NaCl 100 mL/hr at 12/30/18 0950     PRN Meds:HYDROmorphone, ondansetron, oxyCODONE, oxyCODONE, simethicone    Review of patient's allergies indicates:   Allergen Reactions    Pcn [penicillins]      Can not remember reaction     Tetanus vaccines and toxoid Swelling     Localized swelling to injection site       Objective:     Vital Signs (Most Recent):  Temp: 97.9 °F (36.6 °C) (12/30/18 2347)  Pulse: 96 (12/30/18 2347)  Resp: 14 (12/30/18 2347)  BP: (!) 157/74 (12/30/18 2347)  SpO2: 96 % (12/30/18 2347) Vital Signs (24h Range):  Temp:  [97.9 °F (36.6 °C)-99 °F (37.2 °C)] 97.9 °F (36.6 °C)  Pulse:  [] 96  Resp:  [14-20] 14  SpO2:  [93 %-97 %] 96 %  BP: (146-174)/(74-84) 157/74     Weight: 57.2 kg (125 lb 16 oz)  Body mass index is 23.05 kg/m².  No LMP recorded. Patient is  postmenopausal.    I&O (Last 24H):    Intake/Output Summary (Last 24 hours) at 12/31/2018 0306  Last data filed at 12/30/2018 1825  Gross per 24 hour   Intake 480 ml   Output 1450 ml   Net -970 ml       Physical Exam:   Constitutional: She is oriented to person, place, and time. She appears well-developed and well-nourished. No distress.    HENT:   Head: Normocephalic and atraumatic.    Eyes: Conjunctivae and EOM are normal. Pupils are equal, round, and reactive to light.    Neck: Normal range of motion. Neck supple. No thyromegaly present.    Cardiovascular: Normal rate, regular rhythm and normal heart sounds.     Pulmonary/Chest: Effort normal and breath sounds normal. No respiratory distress.   Still with some secretions noted on auscultation, but generally clear breath sounds        Abdominal: Soft. Bowel sounds are normal. She exhibits abdominal incision (vertical midline incision, dressing in place, C/D/I). She exhibits no distension. There is no tenderness.             Musculoskeletal: Normal range of motion and moves all extremeties. She exhibits no edema or tenderness.   SCDs in place       Neurological: She is alert and oriented to person, place, and time.    Skin: Skin is warm and dry. No rash noted.    Psychiatric: She has a normal mood and affect. Her behavior is normal. Judgment and thought content normal.     Laboratory:  Recent Labs   Lab 12/27/18  1221 12/29/18  0810   WBC 6.28 9.64   HGB 13.1 11.3*   HCT 40.1 34.1*   MCV 89 87    228       Recent Labs   Lab 12/27/18  1221 12/29/18  0810    131*   K 4.6 4.2   CL 98 97   CO2 28 24   BUN 8 8   CREATININE 0.8 0.7   GLU 91 166*      Diagnostic Results:  Pathology pending    Assessment/Plan:     * S/P MY-BSO    - IV tylenol/ibuprofen scheduled for pain meds   - Zofran PRN, scheduled Reglan increased to 10 mg for improved nausea control  - Protonix daily for GERD  - AM CBC stable  - BMP wnl  - Voiding spontaneously  - Regular diet, however  only tolerating some clears; ADAT slowly  - Encourage ambulation  - Lovenox ppx daily: lovenox teaching has been ordered for continued home use on discharge  - SCDs in place, ambulating well           Increased oropharyngeal secretions    - nebulizer treatments PRN  - IS encouraged: patient is participating well     Pelvic mass    - s/p above procedures  - pathology pending         Leslee Duran MD  Obstetrics & Gynecology  [facility]

## 2018-12-31 NOTE — DISCHARGE SUMMARY
[facility]  Obstetrics & Gynecology  Discharge Summary    Patient Name: Pau Cook  MRN: [id number]  Admission Date: 12/28/2018  Hospital Length of Stay: 3 days  Discharge Date and Time:  12/31/2018 4:54 PM  Attending Physician: Amee Rose MD   Discharging Provider: Jules Strange MD  Primary Care Provider: Getachew Orlando MD    HPI:  Presents for scheduled RALH/BSO for pelvic mass and elevated CEA.     Hospital Course:  12/30/2018 POD#2 s/p ExLap/MY/BSO/LUX. Patient with vomiting and GERD last night. Received Protonix IV and Reglan, with an improvement in symptoms.    12/31/2018 Patient with mild nausea and pain throughout the day yesterday. Antiemetic dose increased. IV tylenol/ibuprofen scheduled for improved control. Nebulizer treatment ordered last evening for some secretions.  Resting comfortably this AM. Pain well controlled. No new labs/imaging. Pathology pending. No fever, chills, nausea, vomiting overnight. Yesterday tolerated small bites, but no solid foods yet. Voiding and ambulating well. Passing flatus.     She was stable for discharge on POD #3.     Procedure(s) (LRB):  XI ROBOTIC HYSTERECTOMY (N/A)  XI ROBOTIC SALPINGO-OOPHORECTOMY (Bilateral)  HYSTERECTOMY, TOTAL, ABDOMINAL, WITH BILATERAL SALPINGO-OOPHORECTOMY (Bilateral)  LYSIS, ADHESIONS (N/A)         Significant Diagnostic Studies: Labs: BMP: No results for input(s): GLU, NA, K, CL, CO2, BUN, CREATININE, CALCIUM, MG in the last 48 hours. and CBC No results for input(s): WBC, HGB, HCT, PLT in the last 48 hours.    Pending Diagnostic Studies:     None        Final Active Diagnoses:    Diagnosis Date Noted POA    PRINCIPAL PROBLEM:  S/P MY-BSO [Z90.710, Z90.722, Z90.79] 12/28/2018 Not Applicable    Increased oropharyngeal secretions [K11.7] 12/31/2018 No    Pelvic mass [R19.00] 12/28/2018 Yes      Problems Resolved During this Admission:        Discharged Condition: good    Disposition: Home or Self  "Care    Follow Up:  Follow-up Information     Amee Rose MD In 2 weeks.    Specialty:  Gynecologic Oncology  Why:  As needed  Contact information:  [address]  [facility] [id number] [phone]                 Patient Instructions:      WALKER FOR HOME USE     Order Specific Question Answer Comments   Type of Walker: Sergey (4'4"-5'7")    With wheels? Yes    Height: 5' 2" (1.575 m)    Weight: 57.2 kg (125 lb 16 oz)    Length of need (1-99 months): 99    Does patient have medical equipment at home? none    Please check all that apply: Patient's condition impairs ambulation.    Vendor: [facility]    Expected Date of Delivery: 12/31/2018      Medications:  Reconciled Home Medications:      Medication List      START taking these medications    enoxaparin 40 mg/0.4 mL Syrg  Commonly known as:  LOVENOX  Inject 0.4 mLs (40 mg total) into the skin once daily for 21 days        ASK your doctor about these medications    CALCIUM 500 + D 500 mg(1,250mg) -200 unit per tablet  Generic drug:  calcium-vitamin D3  Take 1 tablet by mouth once daily.     carboxymethylcellulose 0.5 % Dpet  Commonly known as:  REFRESH PLUS  1 drop daily as needed.     CENTRUM SILVER ORAL  Take by mouth.     conjugated estrogens vaginal cream  Commonly known as:  PREMARIN  Place 0.5 g vaginally twice a week.     cyanocobalamin 1000 MCG tablet  Commonly known as:  VITAMIN B-12  Take 1,000 mcg by mouth once daily.     FLAXSEED ORAL  Take 1,000 mg by mouth once daily.     ibuprofen 600 MG tablet  Commonly known as:  ADVIL,MOTRIN  Take 1 tablet (600 mg total) by mouth every 6 (six) hours as needed for Pain.     ketotifen 0.025 % (0.035 %) ophthalmic solution  Commonly known as:  ZADITOR  Place 1 drop into both eyes 2 (two) times daily as needed.     NexIUM 20 MG capsule  Generic drug:  esomeprazole  Take 20 mg by mouth before breakfast.     oxyCODONE-acetaminophen 5-325 mg per tablet  Commonly known as:  PERCOCET  Take 1 tablet by mouth " every 4 (four) hours as needed for Pain.     TYLENOL ARTHRITIS ORAL  Take 2 tablets by mouth every 8 (eight) hours as needed.            Jules Strange MD  Obstetrics & Gynecology  [facility]

## 2018-12-31 NOTE — PLAN OF CARE
Problem: Adult Inpatient Plan of Care  Goal: Plan of Care Review  Outcome: Ongoing (interventions implemented as appropriate)  Pt VSS and afebrile, free of falls trauma and injury. Pt ambulates to RR with standby assist, family at bedside. Pt pain controlled with medication, positions self in bed. Pt passe flatus this shift. Pt in low locked ed, call [facility] tin reach, safety precautions maintained, will continue to monitor. Purposeful hourly rounding done. Pt rested well this shift on RA with no N/V.

## 2018-12-31 NOTE — PLAN OF CARE
Aileen from [facility] gave approval to pull marlys rolling walker from our supply - [facility] to deliver to bedside

## 2019-01-01 NOTE — PLAN OF CARE
[facility]    HOME HEALTH ORDERS  FACE TO FACE ENCOUNTER    Patient Name: Pau Cook  YOB: 1929    PCP: Getachew Orlando MD   PCP Address: [address] / [place] [address]  PCP Phone Number: [phone]  PCP Fax: [phone]    Encounter Date: 01/01/2019    Admit to Home Health    Diagnoses:  Active Hospital Problems    Diagnosis  POA    *S/P MY-BSO [Z90.710, Z90.722, Z90.79]  Not Applicable    Increased oropharyngeal secretions [K11.7]  No    Pelvic mass [R19.00]  Yes      Resolved Hospital Problems   No resolved problems to display.       No future appointments.  Follow-up Information     Amee Rose MD In 2 weeks.    Specialty:  Gynecologic Oncology  Why:  As needed  Contact information:  [address]  [facility] [address]  [phone]                     I have seen and examined this patient face to face today. My clinical findings that support the need for the home health skilled services and home bound status are the following:  Requiring assistive device to leave home due to unsteady gait caused by  Weakness/Debility.    Allergies:  Review of patient's allergies indicates:   Allergen Reactions    Pcn [penicillins]      Can not remember reaction     Tetanus vaccines and toxoid Swelling     Localized swelling to injection site       Diet: regular diet    Activities: activity as tolerated    Nursing:   SN to complete comprehensive assessment including routine vital signs. Instruct on disease process and s/s of complications to report to MD. Review/verify medication list sent home with the patient at time of discharge  and instruct patient/caregiver as needed. Frequency may be adjusted depending on start of care date.    Notify MD if SBP > 160 or < 90; DBP > 90 or < 50; HR > 120 or < 50; Temp > 101      CONSULTS:    Physical Therapy to evaluate and treat. Evaluate for home safety and equipment needs; Establish/upgrade home  exercise program. Perform / instruct on therapeutic exercises, gait training, transfer training, and Range of Motion.  Occupational Therapy to evaluate and treat. Evaluate home environment for safety and equipment needs. Perform/Instruct on transfers, ADL training, ROM, and therapeutic exercises.  Aide to provide assistance with personal care, ADLs, and vital signs.      Medications: Review discharge medications with patient and family and provide education.      Discharge Medication List as of 12/31/2018  4:13 PM      START taking these medications    Details   enoxaparin (LOVENOX) 40 mg/0.4 mL Syrg Inject 0.4 mLs (40 mg total) into the skin once daily for 21 days, Starting Fri 12/28/2018, Until Fri 1/18/2019, Normal      ibuprofen (ADVIL,MOTRIN) 600 MG tablet Take 1 tablet (600 mg total) by mouth every 6 (six) hours as needed for Pain., Starting Mon 12/31/2018, Normal      oxyCODONE-acetaminophen (PERCOCET) 5-325 mg per tablet Take 1 tablet by mouth every 4 (four) hours as needed for Pain., Starting Mon 12/31/2018, Normal         CONTINUE these medications which have NOT CHANGED    Details   acetaminophen (TYLENOL ARTHRITIS ORAL) Take 2 tablets by mouth every 8 (eight) hours as needed., Historical Med      calcium-vitamin D3 (CALCIUM 500 + D) 500 mg(1,250mg) -200 unit per tablet Take 1 tablet by mouth once daily. , Historical Med      carboxymethylcellulose (REFRESH PLUS) 0.5 % Dpet 1 drop daily as needed., Until Discontinued, Historical Med      conjugated estrogens (PREMARIN) vaginal cream Place 0.5 g vaginally twice a week., Starting Thu 6/1/2017, Normal      cyanocobalamin (VITAMIN B-12) 1000 MCG tablet Take 1,000 mcg by mouth once daily. , Historical Med      FLAXSEED ORAL Take 1,000 mg by mouth once daily. , Until Discontinued, Historical Med      ketotifen (ZADITOR) 0.025 % (0.035 %) ophthalmic solution Place 1 drop into both eyes 2 (two) times daily as needed. , Historical Med      MULTIVITAMIN  W-MINERALS/LUTEIN (CENTRUM SILVER ORAL) Take by mouth., Until Discontinued, Historical Med      NEXIUM 20 mg capsule Take 20 mg by mouth before breakfast. , Starting 9/21/2016, Until Discontinued, Historical Med             I certify that this patient is confined to her home and needs intermittent skilled nursing care, physical therapy and occupational therapy.

## 2019-01-01 NOTE — PLAN OF CARE
1/1/19 10am Laura from [facility] [place] called requesting home health be set up for patient - spoke with OR nurse yesterday who was going to pass a message to GYN resident of family's request for home health at discharge - spoke with GYN resident Yuval today & she ok'd home health - referral forwarded to [facility] at family's request - spoke with Lynne from [facility] who accepted patient & will set up a visit tomorrow - Laura from [place] updated

## 2019-01-01 NOTE — PLAN OF CARE
01/01/19 1053   Final Note   Assessment Type Final Discharge Note   Anticipated Discharge Disposition Home-Health   What phone number can be called within the next 1-3 days to see how you are doing after discharge? [phone]   Discharge plans and expectations educations in teach back method with documentation complete? Yes   Right Care Referral Info   Post Acute Recommendation Home-care   Facility Name [facility]

## 2019-01-02 NOTE — PLAN OF CARE
1/2/19 4:30pm Patient accepted by [facility] but can not start care until 1/4/19 - patient's daughter updated

## 2019-01-02 NOTE — PLAN OF CARE
1/2/19 3:50pm Received a call from [facility] stating they have been denied by [employer] stating patient must have [facility] - referral forwarded to [facility] via [facility]  - spoke with patient & updated her

## 2019-01-03 ENCOUNTER — TELEPHONE (OUTPATIENT)
Dept: INTERNAL MEDICINE | Facility: CLINIC | Age: 84
End: 2019-01-03

## 2019-01-10 NOTE — PHYSICIAN QUERY
PT Name: Pau Cook  MR #: [id number]    Physician Query Form - Pathology Findings Clarification     CDS/: DANYELLE Mayberry,[email or web address]        Contact information:[email or web address]  This form is a permanent document in the medical record.     Query Date: January 10, 2019      By submitting this query, we are merely seeking further clarification of documentation.  Please utilize your independent clinical judgment when addressing the question(s) below.      The medical record contains the following:     Findings Supporting Clinical Information Location in Medical Record   FINAL PATHOLOGIC DIAGNOSIS  1. LEFT TUBE AND OVARY SHOWING HIGH-GRADE, UNDIFFERENTIATED CARCINOMA OF THE OVARY  INVOLVING MILAGROS TUBAL TISSUES WITH EXTENSIVE NECROSIS, SEE SYNOPTIC REPORT:  PROCEDURE: TOTAL HYSTERECTOMY AND BILATERAL SALPINGO-OOPHORECTOMY  SPECIMEN INTEGRITY: LEFT OVARY, CAPSULE RUPTURED  TUMOR SITE: LEFT OVARY  OVARIAN SURFACE INVOLVEMENT: PRESENT  FALLOPIAN TUBE SURFACE INVOLVEMENT: PRESENT  TUMOR SIZE: 7 CM  HISTOLOGIC TYPE: UNDIFFERENTIATED CARCINOMA  HISTOLOGIC GRADE: GRADE 3  OTHER TISSUE/ORGAN INVOLVEMENT: NOT IDENTIFIED  PERITONEAL/ASCITIC FLUID: NOT SUBMITTED/UNKNOWN  REGIONAL LYMPH NODES: NONE SUBMITTED  SPECIAL STUDIES: TUMOR CELLS ARE STRONGLY POSITIVE FOR BOTH CK7 AND EMA IMMUNOSTAINS.  S-100, WILMS TUMOR ANTIGEN AND CD10 ARE FOCALLY POSITIVE. CALRETININ, CD56 AND INHIBIN ARE  ALL NEGATIVE. THE CONTROLS STAIN APPROPRIATELY. THESE FINDINGS SUPPORT THE DIAGNOSIS  OF CARCINOMA RATHER THAN GRANULOSA CELL TUMOR.  TUMOR STAGE: pT1c2 Nx  2. UTERUS, CERVIX AND RIGHT ADNEXA WEIGHING 66 G SHOWING:  INACTIVE ENDOMETRIUM WITH A BENIGN ENDOMETRIAL POLYP  CERVIX WITH NABOTHIAN CYSTS  ATROPHIC RIGHT OVARY AND UNREMARKABLE RIGHT FALLOPIAN TUBE                                                                                           POSTOPERATIVE DIAGNOSES:  1. Pelvic mass  2. Elevated   3. Postmenopausal  bleeding  4. Hepatic lesions concerning for metastatic disease     PROCEDURES PERFORMED: Robotic coverted to open total abdominal hysterectomy, bilateral salpingo-oophorectomy/pelvic mass resection, extensive lysis of adhesions Pathology report 12/28                                                                                        Op note 12/30     Please document the clinical significance of the Pathologists findings of   1. LEFT TUBE AND OVARY SHOWING HIGH-GRADE, UNDIFFERENTIATED CARCINOMA OF THE OVARY  INVOLVING MILAGROS TUBAL TISSUES WITH EXTENSIVE NECROSIS, SEE SYNOPTIC REPORT:  PROCEDURE: TOTAL HYSTERECTOMY AND BILATERAL SALPINGO-OOPHORECTOMY  SPECIMEN INTEGRITY: LEFT OVARY, CAPSULE RUPTURED  TUMOR SITE: LEFT OVARY  OVARIAN SURFACE INVOLVEMENT: PRESENT  FALLOPIAN TUBE SURFACE INVOLVEMENT: PRESENT  TUMOR SIZE: 7 CM  HISTOLOGIC TYPE: UNDIFFERENTIATED CARCINOMA  HISTOLOGIC GRADE: GRADE 3  OTHER TISSUE/ORGAN INVOLVEMENT: NOT IDENTIFIED  PERITONEAL/ASCITIC FLUID: NOT SUBMITTED/UNKNOWN  REGIONAL LYMPH NODES: NONE SUBMITTED  SPECIAL STUDIES: TUMOR CELLS ARE STRONGLY POSITIVE FOR BOTH CK7 AND EMA IMMUNOSTAINS.  S-100, WILMS TUMOR ANTIGEN AND CD10 ARE FOCALLY POSITIVE. CALRETININ, CD56 AND INHIBIN ARE  ALL NEGATIVE. THE CONTROLS STAIN APPROPRIATELY. THESE FINDINGS SUPPORT THE DIAGNOSIS  OF CARCINOMA RATHER THAN GRANULOSA CELL TUMOR.  TUMOR STAGE: pT1c2 Nx  2. UTERUS, CERVIX AND RIGHT ADNEXA WEIGHING 66 G SHOWING:  INACTIVE ENDOMETRIUM WITH A BENIGN ENDOMETRIAL POLYP  CERVIX WITH NABOTHIAN CYSTS  ATROPHIC RIGHT OVARY AND UNREMARKABLE RIGHT FALLOPIAN TUBE.    [ X  ] I agree with the Pathology Findings   [   ] I do not agree with the Pathology Findings   [   ] Other/Clarification of Findings:   [   ] Clinically Insignificant   [  ] Clinically Undetermined       Please document in your progress notes daily for the duration of treatment until resolved and include in your discharge summary.

## 2019-01-11 ENCOUNTER — TELEPHONE (OUTPATIENT)
Dept: GYNECOLOGIC ONCOLOGY | Facility: CLINIC | Age: 84
End: 2019-01-11

## 2019-01-11 NOTE — TELEPHONE ENCOUNTER
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 She reports recovering appropriately at home.     Post op appointment scheduled for 1/15. She voiced understanding.

## 2019-01-14 ENCOUNTER — TELEPHONE (OUTPATIENT)
Dept: GYNECOLOGIC ONCOLOGY | Facility: CLINIC | Age: 84
End: 2019-01-14

## 2019-01-15 ENCOUNTER — OFFICE VISIT (OUTPATIENT)
Dept: GYNECOLOGIC ONCOLOGY | Facility: CLINIC | Age: 84
End: 2019-01-15
Payer: MEDICARE

## 2019-01-15 VITALS
WEIGHT: 126.31 LBS | HEIGHT: 62 IN | SYSTOLIC BLOOD PRESSURE: 128 MMHG | BODY MASS INDEX: 23.24 KG/M2 | HEART RATE: 94 BPM | DIASTOLIC BLOOD PRESSURE: 78 MMHG

## 2019-01-15 DIAGNOSIS — Z90.710 S/P TAH-BSO: Primary | ICD-10-CM

## 2019-01-15 DIAGNOSIS — Z90.79 S/P TAH-BSO: Primary | ICD-10-CM

## 2019-01-15 DIAGNOSIS — C56.9 MALIGNANT NEOPLASM OF OVARY, UNSPECIFIED LATERALITY: ICD-10-CM

## 2019-01-15 DIAGNOSIS — Z90.722 S/P TAH-BSO: Primary | ICD-10-CM

## 2019-01-15 PROCEDURE — 99024 POSTOP FOLLOW-UP VISIT: CPT | Mod: S$GLB,,, | Performed by: OBSTETRICS & GYNECOLOGY

## 2019-01-15 PROCEDURE — 99024 PR POST-OP FOLLOW-UP VISIT: ICD-10-PCS | Mod: S$GLB,,, | Performed by: OBSTETRICS & GYNECOLOGY

## 2019-01-15 PROCEDURE — 99999 PR PBB SHADOW E&M-EST. PATIENT-LVL III: CPT | Mod: PBBFAC,,, | Performed by: OBSTETRICS & GYNECOLOGY

## 2019-01-15 PROCEDURE — 99499 RISK ADDL DX/OHS AUDIT: ICD-10-PCS | Mod: S$GLB,,, | Performed by: OBSTETRICS & GYNECOLOGY

## 2019-01-15 PROCEDURE — 99999 PR PBB SHADOW E&M-EST. PATIENT-LVL III: ICD-10-PCS | Mod: PBBFAC,,, | Performed by: OBSTETRICS & GYNECOLOGY

## 2019-01-15 PROCEDURE — 99499 UNLISTED E&M SERVICE: CPT | Mod: S$GLB,,, | Performed by: OBSTETRICS & GYNECOLOGY

## 2019-01-15 NOTE — PROGRESS NOTES
"Subjective:      Patient ID: Pau Cook is a 89 y.o. female.    Chief Complaint: Post-op Evaluation (2 weeks)      HPI  Referred by Dr. Aneta Carrizales and Ingrid Duarte NP for PMB and solid adnexal mass.      Pelvic US 18  Uterus: Size: 6.5 x 3.1 x 4.0 cm  Endometrium: Mildly thickened in this post menopausal patient, measuring 8 mm.  Right ovary: Not definitively seen.  Left ovary: Size: 6.9 x 4.3 x 4.5 cm  Appearance: Left adnexal mass identified which appears to arise from the ovary with scattered calcifications, measuring 3.0 x 3.3 x 2.4 cm.  Findings are concerning for malignancy.      70     Prior abdominal surgeries include colectomy for "excessive intestines" and diverticular disease, open appendectomy.   Last colonoscopy 2012 normal.      Family history significant for sister with breast cancer, mom  with cancer in the abdomen of unknown origin.     CT CAP 18  Impression     1. 6.1-cm suspicious mixed cystic/solid left adnexal mass and at least 5 suspicious hypodense hepatic lesions measuring up to 4.0-cm.  Findings are highly concerning for primary ovarian malignancy with hepatic metastases.  2. Small area of ill-defined opacification within the right lower lobe which may represent atelectasis or developing consolidation.  However, given the above described findings, metastasis cannot be excluded and attention to this area on follow-up imaging is recommended.  3. Additional findings as above.      CEA 10---colonoscopy 18 unremarkable for colon primary.     S/p MY/BSO/extensive lysis of adhesions 18. Uncomplicated post operative course.  FINAL PATHOLOGIC DIAGNOSIS  LEFT TUBE AND OVARY SHOWING HIGH-GRADE, UNDIFFERENTIATED CARCINOMA OF THE OVARY  INVOLVING MILAGROS TUBAL TISSUES WITH EXTENSIVE NECROSIS    Stage IVB high grade ovarian cancer, intraparenchymal liver mets.     She presents today for post operative visit. Is recovering very well after surgery. Up and " about, eating, +BM.   Will need genetic testing.      Review of Systems   Constitutional: Negative for appetite change, chills, fatigue and fever.   HENT: Negative for mouth sores.    Respiratory: Negative for cough and shortness of breath.    Cardiovascular: Negative for leg swelling.   Gastrointestinal: Negative for abdominal pain, blood in stool, constipation and diarrhea.   Endocrine: Negative for cold intolerance.   Genitourinary: Negative for dysuria and vaginal bleeding.   Musculoskeletal: Negative for myalgias.   Skin: Negative for rash.   Allergic/Immunologic: Negative.    Neurological: Negative for weakness and numbness.   Hematological: Negative for adenopathy. Does not bruise/bleed easily.   Psychiatric/Behavioral: Negative for confusion.       Objective:   Physical Exam:   Constitutional: She is oriented to person, place, and time. She appears well-developed and well-nourished.    HENT:   Head: Normocephalic and atraumatic.    Eyes: EOM are normal. Pupils are equal, round, and reactive to light.    Neck: Normal range of motion. Neck supple. No thyromegaly present.    Cardiovascular: Normal rate, regular rhythm and intact distal pulses.     Pulmonary/Chest: Effort normal and breath sounds normal. No respiratory distress. She has no wheezes.        Abdominal: Soft. Bowel sounds are normal. She exhibits abdominal incision. She exhibits no distension, no ascites and no mass. There is no tenderness.             Musculoskeletal: Normal range of motion and moves all extremeties.      Lymphadenopathy:     She has no cervical adenopathy.        Right: No supraclavicular adenopathy present.        Left: No supraclavicular adenopathy present.    Neurological: She is alert and oriented to person, place, and time.    Skin: Skin is warm and dry. No rash noted.    Psychiatric: She has a normal mood and affect.       Assessment:     1. S/P MY-BSO    2. Malignant neoplasm of ovary, unspecified laterality        Plan:    No orders of the defined types were placed in this encounter.    Recovering appropriately after surgery.   Discussion with patient and family regarding diagnosis Stage IVB ovarian cancer. Despite her age she has an excellent performance status. Discussed adjuvant chemotherapy with single agent carboplatin. She would like to receive chemo closer to home in [place].   RTC 3 weeks for follow up post operative visit.   Will need genetic testing.

## 2019-01-15 NOTE — LETTER
January 15, 2019        BEE Duarte, CELIA  [address]  [facility] [address]             [facility] - Gynecologic Oncology  [address], [address]  [facility] [address]  Phone: [phone]  Fax: [phone]   Patient: Pau Cook   MR Number: [id number]   YOB: 1929   Date of Visit: 1/15/2019       Dear Dr. Duarte:    Thank you for referring Pau Cook to me for evaluation. Below are the relevant portions of my assessment and plan of care.            If you have questions, please do not hesitate to call me. I look forward to following Pau NELSON along with you.    Sincerely,      Amee Rose MD           CC  Aneta Carrizales MD

## 2019-01-18 ENCOUNTER — TELEPHONE (OUTPATIENT)
Dept: HEMATOLOGY/ONCOLOGY | Facility: CLINIC | Age: 84
End: 2019-01-18

## 2019-01-18 NOTE — TELEPHONE ENCOUNTER
----- Message from Kenyatta Tejeda MD sent at 1/18/2019  6:02 AM CST -----  Please get her in today or MOnday

## 2019-01-21 ENCOUNTER — TELEPHONE (OUTPATIENT)
Dept: HEMATOLOGY/ONCOLOGY | Facility: CLINIC | Age: 84
End: 2019-01-21

## 2019-01-21 ENCOUNTER — TELEPHONE (OUTPATIENT)
Dept: ADMINISTRATIVE | Facility: CLINIC | Age: 84
End: 2019-01-21

## 2019-01-21 NOTE — TELEPHONE ENCOUNTER
Home Health SOC 01/04/2019 - 03/04/2019 with [facility] ([facility] - Dr. Amee Rose. Patient received SN, PT and OT services.

## 2019-01-21 NOTE — TELEPHONE ENCOUNTER
----- Message from Kenyatta Tejeda MD sent at 1/18/2019 11:02 PM CST -----  Thank you  ----- Message -----  From: Kristyn Chung RN  Sent: 1/18/2019   8:16 AM  To: Kenyatta Tejeda MD    She is scheduled for 1/23  ----- Message -----  From: Kenyatta Tejeda MD  Sent: 1/18/2019   6:02 AM  To: Kristyn Cuhng RN    Please get her in today or MOnday

## 2019-01-23 ENCOUNTER — INITIAL CONSULT (OUTPATIENT)
Dept: HEMATOLOGY/ONCOLOGY | Facility: CLINIC | Age: 84
End: 2019-01-23
Payer: MEDICARE

## 2019-01-23 VITALS
BODY MASS INDEX: 23.08 KG/M2 | DIASTOLIC BLOOD PRESSURE: 76 MMHG | SYSTOLIC BLOOD PRESSURE: 125 MMHG | RESPIRATION RATE: 20 BRPM | HEIGHT: 62 IN | HEART RATE: 108 BPM | WEIGHT: 125.44 LBS | TEMPERATURE: 98 F

## 2019-01-23 DIAGNOSIS — M85.80 OSTEOPENIA, UNSPECIFIED LOCATION: ICD-10-CM

## 2019-01-23 DIAGNOSIS — R97.0 ELEVATED CEA: ICD-10-CM

## 2019-01-23 DIAGNOSIS — Z90.710 S/P TAH-BSO: ICD-10-CM

## 2019-01-23 DIAGNOSIS — R25.1 TREMOR OF RIGHT HAND: ICD-10-CM

## 2019-01-23 DIAGNOSIS — Z71.89 ENCOUNTER FOR MEDICATION REVIEW AND COUNSELING: ICD-10-CM

## 2019-01-23 DIAGNOSIS — Z90.79 S/P TAH-BSO: ICD-10-CM

## 2019-01-23 DIAGNOSIS — C56.9 MALIGNANT NEOPLASM OF OVARY, UNSPECIFIED LATERALITY: Primary | ICD-10-CM

## 2019-01-23 DIAGNOSIS — Z90.722 S/P TAH-BSO: ICD-10-CM

## 2019-01-23 PROCEDURE — 99499 UNLISTED E&M SERVICE: CPT | Mod: S$GLB,,, | Performed by: INTERNAL MEDICINE

## 2019-01-23 PROCEDURE — 99205 OFFICE O/P NEW HI 60 MIN: CPT | Mod: S$GLB,,, | Performed by: INTERNAL MEDICINE

## 2019-01-23 PROCEDURE — 99999 PR PBB SHADOW E&M-EST. PATIENT-LVL III: CPT | Mod: PBBFAC,,, | Performed by: INTERNAL MEDICINE

## 2019-01-23 PROCEDURE — 1101F PT FALLS ASSESS-DOCD LE1/YR: CPT | Mod: CPTII,S$GLB,, | Performed by: INTERNAL MEDICINE

## 2019-01-23 PROCEDURE — 99499 RISK ADDL DX/OHS AUDIT: ICD-10-PCS | Mod: S$GLB,,, | Performed by: INTERNAL MEDICINE

## 2019-01-23 PROCEDURE — 99999 PR PBB SHADOW E&M-EST. PATIENT-LVL III: ICD-10-PCS | Mod: PBBFAC,,, | Performed by: INTERNAL MEDICINE

## 2019-01-23 PROCEDURE — 1101F PR PT FALLS ASSESS DOC 0-1 FALLS W/OUT INJ PAST YR: ICD-10-PCS | Mod: CPTII,S$GLB,, | Performed by: INTERNAL MEDICINE

## 2019-01-23 PROCEDURE — 99205 PR OFFICE/OUTPT VISIT, NEW, LEVL V, 60-74 MIN: ICD-10-PCS | Mod: S$GLB,,, | Performed by: INTERNAL MEDICINE

## 2019-01-23 RX ORDER — HEPARIN 100 UNIT/ML
500 SYRINGE INTRAVENOUS
Status: CANCELLED | OUTPATIENT
Start: 2019-02-08

## 2019-01-23 RX ORDER — ACETAMINOPHEN 325 MG/1
650 TABLET ORAL
Status: CANCELLED
Start: 2019-02-08

## 2019-01-23 RX ORDER — FAMOTIDINE 10 MG/ML
20 INJECTION INTRAVENOUS
Status: CANCELLED | OUTPATIENT
Start: 2019-02-08

## 2019-01-23 RX ORDER — SODIUM CHLORIDE 0.9 % (FLUSH) 0.9 %
10 SYRINGE (ML) INJECTION
Status: CANCELLED | OUTPATIENT
Start: 2019-02-08

## 2019-01-23 NOTE — PROGRESS NOTES
CC : I have ovarian cancer     Pau Cook is a 89 y.o.  This is a pleasant 89-year-old female referred from  here for adjuvant chemotherapy.     Referred by :  Dr. 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  Pathology revealed high-grade undifferentiated carcinoma involving the left tube and ovary with extensive necrosis.  Malignancy was noted involving the ovarian surface as well as the fallopian tube surface.  The tumor was 7 cm in greatest dimension.  This was a grade 3 undifferentiated carcinoma.  No regional lymph nodes were submitted tumor cells were strongly positive for both CK7 and EMA.  Pathologically staged as a T1 see to an affects    Path is as follows   SPECIMEN  1) Left tube and ovary  2) Uterus, cervix, right tube and ovary  FINAL PATHOLOGIC DIAGNOSIS  1. LEFT TUBE AND OVARY SHOWING HIGH-GRADE, UNDIFFERENTIATED CARCINOMA OF THE OVARY  INVOLVING MILAGROS TUBAL TISSUES WITH EXTENSIVE NECROSIS, SEE SYNOPTIC REPORT:  PROCEDURE: TOTAL HYSTERECTOMY AND BILATERAL SALPINGO-OOPHORECTOMY  SPECIMEN INTEGRITY: LEFT OVARY, CAPSULE RUPTURED  TUMOR SITE: LEFT OVARY  OVARIAN SURFACE INVOLVEMENT: PRESENT  FALLOPIAN TUBE SURFACE INVOLVEMENT: PRESENT  TUMOR SIZE: 7 CM  HISTOLOGIC TYPE: UNDIFFERENTIATED CARCINOMA  HISTOLOGIC GRADE: GRADE 3  OTHER TISSUE/ORGAN INVOLVEMENT: NOT IDENTIFIED  PERITONEAL/ASCITIC  FLUID: NOT SUBMITTED/UNKNOWN  REGIONAL LYMPH NODES: NONE SUBMITTED  SPECIAL STUDIES: TUMOR CELLS ARE STRONGLY POSITIVE FOR BOTH CK7 AND EMA IMMUNOSTAINS.  S-100, WILMS TUMOR ANTIGEN AND CD10 ARE FOCALLY POSITIVE. CALRETININ, CD56 AND INHIBIN ARE  ALL NEGATIVE. THE CONTROLS STAIN APPROPRIATELY. THESE FINDINGS SUPPORT THE DIAGNOSIS  OF CARCINOMA RATHER THAN GRANULOSA CELL TUMOR.  TUMOR STAGE: pT1c2 Nx  2. UTERUS, CERVIX AND RIGHT ADNEXA WEIGHING 66 G SHOWING:  INACTIVE ENDOMETRIUM WITH A BENIGN ENDOMETRIAL POLYP  CERVIX WITH NABOTHIAN CYSTS  ATROPHIC RIGHT OVARY AND UNREMARKABLE RIGHT FALLOPIAN TUBE  Diagnosed by: Antwon Uribe M.D.  (Electronically Signed: 2019-01-09 08:41:42)  Report continued on next page Page    Patient reports no further bleeding at this time she is tired and anemic.  She has had no fever chills or weight loss since surgery  Past Medical History:   Diagnosis Date    Allergy     Arthritis     Breast cancer 12/2018    Breast cyst     Foot pain     GERD (gastroesophageal reflux disease)     Joint pain     Peripheral vascular disease 1/15/2018    Squamous cell carcinoma 04/2016    Right Lower Leg    MUNIR (stress urinary incontinence, female) 4/17/2017     Past Surgical History:   Procedure Laterality Date    APPENDECTOMY      COLON SURGERY      removed about 10 inch    COLONOSCOPY N/A 12/24/2018    Per[facility]al [facility]    EXCISION, MASS, FINGER Right 7/9/2018    [facility]    hemorhids      HYSTERECTOMY, TOTAL, ABDOMINAL, WITH BILATERAL SALPINGO-OOPHORECTOMY Bilateral 12/28/2018    [facility] at [facility] OR    LYSIS, ADHESIONS N/A 12/28/2018    [facility] at [facility] OR    XI ROBOTIC HYSTERECTOMY N/A 12/28/2018    [facility] at [facility] OR    XI ROBOTIC SALPINGO-OOPHORECTOMY Bilateral 12/28/2018    Performed by [facility]e Rose [facility] at [facility] OR       Current Outpatient  Medications:     acetaminophen (TYLENOL ARTHRITIS ORAL), Take 2 tablets by mouth every 8 (eight) hours as needed., Disp: , Rfl:     carboxymethylcellulose (REFRESH PLUS) 0.5 % Dpet, 1 drop daily as needed., Disp: , Rfl:     conjugated estrogens (PREMARIN) vaginal cream, Place 0.5 g vaginally twice a week. (Patient taking differently: Place 0.5 g vaginally once as needed. ), Disp: 30 g, Rfl: 5    ketotifen (ZADITOR) 0.025 % (0.035 %) ophthalmic solution, Place 1 drop into both eyes 2 (two) times daily as needed. , Disp: , Rfl:     MULTIVITAMIN W-MINERALS/LUTEIN (CENTRUM SILVER ORAL), Take by mouth., Disp: , Rfl:     NEXIUM 20 mg capsule, Take 20 mg by mouth before breakfast. , Disp: , Rfl:     calcium-vitamin D3 (CALCIUM 500 + D) 500 mg(1,250mg) -200 unit per tablet, Take 1 tablet by mouth once daily. , Disp: , Rfl:     cyanocobalamin (VITAMIN B-12) 1000 MCG tablet, Take 1,000 mcg by mouth once daily. , Disp: , Rfl:     FLAXSEED ORAL, Take 1,000 mg by mouth once daily. , Disp: , Rfl:     ibuprofen (ADVIL,MOTRIN) 600 MG tablet, Take 1 tablet (600 mg total) by mouth every 6 (six) hours as needed for Pain., Disp: 60 tablet, Rfl: 0  Review of patient's allergies indicates:   Allergen Reactions    Pcn [penicillins]      Can not remember reaction     Tetanus vaccines and toxoid Swelling     Localized swelling to injection site     Social History     Tobacco Use    Smoking status: Never Smoker    Smokeless tobacco: Never Used   Substance Use Topics    Alcohol use: No     Alcohol/week: 0.0 oz     Comment: rare    Drug use: No     Family History   Adopted: Yes   Problem Relation Age of Onset    Cancer Mother     Stroke Father     Breast cancer Sister     No Known Problems Daughter     No Known Problems Brother     No Known Problems Daughter     Diabetes Brother     Colon cancer Neg Hx     Ovarian cancer Neg Hx        CONSTITUTIONAL: No fevers, chills, night sweats, wt. loss, appetite changes  SKIN: no  "rashes or itching  ENT: No headaches, head trauma, vision changes, or eye pain  LYMPH NODES: None noticed   CV: No chest pain, palpitations.   RESP: No dyspnea on exertion, cough, wheezing, or hemoptysis  GI: No nausea, emesis, diarrhea, constipation, melena, hematochezia, pain.   : No dysuria, hematuria, urgency, or frequency   HEME: No easy bruising, bleeding problems  PSYCHIATRIC: No depression, anxiety, psychosis, hallucinations.  NEURO: No seizures, memory loss, dizziness or difficulty sleeping  MSK: No arthralgias or joint swelling         /76   Pulse 108   Temp 98.1 °F (36.7 °C)   Resp 20   Ht 5' 2" (1.575 m)   Wt 56.9 kg (125 lb 7.1 oz)   BMI 22.94 kg/m²   Gen: NAD, A and O x3, she appears tired  Psych: pleasant affect, normal thought process  Eyes: Pupils round and non dilated, EOM intact  Nose: Nares patent  OP clear, mucosa patent  Neck: suppple, no JVD, trachea midline, no palpable mass, no adenopathy  Lungs: CTAB, no wheezes, no use of accessory muscles  CV: S1S2 with RR positive tachycardia No mrg  Abd: soft, NTND, + BS, No HSM, no ascites  Extr: No CCE, ARGELIA, strength normal, good capillary refill  Neuro: steady gait, CNs grossly intact  Skin: intact, no lesions noted  Rheum: No joint swelling    Lab Results   Component Value Date    WBC 9.64 12/29/2018    HGB 11.3 (L) 12/29/2018    HCT 34.1 (L) 12/29/2018    MCV 87 12/29/2018     12/29/2018     CMP  Sodium   Date Value Ref Range Status   12/29/2018 131 (L) 136 - 145 mmol/L Final     Potassium   Date Value Ref Range Status   12/29/2018 4.2 3.5 - 5.1 mmol/L Final     Chloride   Date Value Ref Range Status   12/29/2018 97 95 - 110 mmol/L Final     CO2   Date Value Ref Range Status   12/29/2018 24 23 - 29 mmol/L Final     Glucose   Date Value Ref Range Status   12/29/2018 166 (H) 70 - 110 mg/dL Final     BUN, Bld   Date Value Ref Range Status   12/29/2018 8 8 - 23 mg/dL Final     Creatinine   Date Value Ref Range Status   12/29/2018 " 0.7 0.5 - 1.4 mg/dL Final     Calcium   Date Value Ref Range Status   12/29/2018 8.4 (L) 8.7 - 10.5 mg/dL Final     Total Protein   Date Value Ref Range Status   04/12/2018 7.3 6.0 - 8.4 g/dL Final     Albumin   Date Value Ref Range Status   04/12/2018 3.8 3.5 - 5.2 g/dL Final     Total Bilirubin   Date Value Ref Range Status   04/12/2018 0.5 0.1 - 1.0 mg/dL Final     Comment:     For infants and newborns, interpretation of results should be based  on gestational age, weight and in agreement with clinical  observations.  Premature Infant recommended reference ranges:  Up to 24 hours.............<8.0 mg/dL  Up to 48 hours............<12.0 mg/dL  3-5 days..................<15.0 mg/dL  6-29 days.................<15.0 mg/dL       Alkaline Phosphatase   Date Value Ref Range Status   04/12/2018 70 55 - 135 U/L Final     AST   Date Value Ref Range Status   04/12/2018 28 10 - 40 U/L Final     ALT   Date Value Ref Range Status   04/12/2018 20 10 - 44 U/L Final     Anion Gap   Date Value Ref Range Status   12/29/2018 10 8 - 16 mmol/L Final     eGFR if    Date Value Ref Range Status   12/29/2018 >60 >60 mL/min/1.73 m^2 Final     eGFR if non    Date Value Ref Range Status   12/29/2018 >60 >60 mL/min/1.73 m^2 Final     Comment:     Calculation used to obtain the estimated glomerular filtration  rate (eGFR) is the CKD-EPI equation.          Malignant neoplasm of ovary, unspecified laterality  -     CBC auto differential; Standing  -     CMP; Future; Expected date: 01/23/2019  -     ; Future; Expected date: 01/23/2019  -     CEA; Future; Expected date: 01/23/2019    Encounter for medication review and counseling    Elevated CEA    Tremor of right hand    Osteopenia, unspecified location    S/P MY-BSO    Other orders  -     palonosetron (ALOXI) 0.25 mg, dexamethasone (DECADRON) 20 mg in sodium chloride 0.9% 50 mL  -     diphenhydrAMINE (BENADRYL) 25 mg in sodium chloride 0.9% 50 mL IVPB  -      famotidine (PF) injection 20 mg  -     CARBOplatin (PARAPLATIN) 235 mg in dextrose 5 % 250 mL chemo infusion  -     DOCEtaxel (TAXOTERE) 96 mg in sodium chloride 0.9% 250 mL chemo infusion  -     sodium chloride 0.9% 500 mL infusion  -     sodium chloride 0.9% flush 10 mL  -     heparin, porcine (PF) 100 unit/mL injection flush 500 Units  -     alteplase injection 2 mg  -     acetaminophen tablet 650 mg      STart adjuvant chemo with taxotere plus carboplatin 20 % dose reduction given her frailty and H  Cycles will be every 3 weeks for a total of 6 doses     After 3 cycles recommend further imaging study to better assess the 5 hepatic hypodensities as well as the multiple right-sided lung nodules.  Repeat tumor markers in approximately 6 weeks being CEA and  check her response to chemotherapy.  As far as anemia will check her iron stores next visit see if she would benefit from IV iron therapy    I discussed the available treatment option(s) in accordance with the latest NCCN Guidelines, their overall age and their co-morbidities. I went over the risks and benefits of the chemotherapy with regard to their particular cancer type, their cancer stage, their age, and their co-morbidities. I provided literature on the chemotherapy regimen and discussed the chemotherapy side-effect profiles of the drug(s). I discussed the importance of compliance with obtaining and monitoring weekly lab work, and went over the potential hematopathology issues and risks with anemia, leucopenia and thrombocytopenia that can occur with chemotherapy. I discussed the potential risks of liver and kidney damage, which could be permanent and could necessitate dialysis long-term if kidney failure developed. I discussed the emetic and/or diarrheal potential of the regimen and the potential need for use of antiemetic and anti-diarrheal medications. I discussed the risk for development of anaphylactic shock, bronchospasm, dysrhythmia, and  respiratory/cardiovascular failure. I discussed the potential risks for development of alopecia, cold sensory issues, ringing in ears, vertigo and neuropathy, all of which could end up being chronic and life-long. I discussed the risks of hand-foot syndrome and rashes, and development of other autoimmune mediated processes such as pneumonitis and colitis which could be life threatening. I discussed the risks of the potential development of leukemia and/or lymphoma from use of certain chemotherapy agents. I discussed the need for neutropenic precautions, basic hygiene/sanitation behaviors and dietary restrictions.     The patient's consent has been obtained to proceed with the chemotherapy.The patient will be referred to [facility] for training and education on chemotherapy, use of antiemetics and/or anti-diarrheals, use of NSAID's, potential chemotherapy side-effects, and any specific recommendations and precautions with the particular chemotherapy agents.       I answered all of the patient's (and family's, if applicable) questions to the best of my ability and to their complete satisfaction. The patient acknowledged full understanding of the risks, recommendations and plan(s).      Sincerely,  Kenyatta Tejeda MD [facility]    Thank you for allowing me to evaluate and participate in the care of this pleasant patient. Please fell free to call me with any questions or concerns.    Warmly,  Kenyatta Tejeda MD    This note was dictated with Dragon and briefly proofread. Please excuse any sentences that may be unclear or words misspelled

## 2019-01-24 DIAGNOSIS — C56.9 MALIGNANT NEOPLASM OF OVARY, UNSPECIFIED LATERALITY: Primary | ICD-10-CM

## 2019-01-30 ENCOUNTER — TELEPHONE (OUTPATIENT)
Dept: HEMATOLOGY/ONCOLOGY | Facility: CLINIC | Age: 84
End: 2019-01-30

## 2019-02-04 ENCOUNTER — TELEPHONE (OUTPATIENT)
Dept: GYNECOLOGIC ONCOLOGY | Facility: CLINIC | Age: 84
End: 2019-02-04

## 2019-02-05 ENCOUNTER — OFFICE VISIT (OUTPATIENT)
Dept: GYNECOLOGIC ONCOLOGY | Facility: CLINIC | Age: 84
End: 2019-02-05
Payer: MEDICARE

## 2019-02-05 ENCOUNTER — LAB VISIT (OUTPATIENT)
Dept: LAB | Facility: HOSPITAL | Age: 84
End: 2019-02-05
Attending: INTERNAL MEDICINE
Payer: MEDICARE

## 2019-02-05 VITALS
SYSTOLIC BLOOD PRESSURE: 120 MMHG | WEIGHT: 124.75 LBS | DIASTOLIC BLOOD PRESSURE: 75 MMHG | HEART RATE: 99 BPM | HEIGHT: 62 IN | BODY MASS INDEX: 22.96 KG/M2

## 2019-02-05 DIAGNOSIS — N95.2 VAGINAL ATROPHY: ICD-10-CM

## 2019-02-05 DIAGNOSIS — C56.9 MALIGNANT NEOPLASM OF OVARY, UNSPECIFIED LATERALITY: Primary | ICD-10-CM

## 2019-02-05 DIAGNOSIS — Z90.722 S/P TAH-BSO: ICD-10-CM

## 2019-02-05 DIAGNOSIS — Z90.710 S/P TAH-BSO: ICD-10-CM

## 2019-02-05 DIAGNOSIS — C56.9 MALIGNANT NEOPLASM OF OVARY, UNSPECIFIED LATERALITY: ICD-10-CM

## 2019-02-05 DIAGNOSIS — Z90.79 S/P TAH-BSO: ICD-10-CM

## 2019-02-05 LAB
ALBUMIN SERPL BCP-MCNC: 3.8 G/DL
ALP SERPL-CCNC: 116 U/L
ALT SERPL W/O P-5'-P-CCNC: 16 U/L
ANION GAP SERPL CALC-SCNC: 12 MMOL/L
AST SERPL-CCNC: 30 U/L
BASOPHILS # BLD AUTO: 0 K/UL
BASOPHILS NFR BLD: 0.4 %
BILIRUB SERPL-MCNC: 0.6 MG/DL
BUN SERPL-MCNC: 10 MG/DL
CALCIUM SERPL-MCNC: 9.7 MG/DL
CEA SERPL-MCNC: 7.6 NG/ML
CHLORIDE SERPL-SCNC: 97 MMOL/L
CO2 SERPL-SCNC: 28 MMOL/L
CREAT SERPL-MCNC: 0.8 MG/DL
DIFFERENTIAL METHOD: ABNORMAL
EOSINOPHIL # BLD AUTO: 0 K/UL
EOSINOPHIL NFR BLD: 0.6 %
ERYTHROCYTE [DISTWIDTH] IN BLOOD BY AUTOMATED COUNT: 12.7 %
EST. GFR  (AFRICAN AMERICAN): >60 ML/MIN/1.73 M^2
EST. GFR  (NON AFRICAN AMERICAN): >60 ML/MIN/1.73 M^2
GLUCOSE SERPL-MCNC: 132 MG/DL
HCT VFR BLD AUTO: 37 %
HGB BLD-MCNC: 12 G/DL
LYMPHOCYTES # BLD AUTO: 1.3 K/UL
LYMPHOCYTES NFR BLD: 21.1 %
MCH RBC QN AUTO: 28 PG
MCHC RBC AUTO-ENTMCNC: 32.4 G/DL
MCV RBC AUTO: 87 FL
MONOCYTES # BLD AUTO: 0.5 K/UL
MONOCYTES NFR BLD: 8.4 %
NEUTROPHILS # BLD AUTO: 4.3 K/UL
NEUTROPHILS NFR BLD: 69.5 %
PLATELET # BLD AUTO: 302 K/UL
PMV BLD AUTO: 7.5 FL
POTASSIUM SERPL-SCNC: 3.8 MMOL/L
PROT SERPL-MCNC: 7.5 G/DL
RBC # BLD AUTO: 4.27 M/UL
SODIUM SERPL-SCNC: 137 MMOL/L
WBC # BLD AUTO: 6.1 K/UL

## 2019-02-05 PROCEDURE — 99024 PR POST-OP FOLLOW-UP VISIT: ICD-10-PCS | Mod: HCNC,S$GLB,, | Performed by: OBSTETRICS & GYNECOLOGY

## 2019-02-05 PROCEDURE — 80053 COMPREHEN METABOLIC PANEL: CPT | Mod: HCNC

## 2019-02-05 PROCEDURE — 99999 PR PBB SHADOW E&M-EST. PATIENT-LVL III: CPT | Mod: PBBFAC,HCNC,, | Performed by: OBSTETRICS & GYNECOLOGY

## 2019-02-05 PROCEDURE — 86304 IMMUNOASSAY TUMOR CA 125: CPT | Mod: HCNC

## 2019-02-05 PROCEDURE — 99024 POSTOP FOLLOW-UP VISIT: CPT | Mod: HCNC,S$GLB,, | Performed by: OBSTETRICS & GYNECOLOGY

## 2019-02-05 PROCEDURE — 85025 COMPLETE CBC W/AUTO DIFF WBC: CPT | Mod: HCNC

## 2019-02-05 PROCEDURE — 36415 COLL VENOUS BLD VENIPUNCTURE: CPT | Mod: HCNC

## 2019-02-05 PROCEDURE — 82378 CARCINOEMBRYONIC ANTIGEN: CPT | Mod: HCNC

## 2019-02-05 PROCEDURE — 99999 PR PBB SHADOW E&M-EST. PATIENT-LVL III: ICD-10-PCS | Mod: PBBFAC,HCNC,, | Performed by: OBSTETRICS & GYNECOLOGY

## 2019-02-05 NOTE — LETTER
February 5, 2019        Kenyatta Tejeda MD  [address]  [facility] [address]  [facility] [address]             [facility] [address]  [address], [address]  [facility] [address]  Phone: [phone]  Fax: [phone]   Patient: Pau Cook   MR Number: [id number]   YOB: 1929   Date of Visit: 2/5/2019       Dear Dr. Tejeda:    Thank you for referring Pau Cook to me for evaluation. Below are the relevant portions of my assessment and plan of care.            If you have questions, please do not hesitate to call me. I look forward to following Pau NELSON along with you.    Sincerely,      Amee Rose MD           CC  No Recipients

## 2019-02-06 ENCOUNTER — CLINICAL SUPPORT (OUTPATIENT)
Dept: HEMATOLOGY/ONCOLOGY | Facility: CLINIC | Age: 84
End: 2019-02-06
Payer: MEDICARE

## 2019-02-06 ENCOUNTER — OFFICE VISIT (OUTPATIENT)
Dept: HEMATOLOGY/ONCOLOGY | Facility: CLINIC | Age: 84
End: 2019-02-06
Payer: MEDICARE

## 2019-02-06 VITALS
DIASTOLIC BLOOD PRESSURE: 57 MMHG | SYSTOLIC BLOOD PRESSURE: 116 MMHG | HEIGHT: 62 IN | OXYGEN SATURATION: 99 % | RESPIRATION RATE: 20 BRPM | WEIGHT: 125.44 LBS | TEMPERATURE: 98 F | HEART RATE: 93 BPM | BODY MASS INDEX: 23.08 KG/M2

## 2019-02-06 DIAGNOSIS — Z86.2 HISTORY OF ANEMIA: Primary | ICD-10-CM

## 2019-02-06 DIAGNOSIS — Z51.11 ENCOUNTER FOR CHEMOTHERAPY MANAGEMENT: ICD-10-CM

## 2019-02-06 DIAGNOSIS — M85.80 OSTEOPENIA, UNSPECIFIED LOCATION: ICD-10-CM

## 2019-02-06 DIAGNOSIS — C56.9 MALIGNANT NEOPLASM OF OVARY, UNSPECIFIED LATERALITY: ICD-10-CM

## 2019-02-06 DIAGNOSIS — Z79.899 MEDICATION MANAGEMENT: ICD-10-CM

## 2019-02-06 LAB — CANCER AG125 SERPL-ACNC: 91 U/ML

## 2019-02-06 PROCEDURE — 1101F PR PT FALLS ASSESS DOC 0-1 FALLS W/OUT INJ PAST YR: ICD-10-PCS | Mod: HCNC,CPTII,S$GLB, | Performed by: INTERNAL MEDICINE

## 2019-02-06 PROCEDURE — 99999 PR PBB SHADOW E&M-EST. PATIENT-LVL IV: CPT | Mod: PBBFAC,HCNC,, | Performed by: INTERNAL MEDICINE

## 2019-02-06 PROCEDURE — 1101F PT FALLS ASSESS-DOCD LE1/YR: CPT | Mod: HCNC,CPTII,S$GLB, | Performed by: INTERNAL MEDICINE

## 2019-02-06 PROCEDURE — 99214 OFFICE O/P EST MOD 30 MIN: CPT | Mod: HCNC,S$GLB,, | Performed by: INTERNAL MEDICINE

## 2019-02-06 PROCEDURE — 99214 PR OFFICE/OUTPT VISIT, EST, LEVL IV, 30-39 MIN: ICD-10-PCS | Mod: HCNC,S$GLB,, | Performed by: INTERNAL MEDICINE

## 2019-02-06 PROCEDURE — 99999 PR PBB SHADOW E&M-EST. PATIENT-LVL IV: ICD-10-PCS | Mod: PBBFAC,HCNC,, | Performed by: INTERNAL MEDICINE

## 2019-02-06 PROCEDURE — 99499 UNLISTED E&M SERVICE: CPT | Mod: S$GLB,,, | Performed by: INTERNAL MEDICINE

## 2019-02-06 PROCEDURE — 99499 RISK ADDL DX/OHS AUDIT: ICD-10-PCS | Mod: S$GLB,,, | Performed by: INTERNAL MEDICINE

## 2019-02-06 NOTE — PROGRESS NOTES
Met with patient and her daughter to complete New Patient Orientation and distress screening; she indicated a distress rating of 2.  Patient declined to sign the consent form to receive information from the [employer].  She did not request any supportive services at the time of the visit.

## 2019-02-06 NOTE — PROGRESS NOTES
Pau Cook  [id number]    [facility]   Cancer Center    TITLE: PLAN OF CARE FOR THE CHEMOTHERAPY PATIENT / TEACHING PROTOCOL    PURPOSE: To involve the patient / significant other in the plan of care and to provide teaching to the significant other & patient receiving chemotherapy.    LEVEL: Independent.    CONTENT: The Plan of Care for the chemotherapy patient is individualized and appropriate to the patients needs, strengths, limitations, & goals.  Education includes information regarding chemotherapy side effects, the treatment itself, and self-care  Activities.    GOAL / OUTCOME STANDARDS    PHYSIOLOGIC: The client will remain free or experience minimal side effects or toxicities throughout the chemotherapy treatment period.     PSYCHOLOGIC: The client/significant others will demonstrate positive coping mechanisms in relation to chemotherapy and its side effects.      COGINITIVE: The client/significant others will verbalize understanding of self-care measure to avoid/minimize side effects of the chemotherapy regime.    EVALUATION / COMMENT KEY:    V = Audiovisual/Video  S = Successfully meets outcome  N = Needs further instruction  NA = Not applicable to the patient  P = Previous knowledge  U = Unable to comprehend  * = See progress notes          PLAN OF CARE  INFORMATION TO BE DELIVERED / NURSING INTERVENTIONS DATE EVALUATION   1. Assessment of client/caregiver,         knowledge of cancer diagnosis,         and chemotherapy as a treatment. 1a. Evaluate patient/caregiver learning ability    b. Plan teaching sessions with patient/caregiver according to needs and present anxiety level/ability to learn.    c. Provide Chemotherapy Education Packet,        Mouth Care Protocol,         Specific Patient Education Sheets. 02/06/2019 S   2. Individual chemotherapy treatment         plan. 2a. Review of Chemotherapy Education handout from [employer]            02/06/2019   S   3. Knowledge Deficit  & Self-Management of general side effects common to all chemotherapy:  a. Nausea/Vomiting  b.   Diarrhea  c. Mouth Care  d. Dental care  e. Constipation  f. Hair Loss  g. Potential for infection  h. Fatigue   3a. Reinforce that the majority of side effects from chemotherapy are reversible and are  controlled both in the hospital and at home        (blood counts recover, hair grows back).   b.  Refer to the following for reinforcement of         information post-treatment:  1. Mouth Care Protocol.  2. Bowel Protocol for constipation or diarrhea.  3.  Drug Specific Chemotherapy Information Sheets for each medication patient receiving.    02/06/2019     S     PLAN OF CARE  INFORMATION TO BE DELIVERED / NURSING INTERVENTIONS DATE EVALUATION   h. Potential for bleeding         i. Potential anemia/fatigue         j. Potential sunburn         k. Birth control measures  l. Safety measures post treatment 4.  Chemotherapy Home Care Instruction  and Safety Information Sheet.  A. patient/caregivers to thoroughly cook shellfish (shrimp, crab, etc) to decrease the chance of infection.    B.  Use sunscreen and protective clothing while in the sun.   02/06/2019      4. Knowledge deficit & Self Management of Drug Specific  Side Effects.    a. BLADDER EFFECTS        (Hemorrhagic Cystitis)                  Preventable with adequate hydration; occurs 2-3 days or more post treatment.   1.  Instruct patient to:  a.   Void at least every 2 hours; increase intake.  b.   DO NOT hold urine; go when urge is felt.  c.    Empty bladder at bedtime and on         awakening.  d.   Observe for color changes (red to tea           colored), amount and frequency changes.  e.   Notify oncologist of any abnormalities           in urine or voiding or if you cannot               drink adequate fluids.   02/06/2019   S   b.   CHANGES IN URINE        COLOR:      1.   Instruct patient:  a.   Most evident in first 2-3 voidings after            administration.  b. Lasts less than 24 hours.  c. If urine is discolored 2 or more days post- treatment, notify oncologist.      02/06/2019 S   c.    KIDNEY EFFECTS           (Nephrotoxicity)   1.  Instruct patient to:  a.   Drink 8-16 glasses of fluid/day the day   pre-treatment and 3-4 days post-treatment to maintain hydration; the best way to minimize kidney problems.  b.   Notify oncologist immediately if unable to drink fluids or if changes are noted in urinary elimination.     02/06/2019   S   a. PULMONARY TOXICITY    1. Instruct patient to report symptoms such as shortness of breath, chest pain, shallow breathing, or chest wall discomfort to physician.  2. Reinforce preventative measures used by the health care team.  a. Baseline and periodic PFT and chest x-ray.   02/06/2019   S     PLAN OF CARE INFORMATION TO BE DELIVERED / NURSING INTERVENTIONS DATE EVALUATION   b. NERVE & MUSCLE EFFECTS (neurotoxocity; neuropathy, possible visual/hearing changes)        3. Instruct patient to:    a. Report numbness or tingling of the hands/feet, loss of fine motor movement (buttoning shirt, tying shoelaces), or gait changes to your oncologist.  b. If numbness/tingling are present:  1. protect feet with shoes at all times.  2. Use gloves for washing dishes/gardening & potholders in kitchen.       02/06/2019   S   c. CARDIOTOXICITY  Decreased effectiveness of             cardiac function. Effective are                  cumulative and irreversible.                                    CARDIAC ARRYTHMIAS              4   Instruct:  a. Heart function may be tested before treatment and perdiocally during treatment.  b. Notify oncologist of irregular pulse, palpitations, shortness of breath, or swelling in lower extremities/feet.          Taxol and Taxotere can cause arrhythmias on infusion that resolve once infusion discontinued. Instruct nurse if any irregularity felt.    02/06/2019   S   d. EXTRAVASATION  Occurs when vesicants  leak outside of vein and cause damage to the skin and underlying tissues.   1. Reinforce preventive measures used to avoid complications.  a. Fresh IV site or central line monitored continuously with vesicant IVP.  b. Continuous infusion via central line site and blood return monitored periodically around the clock.  2. Instruct to:  a. Notify nurse of any discomfort, burning, stinging, etc. at IV site during chemotherapy administration.  b. Notify oncologist of any redness, pain, or swelling at IV site after discharge from hospital.   02/06/2019   S   e. HYPERSENSITIVITY can happen with any medication.   1. Instruct patient:  a. Nurse is with them during the initial part of treatment and will be close by to monitor.  b. Pre-medication ordered by the oncologist must be taken on time. If doses are missed, treatment will need to be re-scheduled.  c. Skin redness, itching, or hives appearing after discharge should be reported to oncologist. 02/06/2019   S       PLAN OF CARE INFORMATION TO BE DELIVERED / NURSING INTERVENTIONS DATE EVALUATION   f. FLU-LIKE SYNDROME      1. Instruct patient symptoms are hard to prevent and may include fever, shaking chills, muscle and body aches.  a. Taking prescribed medications from physician if needed.  b. Adequate fluids are important.    2. Reinforce the need to call if temperature is         elevated to 100.4 or more  02/06/2019   S   g. HAND-FOOT SYNDROME  causes painful, symmetric swelling and redness of palms and soles                  5. Instruct patient to report any numbness or tingling in the hands or feet.  6. Explain prevention techniques, such as     a. Use heavy moisturizers to lessen skin dryness and itching, but to avoid if skin is cracked or broken  b. Bathe in tepid water, use non-perfumed soap, and wash gently. Baths with oatmeal or diluted baking soda may be soothing.  c. Avoid tight fitting shoes and repetitive actions, such as rubbing hands or applying pressure to  hands/feet.  7. Review measures to take should syndrome occur:  a. Cold compresses and elevation for          edema  b. Pain medications and other measures as ordered by oncologist.   4.   Syndrome resolves few weeks after therapy. 02/06/2019   S   5. DISCHARGE PLANNING /        EDUCATION 1.    Explain importance of compliance with follow- up  tests (CBC, CMP).  2.    Verify patient/caregiver know:  a.    Oncologists office phone number.  b.    Dates of follow-up appointments.  c.    Prescriptions given for nausea  3.   Review side effects to monitor and notify          oncologist about.  4.   Reinforce the need for patient and caregivers to:  a.    Review information given.  b.    Call oncologists office with questions          or symptoms  5.   Provide [facility] [place] [employer] make referrals if needed for financial or .   02/06/2019   S     PROGRESS NOTES: I met with the patient and her daughter today for chemotherapy education. she will be starting treatment with Taxotere and Carboplatin. We discussed the mechanism of action, potential side effects of this treatment as well as ways she can manage them at home. Some of these side effects include but or not limited to fever, nausea, vomiting, decreased appetite, fatigue, weakness, cytopenias, myalgia/arthralgia, constipation, diarrhea, bleeding, shortness of breath, nail changes, taste change, or hair thinning/loss. We also discussed dietary modifications she should make although this will be discussed in more detail with the dietician. she was provided with a copy of all of the information we discussed today as well as our contact information. she will be provided a schedule on her first day of treatment. The patient will follow-up with the physician for toxicity monitoring throughout treatment. Dr. Nikhil Simons's nurse, was contacted for antinausea medications and to answer the question about paperwork related to the daughter calling for  the pt as necessary. Kristyn stated she would call the daughter back. The pt and her daughter deferred speaking with the dietician at this time and will contact us if consult is needed. All questions were answered and an informed consent was obtained. she was reminded to certainly contact us sooner if needed.

## 2019-02-06 NOTE — PROGRESS NOTES
CC : I have chemo school today     Pau Cook is a 89 y.o.  This is a pleasant 89-year-old female referred from  here for adjuvant chemotherapy.     Referred by :  Dr. 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  Pathology revealed high-grade undifferentiated carcinoma involving the left tube and ovary with extensive necrosis.  Malignancy was noted involving the ovarian surface as well as the fallopian tube surface.  The tumor was 7 cm in greatest dimension.  This was a grade 3 undifferentiated carcinoma.  No regional lymph nodes were submitted tumor cells were strongly positive for both CK7 and EMA.  Pathologically staged as a T1 see to an affects    Path is as follows   SPECIMEN  1) Left tube and ovary  2) Uterus, cervix, right tube and ovary  FINAL PATHOLOGIC DIAGNOSIS  1. LEFT TUBE AND OVARY SHOWING HIGH-GRADE, UNDIFFERENTIATED CARCINOMA OF THE OVARY  INVOLVING MILAGROS TUBAL TISSUES WITH EXTENSIVE NECROSIS, SEE SYNOPTIC REPORT:  PROCEDURE: TOTAL HYSTERECTOMY AND BILATERAL SALPINGO-OOPHORECTOMY  SPECIMEN INTEGRITY: LEFT OVARY, CAPSULE RUPTURED  TUMOR SITE: LEFT OVARY  OVARIAN SURFACE INVOLVEMENT: PRESENT  FALLOPIAN TUBE SURFACE INVOLVEMENT: PRESENT  TUMOR SIZE: 7 CM  HISTOLOGIC TYPE: UNDIFFERENTIATED CARCINOMA  HISTOLOGIC GRADE: GRADE 3  OTHER TISSUE/ORGAN INVOLVEMENT: NOT IDENTIFIED  PERITONEAL/ASCITIC  FLUID: NOT SUBMITTED/UNKNOWN  REGIONAL LYMPH NODES: NONE SUBMITTED  SPECIAL STUDIES: TUMOR CELLS ARE STRONGLY POSITIVE FOR BOTH CK7 AND EMA IMMUNOSTAINS.  S-100, WILMS TUMOR ANTIGEN AND CD10 ARE FOCALLY POSITIVE. CALRETININ, CD56 AND INHIBIN ARE  ALL NEGATIVE. THE CONTROLS STAIN APPROPRIATELY. THESE FINDINGS SUPPORT THE DIAGNOSIS  OF CARCINOMA RATHER THAN GRANULOSA CELL TUMOR.  TUMOR STAGE: pT1c2 Nx  2. UTERUS, CERVIX AND RIGHT ADNEXA WEIGHING 66 G SHOWING:  INACTIVE ENDOMETRIUM WITH A BENIGN ENDOMETRIAL POLYP  CERVIX WITH NABOTHIAN CYSTS  ATROPHIC RIGHT OVARY AND UNREMARKABLE RIGHT FALLOPIAN TUBE  Diagnosed by: Antwon Uribe M.D.  (Electronically Signed: 2019-01-09 08:41:42)  Report continued on next page Page    Patient here to re-evaluate her labs since last visit she feels well no fatigue no bleeding  Past Medical History:   Diagnosis Date    Allergy     Arthritis     Breast cancer 12/2018    Breast cyst     Foot pain     GERD (gastroesophageal reflux disease)     Joint pain     Peripheral vascular disease 1/15/2018    Squamous cell carcinoma 04/2016    Right Lower Leg    MUNIR (stress urinary incontinence, female) 4/17/2017     Past Surgical History:   Procedure Laterality Date    APPENDECTOMY      COLON SURGERY      removed about 10 inch    COLONOSCOPY N/A 12/24/2018    [facility]    EXCISION, MASS, FINGER Right 7/9/2018    [facility]    hemorhids      HYSTERECTOMY, TOTAL, ABDOMINAL, WITH BILATERAL SALPINGO-OOPHORECTOMY Bilateral 12/28/2018    [facility]    LYSIS, ADHESIONS N/A 12/28/2018    [facility] OR    XI ROBOTIC HYSTERECTOMY N/A 12/28/2018    [facility]    XI ROBOTIC SALPINGO-OOPHORECTOMY Bilateral 12/28/2018    [facility] OR    She remains on Nexium to help with intermittent symptoms of  gastritis and is on calcium with vitamin-D for overall bone health    Current Outpatient Medications:     acetaminophen (TYLENOL ARTHRITIS ORAL), Take 2 tablets by mouth every 8 (eight) hours as needed., Disp: , Rfl:     calcium-vitamin D3 (CALCIUM 500 + D) 500 mg(1,250mg) -200 unit per tablet, Take 1 tablet by mouth once daily. , Disp: , Rfl:     carboxymethylcellulose (REFRESH PLUS) 0.5 % Dpet, 1 drop daily as needed., Disp: , Rfl:     [START ON 2/7/2019] conjugated estrogens (PREMARIN) vaginal cream, Place 0.5 g vaginally twice a week., Disp: 30 g, Rfl: 5    cyanocobalamin (VITAMIN B-12) 1000 MCG tablet, Take 1,000 mcg by mouth once daily. , Disp: , Rfl:     FLAXSEED ORAL, Take 650 mg by mouth once daily. , Disp: , Rfl:     ketotifen (ZADITOR) 0.025 % (0.035 %) ophthalmic solution, Place 1 drop into both eyes 2 (two) times daily as needed (Pt reports use approx once/week). , Disp: , Rfl:     MULTIVITAMIN W-MINERALS/LUTEIN (CENTRUM SILVER ORAL), Take 1 tablet by mouth once daily. , Disp: , Rfl:     NEXIUM 20 mg capsule, Take 20 mg by mouth before breakfast. , Disp: , Rfl:   Review of patient's allergies indicates:   Allergen Reactions    Pcn [penicillins]      Can not remember reaction     Tetanus vaccines and toxoid Swelling     Localized swelling to injection site     Social History     Tobacco Use    Smoking status: Never Smoker    Smokeless tobacco: Never Used   Substance Use Topics    Alcohol use: No     Alcohol/week: 0.0 oz     Comment: rare    Drug use: No     Family History   Adopted: Yes   Problem Relation Age of Onset    Cancer Mother     Stroke Father     Breast cancer Sister     No Known Problems Daughter     No Known Problems Brother     No Known Problems Daughter     Diabetes Brother     Colon cancer Neg Hx     Ovarian cancer Neg Hx        CONSTITUTIONAL: No fevers, chills, night sweats, wt. loss, patient states her appetite is good and she is getting plenty of rest  SKIN: no  "rashes or itching  ENT: No headaches, head trauma, vision changes, or eye pain  LYMPH NODES: None noticed   CV: No chest pain, palpitations.   RESP: No dyspnea on exertion, cough, wheezing, or hemoptysis  GI: No nausea, emesis, diarrhea, constipation, melena, hematochezia, pain.   : No dysuria, hematuria, urgency no abnormal bleeding no discharge  HEME: No easy bruising, bleeding problems  PSYCHIATRIC: No depression, anxiety, psychosis, hallucinations.  NEURO: No seizures, memory loss, no headaches or difficulty sleeping  MSK: No arthralgias or joint swelling         BP (!) 116/57   Pulse 93   Temp 97.8 °F (36.6 °C)   Resp 20   Ht 5' 2" (1.575 m)   Wt 56.9 kg (125 lb 7.1 oz)   SpO2 99%   BMI 22.94 kg/m²   Gen: NAD, A and O x3, she appears tired  Psych: pleasant affect, normal thought process  Eyes: Pupils round and non dilated, EOM intact pale conjunctiva anicteric sclera  Nose: Nares patent  OP clear, mucosa patent  Neck: suppple, no JVD, no palpable thyromegaly no palpable mass, no adenopathy  Lungs: CTAB, no wheezes, no use of accessory muscles  CV: S1S2 with RR regular rate today no further tachycardia No mrg  Abd: soft, NTND, + BS, No HSM, no ascites  Extr: No CCE, ARGELIA, strength normal, good capillary refill  Neuro: steady gait, CNs grossly intact  Skin: intact, no lesions noted  Rheum: No joint swelling    Lab Results   Component Value Date    WBC 6.10 02/05/2019    HGB 12.0 02/05/2019    HCT 37.0 02/05/2019    MCV 87 02/05/2019     02/05/2019     CMP  Sodium   Date Value Ref Range Status   02/05/2019 137 136 - 145 mmol/L Final     Potassium   Date Value Ref Range Status   02/05/2019 3.8 3.5 - 5.1 mmol/L Final     Chloride   Date Value Ref Range Status   02/05/2019 97 95 - 110 mmol/L Final     CO2   Date Value Ref Range Status   02/05/2019 28 23 - 29 mmol/L Final     Glucose   Date Value Ref Range Status   02/05/2019 132 (H) 70 - 110 mg/dL Final     BUN, Bld   Date Value Ref Range Status "   02/05/2019 10 8 - 23 mg/dL Final     Creatinine   Date Value Ref Range Status   02/05/2019 0.8 0.5 - 1.4 mg/dL Final     Calcium   Date Value Ref Range Status   02/05/2019 9.7 8.7 - 10.5 mg/dL Final     Total Protein   Date Value Ref Range Status   02/05/2019 7.5 6.0 - 8.4 g/dL Final     Albumin   Date Value Ref Range Status   02/05/2019 3.8 3.5 - 5.2 g/dL Final     Total Bilirubin   Date Value Ref Range Status   02/05/2019 0.6 0.1 - 1.0 mg/dL Final     Comment:     For infants and newborns, interpretation of results should be based  on gestational age, weight and in agreement with clinical  observations.  Premature Infant recommended reference ranges:  Up to 24 hours.............<8.0 mg/dL  Up to 48 hours............<12.0 mg/dL  3-5 days..................<15.0 mg/dL  6-29 days.................<15.0 mg/dL       Alkaline Phosphatase   Date Value Ref Range Status   02/05/2019 116 55 - 135 U/L Final     AST   Date Value Ref Range Status   02/05/2019 30 10 - 40 U/L Final     ALT   Date Value Ref Range Status   02/05/2019 16 10 - 44 U/L Final     Anion Gap   Date Value Ref Range Status   02/05/2019 12 8 - 16 mmol/L Final     eGFR if    Date Value Ref Range Status   02/05/2019 >60 >60 mL/min/1.73 m^2 Final     eGFR if non    Date Value Ref Range Status   02/05/2019 >60 >60 mL/min/1.73 m^2 Final     Comment:     Calculation used to obtain the estimated glomerular filtration  rate (eGFR) is the CKD-EPI equation.          History of anemia  -     CBC auto differential; Future; Expected date: 02/06/2019  -     CMP; Future; Expected date: 02/06/2019    Medication management    Encounter for chemotherapy management    Osteopenia, unspecified location    Malignant neoplasm of ovary, unspecified laterality    Other orders  -     CARBOplatin (PARAPLATIN) 230 mg in sodium chloride 0.9% 250 mL chemo infusion  -     DOCEtaxel (TAXOTERE) 60 mg/m2 = 95 mg in sodium chloride 0.9% 250 mL chemo  infusion      STart adjuvant chemo with taxotere plus carboplatin 20 % dose reduction given her frailty   Cycles will be every 3 weeks for a total of 6 doses   Cleared for cycle 1   She should stop using Premarin cream at this time  After 3 cycles recommend further imaging study to better assess the 5 hepatic hypodensities as well as the multiple right-sided lung nodules.  Repeat tumor markers in approximately 6 weeks being CEA and  check her response to chemotherapy.  Anemia has recovered continue same diet increase in iron would not help her which is to help protect her from chemotherapy-induced anemia  RTC after cycle 1 chemo for clearance cycle 2 with CBC and CMP tumor markers to be checked after cycle 2   She is to continue calcium and vitamin-D while on chemotherapy for bone preservation it is okay for to take multivitamins however she should not overdose on vitamin C as this would decrease the free radicals and may chemotherapy less effective.  She may continue proton pump inhibitors for gastritis however this could cause iron malabsorption hence of her blood counts start following I will ask her to hold this medication.  Sincerely,  Kenyatta Tejeda MD [facility]    Thank you for allowing me to evaluate and participate in the care of this pleasant patient. Please fell free to call me with any questions or concerns.    [place],  Kenyatta Tejeda MD    This note was dictated with Dragon and briefly proofread. Please excuse any sentences that may be unclear or words misspelled

## 2019-02-07 DIAGNOSIS — C56.9 MALIGNANT NEOPLASM OF OVARY, UNSPECIFIED LATERALITY: Primary | ICD-10-CM

## 2019-02-07 RX ORDER — PROCHLORPERAZINE MALEATE 10 MG
10 TABLET ORAL EVERY 6 HOURS PRN
Qty: 30 TABLET | Refills: 1 | Status: SHIPPED | OUTPATIENT
Start: 2019-02-07 | End: 2019-02-28 | Stop reason: SDUPTHER

## 2019-02-07 RX ORDER — ONDANSETRON 8 MG/1
8 TABLET, ORALLY DISINTEGRATING ORAL EVERY 12 HOURS PRN
Qty: 30 TABLET | Refills: 1 | Status: SHIPPED | OUTPATIENT
Start: 2019-02-07 | End: 2019-05-07

## 2019-02-07 NOTE — TELEPHONE ENCOUNTER
----- Message from Amparo Lowery sent at 2/7/2019 12:43 PM CST -----  Contact: Kendra Mo (Daughter)  Type: Needs Medical Advice    Who Called:  Kendra Mo (Daughter)  Pharmacy name and phone #:  [facility]  Best Call Back Number: [phone]  Additional Information: patient is starting her Chemo tomorrow and was told they would be calling in something nausea. [facility] does not have the request and the patient will need to pick it up today. Please call daughter when called in. Thanks!     [facility]/pharmacy #[id number] - [place] - [address]  [address] [address]  Phone: [phone] Fax: [phone]

## 2019-02-15 ENCOUNTER — PES CALL (OUTPATIENT)
Dept: ADMINISTRATIVE | Facility: CLINIC | Age: 84
End: 2019-02-15

## 2019-02-27 ENCOUNTER — LAB VISIT (OUTPATIENT)
Dept: LAB | Facility: HOSPITAL | Age: 84
End: 2019-02-27
Attending: INTERNAL MEDICINE
Payer: MEDICARE

## 2019-02-27 DIAGNOSIS — C56.9 MALIGNANT NEOPLASM OF OVARY, UNSPECIFIED LATERALITY: ICD-10-CM

## 2019-02-27 LAB
ALBUMIN SERPL BCP-MCNC: 3.3 G/DL
ALP SERPL-CCNC: 122 U/L
ALT SERPL W/O P-5'-P-CCNC: 33 U/L
ANION GAP SERPL CALC-SCNC: 9 MMOL/L
AST SERPL-CCNC: 35 U/L
BASOPHILS # BLD AUTO: 0 K/UL
BASOPHILS NFR BLD: 0.5 %
BILIRUB SERPL-MCNC: 0.3 MG/DL
BUN SERPL-MCNC: 11 MG/DL
CALCIUM SERPL-MCNC: 9 MG/DL
CHLORIDE SERPL-SCNC: 99 MMOL/L
CO2 SERPL-SCNC: 28 MMOL/L
CREAT SERPL-MCNC: 0.7 MG/DL
DIFFERENTIAL METHOD: ABNORMAL
EOSINOPHIL # BLD AUTO: 0 K/UL
EOSINOPHIL NFR BLD: 0 %
ERYTHROCYTE [DISTWIDTH] IN BLOOD BY AUTOMATED COUNT: 13.5 %
EST. GFR  (AFRICAN AMERICAN): >60 ML/MIN/1.73 M^2
EST. GFR  (NON AFRICAN AMERICAN): >60 ML/MIN/1.73 M^2
GLUCOSE SERPL-MCNC: 110 MG/DL
HCT VFR BLD AUTO: 34.3 %
HGB BLD-MCNC: 11 G/DL
LYMPHOCYTES # BLD AUTO: 1.3 K/UL
LYMPHOCYTES NFR BLD: 18.7 %
MCH RBC QN AUTO: 27.5 PG
MCHC RBC AUTO-ENTMCNC: 32.1 G/DL
MCV RBC AUTO: 86 FL
MONOCYTES # BLD AUTO: 0.6 K/UL
MONOCYTES NFR BLD: 8.4 %
NEUTROPHILS # BLD AUTO: 5.1 K/UL
NEUTROPHILS NFR BLD: 72.4 %
PLATELET # BLD AUTO: 329 K/UL
PMV BLD AUTO: 7.1 FL
POTASSIUM SERPL-SCNC: 3.6 MMOL/L
PROT SERPL-MCNC: 6.6 G/DL
RBC # BLD AUTO: 4.01 M/UL
SODIUM SERPL-SCNC: 136 MMOL/L
WBC # BLD AUTO: 7.1 K/UL

## 2019-02-27 PROCEDURE — 85025 COMPLETE CBC W/AUTO DIFF WBC: CPT | Mod: HCNC

## 2019-02-27 PROCEDURE — 80053 COMPREHEN METABOLIC PANEL: CPT | Mod: HCNC

## 2019-02-27 PROCEDURE — 36415 COLL VENOUS BLD VENIPUNCTURE: CPT | Mod: HCNC

## 2019-02-28 ENCOUNTER — OFFICE VISIT (OUTPATIENT)
Dept: HEMATOLOGY/ONCOLOGY | Facility: CLINIC | Age: 84
End: 2019-02-28
Payer: MEDICARE

## 2019-02-28 VITALS
BODY MASS INDEX: 20.31 KG/M2 | OXYGEN SATURATION: 99 % | RESPIRATION RATE: 16 BRPM | TEMPERATURE: 98 F | HEIGHT: 67 IN | SYSTOLIC BLOOD PRESSURE: 116 MMHG | DIASTOLIC BLOOD PRESSURE: 57 MMHG | WEIGHT: 129.44 LBS | HEART RATE: 90 BPM

## 2019-02-28 DIAGNOSIS — L98.9 SKIN LESION: ICD-10-CM

## 2019-02-28 DIAGNOSIS — Z09 CHEMOTHERAPY FOLLOW-UP EXAMINATION: Primary | ICD-10-CM

## 2019-02-28 DIAGNOSIS — C56.9 MALIGNANT NEOPLASM OF OVARY, UNSPECIFIED LATERALITY: ICD-10-CM

## 2019-02-28 DIAGNOSIS — C78.7 LIVER METASTASES: ICD-10-CM

## 2019-02-28 DIAGNOSIS — R97.8 ELEVATED TUMOR MARKERS: ICD-10-CM

## 2019-02-28 PROCEDURE — 99999 PR PBB SHADOW E&M-EST. PATIENT-LVL III: CPT | Mod: PBBFAC,HCNC,, | Performed by: INTERNAL MEDICINE

## 2019-02-28 PROCEDURE — 99499 RISK ADDL DX/OHS AUDIT: ICD-10-PCS | Mod: S$GLB,,, | Performed by: INTERNAL MEDICINE

## 2019-02-28 PROCEDURE — 99215 PR OFFICE/OUTPT VISIT, EST, LEVL V, 40-54 MIN: ICD-10-PCS | Mod: S$GLB,,, | Performed by: INTERNAL MEDICINE

## 2019-02-28 PROCEDURE — 99215 OFFICE O/P EST HI 40 MIN: CPT | Mod: S$GLB,,, | Performed by: INTERNAL MEDICINE

## 2019-02-28 PROCEDURE — 99499 UNLISTED E&M SERVICE: CPT | Mod: S$GLB,,, | Performed by: INTERNAL MEDICINE

## 2019-02-28 PROCEDURE — 1101F PR PT FALLS ASSESS DOC 0-1 FALLS W/OUT INJ PAST YR: ICD-10-PCS | Mod: CPTII,S$GLB,, | Performed by: INTERNAL MEDICINE

## 2019-02-28 PROCEDURE — 1101F PT FALLS ASSESS-DOCD LE1/YR: CPT | Mod: CPTII,S$GLB,, | Performed by: INTERNAL MEDICINE

## 2019-02-28 PROCEDURE — 99999 PR PBB SHADOW E&M-EST. PATIENT-LVL III: ICD-10-PCS | Mod: PBBFAC,HCNC,, | Performed by: INTERNAL MEDICINE

## 2019-02-28 RX ORDER — ACETAMINOPHEN 325 MG/1
650 TABLET ORAL
Status: CANCELLED
Start: 2019-03-08

## 2019-02-28 RX ORDER — SODIUM CHLORIDE 0.9 % (FLUSH) 0.9 %
10 SYRINGE (ML) INJECTION
Status: CANCELLED | OUTPATIENT
Start: 2019-03-08

## 2019-02-28 RX ORDER — HEPARIN 100 UNIT/ML
500 SYRINGE INTRAVENOUS
Status: CANCELLED | OUTPATIENT
Start: 2019-03-08

## 2019-02-28 RX ORDER — FAMOTIDINE 10 MG/ML
20 INJECTION INTRAVENOUS
Status: CANCELLED | OUTPATIENT
Start: 2019-03-08

## 2019-02-28 NOTE — PROGRESS NOTES
CC : I itch around my ribs and have pain on my left     Monseelterrence Cook is a 89 y.o.  This is a pleasant 89-year-old female referred from  here for adjuvant chemotherapy.            Ovarian cancer    1/15/2019 Initial Diagnosis     Ovarian cancer          Cancer Staged     Cancer Staging  Ovarian cancer  Staging form: Ovary, Fallopian Tube, and Primary Peritoneal Carcinoma, AJCC 8th Edition  - Clinical stage from 2/28/2019: FIGO Stage IC, calculated as Stage IV (cT1c2, cNX, cM1) - Signed by Kenyatta Tejeda MD on 2/28/2019          Chemotherapy     Treatment Summary   Plan Name: OP GYN DOCETAXEL CARBOPLATIN (AUC) Q3W  Treatment Goal: Control  Status: Active  Start Date: 2/8/2019  End Date: 5/24/2019 (Planned)  Provider: Kenyatta Tejeda MD  Chemotherapy: CARBOplatin (PARAPLATIN) 230 mg in sodium chloride 0.9% 250 mL chemo infusion, 230 mg (100 % of original dose 230 mg), Intravenous, Clinic/HOD 1 time, 1 of 6 cycles  Dose modification: 230 mg (original dose 230 mg, Cycle 1, Reason: MD Discretion)    DOCEtaxel (TAXOTERE) 60 mg/m2 = 95 mg in sodium chloride 0.9% 250 mL chemo infusion, 60 mg/m2 = 95 mg (original dose 96 mg), Intravenous, Clinic/HOD 1 time, 1 of 6 cycles  Dose modification: 96 mg (original dose 96 mg, Cycle 1), 90 mg (original dose 96 mg, Cycle 1), 60 mg/m2 (original dose 96 mg, Cycle 1)              Tumor Markers     Patient's tumor was tested for the following markers: Ca 125 : 91                                                      She is having new skin lesions and losing her hair   Referred by :  Dr. 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  Pathology revealed high-grade undifferentiated carcinoma involving the left tube and ovary with extensive necrosis.  Malignancy was noted involving the ovarian surface as well as the fallopian tube surface.  The tumor was 7 cm in greatest dimension.  This was a grade 3 undifferentiated carcinoma.  No regional lymph nodes were submitted tumor cells were strongly positive for both CK7 and EMA.  Pathologically staged as a T1 see to an affects    Path is as follows   SPECIMEN  1) Left tube and ovary  2) Uterus, cervix, right tube and ovary  FINAL PATHOLOGIC DIAGNOSIS  1. LEFT TUBE AND OVARY SHOWING HIGH-GRADE, UNDIFFERENTIATED CARCINOMA OF THE OVARY  INVOLVING MILAGROS TUBAL TISSUES WITH EXTENSIVE NECROSIS, SEE SYNOPTIC REPORT:  PROCEDURE: TOTAL HYSTERECTOMY AND BILATERAL SALPINGO-OOPHORECTOMY  SPECIMEN INTEGRITY: LEFT OVARY, CAPSULE RUPTURED  TUMOR SITE: LEFT OVARY  OVARIAN SURFACE INVOLVEMENT: PRESENT  FALLOPIAN TUBE SURFACE INVOLVEMENT: PRESENT  TUMOR SIZE: 7 CM  HISTOLOGIC TYPE: UNDIFFERENTIATED CARCINOMA  HISTOLOGIC GRADE: GRADE 3  OTHER TISSUE/ORGAN INVOLVEMENT: NOT IDENTIFIED  PERITONEAL/ASCITIC FLUID: NOT SUBMITTED/UNKNOWN  REGIONAL LYMPH NODES: NONE SUBMITTED  SPECIAL STUDIES: TUMOR CELLS ARE STRONGLY POSITIVE FOR BOTH CK7 AND EMA IMMUNOSTAINS.  S-100, WILMS TUMOR ANTIGEN AND CD10 ARE FOCALLY POSITIVE. CALRETININ, CD56 AND INHIBIN ARE  ALL NEGATIVE. THE CONTROLS STAIN APPROPRIATELY. THESE FINDINGS SUPPORT THE DIAGNOSIS  OF CARCINOMA RATHER THAN GRANULOSA CELL TUMOR.  TUMOR STAGE: pT1c2 Nx  2. UTERUS, CERVIX AND RIGHT ADNEXA WEIGHING 66 G SHOWING:  INACTIVE ENDOMETRIUM WITH A BENIGN ENDOMETRIAL POLYP  CERVIX WITH NABOTHIAN CYSTS  ATROPHIC RIGHT OVARY AND  UNREMARKABLE RIGHT FALLOPIAN TUBE  Diagnosed by: Antwon Uribe M.D.  (Electronically Signed: 2019-01-09 08:41:42)  Report continued on next page Page    Patient here to re-evaluate her labs since last visit she feels well no fatigue no bleeding  Past Medical History:   Diagnosis Date    Allergy     Arthritis     Breast cancer 12/2018    Breast cyst     Foot pain     GERD (gastroesophageal reflux disease)     Joint pain     Peripheral vascular disease 1/15/2018    Squamous cell carcinoma 04/2016    Right Lower Leg    MUNIR (stress urinary incontinence, female) 4/17/2017     Past Surgical History:   Procedure Laterality Date    APPENDECTOMY      COLON SURGERY      removed about 10 inch    COLONOSCOPY N/A 12/24/2018    [facility]    EXCISION, MASS, FINGER Right 7/9/2018    [facility]    hemorhids      HYSTERECTOMY, TOTAL, ABDOMINAL, WITH BILATERAL SALPINGO-OOPHORECTOMY Bilateral 12/28/2018    [facility]    LYSIS, ADHESIONS N/A 12/28/2018    [facility] at [facility] OR    XI ROBOTIC HYSTERECTOMY N/A 12/28/2018    [facility] at [facility] OR    XI ROBOTIC SALPINGO-OOPHORECTOMY Bilateral 12/28/2018    Performed by [facility] at [facility] OR    She remains on Nexium to help with intermittent symptoms of gastritis and is on calcium with vitamin-D for overall bone health    Current Outpatient Medications:     acetaminophen (TYLENOL ARTHRITIS ORAL), Take 2 tablets by mouth every 8 (eight) hours as needed., Disp: , Rfl:     calcium-vitamin D3 (CALCIUM 500 + D) 500 mg(1,250mg) -200 unit per tablet, Take 1 tablet by mouth once daily. , Disp: , Rfl:     carboxymethylcellulose (REFRESH PLUS) 0.5 % Dpet, 1 drop daily as needed., Disp: , Rfl:     conjugated estrogens (PREMARIN) vaginal cream, Place 0.5 g vaginally twice a week., Disp: 30 g, Rfl: 5    cyanocobalamin (VITAMIN  B-12) 1000 MCG tablet, Take 1,000 mcg by mouth once daily. , Disp: , Rfl:     FLAXSEED ORAL, Take 650 mg by mouth once daily. , Disp: , Rfl:     ketotifen (ZADITOR) 0.025 % (0.035 %) ophthalmic solution, Place 1 drop into both eyes 2 (two) times daily as needed (Pt reports use approx once/week). , Disp: , Rfl:     MULTIVITAMIN W-MINERALS/LUTEIN (CENTRUM SILVER ORAL), Take 1 tablet by mouth once daily. , Disp: , Rfl:     NEXIUM 20 mg capsule, Take 20 mg by mouth before breakfast. , Disp: , Rfl:     ondansetron (ZOFRAN-ODT) 8 MG TbDL, Take 1 tablet (8 mg total) by mouth every 12 (twelve) hours as needed., Disp: 30 tablet, Rfl: 1    prochlorperazine (COMPAZINE) 10 MG tablet, Take 1 tablet (10 mg total) by mouth every 6 (six) hours as needed., Disp: 30 tablet, Rfl: 1  Review of patient's allergies indicates:   Allergen Reactions    Pcn [penicillins]      Can not remember reaction     Tetanus vaccines and toxoid Swelling     Localized swelling to injection site     Social History     Tobacco Use    Smoking status: Never Smoker    Smokeless tobacco: Never Used   Substance Use Topics    Alcohol use: No     Alcohol/week: 0.0 oz     Comment: rare    Drug use: No     Family History   Adopted: Yes   Problem Relation Age of Onset    Cancer Mother     Stroke Father     Breast cancer Sister     No Known Problems Daughter     No Known Problems Brother     No Known Problems Daughter     Diabetes Brother     Colon cancer Neg Hx     Ovarian cancer Neg Hx        CONSTITUTIONAL: No fevers, chills, night sweats, wt. loss,  SKIN: no rashes or itching  ENT: No headaches, head trauma, vision changes, or eye pain  LYMPH NODES: None noticed   CV: No chest pain, palpitations.   RESP: No dyspnea on exertion, cough, wheezing, or hemoptysis rib pain   GI: No nausea, emesis, diarrhea, constipation, melena, hematochezia,  : No dysuria, hematuria, urgency no abnormal bleeding no discharge  HEME: No easy bruising, bleeding  "problems  PSYCHIATRIC: No depression, anxiety, psychosis, hallucinations.  NEURO: No seizures, memory loss, no headaches or difficulty sleeping  MSK: No arthralgias or joint swelling         BP (!) 116/57   Pulse 90   Temp 97.8 °F (36.6 °C)   Resp 16   Ht 5' 7" (1.702 m)   Wt 58.7 kg (129 lb 6.6 oz)   SpO2 99%   BMI 20.27 kg/m²     Constitutional: oriented to person, place, and time.  well-developed and well-nourished.   HENT:   Head: Normocephalic and atraumatic.   Right Ear: External ear normal.   Left Ear: External ear normal.   Nose: Nose normal.   Mouth/Throat: Oropharynx is clear and moist.   Eyes: Conjunctivae and EOM are normal. Pupils are equal, round  Neck: Normal range of motion. Neck supple. No thyromegaly present.   Cardiovascular: Normal rate, regular rhythm, normal heart sounds and intact distal pulses.    No murmur heard.  Pulmonary/Chest: Effort normal and breath sounds normal.  no wheezes.  no rales.   Abdominal: Soft. Bowel sounds are normal.  no mass. There is no tenderness.    Musculoskeletal: Normal range of motion.  no edema or tenderness.   Lymphadenopathy:    no cervical adenopathy.   Neurological: alert and oriented to person, place, and time.  Skin: Skin is warm and dry. No rash noted.   Psychiatric: normal mood and affect.   behavior is normal.   Vitals reviewed.      Lab Results   Component Value Date    WBC 7.10 02/27/2019    HGB 11.0 (L) 02/27/2019    HCT 34.3 (L) 02/27/2019    MCV 86 02/27/2019     02/27/2019     CMP  Sodium   Date Value Ref Range Status   02/27/2019 136 136 - 145 mmol/L Final     Potassium   Date Value Ref Range Status   02/27/2019 3.6 3.5 - 5.1 mmol/L Final     Chloride   Date Value Ref Range Status   02/27/2019 99 95 - 110 mmol/L Final     CO2   Date Value Ref Range Status   02/27/2019 28 23 - 29 mmol/L Final     Glucose   Date Value Ref Range Status   02/27/2019 110 70 - 110 mg/dL Final     BUN, Bld   Date Value Ref Range Status   02/27/2019 11 8 - " 23 mg/dL Final     Creatinine   Date Value Ref Range Status   02/27/2019 0.7 0.5 - 1.4 mg/dL Final     Calcium   Date Value Ref Range Status   02/27/2019 9.0 8.7 - 10.5 mg/dL Final     Total Protein   Date Value Ref Range Status   02/27/2019 6.6 6.0 - 8.4 g/dL Final     Albumin   Date Value Ref Range Status   02/27/2019 3.3 (L) 3.5 - 5.2 g/dL Final     Total Bilirubin   Date Value Ref Range Status   02/27/2019 0.3 0.1 - 1.0 mg/dL Final     Comment:     For infants and newborns, interpretation of results should be based  on gestational age, weight and in agreement with clinical  observations.  Premature Infant recommended reference ranges:  Up to 24 hours.............<8.0 mg/dL  Up to 48 hours............<12.0 mg/dL  3-5 days..................<15.0 mg/dL  6-29 days.................<15.0 mg/dL       Alkaline Phosphatase   Date Value Ref Range Status   02/27/2019 122 55 - 135 U/L Final     AST   Date Value Ref Range Status   02/27/2019 35 10 - 40 U/L Final     ALT   Date Value Ref Range Status   02/27/2019 33 10 - 44 U/L Final     Anion Gap   Date Value Ref Range Status   02/27/2019 9 8 - 16 mmol/L Final     eGFR if    Date Value Ref Range Status   02/27/2019 >60 >60 mL/min/1.73 m^2 Final     eGFR if non    Date Value Ref Range Status   02/27/2019 >60 >60 mL/min/1.73 m^2 Final     Comment:     Calculation used to obtain the estimated glomerular filtration  rate (eGFR) is the CKD-EPI equation.          Chemotherapy follow-up examination  -     CBC auto differential; Future; Expected date: 02/28/2019  -     CMP; Future; Expected date: 02/28/2019  -     ; Future; Expected date: 02/28/2019    Elevated tumor markers  -     ; Future; Expected date: 02/28/2019    Skin lesion  -     Ambulatory Referral to Dermatology    Other orders  -     acetaminophen tablet 650 mg  -     palonosetron (ALOXI) 0.25 mg, dexamethasone (DECADRON) 20 mg in sodium chloride 0.9% 50 mL  -      diphenhydrAMINE (BENADRYL) 25 mg in sodium chloride 0.9% 50 mL IVPB  -     famotidine (PF) injection 20 mg  -     CARBOplatin (PARAPLATIN) 230 mg in sodium chloride 0.9% 250 mL chemo infusion  -     DOCEtaxel (TAXOTERE) 96 mg in sodium chloride 0.9% 250 mL chemo infusion  -     sodium chloride 0.9% 500 mL infusion  -     sodium chloride 0.9% flush 10 mL  -     heparin, porcine (PF) 100 unit/mL injection flush 500 Units  -     alteplase injection 2 mg         Continue  adjuvant chemo with taxotere plus carboplatin 20 % dose reduction given her frailty Cycle 2 due   Cycles will be every 3 weeks for a total of 5 doses   Cleared for cycle 2   lamisil to hands and to derm for skin lesion  Alopecia is normal with this chemo regimen   After 3 cycles recommend further imaging study to better assess the 5 hepatic hypodensities as well as the multiple right-sided lung nodules.  Repeat tumor markers in approximately 6 weeks being CEA and  check her response to chemotherapy.  Anemia has recovered continue same diet increase in iron would not help her which is to help protect her from chemotherapy-induced anemia  RTC after cycle 2   scan after cycle 3   She is to continue calcium and vitamin-D while on chemotherapy for bone preservation it is okay for to take multivitamins however she should not overdose on vitamin C as this would decrease the free radicals and may chemotherapy less effective.  She may continue proton pump inhibitors for gastritis however this could cause iron malabsorption hence of her blood counts start following I will ask her to hold this medication.  Sincerely,  Kenyatta Tejeda MD [facility]    Thank you for allowing me to evaluate and participate in the care of this pleasant patient. Please fell free to call me with any questions or concerns.    Warmly,  Kenyatta eTjeda MD    This note was dictated with Dragon and briefly proofread. Please excuse any sentences that may be unclear or words misspelled    Advance  Care Planning     Living Will  During this visit, I engaged the patient in the advance care planning process.  The patient and I reviewed the role for advance directives and their purpose in directing future healthcare if the patient's unable to speak for him/herself.  At this point in time, the patient does have full decision-making capacity.  We discussed different extreme health states that she could experience, and reviewed what kind of medical care she would want in those situations.  The patient communicated that if she were comatose and had little chance of a meaningful recovery, she would not want machines/life-sustaining treatments used.  The patient has completed a living will to reflect these preferences.  The patient  Has  already designated a healthcare power of  to make decisions on her behalf.   I spent a total of  10  minutes engaging the patient in this advance care planning discussion.

## 2019-03-01 DIAGNOSIS — C56.9 MALIGNANT NEOPLASM OF OVARY, UNSPECIFIED LATERALITY: Primary | ICD-10-CM

## 2019-03-01 RX ORDER — PROCHLORPERAZINE MALEATE 10 MG
10 TABLET ORAL EVERY 6 HOURS PRN
Qty: 30 TABLET | Refills: 1 | Status: SHIPPED | OUTPATIENT
Start: 2019-03-01 | End: 2020-01-07 | Stop reason: SDUPTHER

## 2019-03-04 ENCOUNTER — TELEPHONE (OUTPATIENT)
Dept: HEMATOLOGY/ONCOLOGY | Facility: CLINIC | Age: 84
End: 2019-03-04

## 2019-03-04 NOTE — TELEPHONE ENCOUNTER
Spoke to Kendra and she stated that she had already spoken to the insurance company [employer] and the authorization is still pending. Kendra wanted to be sure that everything was authorized before coming for her mother comes to the appointment. Per Kendra [employer] will be contacting her on tomorrow

## 2019-03-04 NOTE — TELEPHONE ENCOUNTER
----- Message from Brisa Crockett sent at 3/4/2019 12:27 PM CST -----  Contact: Kendra, jaime  Type: Needs Medical Advice    Who Called:  Kendra Ramon  Best Call Back Number: [phone]  Additional Information: daughter wants to know if the Chemo has been approved by new Insurance ([employer])--her labs are Wednesday, Dr. Tejeda Thursday, & chemo Friday--doesn't want to come in for labs that would be unnecessary if it isn't approved yet--please advise--thank you

## 2019-03-04 NOTE — TELEPHONE ENCOUNTER
----- Message from Jenniffer Frazier sent at 3/4/2019  9:26 AM CST -----  Contact: Patient's daughter, Kendra Hill  Patient's daughter is calling to see if patient's chemo has been cleared for her upcoming appointment with her new insurance.  Please call to let her know before she brings her in on Wednesday for her labs.  Call Back#[phone] (Please leave a detailed message is she does not answer)  Thanks

## 2019-03-04 NOTE — TELEPHONE ENCOUNTER
Placed call to patient daughter June left voice message with instruction to attend scheduled appointment and notified that treatment has been scheduled. Left instruction to call  [phone]

## 2019-03-04 NOTE — TELEPHONE ENCOUNTER
Called and left message for pt to inform her of her scheduled apts following her 1st cycle of chemo. Instructed pt to call  if the apts do not work for her.

## 2019-03-06 ENCOUNTER — LAB VISIT (OUTPATIENT)
Dept: LAB | Facility: HOSPITAL | Age: 84
End: 2019-03-06
Attending: INTERNAL MEDICINE
Payer: MEDICARE

## 2019-03-06 ENCOUNTER — TELEPHONE (OUTPATIENT)
Dept: GYNECOLOGIC ONCOLOGY | Facility: CLINIC | Age: 84
End: 2019-03-06

## 2019-03-06 DIAGNOSIS — C56.9 MALIGNANT NEOPLASM OF OVARY, UNSPECIFIED LATERALITY: ICD-10-CM

## 2019-03-06 LAB
ALBUMIN SERPL BCP-MCNC: 3.6 G/DL
ALP SERPL-CCNC: 109 U/L
ALT SERPL W/O P-5'-P-CCNC: 24 U/L
ANION GAP SERPL CALC-SCNC: 9 MMOL/L
AST SERPL-CCNC: 31 U/L
BASOPHILS # BLD AUTO: 0 K/UL
BASOPHILS NFR BLD: 0.4 %
BILIRUB SERPL-MCNC: 0.3 MG/DL
BUN SERPL-MCNC: 11 MG/DL
CALCIUM SERPL-MCNC: 9.3 MG/DL
CHLORIDE SERPL-SCNC: 102 MMOL/L
CO2 SERPL-SCNC: 25 MMOL/L
CREAT SERPL-MCNC: 0.8 MG/DL
DIFFERENTIAL METHOD: ABNORMAL
EOSINOPHIL # BLD AUTO: 0 K/UL
EOSINOPHIL NFR BLD: 0.5 %
ERYTHROCYTE [DISTWIDTH] IN BLOOD BY AUTOMATED COUNT: 14.1 %
EST. GFR  (AFRICAN AMERICAN): >60 ML/MIN/1.73 M^2
EST. GFR  (NON AFRICAN AMERICAN): >60 ML/MIN/1.73 M^2
GLUCOSE SERPL-MCNC: 89 MG/DL
HCT VFR BLD AUTO: 37.6 %
HGB BLD-MCNC: 12 G/DL
LYMPHOCYTES # BLD AUTO: 1 K/UL
LYMPHOCYTES NFR BLD: 23.5 %
MCH RBC QN AUTO: 27.3 PG
MCHC RBC AUTO-ENTMCNC: 31.9 G/DL
MCV RBC AUTO: 86 FL
MONOCYTES # BLD AUTO: 0.4 K/UL
MONOCYTES NFR BLD: 8 %
NEUTROPHILS # BLD AUTO: 3 K/UL
NEUTROPHILS NFR BLD: 67.6 %
PLATELET # BLD AUTO: 287 K/UL
PMV BLD AUTO: 7 FL
POTASSIUM SERPL-SCNC: 4.2 MMOL/L
PROT SERPL-MCNC: 7 G/DL
RBC # BLD AUTO: 4.4 M/UL
SODIUM SERPL-SCNC: 136 MMOL/L
WBC # BLD AUTO: 4.5 K/UL

## 2019-03-06 PROCEDURE — 80053 COMPREHEN METABOLIC PANEL: CPT

## 2019-03-06 PROCEDURE — 36415 COLL VENOUS BLD VENIPUNCTURE: CPT

## 2019-03-06 PROCEDURE — 85025 COMPLETE CBC W/AUTO DIFF WBC: CPT

## 2019-03-06 NOTE — TELEPHONE ENCOUNTER
----- Message from Chaparrita Martinez sent at 3/6/2019  1:14 PM CST -----  Contact: Patient   Patient called regarding questions she has about the dosage of Chemo that may be the cause for her hair loss. The patient can be reached at [phone].

## 2019-03-07 ENCOUNTER — OFFICE VISIT (OUTPATIENT)
Dept: HEMATOLOGY/ONCOLOGY | Facility: CLINIC | Age: 84
End: 2019-03-07
Payer: MEDICARE

## 2019-03-07 VITALS
RESPIRATION RATE: 20 BRPM | WEIGHT: 127.63 LBS | HEART RATE: 90 BPM | OXYGEN SATURATION: 99 % | SYSTOLIC BLOOD PRESSURE: 128 MMHG | BODY MASS INDEX: 20.03 KG/M2 | DIASTOLIC BLOOD PRESSURE: 60 MMHG | TEMPERATURE: 99 F | HEIGHT: 67 IN

## 2019-03-07 DIAGNOSIS — M79.604 PAIN OF RIGHT LEG: ICD-10-CM

## 2019-03-07 DIAGNOSIS — M85.80 OSTEOPENIA, UNSPECIFIED LOCATION: ICD-10-CM

## 2019-03-07 DIAGNOSIS — I70.0 ATHEROSCLEROSIS OF AORTA: ICD-10-CM

## 2019-03-07 DIAGNOSIS — R25.2 LEG CRAMPING: ICD-10-CM

## 2019-03-07 DIAGNOSIS — C56.9 MALIGNANT NEOPLASM OF OVARY, UNSPECIFIED LATERALITY: ICD-10-CM

## 2019-03-07 DIAGNOSIS — Z90.710 S/P TAH-BSO: ICD-10-CM

## 2019-03-07 DIAGNOSIS — R97.1 ELEVATED CANCER ANTIGEN 125 (CA-125): ICD-10-CM

## 2019-03-07 DIAGNOSIS — Z90.79 S/P TAH-BSO: ICD-10-CM

## 2019-03-07 DIAGNOSIS — Z09 CHEMOTHERAPY FOLLOW-UP EXAMINATION: Primary | ICD-10-CM

## 2019-03-07 DIAGNOSIS — Z90.722 S/P TAH-BSO: ICD-10-CM

## 2019-03-07 PROCEDURE — 99499 UNLISTED E&M SERVICE: CPT | Mod: S$GLB,,, | Performed by: INTERNAL MEDICINE

## 2019-03-07 PROCEDURE — 1101F PT FALLS ASSESS-DOCD LE1/YR: CPT | Mod: CPTII,S$GLB,, | Performed by: INTERNAL MEDICINE

## 2019-03-07 PROCEDURE — 99999 PR PBB SHADOW E&M-EST. PATIENT-LVL III: CPT | Mod: PBBFAC,,, | Performed by: INTERNAL MEDICINE

## 2019-03-07 PROCEDURE — 99215 PR OFFICE/OUTPT VISIT, EST, LEVL V, 40-54 MIN: ICD-10-PCS | Mod: S$GLB,,, | Performed by: INTERNAL MEDICINE

## 2019-03-07 PROCEDURE — 99999 PR PBB SHADOW E&M-EST. PATIENT-LVL III: ICD-10-PCS | Mod: PBBFAC,,, | Performed by: INTERNAL MEDICINE

## 2019-03-07 PROCEDURE — 1101F PR PT FALLS ASSESS DOC 0-1 FALLS W/OUT INJ PAST YR: ICD-10-PCS | Mod: CPTII,S$GLB,, | Performed by: INTERNAL MEDICINE

## 2019-03-07 PROCEDURE — 99215 OFFICE O/P EST HI 40 MIN: CPT | Mod: S$GLB,,, | Performed by: INTERNAL MEDICINE

## 2019-03-07 PROCEDURE — 99499 RISK ADDL DX/OHS AUDIT: ICD-10-PCS | Mod: S$GLB,,, | Performed by: INTERNAL MEDICINE

## 2019-03-07 NOTE — PROGRESS NOTES
CC : I itch around my ribs and have pain on my left     Monseelterrence Cook is a 89 y.o.  This is a pleasant 89-year-old female referred from  here for adjuvant chemotherapy.            Ovarian cancer    1/15/2019 Initial Diagnosis     Ovarian cancer          Cancer Staged     Cancer Staging  Ovarian cancer  Staging form: Ovary, Fallopian Tube, and Primary Peritoneal Carcinoma, AJCC 8th Edition  - Clinical stage from 2/28/2019: FIGO Stage IC, calculated as Stage IV (cT1c2, cNX, cM1) - Signed by Kenyatta Tejeda MD on 2/28/2019          Chemotherapy     Treatment Summary   Plan Name: OP GYN DOCETAXEL CARBOPLATIN (AUC) Q3W  Treatment Goal: Control  Status: Active  Start Date: 2/8/2019  End Date: 5/24/2019 (Planned)  Provider: Kenyatta Tejeda MD  Chemotherapy: CARBOplatin (PARAPLATIN) 230 mg in sodium chloride 0.9% 250 mL chemo infusion, 230 mg (100 % of original dose 230 mg), Intravenous, Clinic/HOD 1 time, 1 of 6 cycles  Dose modification: 230 mg (original dose 230 mg, Cycle 1, Reason: MD Discretion)    DOCEtaxel (TAXOTERE) 60 mg/m2 = 95 mg in sodium chloride 0.9% 250 mL chemo infusion, 60 mg/m2 = 95 mg (original dose 96 mg), Intravenous, Clinic/HOD 1 time, 1 of 6 cycles  Dose modification: 96 mg (original dose 96 mg, Cycle 1), 90 mg (original dose 96 mg, Cycle 1), 60 mg/m2 (original dose 96 mg, Cycle 1)              Tumor Markers     Patient's tumor was tested for the following markers: Ca 125 : 91                                                      She is having new skin lesions and losing her hair   Referred by :  Dr. 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  Pathology revealed high-grade undifferentiated carcinoma involving the left tube and ovary with extensive necrosis.  Malignancy was noted involving the ovarian surface as well as the fallopian tube surface.  The tumor was 7 cm in greatest dimension.  This was a grade 3 undifferentiated carcinoma.  No regional lymph nodes were submitted tumor cells were strongly positive for both CK7 and EMA.  Pathologically staged as a T1 see to an affects    Path is as follows   SPECIMEN  1) Left tube and ovary  2) Uterus, cervix, right tube and ovary  FINAL PATHOLOGIC DIAGNOSIS  1. LEFT TUBE AND OVARY SHOWING HIGH-GRADE, UNDIFFERENTIATED CARCINOMA OF THE OVARY  INVOLVING MILAGROS TUBAL TISSUES WITH EXTENSIVE NECROSIS, SEE SYNOPTIC REPORT:  PROCEDURE: TOTAL HYSTERECTOMY AND BILATERAL SALPINGO-OOPHORECTOMY  SPECIMEN INTEGRITY: LEFT OVARY, CAPSULE RUPTURED  TUMOR SITE: LEFT OVARY  OVARIAN SURFACE INVOLVEMENT: PRESENT  FALLOPIAN TUBE SURFACE INVOLVEMENT: PRESENT  TUMOR SIZE: 7 CM  HISTOLOGIC TYPE: UNDIFFERENTIATED CARCINOMA  HISTOLOGIC GRADE: GRADE 3  OTHER TISSUE/ORGAN INVOLVEMENT: NOT IDENTIFIED  PERITONEAL/ASCITIC FLUID: NOT SUBMITTED/UNKNOWN  REGIONAL LYMPH NODES: NONE SUBMITTED  SPECIAL STUDIES: TUMOR CELLS ARE STRONGLY POSITIVE FOR BOTH CK7 AND EMA IMMUNOSTAINS.  S-100, WILMS TUMOR ANTIGEN AND CD10 ARE FOCALLY POSITIVE. CALRETININ, CD56 AND INHIBIN ARE  ALL NEGATIVE. THE CONTROLS STAIN APPROPRIATELY. THESE FINDINGS SUPPORT THE DIAGNOSIS  OF CARCINOMA RATHER THAN GRANULOSA CELL TUMOR.  TUMOR STAGE: pT1c2 Nx  2. UTERUS, CERVIX AND RIGHT ADNEXA WEIGHING 66 G SHOWING:  INACTIVE ENDOMETRIUM WITH A BENIGN ENDOMETRIAL POLYP  CERVIX WITH NABOTHIAN CYSTS  ATROPHIC RIGHT OVARY AND  UNREMARKABLE RIGHT FALLOPIAN TUBE  Diagnosed by: Antwon Uribe M.D.  (Electronically Signed: 2019-01-09 08:41:42)  Report continued on next page Page    Patient here to re-evaluate her labs since last visit she feels well no fatigue no bleeding  Past Medical History:   Diagnosis Date    Allergy     Arthritis     Breast cancer 12/2018    Breast cyst     Foot pain     GERD (gastroesophageal reflux disease)     Joint pain     Peripheral vascular disease 1/15/2018    Squamous cell carcinoma 04/2016    Right Lower Leg    MUNIR (stress urinary incontinence, female) 4/17/2017     Past Surgical History:   Procedure Laterality Date    APPENDECTOMY      COLON SURGERY      removed about 10 inch    COLONOSCOPY N/A 12/24/2018    Performed by [facility] at [facility]    EXCISION, MASS, FINGER Right 7/9/2018    [facility]    hemorhids      HYSTERECTOMY, TOTAL, ABDOMINAL, WITH BILATERAL SALPINGO-OOPHORECTOMY Bilateral 12/28/2018    [facility] at [facility]    LYSIS, ADHESIONS N/A 12/28/2018    [facility] at [facility]    XI ROBOTIC HYSTERECTOMY N/A 12/28/2018    [facility] at [facility]    XI ROBOTIC SALPINGO-OOPHORECTOMY Bilateral 12/28/2018    Performed by [facility] at [facility] OR    She remains on Nexium to help with intermittent symptoms of gastritis and is on calcium with vitamin-D for overall bone health    Current Outpatient Medications:     acetaminophen (TYLENOL ARTHRITIS ORAL), Take 2 tablets by mouth every 8 (eight) hours as needed., Disp: , Rfl:     calcium-vitamin D3 (CALCIUM 500 + D) 500 mg(1,250mg) -200 unit per tablet, Take 1 tablet by mouth once daily. , Disp: , Rfl:     carboxymethylcellulose (REFRESH PLUS) 0.5 % Dpet, 1 drop daily as needed., Disp: , Rfl:     cyanocobalamin (VITAMIN B-12) 1000 MCG tablet, Take 1,000 mcg by mouth once daily. , Disp: , Rfl:     FLAXSEED ORAL, Take 650 mg by  mouth once daily. , Disp: , Rfl:     ketotifen (ZADITOR) 0.025 % (0.035 %) ophthalmic solution, Place 1 drop into both eyes 2 (two) times daily as needed (Pt reports use approx once/week). , Disp: , Rfl:     MULTIVITAMIN W-MINERALS/LUTEIN (CENTRUM SILVER ORAL), Take 1 tablet by mouth once daily. , Disp: , Rfl:     NEXIUM 20 mg capsule, Take 20 mg by mouth before breakfast. , Disp: , Rfl:     ondansetron (ZOFRAN-ODT) 8 MG TbDL, Take 1 tablet (8 mg total) by mouth every 12 (twelve) hours as needed., Disp: 30 tablet, Rfl: 1    prochlorperazine (COMPAZINE) 10 MG tablet, Take 1 tablet (10 mg total) by mouth every 6 (six) hours as needed., Disp: 30 tablet, Rfl: 1  Review of patient's allergies indicates:   Allergen Reactions    Pcn [penicillins]      Can not remember reaction     Tetanus vaccines and toxoid Swelling     Localized swelling to injection site     Social History     Tobacco Use    Smoking status: Never Smoker    Smokeless tobacco: Never Used   Substance Use Topics    Alcohol use: No     Alcohol/week: 0.0 oz     Comment: rare    Drug use: No     Family History   Adopted: Yes   Problem Relation Age of Onset    Cancer Mother     Stroke Father     Breast cancer Sister     No Known Problems Daughter     No Known Problems Brother     No Known Problems Daughter     Diabetes Brother     Colon cancer Neg Hx     Ovarian cancer Neg Hx        CONSTITUTIONAL: No fevers, chills, night sweats, wt. loss,  SKIN: no rashes or itching  ENT: No headaches, head trauma, vision changes, or eye pain  LYMPH NODES: None noticed   CV: No chest pain, palpitations.   RESP: No dyspnea on exertion, cough, wheezing, + rib pain around diaphragm   GI: No nausea, emesis, severe gastritis heartburn and reflux   : No dysuria, hematuria, urgency no abnormal bleeding no discharge  HEME: No easy bruising, bleeding problems  PSYCHIATRIC: No depression, anxiety, psychosis  NEURO: No seizures, memory loss, no headaches or  "difficulty sleeping  MSK: No arthralgias or joint swelling  ++ muscle cramping in her thighs at times          /60   Pulse 90   Temp 98.5 °F (36.9 °C)   Resp 20   Ht 5' 7" (1.702 m)   Wt 57.9 kg (127 lb 10.3 oz)   SpO2 99%   BMI 19.99 kg/m²     Constitutional: oriented to person, place, and time.  Conversant   HENT:   Head: Normocephalic and atraumatic.   Right Ear: External ear normal.   Left Ear: External ear normal.   Nose: Nose normal.   Mouth/Throat: Oropharynx is clear and moist.   Eyes: Conjunctivae and EOM are normal. Pupils are equal, round  Neck: Normal range of motion. Neck supple. No thyromegaly present.   Cardiovascular: Normal rate, regular rhythm, normal heart sounds  ++  intact distal pulses.    No murmur heard.  Pulmonary/Chest: Effort normal and breath sounds normal.  no wheezes.  no rales.   Abdominal: Soft. Bowel sounds are normal.  no mass. There is no tenderness.    Musculoskeletal: Normal range of motion.  no edema  ++ homans sign   Lymphadenopathy:    no cervical adenopathy.   Neurological: alert and oriented to person, place, and time.  Skin: Skin is warm and dry. No rash noted.   Psychiatric: normal mood and affect.   behavior is normal.   Vitals reviewed.      Lab Results   Component Value Date    WBC 4.50 03/06/2019    HGB 12.0 03/06/2019    HCT 37.6 03/06/2019    MCV 86 03/06/2019     03/06/2019     CMP  Sodium   Date Value Ref Range Status   03/06/2019 136 136 - 145 mmol/L Final     Potassium   Date Value Ref Range Status   03/06/2019 4.2 3.5 - 5.1 mmol/L Final     Chloride   Date Value Ref Range Status   03/06/2019 102 95 - 110 mmol/L Final     CO2   Date Value Ref Range Status   03/06/2019 25 23 - 29 mmol/L Final     Glucose   Date Value Ref Range Status   03/06/2019 89 70 - 110 mg/dL Final     BUN, Bld   Date Value Ref Range Status   03/06/2019 11 8 - 23 mg/dL Final     Creatinine   Date Value Ref Range Status   03/06/2019 0.8 0.5 - 1.4 mg/dL Final     Calcium "   Date Value Ref Range Status   03/06/2019 9.3 8.7 - 10.5 mg/dL Final     Total Protein   Date Value Ref Range Status   03/06/2019 7.0 6.0 - 8.4 g/dL Final     Albumin   Date Value Ref Range Status   03/06/2019 3.6 3.5 - 5.2 g/dL Final     Total Bilirubin   Date Value Ref Range Status   03/06/2019 0.3 0.1 - 1.0 mg/dL Final     Comment:     For infants and newborns, interpretation of results should be based  on gestational age, weight and in agreement with clinical  observations.  Premature Infant recommended reference ranges:  Up to 24 hours.............<8.0 mg/dL  Up to 48 hours............<12.0 mg/dL  3-5 days..................<15.0 mg/dL  6-29 days.................<15.0 mg/dL       Alkaline Phosphatase   Date Value Ref Range Status   03/06/2019 109 55 - 135 U/L Final     AST   Date Value Ref Range Status   03/06/2019 31 10 - 40 U/L Final     ALT   Date Value Ref Range Status   03/06/2019 24 10 - 44 U/L Final     Anion Gap   Date Value Ref Range Status   03/06/2019 9 8 - 16 mmol/L Final     eGFR if    Date Value Ref Range Status   03/06/2019 >60 >60 mL/min/1.73 m^2 Final     eGFR if non    Date Value Ref Range Status   03/06/2019 >60 >60 mL/min/1.73 m^2 Final     Comment:     Calculation used to obtain the estimated glomerular filtration  rate (eGFR) is the CKD-EPI equation.          Chemotherapy follow-up examination  -     CEA; Future; Expected date: 03/07/2019  -     ; Future; Expected date: 03/07/2019  -     CBC auto differential; Standing  -     CMP; Future; Expected date: 03/07/2019  -     CT Chest Abdomen Pelvis W W/O Contrast (XPD); Future; Expected date: 03/07/2019    Elevated cancer antigen 125 (CA-125)   -     ; Future; Expected date: 03/07/2019    Atherosclerosis of aorta    S/P MY-BSO    Malignant neoplasm of ovary, unspecified laterality    Osteopenia, unspecified location         Continue  adjuvant chemo with taxotere plus carboplatin 20 % dose reduction  given her frailty Cycle 2 due   Cycles will be every 3 weeks for a total of 5 doses   Cleared for cycle 2  March 8   Scan after cycle 3   Severe gastritis and esophagitis after chemo   Diet explained : no JUICE or acidic products for four days after chemo   Alopecia is normal with this chemo regimen   After 3 cycles recommend further imaging study to better assess the 5 hepatic hypodensities as well as the multiple right-sided lung nodules.  Repeat tumor markers in approximately 6 weeks being CEA and  check her response to chemotherapy.  RTC after cycle 2   scan after cycle 3   Cont nexium for gerd   If gastritis continues add carafate   She is to continue calcium and vitamin-D while on chemotherapy for bone preservation it is okay for to take multivitamins   Sincerely,  Kenyatta Tejeda MD [facility]    Thank you for allowing me to evaluate and participate in the care of this pleasant patient. Please fell free to call me with any questions or concerns.    Warmly,  Kenyatta Tejeda MD    This note was dictated with Dragon and briefly proofread. Please excuse any sentences that may be unclear or words misspelled    Advance Care Planning     Living Will  During this visit, I engaged the patient in the advance care planning process.  The patient and I reviewed the role for advance directives and their purpose in directing future healthcare if the patient's unable to speak for him/herself.  At this point in time, the patient does have full decision-making capacity.  We discussed different extreme health states that she could experience, and reviewed what kind of medical care she would want in those situations.  The patient communicated that if she were comatose and had little chance of a meaningful recovery, she would not want machines/life-sustaining treatments used.  The patient has completed a living will to reflect these preferences.  The patient  Has  already designated a healthcare power of  to make decisions on  her behalf.   I spent a total of  10  minutes engaging the patient in this advance care planning discussion.

## 2019-03-19 ENCOUNTER — PATIENT OUTREACH (OUTPATIENT)
Dept: ADMINISTRATIVE | Facility: HOSPITAL | Age: 84
End: 2019-03-19

## 2019-03-26 ENCOUNTER — HOSPITAL ENCOUNTER (OUTPATIENT)
Dept: RADIOLOGY | Facility: HOSPITAL | Age: 84
Discharge: HOME | End: 2019-03-26
Attending: INTERNAL MEDICINE
Payer: MEDICARE

## 2019-03-26 DIAGNOSIS — M79.604 PAIN OF RIGHT LEG: ICD-10-CM

## 2019-03-26 DIAGNOSIS — R25.2 LEG CRAMPING: ICD-10-CM

## 2019-03-26 PROCEDURE — 93970 US LOWER EXTREMITY VEINS BILATERAL: ICD-10-PCS | Mod: 26,,, | Performed by: RADIOLOGY

## 2019-03-26 PROCEDURE — 93970 EXTREMITY STUDY: CPT | Mod: TC

## 2019-03-26 PROCEDURE — 93970 EXTREMITY STUDY: CPT | Mod: 26,,, | Performed by: RADIOLOGY

## 2019-03-28 ENCOUNTER — OFFICE VISIT (OUTPATIENT)
Dept: HEMATOLOGY/ONCOLOGY | Facility: CLINIC | Age: 84
End: 2019-03-28
Payer: MEDICARE

## 2019-03-28 VITALS
WEIGHT: 129.88 LBS | SYSTOLIC BLOOD PRESSURE: 155 MMHG | HEART RATE: 80 BPM | OXYGEN SATURATION: 96 % | DIASTOLIC BLOOD PRESSURE: 68 MMHG | TEMPERATURE: 98 F | HEIGHT: 67 IN | BODY MASS INDEX: 20.38 KG/M2 | RESPIRATION RATE: 20 BRPM

## 2019-03-28 DIAGNOSIS — Z90.710 S/P TAH-BSO: ICD-10-CM

## 2019-03-28 DIAGNOSIS — G60.9 IDIOPATHIC PERIPHERAL NEUROPATHY: Primary | ICD-10-CM

## 2019-03-28 DIAGNOSIS — Z90.79 S/P TAH-BSO: ICD-10-CM

## 2019-03-28 DIAGNOSIS — Z09 ONCOLOGY FOLLOW-UP ENCOUNTER: ICD-10-CM

## 2019-03-28 DIAGNOSIS — Z09 CHEMOTHERAPY FOLLOW-UP EXAMINATION: ICD-10-CM

## 2019-03-28 DIAGNOSIS — C56.9 MALIGNANT NEOPLASM OF OVARY, UNSPECIFIED LATERALITY: ICD-10-CM

## 2019-03-28 DIAGNOSIS — Z90.722 S/P TAH-BSO: ICD-10-CM

## 2019-03-28 DIAGNOSIS — N94.89 ADNEXAL MASS: ICD-10-CM

## 2019-03-28 DIAGNOSIS — I70.0 ATHEROSCLEROSIS OF AORTA: ICD-10-CM

## 2019-03-28 PROCEDURE — 1101F PT FALLS ASSESS-DOCD LE1/YR: CPT | Mod: CPTII,S$GLB,, | Performed by: INTERNAL MEDICINE

## 2019-03-28 PROCEDURE — 99999 PR PBB SHADOW E&M-EST. PATIENT-LVL III: ICD-10-PCS | Mod: PBBFAC,,, | Performed by: INTERNAL MEDICINE

## 2019-03-28 PROCEDURE — 99999 PR PBB SHADOW E&M-EST. PATIENT-LVL III: CPT | Mod: PBBFAC,,, | Performed by: INTERNAL MEDICINE

## 2019-03-28 PROCEDURE — 99499 UNLISTED E&M SERVICE: CPT | Mod: S$GLB,,, | Performed by: INTERNAL MEDICINE

## 2019-03-28 PROCEDURE — 99215 OFFICE O/P EST HI 40 MIN: CPT | Mod: S$GLB,,, | Performed by: INTERNAL MEDICINE

## 2019-03-28 PROCEDURE — 99215 PR OFFICE/OUTPT VISIT, EST, LEVL V, 40-54 MIN: ICD-10-PCS | Mod: S$GLB,,, | Performed by: INTERNAL MEDICINE

## 2019-03-28 PROCEDURE — 1101F PR PT FALLS ASSESS DOC 0-1 FALLS W/OUT INJ PAST YR: ICD-10-PCS | Mod: CPTII,S$GLB,, | Performed by: INTERNAL MEDICINE

## 2019-03-28 PROCEDURE — 99499 RISK ADDL DX/OHS AUDIT: ICD-10-PCS | Mod: S$GLB,,, | Performed by: INTERNAL MEDICINE

## 2019-03-28 RX ORDER — ACETAMINOPHEN 325 MG/1
650 TABLET ORAL
Status: CANCELLED
Start: 2019-03-29

## 2019-03-28 RX ORDER — FAMOTIDINE 10 MG/ML
20 INJECTION INTRAVENOUS
Status: CANCELLED | OUTPATIENT
Start: 2019-03-29

## 2019-03-28 RX ORDER — HEPARIN 100 UNIT/ML
500 SYRINGE INTRAVENOUS
Status: CANCELLED | OUTPATIENT
Start: 2019-03-29

## 2019-03-28 RX ORDER — SODIUM CHLORIDE 0.9 % (FLUSH) 0.9 %
10 SYRINGE (ML) INJECTION
Status: CANCELLED | OUTPATIENT
Start: 2019-03-29

## 2019-03-28 NOTE — PROGRESS NOTES
CC : I feel awful     Pau Cook is a 89 y.o.  This is a pleasant 89-year-old female referred from  here for adjuvant chemotherapy.     Pt having a rough bout with chemo after last cycle.  She has had gastritis, nausea diffuse bone pain and listlessness       Ovarian cancer    1/15/2019 Initial Diagnosis     Ovarian cancer          Cancer Staged     Cancer Staging  Ovarian cancer  Staging form: Ovary, Fallopian Tube, and Primary Peritoneal Carcinoma, AJCC 8th Edition  - Clinical stage from 2/28/2019: FIGO Stage IC, calculated as Stage IV (cT1c2, cNX, cM1) - Signed by Kenyatta Tejeda MD on 2/28/2019          Chemotherapy     Treatment Summary   Plan Name: OP GYN DOCETAXEL CARBOPLATIN (AUC) Q3W  Treatment Goal: Control  Status: Active  Start Date: 2/8/2019  End Date: 5/24/2019 (Planned)  Provider: Kenyatta Tejeda MD  Chemotherapy: CARBOplatin (PARAPLATIN) 230 mg in sodium chloride 0.9% 250 mL chemo infusion, 230 mg (100 % of original dose 230 mg), Intravenous, Clinic/HOD 1 time, 1 of 6 cycles  Dose modification: 230 mg (original dose 230 mg, Cycle 1, Reason: MD Discretion)    DOCEtaxel (TAXOTERE) 60 mg/m2 = 95 mg in sodium chloride 0.9% 250 mL chemo infusion, 60 mg/m2 = 95 mg (original dose 96 mg), Intravenous, Clinic/HOD 1 time, 1 of 6 cycles  Dose modification: 96 mg (original dose 96 mg, Cycle 1), 90 mg (original dose 96 mg, Cycle 1), 60 mg/m2 (original dose 96 mg, Cycle 1)              Tumor Markers     Patient's tumor was tested for the following markers: Ca 125 : 91                                                      She is having new skin lesions and losing her hair   Referred by :  Dr. 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  Pathology revealed high-grade undifferentiated carcinoma involving the left tube and ovary with extensive necrosis.  Malignancy was noted involving the ovarian surface as well as the fallopian tube surface.  The tumor was 7 cm in greatest dimension.  This was a grade 3 undifferentiated carcinoma.  No regional lymph nodes were submitted tumor cells were strongly positive for both CK7 and EMA.  Pathologically staged as a T1 see to an affects    Path is as follows   SPECIMEN  1) Left tube and ovary  2) Uterus, cervix, right tube and ovary  FINAL PATHOLOGIC DIAGNOSIS  1. LEFT TUBE AND OVARY SHOWING HIGH-GRADE, UNDIFFERENTIATED CARCINOMA OF THE OVARY  INVOLVING MILAGROS TUBAL TISSUES WITH EXTENSIVE NECROSIS, SEE SYNOPTIC REPORT:  PROCEDURE: TOTAL HYSTERECTOMY AND BILATERAL SALPINGO-OOPHORECTOMY  SPECIMEN INTEGRITY: LEFT OVARY, CAPSULE RUPTURED  TUMOR SITE: LEFT OVARY  OVARIAN SURFACE INVOLVEMENT: PRESENT  FALLOPIAN TUBE SURFACE INVOLVEMENT: PRESENT  TUMOR SIZE: 7 CM  HISTOLOGIC TYPE: UNDIFFERENTIATED CARCINOMA  HISTOLOGIC GRADE: GRADE 3  OTHER TISSUE/ORGAN INVOLVEMENT: NOT IDENTIFIED  PERITONEAL/ASCITIC FLUID: NOT SUBMITTED/UNKNOWN  REGIONAL LYMPH NODES: NONE SUBMITTED  SPECIAL STUDIES: TUMOR CELLS ARE STRONGLY POSITIVE FOR BOTH CK7 AND EMA IMMUNOSTAINS.  S-100, WILMS TUMOR ANTIGEN AND CD10 ARE FOCALLY POSITIVE. CALRETININ, CD56 AND INHIBIN ARE  ALL NEGATIVE. THE CONTROLS STAIN APPROPRIATELY. THESE FINDINGS SUPPORT THE DIAGNOSIS  OF CARCINOMA RATHER THAN GRANULOSA CELL TUMOR.  TUMOR STAGE: pT1c2 Nx  2. UTERUS, CERVIX AND RIGHT ADNEXA WEIGHING 66 G SHOWING:  INACTIVE ENDOMETRIUM WITH A BENIGN  ENDOMETRIAL POLYP  CERVIX WITH NABOTHIAN CYSTS  ATROPHIC RIGHT OVARY AND UNREMARKABLE RIGHT FALLOPIAN TUBE  Diagnosed by: Antwon Uribe M.D.  (Electronically Signed: 2019-01-09 08:41:42)  Report continued on next page Page    Patient here to re-evaluate her labs since last visit she feels well no fatigue no bleeding  Past Medical History:   Diagnosis Date    Allergy     Arthritis     Breast cancer 12/2018    Breast cyst     Foot pain     GERD (gastroesophageal reflux disease)     Joint pain     Peripheral vascular disease 1/15/2018    Squamous cell carcinoma 04/2016    Right Lower Leg    MUNIR (stress urinary incontinence, female) 4/17/2017     Past Surgical History:   Procedure Laterality Date    APPENDECTOMY      COLON SURGERY      removed about 10 inch    COLONOSCOPY N/A 12/24/2018    [facility]    EXCISION, MASS, FINGER Right 7/9/2018    [facility]    hemorhids      HYSTERECTOMY, TOTAL, ABDOMINAL, WITH BILATERAL SALPINGO-OOPHORECTOMY Bilateral 12/28/2018    [facility]    LYSIS, ADHESIONS N/A 12/28/2018    [facility] at [facility] OR    XI ROBOTIC HYSTERECTOMY N/A 12/28/2018    Performed by [facility] at [facility] OR    XI ROBOTIC SALPINGO-OOPHORECTOMY Bilateral 12/28/2018    Performed by [facility] at [facility] OR    She remains on Nexium to help with intermittent symptoms of gastritis and is on calcium with vitamin-D for overall bone health    Current Outpatient Medications:     acetaminophen (TYLENOL ARTHRITIS ORAL), Take 2 tablets by mouth every 8 (eight) hours as needed., Disp: , Rfl:     calcium-vitamin D3 (CALCIUM 500 + D) 500 mg(1,250mg) -200 unit per tablet, Take 1 tablet by mouth once daily. , Disp: , Rfl:     carboxymethylcellulose (REFRESH PLUS) 0.5 % Dpet, 1 drop daily as needed., Disp: , Rfl:     cyanocobalamin (VITAMIN B-12) 1000 MCG tablet, Take 1,000  mcg by mouth once daily. , Disp: , Rfl:     FLAXSEED ORAL, Take 650 mg by mouth once daily. , Disp: , Rfl:     ketotifen (ZADITOR) 0.025 % (0.035 %) ophthalmic solution, Place 1 drop into both eyes 2 (two) times daily as needed (Pt reports use approx once/week). , Disp: , Rfl:     MULTIVITAMIN W-MINERALS/LUTEIN (CENTRUM SILVER ORAL), Take 1 tablet by mouth once daily. , Disp: , Rfl:     NEXIUM 20 mg capsule, Take 20 mg by mouth before breakfast. , Disp: , Rfl:     prochlorperazine (COMPAZINE) 10 MG tablet, Take 1 tablet (10 mg total) by mouth every 6 (six) hours as needed., Disp: 30 tablet, Rfl: 1    ondansetron (ZOFRAN-ODT) 8 MG TbDL, Take 1 tablet (8 mg total) by mouth every 12 (twelve) hours as needed., Disp: 30 tablet, Rfl: 1     She remains on intermittent Compazine with Nexium to help with gastritis and nausea.  She reports the Zofran is not helping her much.  For bone pain she has taken over-the-counter Tylenol Arthritis  Review of patient's allergies indicates:   Allergen Reactions    Pcn [penicillins]      Can not remember reaction     Tetanus vaccines and toxoid Swelling     Localized swelling to injection site     Social History     Tobacco Use    Smoking status: Never Smoker    Smokeless tobacco: Never Used   Substance Use Topics    Alcohol use: No     Alcohol/week: 0.0 oz     Comment: rare    Drug use: No     Family History   Adopted: Yes   Problem Relation Age of Onset    Cancer Mother     Stroke Father     Breast cancer Sister     No Known Problems Daughter     No Known Problems Brother     No Known Problems Daughter     Diabetes Brother     Colon cancer Neg Hx     Ovarian cancer Neg Hx        CONSTITUTIONAL: No fevers, chills, night sweats, wt. loss,  SKIN: no rashes or itching  ENT: No headaches, head trauma, vision changes, or eye pain  LYMPH NODES: None noticed   CV: No chest pain, palpitations.   RESP: No dyspnea on exertion, cough, wheezing, + rib pain around diaphragm   GI:  "No nausea, emesis, severe gastritis heartburn and reflux unrelieved with medication  : No dysuria, hematuria, urgency no abnormal bleeding no discharge  HEME: No easy bruising, bleeding problems  PSYCHIATRIC: No depression, anxiety, psychosis  NEURO: No seizures, memory loss, no headaches or difficulty sleeping  MSK:  Positive bone pain unrelieved with Tylenol over-the-counter  ++ muscle cramping in her thighs at times under relieved with Tylenol over-the-counter         BP (!) 155/68   Pulse 80   Temp 97.8 °F (36.6 °C)   Resp 20   Ht 5' 7" (1.702 m)   Wt 58.9 kg (129 lb 13.6 oz)   SpO2 96%   BMI 20.34 kg/m²     Constitutional: oriented to person, place, and time.  Conversant accompanied by her daughter  HENT:   Head: Normocephalic and atraumatic.   Right Ear: External ear normal. Left Ear: External ear normal.   Nose: Nose normal.   Mouth/Throat: Oropharynx is clear and moist.   Eyes: Conjunctivae and EOM are normal. Pupils are equal, round  Neck: Normal range of motion. Neck supple. No palpable mass no lymphadenopathy noted  Cardiovascular: Normal rate, regular rhythm, normal heart sounds   No murmur heard.  Pulmonary/Chest: Effort normal and breath sounds normal.  no wheezes.  no rales.   Abdominal: Soft. Bowel sounds are normal.  no mass. There is no tenderness.    Musculoskeletal: Normal range of motion.  no edema  ++ homans sign   Neurological: alert and oriented to person, place, and time.  Skin: Skin is warm and dry. No rash noted.  Positive tenting today  Psychiatric:  Angry affect today.  She is definitely indicating that she does not feel well and wants the pain in her ribs to go away  Vitals reviewed.      Lab Results   Component Value Date    WBC 4.20 03/26/2019    HGB 10.3 (L) 03/26/2019    HCT 31.6 (L) 03/26/2019    MCV 84 03/26/2019     03/26/2019     CMP  Sodium   Date Value Ref Range Status   03/26/2019 136 136 - 145 mmol/L Final     Potassium   Date Value Ref Range Status "   03/26/2019 4.3 3.5 - 5.1 mmol/L Final     Chloride   Date Value Ref Range Status   03/26/2019 102 95 - 110 mmol/L Final     CO2   Date Value Ref Range Status   03/26/2019 26 23 - 29 mmol/L Final     Glucose   Date Value Ref Range Status   03/26/2019 99 70 - 110 mg/dL Final     BUN, Bld   Date Value Ref Range Status   03/26/2019 12 8 - 23 mg/dL Final     Creatinine   Date Value Ref Range Status   03/26/2019 0.7 0.5 - 1.4 mg/dL Final     Calcium   Date Value Ref Range Status   03/26/2019 9.0 8.7 - 10.5 mg/dL Final     Total Protein   Date Value Ref Range Status   03/26/2019 6.2 6.0 - 8.4 g/dL Final     Albumin   Date Value Ref Range Status   03/26/2019 3.3 (L) 3.5 - 5.2 g/dL Final     Total Bilirubin   Date Value Ref Range Status   03/26/2019 0.2 0.1 - 1.0 mg/dL Final     Comment:     For infants and newborns, interpretation of results should be based  on gestational age, weight and in agreement with clinical  observations.  Premature Infant recommended reference ranges:  Up to 24 hours.............<8.0 mg/dL  Up to 48 hours............<12.0 mg/dL  3-5 days..................<15.0 mg/dL  6-29 days.................<15.0 mg/dL       Alkaline Phosphatase   Date Value Ref Range Status   03/26/2019 78 55 - 135 U/L Final     AST   Date Value Ref Range Status   03/26/2019 28 10 - 40 U/L Final     ALT   Date Value Ref Range Status   03/26/2019 18 10 - 44 U/L Final     Anion Gap   Date Value Ref Range Status   03/26/2019 8 8 - 16 mmol/L Final     eGFR if    Date Value Ref Range Status   03/26/2019 >60 >60 mL/min/1.73 m^2 Final     eGFR if non    Date Value Ref Range Status   03/26/2019 >60 >60 mL/min/1.73 m^2 Final     Comment:     Calculation used to obtain the estimated glomerular filtration  rate (eGFR) is the CKD-EPI equation.        COMPARISON:  None    FINDINGS:  Right thigh veins: The common femoral, femoral, popliteal, upper greater saphenous, and deep femoral veins are patent and free  of thrombus. The veins are normally compressible and have normal phasic flow and augmentation response.    Right calf veins: The visualized calf veins are patent.    Left thigh veins: The common femoral, femoral, popliteal, upper greater saphenous, and deep femoral veins are patent and free of thrombus. The veins are normally compressible and have normal phasic flow and augmentation response.    Left calf veins: The visualized calf veins are patent.    Miscellaneous: None      Impression       No evidence of deep venous thrombosis in either lower extremity.      Electronically signed by: Alvarez Rodriguez MD  Date: 03/26/2019  Time: 10:21       Ref Range & Xffmb6h ago  CA 1250 - 30 U/mL91 Abnormally high     Ref Range & Cqgwz0b ago  CEA0.0 - 5.0 ng/mL7.6 Abnormally high      There are no diagnoses linked to this encounter.   Blood pressure not well controlled today she will follow-up with her primary care physician to help adjust such  Tumor markers will be assessed next visit  Continue  adjuvant chemo with taxotere plus carboplatin 20 % dose reduction given her frailty   Cleared for chemotherapy  No further dose reduction is indicated  Severe gastritis and esophagitis after chemo :  Patient is not avoiding spicy and acidic foods.  It is imperative that she follow with diet in order prevent gastritis.  She should increase her intake of carbs and not eat 3 hr prior to bedtime  She must watch her salt intake which is likely contributing to her elevated blood pressure  Alopecia is normal with this chemo regimen she thought she was told she would not lose her hair and his clear on her chemo consent  After 3 cycles recommend further imaging study to better assess the 5 hepatic hypodensities as well as the multiple right-sided lung nodules.  Repeat tumor markers in approximately 6 weeks being CEA and  check her response to chemotherapy.  If gastritis continues alexis to Gastroenterology to rule out possible gastric  ulcer with EGD  She is to continue calcium and vitamin-D while on chemotherapy for bone preservation it is okay for to take multivitamins Osteopenia  I would not take any further herbal therapy except a multivitamin  Pain 3/10 bones : take tylenol prn and increase hydratiopn  No falls   Sincerely,  Kenyatta Tejeda MD [facility]    Thank you for allowing me to evaluate and participate in the care of this pleasant patient. Please fell free to call me with any questions or concerns.    Warmly,  Kenyatta Tejeda MD    This note was dictated with Dragon and briefly proofread. Please excuse any sentences that may be unclear or words misspelled    Advance Care Planning     Living Will  During this visit, I engaged the patient in the advance care planning process.  The patient and I reviewed the role for advance directives and their purpose in directing future healthcare if the patient's unable to speak for him/herself.  At this point in time, the patient does have full decision-making capacity.  We discussed different extreme health states that she could experience, and reviewed what kind of medical care she would want in those situations.  The patient communicated that if she were comatose and had little chance of a meaningful recovery, she would not want machines/life-sustaining treatments used.  The patient has completed a living will to reflect these preferences.  The patient  Has  already designated a healthcare power of  to make decisions on her behalf.   I spent a total of  10  minutes engaging the patient in this advance care planning discussion.

## 2019-04-03 ENCOUNTER — OFFICE VISIT (OUTPATIENT)
Dept: FAMILY MEDICINE | Facility: CLINIC | Age: 84
End: 2019-04-03
Payer: MEDICARE

## 2019-04-03 VITALS
TEMPERATURE: 98 F | DIASTOLIC BLOOD PRESSURE: 64 MMHG | WEIGHT: 125.88 LBS | SYSTOLIC BLOOD PRESSURE: 104 MMHG | HEART RATE: 115 BPM | BODY MASS INDEX: 22.3 KG/M2 | HEIGHT: 63 IN

## 2019-04-03 DIAGNOSIS — K59.00 CONSTIPATION, UNSPECIFIED CONSTIPATION TYPE: Primary | ICD-10-CM

## 2019-04-03 PROBLEM — N94.89 ADNEXAL MASS: Status: ACTIVE | Noted: 2019-04-03

## 2019-04-03 PROCEDURE — 99999 PR PBB SHADOW E&M-EST. PATIENT-LVL III: CPT | Mod: PBBFAC,,, | Performed by: FAMILY MEDICINE

## 2019-04-03 PROCEDURE — 99999 PR PBB SHADOW E&M-EST. PATIENT-LVL III: ICD-10-PCS | Mod: PBBFAC,,, | Performed by: FAMILY MEDICINE

## 2019-04-03 PROCEDURE — 99203 OFFICE O/P NEW LOW 30 MIN: CPT | Mod: S$GLB,,, | Performed by: FAMILY MEDICINE

## 2019-04-03 PROCEDURE — 1101F PR PT FALLS ASSESS DOC 0-1 FALLS W/OUT INJ PAST YR: ICD-10-PCS | Mod: CPTII,S$GLB,, | Performed by: FAMILY MEDICINE

## 2019-04-03 PROCEDURE — 1101F PT FALLS ASSESS-DOCD LE1/YR: CPT | Mod: CPTII,S$GLB,, | Performed by: FAMILY MEDICINE

## 2019-04-03 PROCEDURE — 99203 PR OFFICE/OUTPT VISIT, NEW, LEVL III, 30-44 MIN: ICD-10-PCS | Mod: S$GLB,,, | Performed by: FAMILY MEDICINE

## 2019-04-03 NOTE — PLAN OF CARE
START OFF PATHWAY REGIMEN - Ovarian            Docetaxel (Taxotere(R))       Carboplatin (Paraplatin(R))           Additional Orders: Premedicate with dexamethasone 8 mg PO bid for three   days beginning 1 day prior to therapy. Ref: Cecilio P, Chacorta G, Markus HEREDIA, et al.    Phase III Randomized Trial of Docetaxel-Carboplatin Versus   Paclitaxel-Carboplatin as First-line Chemotherapy for Ovarian Carcinoma.  J Dariana   Cancer [place] 2004; 96(22): 4336-8491.    **Always confirm dose/schedule in your pharmacy ordering system**        Patient Characteristics:  Newly Diagnosed, Adjuvant Therapy, Stage IIIC Suboptimal and Stage IV  Therapeutic Status: Newly Diagnosed  AJCC T Category: T1c2  AJCC N Category: NX  AJCC M Category: M1  AJCC 8 Stage Grouping: IV  BRCA Mutation Status: Did Not Order Test  Intent of Therapy:  Non-Curative / Palliative Intent, Discussed with Patient

## 2019-04-03 NOTE — PROGRESS NOTES
Subjective:       Patient ID: Pau Cook is a 89 y.o. female.    Chief Complaint: Establish Care (liver cancer, ovarian cancer )    HPI    Mrs. Cook presents to clinic to establish care.  Currently seeing Dr. 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  Review problem list which states that she had breast cancer.  Patient and daughter both state that she never had a history of breast cancer.  Will remove this from problem list.  Right now patient's only complaint is that she feels constipated.  Usually happens after chemotherapy.  Colace helps a little.  Tries to stay hydrated and eats fiber    Past Medical History:   Diagnosis Date    Allergy     Arthritis     Breast cancer 12/2018    Breast cyst     Foot pain     GERD (gastroesophageal reflux disease)     Joint pain     Peripheral vascular disease 1/15/2018    Squamous cell carcinoma 04/2016    Right Lower Leg    MUNIR (stress urinary incontinence, female) 4/17/2017       Past Surgical History:   Procedure Laterality Date    APPENDECTOMY      COLON SURGERY      removed about 10 inch    COLONOSCOPY N/A 12/24/2018    [facility] ENDO    EXCISION, MASS, FINGER Right 7/9/2018    [facility]    hemorhids      HYSTERECTOMY      HYSTERECTOMY, TOTAL, ABDOMINAL, WITH BILATERAL SALPINGO-OOPHORECTOMY Bilateral 12/28/2018    [facility] OR    LYSIS, ADHESIONS N/A 12/28/2018    [facility] at [facility] OR    XI ROBOTIC HYSTERECTOMY N/A 12/28/2018    [facility] at [facility] OR    XI ROBOTIC SALPINGO-OOPHORECTOMY Bilateral 12/28/2018    [facility] at [facility] OR       Family History   Adopted: Yes   Problem Relation Age of Onset    Cancer Mother     Stroke Father     Breast cancer Sister     Cancer Sister     No Known Problems Daughter     No Known Problems Brother     No Known Problems Daughter      Diabetes Brother     Colon cancer Neg Hx     Ovarian cancer Neg Hx        Review of patient's allergies indicates:   Allergen Reactions    Pcn [penicillins]      Can not remember reaction     Tetanus vaccines and toxoid Swelling     Localized swelling to injection site           Social History     Substance and Sexual Activity   Drug Use No       Social History     Tobacco Use    Smoking status: Never Smoker    Smokeless tobacco: Never Used   Substance Use Topics    Alcohol use: No     Alcohol/week: 0.0 oz     Comment: rare       Social History     Substance and Sexual Activity   Sexual Activity Not Currently         Current Outpatient Medications:     acetaminophen (TYLENOL ARTHRITIS ORAL), Take 2 tablets by mouth every 8 (eight) hours as needed., Disp: , Rfl:     calcium-vitamin D3 (CALCIUM 500 + D) 500 mg(1,250mg) -200 unit per tablet, Take 1 tablet by mouth once daily. , Disp: , Rfl:     carboxymethylcellulose (REFRESH PLUS) 0.5 % Dpet, 1 drop daily as needed., Disp: , Rfl:     cyanocobalamin (VITAMIN B-12) 1000 MCG tablet, Take 1,000 mcg by mouth once daily. , Disp: , Rfl:     FLAXSEED ORAL, Take 650 mg by mouth once daily. , Disp: , Rfl:     ketotifen (ZADITOR) 0.025 % (0.035 %) ophthalmic solution, Place 1 drop into both eyes 2 (two) times daily as needed (Pt reports use approx once/week). , Disp: , Rfl:     MULTIVITAMIN W-MINERALS/LUTEIN (CENTRUM SILVER ORAL), Take 1 tablet by mouth once daily. , Disp: , Rfl:     NEXIUM 20 mg capsule, Take 20 mg by mouth before breakfast. , Disp: , Rfl:     ondansetron (ZOFRAN-ODT) 8 MG TbDL, Take 1 tablet (8 mg total) by mouth every 12 (twelve) hours as needed., Disp: 30 tablet, Rfl: 1    prochlorperazine (COMPAZINE) 10 MG tablet, Take 1 tablet (10 mg total) by mouth every 6 (six) hours as needed., Disp: 30 tablet, Rfl: 1    Review of Systems   Constitutional: Negative for chills and fever.   HENT: Negative for congestion and sore throat.   "  Eyes: Negative for visual disturbance.   Respiratory: Negative for cough and shortness of breath.    Cardiovascular: Negative for chest pain.   Gastrointestinal: Positive for constipation. Negative for abdominal pain, diarrhea, nausea and vomiting.   Genitourinary: Negative for dysuria.   Musculoskeletal: Negative for joint swelling.   Skin: Negative for rash and wound.   Neurological: Negative for dizziness and headaches.   Hematological: Does not bruise/bleed easily.           Objective:          Vitals:    04/03/19 1457   BP: 104/64   Pulse: (!) 115   Temp: 98 °F (36.7 °C)   Weight: 57.1 kg (125 lb 14.1 oz)   Height: 5' 3" (1.6 m)       Physical Exam   Constitutional: She appears well-developed and well-nourished. She is cooperative. No distress.   HENT:   Head: Normocephalic and atraumatic.   Right Ear: Hearing and external ear normal.   Left Ear: Hearing and external ear normal.   Nose: Nose normal.   Eyes: Conjunctivae, EOM and lids are normal.   Cardiovascular: Normal rate, regular rhythm, normal heart sounds and normal pulses.   Pulmonary/Chest: Effort normal and breath sounds normal.   Abdominal: Soft. Normal appearance and bowel sounds are normal. There is no tenderness.   Musculoskeletal: Normal range of motion.   Neurological: She is alert.   Skin: Skin is warm. No rash noted. No cyanosis.   Psychiatric: She has a normal mood and affect. Her speech is normal and behavior is normal. Cognition and memory are normal.   Vitals reviewed.              Assessment/Plan     Pau NELSON was seen today for establish care.    Diagnoses and all orders for this visit:    Constipation, unspecified constipation type       - To help with bowel movements, patient can add on miralax. Patient voiced understanding.         Follow up in about 3 months (around 7/3/2019) for check up or sooner as needed.     Future Appointments   Date Time Provider Department Center   4/11/2019  8:00 AM [facility] CT1 LIMIT 400 LBS [facility] CT SCAN " [facility]   4/15/2019  9:40 AM LAB[facility]   4/16/2019  8:00 AM Joey Cunningham MD [facility]EM ON [facility]   5/7/2019 11:00 AM Amee Rose MD [facility] GYN ONC [facility]   7/3/2019  9:00 AM Rosi Ramires MD [facility]       Rosi Ramires MD  [facility]

## 2019-04-11 ENCOUNTER — HOSPITAL ENCOUNTER (OUTPATIENT)
Dept: RADIOLOGY | Facility: HOSPITAL | Age: 84
Discharge: HOME | End: 2019-04-11
Attending: INTERNAL MEDICINE
Payer: MEDICARE

## 2019-04-11 DIAGNOSIS — Z09 CHEMOTHERAPY FOLLOW-UP EXAMINATION: ICD-10-CM

## 2019-04-11 PROCEDURE — 74177 CT ABD & PELVIS W/CONTRAST: CPT | Mod: 26,,, | Performed by: RADIOLOGY

## 2019-04-11 PROCEDURE — 74177 CT ABD & PELVIS W/CONTRAST: CPT | Mod: TC

## 2019-04-11 PROCEDURE — 74177 CT CHEST ABDOMEN PELVIS WITH CONTRAST (XPD): ICD-10-PCS | Mod: 26,,, | Performed by: RADIOLOGY

## 2019-04-11 PROCEDURE — 71260 CT THORAX DX C+: CPT | Mod: 26,,, | Performed by: RADIOLOGY

## 2019-04-11 PROCEDURE — 71260 CT CHEST ABDOMEN PELVIS WITH CONTRAST (XPD): ICD-10-PCS | Mod: 26,,, | Performed by: RADIOLOGY

## 2019-04-11 PROCEDURE — 25500020 PHARM REV CODE 255: Performed by: INTERNAL MEDICINE

## 2019-04-11 RX ADMIN — IOHEXOL 75 ML: 350 INJECTION, SOLUTION INTRAVENOUS at 08:04

## 2019-04-11 RX ADMIN — IOHEXOL 30 ML: 350 INJECTION, SOLUTION INTRAVENOUS at 08:04

## 2019-04-15 ENCOUNTER — LAB VISIT (OUTPATIENT)
Dept: LAB | Facility: HOSPITAL | Age: 84
End: 2019-04-15
Attending: INTERNAL MEDICINE
Payer: MEDICARE

## 2019-04-15 DIAGNOSIS — Z09 CHEMOTHERAPY FOLLOW-UP EXAMINATION: ICD-10-CM

## 2019-04-15 LAB
ALBUMIN SERPL BCP-MCNC: 3.5 G/DL (ref 3.5–5.2)
ALP SERPL-CCNC: 69 U/L (ref 55–135)
ALT SERPL W/O P-5'-P-CCNC: 19 U/L (ref 10–44)
ANION GAP SERPL CALC-SCNC: 12 MMOL/L (ref 8–16)
ANISOCYTOSIS BLD QL SMEAR: SLIGHT
AST SERPL-CCNC: 29 U/L (ref 10–40)
BASOPHILS NFR BLD: 0 % (ref 0–1.9)
BILIRUB SERPL-MCNC: 0.2 MG/DL (ref 0.1–1)
BUN SERPL-MCNC: 10 MG/DL (ref 8–23)
CALCIUM SERPL-MCNC: 9.5 MG/DL (ref 8.7–10.5)
CHLORIDE SERPL-SCNC: 101 MMOL/L (ref 95–110)
CO2 SERPL-SCNC: 25 MMOL/L (ref 23–29)
CREAT SERPL-MCNC: 0.8 MG/DL (ref 0.5–1.4)
DIFFERENTIAL METHOD: ABNORMAL
EOSINOPHIL NFR BLD: 0 % (ref 0–8)
ERYTHROCYTE [DISTWIDTH] IN BLOOD BY AUTOMATED COUNT: 15.9 % (ref 11.5–14.5)
EST. GFR  (AFRICAN AMERICAN): >60 ML/MIN/1.73 M^2
EST. GFR  (NON AFRICAN AMERICAN): >60 ML/MIN/1.73 M^2
GLUCOSE SERPL-MCNC: 91 MG/DL (ref 70–110)
HCT VFR BLD AUTO: 33.3 % (ref 37–48.5)
HGB BLD-MCNC: 10.8 G/DL (ref 12–16)
HYPOCHROMIA BLD QL SMEAR: ABNORMAL
LYMPHOCYTES NFR BLD: 45 % (ref 18–48)
MCH RBC QN AUTO: 27.3 PG (ref 27–31)
MCHC RBC AUTO-ENTMCNC: 32.3 G/DL (ref 32–36)
MCV RBC AUTO: 85 FL (ref 82–98)
MONOCYTES NFR BLD: 14 % (ref 4–15)
NEUTROPHILS NFR BLD: 41 % (ref 38–73)
OVALOCYTES BLD QL SMEAR: ABNORMAL
PLATELET # BLD AUTO: 305 K/UL (ref 150–350)
PLATELET BLD QL SMEAR: ABNORMAL
PMV BLD AUTO: 7.1 FL (ref 9.2–12.9)
POIKILOCYTOSIS BLD QL SMEAR: SLIGHT
POTASSIUM SERPL-SCNC: 4.2 MMOL/L (ref 3.5–5.1)
PROT SERPL-MCNC: 6.8 G/DL (ref 6–8.4)
RBC # BLD AUTO: 3.94 M/UL (ref 4–5.4)
SODIUM SERPL-SCNC: 138 MMOL/L (ref 136–145)
WBC # BLD AUTO: 2.1 K/UL (ref 3.9–12.7)

## 2019-04-15 PROCEDURE — 85027 COMPLETE CBC AUTOMATED: CPT

## 2019-04-15 PROCEDURE — 85007 BL SMEAR W/DIFF WBC COUNT: CPT

## 2019-04-15 PROCEDURE — 36415 COLL VENOUS BLD VENIPUNCTURE: CPT

## 2019-04-15 PROCEDURE — 80053 COMPREHEN METABOLIC PANEL: CPT

## 2019-04-16 ENCOUNTER — OFFICE VISIT (OUTPATIENT)
Dept: HEMATOLOGY/ONCOLOGY | Facility: CLINIC | Age: 84
End: 2019-04-16
Payer: MEDICARE

## 2019-04-16 VITALS
RESPIRATION RATE: 20 BRPM | WEIGHT: 128.75 LBS | DIASTOLIC BLOOD PRESSURE: 58 MMHG | BODY MASS INDEX: 22.81 KG/M2 | HEART RATE: 56 BPM | SYSTOLIC BLOOD PRESSURE: 118 MMHG | TEMPERATURE: 99 F | OXYGEN SATURATION: 98 % | HEIGHT: 63 IN

## 2019-04-16 DIAGNOSIS — T45.1X5A CHEMOTHERAPY INDUCED NEUTROPENIA: ICD-10-CM

## 2019-04-16 DIAGNOSIS — D70.1 CHEMOTHERAPY INDUCED NEUTROPENIA: ICD-10-CM

## 2019-04-16 DIAGNOSIS — Z09 ONCOLOGY FOLLOW-UP ENCOUNTER: Primary | ICD-10-CM

## 2019-04-16 PROCEDURE — 99499 UNLISTED E&M SERVICE: CPT | Mod: S$GLB,,, | Performed by: INTERNAL MEDICINE

## 2019-04-16 PROCEDURE — 99215 OFFICE O/P EST HI 40 MIN: CPT | Mod: S$GLB,,, | Performed by: INTERNAL MEDICINE

## 2019-04-16 PROCEDURE — 99999 PR PBB SHADOW E&M-EST. PATIENT-LVL III: CPT | Mod: PBBFAC,,, | Performed by: INTERNAL MEDICINE

## 2019-04-16 PROCEDURE — 99215 PR OFFICE/OUTPT VISIT, EST, LEVL V, 40-54 MIN: ICD-10-PCS | Mod: S$GLB,,, | Performed by: INTERNAL MEDICINE

## 2019-04-16 PROCEDURE — 99999 PR PBB SHADOW E&M-EST. PATIENT-LVL III: ICD-10-PCS | Mod: PBBFAC,,, | Performed by: INTERNAL MEDICINE

## 2019-04-16 PROCEDURE — 1101F PT FALLS ASSESS-DOCD LE1/YR: CPT | Mod: CPTII,S$GLB,, | Performed by: INTERNAL MEDICINE

## 2019-04-16 PROCEDURE — 1101F PR PT FALLS ASSESS DOC 0-1 FALLS W/OUT INJ PAST YR: ICD-10-PCS | Mod: CPTII,S$GLB,, | Performed by: INTERNAL MEDICINE

## 2019-04-16 PROCEDURE — 99499 RISK ADDL DX/OHS AUDIT: ICD-10-PCS | Mod: S$GLB,,, | Performed by: INTERNAL MEDICINE

## 2019-04-16 NOTE — PROGRESS NOTES
Pau Cook is a 89 y.o.    This is a pleasant 89-year-old female referred from  here for adjuvant chemotherapy.     Pt having a rough bout with chemo after last cycle.  She has had gastritis, nausea diffuse bone pain and listlessness       Ovarian cancer    1/15/2019 Initial Diagnosis     Ovarian cancer          Cancer Staged     Cancer Staging  Ovarian cancer  Staging form: Ovary, Fallopian Tube, and Primary Peritoneal Carcinoma, AJCC 8th Edition  - Clinical stage from 2/28/2019: FIGO Stage IC, calculated as Stage IV (cT1c2, cNX, cM1) - Signed by Kenyatta Tejeda MD on 2/28/2019          Chemotherapy     Treatment Summary   Plan Name: OP GYN DOCETAXEL CARBOPLATIN (AUC) Q3W  Treatment Goal: Control  Status: Active  Start Date: 2/8/2019  End Date: 5/24/2019 (Planned)  Provider: Kenyatta Tejeda MD  Chemotherapy: CARBOplatin (PARAPLATIN) 230 mg in sodium chloride 0.9% 250 mL chemo infusion, 230 mg (100 % of original dose 230 mg), Intravenous, Clinic/HOD 1 time, 1 of 6 cycles  Dose modification: 230 mg (original dose 230 mg, Cycle 1, Reason: MD Discretion)    DOCEtaxel (TAXOTERE) 60 mg/m2 = 95 mg in sodium chloride 0.9% 250 mL chemo infusion, 60 mg/m2 = 95 mg (original dose 96 mg), Intravenous, Clinic/HOD 1 time, 1 of 6 cycles  Dose modification: 96 mg (original dose 96 mg, Cycle 1), 90 mg (original dose 96 mg, Cycle 1), 60 mg/m2 (original dose 96 mg, Cycle 1)              Tumor Markers     Patient's tumor was tested for the following markers: Ca 125 : 91                                                   4/19/2019 -  Chemotherapy     Treatment Summary   Plan Name: OP GYN DOCETAXEL CARBOPLATIN   Treatment Goal: Control  Status: Active  Start Date: 4/19/2019 (Planned)  End Date: 5/31/2019 (Planned)  Provider: Kenyatta Tejeda MD  Chemotherapy: CARBOplatin (PARAPLATIN) 240 mg in sodium chloride 0.9% 250 mL chemo infusion, 240 mg (original dose ), Intravenous, Clinic/HOD 1 time, 0 of 3 cycles  Dose  modification:   (Cycle 1, Reason: Other (see comments))  DOCEtaxel (TAXOTERE) 96 mg in sodium chloride 0.9% 250 mL chemo infusion, 95 mg (original dose ), Intravenous, Clinic/HOD 1 time, 0 of 3 cycles  Dose modification: 96 mg (Cycle 1)            She is having new skin lesions and losing her hair   Referred by :  Dr. 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  Pathology revealed high-grade undifferentiated carcinoma involving the left tube and ovary with extensive necrosis.  Malignancy was noted involving the ovarian surface as well as the fallopian tube surface.  The tumor was 7 cm in greatest dimension.  This was a grade 3 undifferentiated carcinoma.  No regional lymph nodes were submitted tumor cells were strongly positive for both CK7 and EMA.  Pathologically staged as a T1 see to an affects    Path is as follows   SPECIMEN  1) Left tube and ovary  2) Uterus, cervix, right tube and ovary  FINAL PATHOLOGIC DIAGNOSIS  1. LEFT TUBE AND OVARY SHOWING HIGH-GRADE, UNDIFFERENTIATED CARCINOMA OF THE OVARY  INVOLVING MILAGROS TUBAL TISSUES WITH EXTENSIVE NECROSIS, SEE SYNOPTIC REPORT:  PROCEDURE: TOTAL HYSTERECTOMY AND BILATERAL SALPINGO-OOPHORECTOMY  SPECIMEN INTEGRITY: LEFT OVARY, CAPSULE RUPTURED  TUMOR SITE: LEFT OVARY  OVARIAN SURFACE INVOLVEMENT: PRESENT  FALLOPIAN TUBE SURFACE INVOLVEMENT: PRESENT  TUMOR SIZE: 7  CM  HISTOLOGIC TYPE: UNDIFFERENTIATED CARCINOMA  HISTOLOGIC GRADE: GRADE 3  OTHER TISSUE/ORGAN INVOLVEMENT: NOT IDENTIFIED  PERITONEAL/ASCITIC FLUID: NOT SUBMITTED/UNKNOWN  REGIONAL LYMPH NODES: NONE SUBMITTED  SPECIAL STUDIES: TUMOR CELLS ARE STRONGLY POSITIVE FOR BOTH CK7 AND EMA IMMUNOSTAINS.  S-100, WILMS TUMOR ANTIGEN AND CD10 ARE FOCALLY POSITIVE. CALRETININ, CD56 AND INHIBIN ARE  ALL NEGATIVE. THE CONTROLS STAIN APPROPRIATELY. THESE FINDINGS SUPPORT THE DIAGNOSIS  OF CARCINOMA RATHER THAN GRANULOSA CELL TUMOR.  TUMOR STAGE: pT1c2 Nx  2. UTERUS, CERVIX AND RIGHT ADNEXA WEIGHING 66 G SHOWING:  INACTIVE ENDOMETRIUM WITH A BENIGN ENDOMETRIAL POLYP  CERVIX WITH NABOTHIAN CYSTS  ATROPHIC RIGHT OVARY AND UNREMARKABLE RIGHT FALLOPIAN TUBE  Diagnosed by: Antwon Uribe M.D.  (Electronically Signed: 2019-01-09 08:41:42)  Report continued on next page Page    Patient here to re-evaluate her labs since last visit she feels well no fatigue no bleeding  Past Medical History:   Diagnosis Date    Allergy     Arthritis     Breast cyst     Foot pain     GERD (gastroesophageal reflux disease)     Joint pain     Peripheral vascular disease 1/15/2018    Squamous cell carcinoma 04/2016    Right Lower Leg    MUNIR (stress urinary incontinence, female) 4/17/2017     Past Surgical History:   Procedure Laterality Date    APPENDECTOMY      COLON SURGERY      removed about 10 inch    COLONOSCOPY N/A 12/24/2018    [facility]    EXCISION, MASS, FINGER Right 7/9/2018    [facility]    hemorhids      HYSTERECTOMY      HYSTERECTOMY, TOTAL, ABDOMINAL, WITH BILATERAL SALPINGO-OOPHORECTOMY Bilateral 12/28/2018    [facility]    LYSIS, ADHESIONS N/A 12/28/2018    [facility] at [facility] OR    XI ROBOTIC HYSTERECTOMY N/A 12/28/2018    [facility] OR    XI ROBOTIC SALPINGO-OOPHORECTOMY  Bilateral 12/28/2018    [facility] at [facility] OR    She remains on Nexium to help with intermittent symptoms of gastritis and is on calcium with vitamin-D for overall bone health    Current Outpatient Medications:     acetaminophen (TYLENOL ARTHRITIS ORAL), Take 2 tablets by mouth every 8 (eight) hours as needed., Disp: , Rfl:     calcium-vitamin D3 (CALCIUM 500 + D) 500 mg(1,250mg) -200 unit per tablet, Take 1 tablet by mouth once daily. , Disp: , Rfl:     carboxymethylcellulose (REFRESH PLUS) 0.5 % Dpet, 1 drop daily as needed., Disp: , Rfl:     cyanocobalamin (VITAMIN B-12) 1000 MCG tablet, Take 1,000 mcg by mouth once daily. , Disp: , Rfl:     FLAXSEED ORAL, Take 650 mg by mouth once daily. , Disp: , Rfl:     ketotifen (ZADITOR) 0.025 % (0.035 %) ophthalmic solution, Place 1 drop into both eyes 2 (two) times daily as needed (Pt reports use approx once/week). , Disp: , Rfl:     MULTIVITAMIN W-MINERALS/LUTEIN (CENTRUM SILVER ORAL), Take 1 tablet by mouth once daily. , Disp: , Rfl:     NEXIUM 20 mg capsule, Take 20 mg by mouth before breakfast. , Disp: , Rfl:     ondansetron (ZOFRAN-ODT) 8 MG TbDL, Take 1 tablet (8 mg total) by mouth every 12 (twelve) hours as needed., Disp: 30 tablet, Rfl: 1    prochlorperazine (COMPAZINE) 10 MG tablet, Take 1 tablet (10 mg total) by mouth every 6 (six) hours as needed., Disp: 30 tablet, Rfl: 1     She remains on intermittent Compazine with Nexium to help with gastritis and nausea.  She reports the Zofran is not helping her much.  For bone pain she has taken over-the-counter Tylenol Arthritis  Review of patient's allergies indicates:   Allergen Reactions    Pcn [penicillins]      Can not remember reaction     Tetanus vaccines and toxoid Swelling     Localized swelling to injection site     Social History     Tobacco Use    Smoking status: Never Smoker    Smokeless tobacco: Never Used   Substance Use Topics    Alcohol use: No     Alcohol/week: 0.0  oz     Comment: rare    Drug use: No     Family History   Adopted: Yes   Problem Relation Age of Onset    Cancer Mother     Stroke Father     Breast cancer Sister     Cancer Sister     No Known Problems Daughter     No Known Problems Brother     No Known Problems Daughter     Diabetes Brother     Colon cancer Neg Hx     Ovarian cancer Neg Hx        CONSTITUTIONAL: No fevers, chills, night sweats, wt. loss,  SKIN: no rashes or itching  ENT: No headaches, head trauma, vision changes, or eye pain  LYMPH NODES: None noticed   CV: No chest pain, palpitations.   RESP: No dyspnea on exertion, cough, wheezing, + rib pain around diaphragm   GI: No nausea, emesis, severe gastritis heartburn and reflux unrelieved with medication  : No dysuria, hematuria, urgency no abnormal bleeding no discharge  HEME: No easy bruising, bleeding problems  PSYCHIATRIC: No depression, anxiety, psychosis  NEURO: No seizures, memory loss, no headaches or difficulty sleeping  MSK:  Positive bone pain unrelieved with Tylenol over-the-counter  ++ muscle cramping in her thighs at times under relieved with Tylenol over-the-counter       Wt Readings from Last 3 Encounters:   04/16/19 58.4 kg (128 lb 12 oz)   04/03/19 57.1 kg (125 lb 14.1 oz)   03/28/19 58.9 kg (129 lb 13.6 oz)     Temp Readings from Last 3 Encounters:   04/16/19 98.7 °F (37.1 °C)   04/03/19 98 °F (36.7 °C)   03/28/19 97.8 °F (36.6 °C)     BP Readings from Last 3 Encounters:   04/16/19 (!) 118/58   04/03/19 104/64   03/28/19 (!) 155/68     Pulse Readings from Last 3 Encounters:   04/16/19 (!) 56   04/03/19 (!) 115   03/28/19 80         Constitutional: oriented to person, place, and time.  Conversant accompanied by her daughter  HENT:  Normocephalic atraumatic pupils equal round reactive to light extraocular muscle intact.  Cardiovascular: Normal rate, regular rhythm, normal heart sounds   No murmur heard.  Pulmonary/Chest: Effort normal and breath sounds normal.  no wheezes.   no rales.   Abdominal: Soft. Bowel sounds are normal.  no mass. There is no tenderness.    Musculoskeletal: Normal range of motion.  no edema  ++ homans sign   Neurological: alert and oriented to person, place, and time.  Skin: Skin is warm and dry. No rash noted.  Positive tenting today  Psychiatric:    Vitals reviewed.      Lab Results   Component Value Date    WBC 2.10 (L) 04/15/2019    HGB 10.8 (L) 04/15/2019    HCT 33.3 (L) 04/15/2019    MCV 85 04/15/2019     04/15/2019     CMP  Sodium   Date Value Ref Range Status   04/15/2019 138 136 - 145 mmol/L Final     Potassium   Date Value Ref Range Status   04/15/2019 4.2 3.5 - 5.1 mmol/L Final     Chloride   Date Value Ref Range Status   04/15/2019 101 95 - 110 mmol/L Final     CO2   Date Value Ref Range Status   04/15/2019 25 23 - 29 mmol/L Final     Glucose   Date Value Ref Range Status   04/15/2019 91 70 - 110 mg/dL Final     BUN, Bld   Date Value Ref Range Status   04/15/2019 10 8 - 23 mg/dL Final     Creatinine   Date Value Ref Range Status   04/15/2019 0.8 0.5 - 1.4 mg/dL Final     Calcium   Date Value Ref Range Status   04/15/2019 9.5 8.7 - 10.5 mg/dL Final     Total Protein   Date Value Ref Range Status   04/15/2019 6.8 6.0 - 8.4 g/dL Final     Albumin   Date Value Ref Range Status   04/15/2019 3.5 3.5 - 5.2 g/dL Final     Total Bilirubin   Date Value Ref Range Status   04/15/2019 0.2 0.1 - 1.0 mg/dL Final     Comment:     For infants and newborns, interpretation of results should be based  on gestational age, weight and in agreement with clinical  observations.  Premature Infant recommended reference ranges:  Up to 24 hours.............<8.0 mg/dL  Up to 48 hours............<12.0 mg/dL  3-5 days..................<15.0 mg/dL  6-29 days.................<15.0 mg/dL       Alkaline Phosphatase   Date Value Ref Range Status   04/15/2019 69 55 - 135 U/L Final     AST   Date Value Ref Range Status   04/15/2019 29 10 - 40 U/L Final     ALT   Date Value Ref  Range Status   04/15/2019 19 10 - 44 U/L Final     Anion Gap   Date Value Ref Range Status   04/15/2019 12 8 - 16 mmol/L Final     eGFR if    Date Value Ref Range Status   04/15/2019 >60 >60 mL/min/1.73 m^2 Final     eGFR if non    Date Value Ref Range Status   04/15/2019 >60 >60 mL/min/1.73 m^2 Final     Comment:     Calculation used to obtain the estimated glomerular filtration  rate (eGFR) is the CKD-EPI equation.        COMPARISON:  None    FINDINGS:  Right thigh veins: The common femoral, femoral, popliteal, upper greater saphenous, and deep femoral veins are patent and free of thrombus. The veins are normally compressible and have normal phasic flow and augmentation response.    Right calf veins: The visualized calf veins are patent.    Left thigh veins: The common femoral, femoral, popliteal, upper greater saphenous, and deep femoral veins are patent and free of thrombus. The veins are normally compressible and have normal phasic flow and augmentation response.    Left calf veins: The visualized calf veins are patent.    Miscellaneous: None      Impression       No evidence of deep venous thrombosis in either lower extremity.      Electronically signed by: Alvarez Rodriguez MD  Date: 03/26/2019  Time: 10:21       Ref Range & [id number] ago  CA 1250 - 30 U/mL91 Abnormally high     Ref Range & Vzzqe2i ago  CEA0.0 - 5.0 ng/mL7.6 Abnormally high      Assessment and plan      Blood pressure not well controlled today she will follow-up with her primary care physician to help adjust such    [] 04/16/2019:  Patient here for pretreatment evaluation adjuvant chemotherapy with Taxotere and carboplatin AUC of 4 for the treatment of ovarian cancer.  She is here for pre treatment evaluation of cycle 4 day 1 of therapy which is scheduled on 04/19/2019.  Review of the CBC shows WBC of 2.1 with suspected ANC of 800.  I will do Neupogen 300 mcg x 2 starting 04/17/2019    [] RTC  on 04/22/2019 for  blood work check      [] Review CT scan results with patient stable no progression of disease    Severe gastritis and esophagitis after chemo :  Patient is not avoiding spicy and acidic foods.  It is imperative that she follow with diet in order prevent gastritis.  She should increase her intake of carbs and not eat 3 hr prior to bedtime    She must watch her salt intake which is likely contributing to her elevated blood pressure  Alopecia is normal with this chemo regimen she thought she was told she would not lose her hair and his clear on her chemo consent    After 3 cycles recommend further imaging study to better assess the 5 hepatic hypodensities as well as the multiple right-sided lung nodules.  Repeat tumor markers in approximately 6 weeks being CEA and  check her response to chemotherapy.    If gastritis continues alexis to Gastroenterology to rule out possible gastric ulcer with EGD    She is to continue calcium and vitamin-D while on chemotherapy for bone preservation it is okay for to take multivitamins Osteopenia    I would not take any further herbal therapy except a multivitamin    Pain 3/10 bones : take tylenol prn and increase hydratiopn    No falls     Thank you for allowing me to evaluate and participate in the care of this pleasant patient. Please fell free to call me with any questions or concerns.        This note was dictated with Dragon and briefly proofread. Please excuse any sentences that may be unclear or words misspelled    Advance Care Planning     Living Will  During this visit, I engaged the patient in the advance care planning process.  The patient and I reviewed the role for advance directives and their purpose in directing future healthcare if the patient's unable to speak for him/herself.  At this point in time, the patient does have full decision-making capacity.  We discussed different extreme health states that she could experience, and reviewed what kind of medical care  she would want in those situations.  The patient communicated that if she were comatose and had little chance of a meaningful recovery, she would not want machines/life-sustaining treatments used.  The patient has completed a living will to reflect these preferences.  The patient  Has  already designated a healthcare power of  to make decisions on her behalf.   I spent a total of  10  minutes engaging the patient in this advance care planning discussion.

## 2019-04-22 NOTE — PROGRESS NOTES
CC my face is itching     Pau Cook is a 89 y.o.    This is a 89-year-old female referred from  here for adjuvant chemotherapy.   Patient with both of her daughters and she is aggravated today.  She is having some lower extremity edema and she states that she has developed of a rash that is keeping her up at night because it is pruritic  She does have appointment to follow-up with Dr. Rose May 6       Ovarian cancer    1/15/2019 Initial Diagnosis     Ovarian cancer          Cancer Staged     Cancer Staging  Ovarian cancer  Staging form: Ovary, Fallopian Tube, and Primary Peritoneal Carcinoma, AJCC 8th Edition  - Clinical stage from 2/28/2019: FIGO Stage IC, calculated as Stage IV (cT1c2, cNX, cM1) - Signed by Kenyatta Tejeda MD on 2/28/2019          Chemotherapy     Treatment Summary   Plan Name: OP GYN DOCETAXEL CARBOPLATIN (AUC) Q3W  Treatment Goal: Control  Status: Active  Start Date: 2/8/2019  End Date: 5/24/2019 (Planned)  Provider: Kenyatta Tejeda MD  Chemotherapy: CARBOplatin (PARAPLATIN) 230 mg in sodium chloride 0.9% 250 mL chemo infusion, 230 mg (100 % of original dose 230 mg), Intravenous, Clinic/HOD 1 time, 1 of 6 cycles  Dose modification: 230 mg (original dose 230 mg, Cycle 1, Reason: MD Discretion)    DOCEtaxel (TAXOTERE) 60 mg/m2 = 95 mg in sodium chloride 0.9% 250 mL chemo infusion, 60 mg/m2 = 95 mg (original dose 96 mg), Intravenous, Clinic/HOD 1 time, 1 of 6 cycles  Dose modification: 96 mg (original dose 96 mg, Cycle 1), 90 mg (original dose 96 mg, Cycle 1), 60 mg/m2 (original dose 96 mg, Cycle 1)              Tumor Markers     Patient's tumor was tested for the following markers: Ca 125 : 91                                                   4/19/2019 -  Chemotherapy     Treatment Summary   Plan Name: OP GYN DOCETAXEL CARBOPLATIN   Treatment Goal: Control  Status: Active  Start Date: 4/19/2019 (Planned)  End Date: 5/31/2019 (Planned)  Provider: Kenyatta Tejeda MD  Chemotherapy:  CARBOplatin (PARAPLATIN) 240 mg in sodium chloride 0.9% 250 mL chemo infusion, 240 mg (original dose ), Intravenous, Clinic/HOD 1 time, 0 of 3 cycles  Dose modification:   (Cycle 1, Reason: Other (see comments))  DOCEtaxel (TAXOTERE) 96 mg in sodium chloride 0.9% 250 mL chemo infusion, 95 mg (original dose ), Intravenous, Clinic/HOD 1 time, 0 of 3 cycles  Dose modification: 96 mg (Cycle 1)            She is having new skin lesions and losing her hair   Referred by :  Dr. 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  Pathology revealed high-grade undifferentiated carcinoma involving the left tube and ovary with extensive necrosis.  Malignancy was noted involving the ovarian surface as well as the fallopian tube surface.  The tumor was 7 cm in greatest dimension.  This was a grade 3 undifferentiated carcinoma.  No regional lymph nodes were submitted tumor cells were strongly positive for both CK7 and EMA.  Pathologically staged as a T1 see to an affects    Path is as follows   SPECIMEN  1) Left tube and ovary  2) Uterus, cervix, right tube and ovary  FINAL PATHOLOGIC DIAGNOSIS  1. LEFT TUBE AND OVARY SHOWING HIGH-GRADE, UNDIFFERENTIATED CARCINOMA OF THE OVARY  INVOLVING MILAGROS TUBAL TISSUES WITH EXTENSIVE NECROSIS, SEE SYNOPTIC REPORT:  PROCEDURE: TOTAL HYSTERECTOMY AND BILATERAL SALPINGO-OOPHORECTOMY  SPECIMEN INTEGRITY: LEFT  OVARY, CAPSULE RUPTURED  TUMOR SITE: LEFT OVARY  OVARIAN SURFACE INVOLVEMENT: PRESENT  FALLOPIAN TUBE SURFACE INVOLVEMENT: PRESENT  TUMOR SIZE: 7 CM  HISTOLOGIC TYPE: UNDIFFERENTIATED CARCINOMA  HISTOLOGIC GRADE: GRADE 3  OTHER TISSUE/ORGAN INVOLVEMENT: NOT IDENTIFIED  PERITONEAL/ASCITIC FLUID: NOT SUBMITTED/UNKNOWN  REGIONAL LYMPH NODES: NONE SUBMITTED  SPECIAL STUDIES: TUMOR CELLS ARE STRONGLY POSITIVE FOR BOTH CK7 AND EMA IMMUNOSTAINS.  S-100, WILMS TUMOR ANTIGEN AND CD10 ARE FOCALLY POSITIVE. CALRETININ, CD56 AND INHIBIN ARE  ALL NEGATIVE. THE CONTROLS STAIN APPROPRIATELY. THESE FINDINGS SUPPORT THE DIAGNOSIS  OF CARCINOMA RATHER THAN GRANULOSA CELL TUMOR.  TUMOR STAGE: pT1c2 Nx  2. UTERUS, CERVIX AND RIGHT ADNEXA WEIGHING 66 G SHOWING:  INACTIVE ENDOMETRIUM WITH A BENIGN ENDOMETRIAL POLYP  CERVIX WITH NABOTHIAN CYSTS  ATROPHIC RIGHT OVARY AND UNREMARKABLE RIGHT FALLOPIAN TUBE  Diagnosed by: Antwon Uribe M.D.  (Electronically Signed: 2019-01-09 08:41:42)  Report continued on next page Page    Patient here to re-evaluate her labs since last visit she feels well no fatigue no bleeding  Past Medical History:   Diagnosis Date    Allergy     Arthritis     Breast cyst     Foot pain     GERD (gastroesophageal reflux disease)     Joint pain     Peripheral vascular disease 1/15/2018    Squamous cell carcinoma 04/2016    Right Lower Leg    MUNIR (stress urinary incontinence, female) 4/17/2017     Past Surgical History:   Procedure Laterality Date    APPENDECTOMY      COLON SURGERY      removed about 10 inch    COLONOSCOPY N/A 12/24/2018    Performed [facility]al [facility]    EXCISION, MASS, FINGER Right 7/9/2018    [facility]    hemorhids      HYSTERECTOMY      HYSTERECTOMY, TOTAL, ABDOMINAL, WITH BILATERAL SALPINGO-OOPHORECTOMY Bilateral 12/28/2018    [facility]    LYSIS, ADHESIONS N/A 12/28/2018    Performed by Amee MALCOLM  MD [place] [facility] OR    XI ROBOTIC HYSTERECTOMY N/A 12/28/2018    Performed by [facility]e Rose [facility] at [facility] OR    XI ROBOTIC SALPINGO-OOPHORECTOMY Bilateral 12/28/2018    Performed by [facility]e Rose [facility] at [facility] OR    She remains on Nexium to help with intermittent symptoms of gastritis and is on calcium with vitamin-D for overall bone health    Current Outpatient Medications:     acetaminophen (TYLENOL ARTHRITIS ORAL), Take 2 tablets by mouth every 8 (eight) hours as needed., Disp: , Rfl:     calcium-vitamin D3 (CALCIUM 500 + D) 500 mg(1,250mg) -200 unit per tablet, Take 1 tablet by mouth once daily. , Disp: , Rfl:     carboxymethylcellulose (REFRESH PLUS) 0.5 % Dpet, 1 drop daily as needed., Disp: , Rfl:     cyanocobalamin (VITAMIN B-12) 1000 MCG tablet, Take 1,000 mcg by mouth once daily. , Disp: , Rfl:     FLAXSEED ORAL, Take 650 mg by mouth once daily. , Disp: , Rfl:     ketotifen (ZADITOR) 0.025 % (0.035 %) ophthalmic solution, Place 1 drop into both eyes 2 (two) times daily as needed (Pt reports use approx once/week). , Disp: , Rfl:     MULTIVITAMIN W-MINERALS/LUTEIN (CENTRUM SILVER ORAL), Take 1 tablet by mouth once daily. , Disp: , Rfl:     NEXIUM 20 mg capsule, Take 20 mg by mouth before breakfast. , Disp: , Rfl:     ondansetron (ZOFRAN-ODT) 8 MG TbDL, Take 1 tablet (8 mg total) by mouth every 12 (twelve) hours as needed., Disp: 30 tablet, Rfl: 1    prochlorperazine (COMPAZINE) 10 MG tablet, Take 1 tablet (10 mg total) by mouth every 6 (six) hours as needed., Disp: 30 tablet, Rfl: 1     She remains on intermittent Compazine with Nexium to help with gastritis and nausea.  She reports the Zofran is not helping her much.  For bone pain she has taken over-the-counter Tylenol Arthritis  Review of patient's allergies indicates:   Allergen Reactions    Pcn [penicillins]      Can not remember reaction     Tetanus vaccines and toxoid Swelling     Localized swelling to injection site     Social History      Tobacco Use    Smoking status: Never Smoker    Smokeless tobacco: Never Used   Substance Use Topics    Alcohol use: No     Alcohol/week: 0.0 oz     Comment: rare    Drug use: No     Family History   Adopted: Yes   Problem Relation Age of Onset    Cancer Mother     Stroke Father     Breast cancer Sister     Cancer Sister     No Known Problems Daughter     No Known Problems Brother     No Known Problems Daughter     Diabetes Brother     Colon cancer Neg Hx     Ovarian cancer Neg Hx      New skin rash   CONSTITUTIONAL: No fevers, chills, night sweats, wt. loss,  SKIN:  Positive rash  ENT: No headaches, head trauma, vision changes  LYMPH NODES: None noticed   CV: No chest pain, palpitations.   RESP: No dyspnea on exertion, cough, wheezing, no further rib pain  GI: No nausea, emesis, severe gastritis heartburn and reflux unrelieved with medication  : No dysuria, hematuria, urgency   HEME: No easy bruising, bleeding problems  PSYCHIATRIC: No depression, anxiety, psychosis  NEURO: No seizures, memory loss, no headaches  MSK:  Positive bone pain unrelieved with Tylenol over-the-counter  ++ muscle cramping in her thighs at times under relieved with Tylenol over-the-counter  Rash on her face and legs swelling in her legs     Wt Readings from Last 3 Encounters:   04/16/19 58.4 kg (128 lb 12 oz)   04/03/19 57.1 kg (125 lb 14.1 oz)   03/28/19 58.9 kg (129 lb 13.6 oz)     Temp Readings from Last 3 Encounters:   04/16/19 98.7 °F (37.1 °C)   04/03/19 98 °F (36.7 °C)   03/28/19 97.8 °F (36.6 °C)     BP Readings from Last 3 Encounters:   04/16/19 (!) 118/58   04/03/19 104/64   03/28/19 (!) 155/68     Pulse Readings from Last 3 Encounters:   04/16/19 (!) 56   04/03/19 (!) 115   03/28/19 80       Constitutional: oriented to person, place, and time.     HENT:   Head: Normocephalic and atraumatic.   Right Ear: External ear normal.   Left Ear: External ear normal.   Nose: Nose normal.   Mouth/Throat: Oropharynx is  clear and moist.   Eyes: Conjunctivae and EOM are normal. Pupils are equal,  Neck: Normal range of motion. Neck supple.   Cardiovascular: Normal rate, regular rhythm, normal heart sounds   No murmur heard.  Pulmonary/Chest: Effort normal and breath sounds normal.  no wheezes.  no rales.   Abdominal: Soft. Bowel sounds are normal.  no mass.   Musculoskeletal: Normal range of motion.  + edema of ankles    Lymphadenopathy:    no cervical adenopathy.   Neurological: alert and oriented to person, place, Rash sandpaper erythematous longer face arms and legs  Lower extremity edema also changes to her legs consistent with peripheral vascular disease  Psychiatric: normal mood and affect.   behavior is normal.   Vitals reviewed.          Lab Results   Component Value Date    WBC 4.20 04/24/2019    RBC 4.23 04/24/2019    HGB 11.6 (L) 04/24/2019    HCT 35.9 (L) 04/24/2019    MCV 85 04/24/2019    MCH 27.3 04/24/2019    MCHC 32.2 04/24/2019    RDW 17.0 (H) 04/24/2019     04/24/2019    MPV 7.0 (L) 04/24/2019    GRAN 2.7 04/24/2019    GRAN 64.2 04/24/2019    LYMPH 1.0 04/24/2019    LYMPH 24.7 04/24/2019    MONO 0.4 04/24/2019    MONO 10.5 04/24/2019    EOS 0.0 04/24/2019    BASO 0.00 04/24/2019    EOSINOPHIL 0.3 04/24/2019    BASOPHIL 0.3 04/24/2019       CMP  Sodium   Date Value Ref Range Status   04/15/2019 138 136 - 145 mmol/L Final     Potassium   Date Value Ref Range Status   04/15/2019 4.2 3.5 - 5.1 mmol/L Final     Chloride   Date Value Ref Range Status   04/15/2019 101 95 - 110 mmol/L Final     CO2   Date Value Ref Range Status   04/15/2019 25 23 - 29 mmol/L Final     Glucose   Date Value Ref Range Status   04/15/2019 91 70 - 110 mg/dL Final     BUN, Bld   Date Value Ref Range Status   04/15/2019 10 8 - 23 mg/dL Final     Creatinine   Date Value Ref Range Status   04/15/2019 0.8 0.5 - 1.4 mg/dL Final     Calcium   Date Value Ref Range Status   04/15/2019 9.5 8.7 - 10.5 mg/dL Final     Total Protein   Date Value Ref  Range Status   04/15/2019 6.8 6.0 - 8.4 g/dL Final     Albumin   Date Value Ref Range Status   04/15/2019 3.5 3.5 - 5.2 g/dL Final     Total Bilirubin   Date Value Ref Range Status   04/15/2019 0.2 0.1 - 1.0 mg/dL Final     Comment:     For infants and newborns, interpretation of results should be based  on gestational age, weight and in agreement with clinical  observations.  Premature Infant recommended reference ranges:  Up to 24 hours.............<8.0 mg/dL  Up to 48 hours............<12.0 mg/dL  3-5 days..................<15.0 mg/dL  6-29 days.................<15.0 mg/dL       Alkaline Phosphatase   Date Value Ref Range Status   04/15/2019 69 55 - 135 U/L Final     AST   Date Value Ref Range Status   04/15/2019 29 10 - 40 U/L Final     ALT   Date Value Ref Range Status   04/15/2019 19 10 - 44 U/L Final     Anion Gap   Date Value Ref Range Status   04/15/2019 12 8 - 16 mmol/L Final     eGFR if    Date Value Ref Range Status   04/15/2019 >60 >60 mL/min/1.73 m^2 Final     eGFR if non    Date Value Ref Range Status   04/15/2019 >60 >60 mL/min/1.73 m^2 Final     Comment:     Calculation used to obtain the estimated glomerular filtration  rate (eGFR) is the CKD-EPI equation.        COMPARISON:  None    FINDINGS:  Right thigh veins: The common femoral, femoral, popliteal, upper greater saphenous, and deep femoral veins are patent and free of thrombus. The veins are normally compressible and have normal phasic flow and augmentation response.    Right calf veins: The visualized calf veins are patent.    Left thigh veins: The common femoral, femoral, popliteal, upper greater saphenous, and deep femoral veins are patent and free of thrombus. The veins are normally compressible and have normal phasic flow and augmentation response.    Left calf veins: The visualized calf veins are patent.    Miscellaneous: None      Impression       No evidence of deep venous thrombosis in either lower  extremity.      Electronically signed by: Alvarez Rodriguez MD  Date: 03/26/2019  Time: 10:21       Ref Range & [id number] ago  CA 1250 - 30 U/mL91 Abnormally high   Ref Range & [id number] ago  CEA0.0 - 5.0 ng/mL7.6 Abnormally high      Rash  -     dexamethasone (DECADRON) 4 MG Tab; Take 1 tablet (4 mg total) by mouth every 12 (twelve) hours. for 5 days  Dispense: 10 tablet; Refill: 1    Osteopenia, unspecified location    Oncology follow-up encounter    Malignant neoplasm of ovary, unspecified laterality    Atherosclerosis of aorta    Idiopathic peripheral neuropathy    Other orders  -     palonosetron (ALOXI) 0.25 mg, dexamethasone (DECADRON) 20 mg in sodium chloride 0.9% 50 mL  -     diphenhydrAMINE (BENADRYL) 50 mg in sodium chloride 0.9% 50 mL IVPB  -     famotidine (PF) injection 20 mg  -     DOCEtaxel (TAXOTERE) 96 mg in sodium chloride 0.9% 250 mL chemo infusion  -     CARBOplatin (PARAPLATIN) 240 mg in sodium chloride 0.9% 250 mL chemo infusion  -     sodium chloride 0.9% bolus 500 mL  -     sodium chloride 0.9% flush 10 mL  -     heparin, porcine (PF) 100 unit/mL injection flush 500 Units  -     alteplase injection 2 mg  -     filgrastim (NEUPOGEN) injection 300 mcg/mL     Reiterated findings of her CT scan times with patient review 50 min with more than 50% in counseling  Edema NO salt in diet   Cleared for chemo   Will need neupogen   Start oral decadron daily   Elevate legs at home   [place] hose for legs I had explain how she is to measure legs and by couple of different sizes to alternate them accordingly  RTC after seeing DR Rose    Advance Care Planning     Living Will  During this visit, I engaged the patient in the advance care planning process.  The patient and I reviewed the role for advance directives and their purpose in directing future healthcare if the patient's unable to speak for him/herself.  At this point in time, the patient does have full decision-making capacity.  We discussed different  extreme health states that she could experience, and reviewed what kind of medical care she would want in those situations.  The patient communicated that if she were comatose and had little chance of a meaningful recovery, she would not want machines/life-sustaining treatments used.  The patient has completed a living will to reflect these preferences.  The patient  Has  already designated a healthcare power of  to make decisions on her behalf.   I spent a total of  10  minutes engaging the patient in this advance care planning discussion.

## 2019-04-24 ENCOUNTER — LAB VISIT (OUTPATIENT)
Dept: LAB | Facility: HOSPITAL | Age: 84
End: 2019-04-24
Attending: INTERNAL MEDICINE
Payer: MEDICARE

## 2019-04-24 DIAGNOSIS — Z09 ONCOLOGY FOLLOW-UP ENCOUNTER: ICD-10-CM

## 2019-04-24 LAB
ALBUMIN SERPL BCP-MCNC: 3.6 G/DL (ref 3.5–5.2)
ALP SERPL-CCNC: 91 U/L (ref 55–135)
ALT SERPL W/O P-5'-P-CCNC: 24 U/L (ref 10–44)
ANION GAP SERPL CALC-SCNC: 9 MMOL/L (ref 8–16)
AST SERPL-CCNC: 34 U/L (ref 10–40)
BASOPHILS # BLD AUTO: 0 K/UL (ref 0–0.2)
BASOPHILS NFR BLD: 0.3 % (ref 0–1.9)
BILIRUB SERPL-MCNC: 0.3 MG/DL (ref 0.1–1)
BUN SERPL-MCNC: 9 MG/DL (ref 8–23)
CALCIUM SERPL-MCNC: 9.5 MG/DL (ref 8.7–10.5)
CHLORIDE SERPL-SCNC: 102 MMOL/L (ref 95–110)
CO2 SERPL-SCNC: 28 MMOL/L (ref 23–29)
CREAT SERPL-MCNC: 0.8 MG/DL (ref 0.5–1.4)
DIFFERENTIAL METHOD: ABNORMAL
EOSINOPHIL # BLD AUTO: 0 K/UL (ref 0–0.5)
EOSINOPHIL NFR BLD: 0.3 % (ref 0–8)
ERYTHROCYTE [DISTWIDTH] IN BLOOD BY AUTOMATED COUNT: 17 % (ref 11.5–14.5)
EST. GFR  (AFRICAN AMERICAN): >60 ML/MIN/1.73 M^2
EST. GFR  (NON AFRICAN AMERICAN): >60 ML/MIN/1.73 M^2
GLUCOSE SERPL-MCNC: 101 MG/DL (ref 70–110)
HCT VFR BLD AUTO: 35.9 % (ref 37–48.5)
HGB BLD-MCNC: 11.6 G/DL (ref 12–16)
LYMPHOCYTES # BLD AUTO: 1 K/UL (ref 1–4.8)
LYMPHOCYTES NFR BLD: 24.7 % (ref 18–48)
MCH RBC QN AUTO: 27.3 PG (ref 27–31)
MCHC RBC AUTO-ENTMCNC: 32.2 G/DL (ref 32–36)
MCV RBC AUTO: 85 FL (ref 82–98)
MONOCYTES # BLD AUTO: 0.4 K/UL (ref 0.3–1)
MONOCYTES NFR BLD: 10.5 % (ref 4–15)
NEUTROPHILS # BLD AUTO: 2.7 K/UL (ref 1.8–7.7)
NEUTROPHILS NFR BLD: 64.2 % (ref 38–73)
PLATELET # BLD AUTO: 233 K/UL (ref 150–350)
PMV BLD AUTO: 7 FL (ref 9.2–12.9)
POTASSIUM SERPL-SCNC: 4.3 MMOL/L (ref 3.5–5.1)
PROT SERPL-MCNC: 6.8 G/DL (ref 6–8.4)
RBC # BLD AUTO: 4.23 M/UL (ref 4–5.4)
SODIUM SERPL-SCNC: 139 MMOL/L (ref 136–145)
WBC # BLD AUTO: 4.2 K/UL (ref 3.9–12.7)

## 2019-04-24 PROCEDURE — 36415 COLL VENOUS BLD VENIPUNCTURE: CPT

## 2019-04-24 PROCEDURE — 85025 COMPLETE CBC W/AUTO DIFF WBC: CPT

## 2019-04-24 PROCEDURE — 80053 COMPREHEN METABOLIC PANEL: CPT

## 2019-04-25 ENCOUNTER — OFFICE VISIT (OUTPATIENT)
Dept: HEMATOLOGY/ONCOLOGY | Facility: CLINIC | Age: 84
End: 2019-04-25
Payer: MEDICARE

## 2019-04-25 VITALS
DIASTOLIC BLOOD PRESSURE: 75 MMHG | TEMPERATURE: 99 F | HEART RATE: 88 BPM | WEIGHT: 130.75 LBS | BODY MASS INDEX: 23.17 KG/M2 | SYSTOLIC BLOOD PRESSURE: 121 MMHG | RESPIRATION RATE: 20 BRPM | OXYGEN SATURATION: 98 % | HEIGHT: 63 IN

## 2019-04-25 DIAGNOSIS — I70.0 ATHEROSCLEROSIS OF AORTA: ICD-10-CM

## 2019-04-25 DIAGNOSIS — C56.9 MALIGNANT NEOPLASM OF OVARY, UNSPECIFIED LATERALITY: ICD-10-CM

## 2019-04-25 DIAGNOSIS — M85.80 OSTEOPENIA, UNSPECIFIED LOCATION: ICD-10-CM

## 2019-04-25 DIAGNOSIS — G60.9 IDIOPATHIC PERIPHERAL NEUROPATHY: ICD-10-CM

## 2019-04-25 DIAGNOSIS — R21 RASH: ICD-10-CM

## 2019-04-25 DIAGNOSIS — Z09 ONCOLOGY FOLLOW-UP ENCOUNTER: ICD-10-CM

## 2019-04-25 DIAGNOSIS — R21 RASH: Primary | ICD-10-CM

## 2019-04-25 PROCEDURE — 99499 RISK ADDL DX/OHS AUDIT: ICD-10-PCS | Mod: S$GLB,,, | Performed by: INTERNAL MEDICINE

## 2019-04-25 PROCEDURE — 1101F PT FALLS ASSESS-DOCD LE1/YR: CPT | Mod: CPTII,S$GLB,, | Performed by: INTERNAL MEDICINE

## 2019-04-25 PROCEDURE — 99215 OFFICE O/P EST HI 40 MIN: CPT | Mod: S$GLB,,, | Performed by: INTERNAL MEDICINE

## 2019-04-25 PROCEDURE — 99499 UNLISTED E&M SERVICE: CPT | Mod: S$GLB,,, | Performed by: INTERNAL MEDICINE

## 2019-04-25 PROCEDURE — 99215 PR OFFICE/OUTPT VISIT, EST, LEVL V, 40-54 MIN: ICD-10-PCS | Mod: S$GLB,,, | Performed by: INTERNAL MEDICINE

## 2019-04-25 PROCEDURE — 99999 PR PBB SHADOW E&M-EST. PATIENT-LVL III: CPT | Mod: PBBFAC,,, | Performed by: INTERNAL MEDICINE

## 2019-04-25 PROCEDURE — 1101F PR PT FALLS ASSESS DOC 0-1 FALLS W/OUT INJ PAST YR: ICD-10-PCS | Mod: CPTII,S$GLB,, | Performed by: INTERNAL MEDICINE

## 2019-04-25 PROCEDURE — 99999 PR PBB SHADOW E&M-EST. PATIENT-LVL III: ICD-10-PCS | Mod: PBBFAC,,, | Performed by: INTERNAL MEDICINE

## 2019-04-25 RX ORDER — DEXAMETHASONE 4 MG/1
4 TABLET ORAL EVERY 12 HOURS
Qty: 10 TABLET | Refills: 1 | Status: SHIPPED | OUTPATIENT
Start: 2019-04-25 | End: 2019-04-30

## 2019-04-25 RX ORDER — DEXAMETHASONE 4 MG/1
4 TABLET ORAL EVERY 12 HOURS
Qty: 10 TABLET | Refills: 1 | Status: SHIPPED | OUTPATIENT
Start: 2019-04-25 | End: 2019-04-25 | Stop reason: SDUPTHER

## 2019-04-25 RX ORDER — FAMOTIDINE 10 MG/ML
20 INJECTION INTRAVENOUS
Status: CANCELLED | OUTPATIENT
Start: 2019-04-26

## 2019-04-25 RX ORDER — SODIUM CHLORIDE 0.9 % (FLUSH) 0.9 %
10 SYRINGE (ML) INJECTION
Status: CANCELLED | OUTPATIENT
Start: 2019-04-26

## 2019-04-25 RX ORDER — HEPARIN 100 UNIT/ML
500 SYRINGE INTRAVENOUS
Status: CANCELLED | OUTPATIENT
Start: 2019-04-26

## 2019-04-25 NOTE — TELEPHONE ENCOUNTER
Spoke to pt to inform her that I sent a request for medication to dr. Tejeda, just waiting on her approval. Pt verbalized understanding.

## 2019-04-25 NOTE — TELEPHONE ENCOUNTER
----- Message from Lizz Crawford sent at 4/25/2019  3:48 PM CDT -----  Contact: patient  Type: Needs Medical Advice    Who Called:  Patient and daughter  Symptoms (please be specific):  na  How long has patient had these symptoms:  na  Pharmacy name and phone #:   [id number] [place] - [address]  [address] [place] [address]  Phone: [phone] Fax: [phone]  Best Call Back Number:   Additional Information: Patient went to pharmacy [facility] to  medication dexamethasone (DECADRON) 4 MG Tab but pharmacy does not have it. Patient states Do Not send to mail service. System says it was sent to pharmacy but I'm not sure where it went. Please call back to advise.Thanks!

## 2019-05-06 ENCOUNTER — TELEPHONE (OUTPATIENT)
Dept: GYNECOLOGIC ONCOLOGY | Facility: CLINIC | Age: 84
End: 2019-05-06

## 2019-05-07 ENCOUNTER — OFFICE VISIT (OUTPATIENT)
Dept: GYNECOLOGIC ONCOLOGY | Facility: CLINIC | Age: 84
End: 2019-05-07
Payer: MEDICARE

## 2019-05-07 VITALS
SYSTOLIC BLOOD PRESSURE: 108 MMHG | WEIGHT: 127 LBS | BODY MASS INDEX: 22.5 KG/M2 | HEIGHT: 63 IN | HEART RATE: 85 BPM | DIASTOLIC BLOOD PRESSURE: 60 MMHG

## 2019-05-07 DIAGNOSIS — C56.9 MALIGNANT NEOPLASM OF OVARY, UNSPECIFIED LATERALITY: Primary | ICD-10-CM

## 2019-05-07 PROCEDURE — 99213 PR OFFICE/OUTPT VISIT, EST, LEVL III, 20-29 MIN: ICD-10-PCS | Mod: S$GLB,,, | Performed by: OBSTETRICS & GYNECOLOGY

## 2019-05-07 PROCEDURE — 99999 PR PBB SHADOW E&M-EST. PATIENT-LVL III: ICD-10-PCS | Mod: PBBFAC,,, | Performed by: OBSTETRICS & GYNECOLOGY

## 2019-05-07 PROCEDURE — 99213 OFFICE O/P EST LOW 20 MIN: CPT | Mod: S$GLB,,, | Performed by: OBSTETRICS & GYNECOLOGY

## 2019-05-07 PROCEDURE — 99999 PR PBB SHADOW E&M-EST. PATIENT-LVL III: CPT | Mod: PBBFAC,,, | Performed by: OBSTETRICS & GYNECOLOGY

## 2019-05-07 PROCEDURE — 1101F PR PT FALLS ASSESS DOC 0-1 FALLS W/OUT INJ PAST YR: ICD-10-PCS | Mod: CPTII,S$GLB,, | Performed by: OBSTETRICS & GYNECOLOGY

## 2019-05-07 PROCEDURE — 1101F PT FALLS ASSESS-DOCD LE1/YR: CPT | Mod: CPTII,S$GLB,, | Performed by: OBSTETRICS & GYNECOLOGY

## 2019-05-07 RX ORDER — LANOLIN ALCOHOL/MO/W.PET/CERES
CREAM (GRAM) TOPICAL
COMMUNITY
Start: 2018-07-03 | End: 2019-05-07 | Stop reason: SDUPTHER

## 2019-05-07 RX ORDER — DEXTROMETHORPHAN HYDROBROMIDE, GUAIFENESIN 5; 100 MG/5ML; MG/5ML
LIQUID ORAL
Status: ON HOLD | COMMUNITY
Start: 2018-07-03 | End: 2019-06-03 | Stop reason: HOSPADM

## 2019-05-07 NOTE — PROGRESS NOTES
"Subjective:      Patient ID: Pau Cook is a 89 y.o. female.    Chief Complaint: Malignant neoplasm of ovary, unspecified laterality (3 mth )      HPI  Referred by Dr. Aneta Carrizales and Ingrid Duarte NP for PMB and solid adnexal mass.      Pelvic US 18  Uterus: Size: 6.5 x 3.1 x 4.0 cm  Endometrium: Mildly thickened in this post menopausal patient, measuring 8 mm.  Right ovary: Not definitively seen.  Left ovary: Size: 6.9 x 4.3 x 4.5 cm  Appearance: Left adnexal mass identified which appears to arise from the ovary with scattered calcifications, measuring 3.0 x 3.3 x 2.4 cm.  Findings are concerning for malignancy.      70     Prior abdominal surgeries include colectomy for "excessive intestines" and diverticular disease, open appendectomy.   Last colonoscopy 2012 normal.      Family history significant for sister with breast cancer, mom  with cancer in the abdomen of unknown origin.      CT CAP 18  Impression       1. 6.1-cm suspicious mixed cystic/solid left adnexal mass and at least 5 suspicious hypodense hepatic lesions measuring up to 4.0-cm.  Findings are highly concerning for primary ovarian malignancy with hepatic metastases.  2. Small area of ill-defined opacification within the right lower lobe which may represent atelectasis or developing consolidation.  However, given the above described findings, metastasis cannot be excluded and attention to this area on follow-up imaging is recommended.  3. Additional findings as above.      CEA 10---colonoscopy 18 unremarkable for colon primary.      S/p MY/BSO/extensive lysis of adhesions 18. Uncomplicated post operative course.  FINAL PATHOLOGIC DIAGNOSIS  LEFT TUBE AND OVARY SHOWING HIGH-GRADE, UNDIFFERENTIATED CARCINOMA OF THE OVARY  INVOLVING MILAGROS TUBAL TISSUES WITH EXTENSIVE NECROSIS     Stage IVB high grade ovarian cancer, intraparenchymal liver mets.     Established with Dr. Tejeda for adjuvant chemotherapy. "   Carbo/taxotere s/p 4 cycles.  Interval imaging 4/11/19 CT CAP- No evidence for progression of regional or distant metastatic disease.  Liver lesions are smaller, lung lesions are unchanged and there are no new lesions.   15 (pretreatment 70)    Presents today for follow up visit.     Review of Systems   Constitutional: Negative for appetite change, chills, fatigue and fever.   HENT: Negative for mouth sores.    Respiratory: Negative for cough and shortness of breath.    Cardiovascular: Negative for leg swelling.   Gastrointestinal: Negative for abdominal pain, blood in stool, constipation and diarrhea.   Endocrine: Negative for cold intolerance.   Genitourinary: Negative for dysuria and vaginal bleeding.   Musculoskeletal: Negative for myalgias.   Skin: Negative for rash.   Allergic/Immunologic: Negative.    Neurological: Negative for weakness and numbness.   Hematological: Negative for adenopathy. Does not bruise/bleed easily.   Psychiatric/Behavioral: Negative for confusion.       Objective:   Physical Exam:   Constitutional: She is oriented to person, place, and time. She appears well-developed and well-nourished.    HENT:   Head: Normocephalic and atraumatic.    Eyes: Pupils are equal, round, and reactive to light. EOM are normal.    Neck: Normal range of motion. Neck supple. No thyromegaly present.    Cardiovascular: Normal rate, regular rhythm and intact distal pulses.     Pulmonary/Chest: Effort normal and breath sounds normal. No respiratory distress. She has no wheezes.        Abdominal: Soft. Bowel sounds are normal. She exhibits no distension, no ascites and no mass. There is no tenderness.             Musculoskeletal: Normal range of motion and moves all extremeties.      Lymphadenopathy:     She has no cervical adenopathy.        Right: No supraclavicular adenopathy present.        Left: No supraclavicular adenopathy present.    Neurological: She is alert and oriented to person, place, and time.     Skin: Skin is warm and dry. No rash noted.    Psychiatric: She has a normal mood and affect.       Assessment:     1. Malignant neoplasm of ovary, unspecified laterality        Plan:   No orders of the defined types were placed in this encounter.    We reviewed her treatment course thus far.   Undergoing adjuvant chemotherapy with carbo/taxotere with Dr. Tejeda.  CT shows partial response/stable disease. No new or worsening of disease.    has normalized.     I discussed continuation of chemotherapy for a total of 6 cycles and then interval assessment with CT imaging again.   RTC with me in 3 months/after completion of chemo and imaging for treatment planning discussions.   Discussed options pending results would be continuation of chemotherapy, observation, or maintenance strategy.       The patient and her granddaughter were allowed to ask questions and all were answered.

## 2019-05-07 NOTE — LETTER
May 7, 2019        Kenyatta Tejeda MD  [address] [facility] [address]  [address]  [place] [address]             [facility] [address]  [address], [address]  [facility] [address]  Phone: [phone]  Fax: [phone]   Patient: Pau Cook   MR Number: [id number]   YOB: 1929   Date of Visit: 5/7/2019     Dear Dr. Tejeda,     Please find attached progress note.     Sincerely,      Amee Rose MD            CC  No Recipients    Enclosure

## 2019-05-08 ENCOUNTER — PES CALL (OUTPATIENT)
Dept: ADMINISTRATIVE | Facility: CLINIC | Age: 84
End: 2019-05-08

## 2019-05-15 ENCOUNTER — LAB VISIT (OUTPATIENT)
Dept: LAB | Facility: HOSPITAL | Age: 84
End: 2019-05-15
Attending: INTERNAL MEDICINE
Payer: MEDICARE

## 2019-05-15 DIAGNOSIS — R97.1 ELEVATED CANCER ANTIGEN 125 (CA-125): ICD-10-CM

## 2019-05-15 DIAGNOSIS — Z09 CHEMOTHERAPY FOLLOW-UP EXAMINATION: ICD-10-CM

## 2019-05-15 LAB
BASOPHILS # BLD AUTO: 0 K/UL (ref 0–0.2)
BASOPHILS NFR BLD: 0.6 % (ref 0–1.9)
CANCER AG125 SERPL-ACNC: 12 U/ML (ref 0–30)
CEA SERPL-MCNC: 9 NG/ML (ref 0–5)
DIFFERENTIAL METHOD: ABNORMAL
EOSINOPHIL # BLD AUTO: 0 K/UL (ref 0–0.5)
EOSINOPHIL NFR BLD: 0.2 % (ref 0–8)
ERYTHROCYTE [DISTWIDTH] IN BLOOD BY AUTOMATED COUNT: 17.8 % (ref 11.5–14.5)
HCT VFR BLD AUTO: 32 % (ref 37–48.5)
HGB BLD-MCNC: 10.4 G/DL (ref 12–16)
LYMPHOCYTES # BLD AUTO: 1.1 K/UL (ref 1–4.8)
LYMPHOCYTES NFR BLD: 28.7 % (ref 18–48)
MCH RBC QN AUTO: 27.8 PG (ref 27–31)
MCHC RBC AUTO-ENTMCNC: 32.6 G/DL (ref 32–36)
MCV RBC AUTO: 85 FL (ref 82–98)
MONOCYTES # BLD AUTO: 0.4 K/UL (ref 0.3–1)
MONOCYTES NFR BLD: 11.8 % (ref 4–15)
NEUTROPHILS # BLD AUTO: 2.2 K/UL (ref 1.8–7.7)
NEUTROPHILS NFR BLD: 58.7 % (ref 38–73)
PLATELET # BLD AUTO: 310 K/UL (ref 150–350)
PMV BLD AUTO: 6.6 FL (ref 9.2–12.9)
RBC # BLD AUTO: 3.75 M/UL (ref 4–5.4)
WBC # BLD AUTO: 3.8 K/UL (ref 3.9–12.7)

## 2019-05-15 PROCEDURE — 82378 CARCINOEMBRYONIC ANTIGEN: CPT

## 2019-05-15 PROCEDURE — 36415 COLL VENOUS BLD VENIPUNCTURE: CPT

## 2019-05-15 PROCEDURE — 85025 COMPLETE CBC W/AUTO DIFF WBC: CPT

## 2019-05-15 PROCEDURE — 86304 IMMUNOASSAY TUMOR CA 125: CPT

## 2019-05-16 ENCOUNTER — OFFICE VISIT (OUTPATIENT)
Dept: HEMATOLOGY/ONCOLOGY | Facility: CLINIC | Age: 84
End: 2019-05-16
Payer: MEDICARE

## 2019-05-16 ENCOUNTER — TELEPHONE (OUTPATIENT)
Dept: HEMATOLOGY/ONCOLOGY | Facility: CLINIC | Age: 84
End: 2019-05-16

## 2019-05-16 VITALS
HEART RATE: 88 BPM | HEIGHT: 63 IN | DIASTOLIC BLOOD PRESSURE: 55 MMHG | BODY MASS INDEX: 23.01 KG/M2 | OXYGEN SATURATION: 99 % | RESPIRATION RATE: 12 BRPM | SYSTOLIC BLOOD PRESSURE: 112 MMHG | TEMPERATURE: 99 F | WEIGHT: 129.88 LBS

## 2019-05-16 DIAGNOSIS — D70.1 CHEMOTHERAPY INDUCED NEUTROPENIA: Primary | ICD-10-CM

## 2019-05-16 DIAGNOSIS — T45.1X5A CHEMOTHERAPY INDUCED NEUTROPENIA: Primary | ICD-10-CM

## 2019-05-16 DIAGNOSIS — C56.9 MALIGNANT NEOPLASM OF OVARY, UNSPECIFIED LATERALITY: ICD-10-CM

## 2019-05-16 PROCEDURE — 3074F PR MOST RECENT SYSTOLIC BLOOD PRESSURE < 130 MM HG: ICD-10-PCS | Mod: CPTII,S$GLB,, | Performed by: INTERNAL MEDICINE

## 2019-05-16 PROCEDURE — 99999 PR PBB SHADOW E&M-EST. PATIENT-LVL IV: ICD-10-PCS | Mod: PBBFAC,,, | Performed by: INTERNAL MEDICINE

## 2019-05-16 PROCEDURE — 99499 RISK ADDL DX/OHS AUDIT: ICD-10-PCS | Mod: S$GLB,,, | Performed by: INTERNAL MEDICINE

## 2019-05-16 PROCEDURE — 3078F PR MOST RECENT DIASTOLIC BLOOD PRESSURE < 80 MM HG: ICD-10-PCS | Mod: CPTII,S$GLB,, | Performed by: INTERNAL MEDICINE

## 2019-05-16 PROCEDURE — 3078F DIAST BP <80 MM HG: CPT | Mod: CPTII,S$GLB,, | Performed by: INTERNAL MEDICINE

## 2019-05-16 PROCEDURE — 99499 UNLISTED E&M SERVICE: CPT | Mod: S$GLB,,, | Performed by: INTERNAL MEDICINE

## 2019-05-16 PROCEDURE — 1101F PR PT FALLS ASSESS DOC 0-1 FALLS W/OUT INJ PAST YR: ICD-10-PCS | Mod: CPTII,S$GLB,, | Performed by: INTERNAL MEDICINE

## 2019-05-16 PROCEDURE — 99215 OFFICE O/P EST HI 40 MIN: CPT | Mod: S$GLB,,, | Performed by: INTERNAL MEDICINE

## 2019-05-16 PROCEDURE — 99215 PR OFFICE/OUTPT VISIT, EST, LEVL V, 40-54 MIN: ICD-10-PCS | Mod: S$GLB,,, | Performed by: INTERNAL MEDICINE

## 2019-05-16 PROCEDURE — 99999 PR PBB SHADOW E&M-EST. PATIENT-LVL IV: CPT | Mod: PBBFAC,,, | Performed by: INTERNAL MEDICINE

## 2019-05-16 PROCEDURE — 3074F SYST BP LT 130 MM HG: CPT | Mod: CPTII,S$GLB,, | Performed by: INTERNAL MEDICINE

## 2019-05-16 PROCEDURE — 1101F PT FALLS ASSESS-DOCD LE1/YR: CPT | Mod: CPTII,S$GLB,, | Performed by: INTERNAL MEDICINE

## 2019-05-16 RX ORDER — SODIUM CHLORIDE 0.9 % (FLUSH) 0.9 %
10 SYRINGE (ML) INJECTION
Status: CANCELLED | OUTPATIENT
Start: 2019-05-21

## 2019-05-16 RX ORDER — HEPARIN 100 UNIT/ML
500 SYRINGE INTRAVENOUS
Status: CANCELLED | OUTPATIENT
Start: 2019-05-21

## 2019-05-16 RX ORDER — FAMOTIDINE 10 MG/ML
20 INJECTION INTRAVENOUS
Status: CANCELLED | OUTPATIENT
Start: 2019-05-21

## 2019-05-16 NOTE — TELEPHONE ENCOUNTER
Left message for pt to inform her that her chemo is scheduled for Monday at 7am. Further chemo is scheduled for 8am. Instructed pt to call .

## 2019-05-16 NOTE — PROGRESS NOTES
CC I saw the surgeon    I do not want to go to the hospital for those shots because they did not even ask me my name     Pau Cook is a 89 y.o.    This is a 89-year-old female referred from  here for adjuvant chemotherapy. Cycle 5 due   Patient is here with her    Saw  Dr. Rose May 6, recommends further chemo        Ovarian cancer    1/15/2019 Initial Diagnosis     Ovarian cancer          Cancer Staged     Cancer Staging  Ovarian cancer  Staging form: Ovary, Fallopian Tube, and Primary Peritoneal Carcinoma, AJCC 8th Edition  - Clinical stage from 2/28/2019: FIGO Stage IC, calculated as Stage IV (cT1c2, cNX, cM1) - Signed by Kenyatta Tejeda MD on 2/28/2019          Chemotherapy     Treatment Summary   Plan Name: OP GYN DOCETAXEL CARBOPLATIN (AUC) Q3W  Treatment Goal: Control  Status: Active  Start Date: 2/8/2019  End Date: 5/24/2019 (Planned)  Provider: Kenyatta Tejeda MD  Chemotherapy: CARBOplatin (PARAPLATIN) 230 mg in sodium chloride 0.9% 250 mL chemo infusion, 230 mg (100 % of original dose 230 mg), Intravenous, Clinic/HOD 1 time, 1 of 6 cycles  Dose modification: 230 mg (original dose 230 mg, Cycle 1, Reason: MD Discretion)    DOCEtaxel (TAXOTERE) 60 mg/m2 = 95 mg in sodium chloride 0.9% 250 mL chemo infusion, 60 mg/m2 = 95 mg (original dose 96 mg), Intravenous, Clinic/HOD 1 time, 1 of 6 cycles  Dose modification: 96 mg (original dose 96 mg, Cycle 1), 90 mg (original dose 96 mg, Cycle 1), 60 mg/m2 (original dose 96 mg, Cycle 1)              Tumor Markers     Patient's tumor was tested for the following markers: Ca 125 : 91                                                   4/19/2019 -  Chemotherapy     Treatment Summary   Plan Name: OP GYN DOCETAXEL CARBOPLATIN   Treatment Goal: Control  Status: Active  Start Date: 4/26/2019  End Date: 6/7/2019 (Planned)  Provider: Kenyatta Tejeda MD  Chemotherapy: CARBOplatin (PARAPLATIN) 240 mg in sodium chloride 0.9% 250 mL chemo infusion, 240 mg (100 % of  original dose 242 mg), Intravenous, Clinic/HOD 1 time, 1 of 3 cycles  Dose modification:   (original dose 242 mg, Cycle 1, Reason: Other (see comments))  DOCEtaxel (TAXOTERE) 96 mg in sodium chloride 0.9% 250 mL chemo infusion, 95 mg (100 % of original dose 96 mg), Intravenous, Clinic/HOD 1 time, 1 of 3 cycles  Dose modification: 96 mg (original dose 96 mg, Cycle 1)            She is having new skin lesions and losing her hair   Referred by :  Dr. 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  Pathology revealed high-grade undifferentiated carcinoma involving the left tube and ovary with extensive necrosis.  Malignancy was noted involving the ovarian surface as well as the fallopian tube surface.  The tumor was 7 cm in greatest dimension.  This was a grade 3 undifferentiated carcinoma.  No regional lymph nodes were submitted tumor cells were strongly positive for both CK7 and EMA.  Pathologically staged as a T1 see to an affects    Path is as follows   SPECIMEN  1) Left tube and ovary  2) Uterus, cervix, right tube and ovary  FINAL PATHOLOGIC DIAGNOSIS  1. LEFT TUBE AND OVARY SHOWING HIGH-GRADE, UNDIFFERENTIATED CARCINOMA OF THE OVARY  INVOLVING MILAGROS TUBAL TISSUES WITH EXTENSIVE NECROSIS, SEE SYNOPTIC REPORT:  PROCEDURE: TOTAL HYSTERECTOMY AND BILATERAL SALPINGO-OOPHORECTOMY  SPECIMEN INTEGRITY: LEFT OVARY, CAPSULE  RUPTURED  TUMOR SITE: LEFT OVARY  OVARIAN SURFACE INVOLVEMENT: PRESENT  FALLOPIAN TUBE SURFACE INVOLVEMENT: PRESENT  TUMOR SIZE: 7 CM  HISTOLOGIC TYPE: UNDIFFERENTIATED CARCINOMA  HISTOLOGIC GRADE: GRADE 3  OTHER TISSUE/ORGAN INVOLVEMENT: NOT IDENTIFIED  PERITONEAL/ASCITIC FLUID: NOT SUBMITTED/UNKNOWN  REGIONAL LYMPH NODES: NONE SUBMITTED  SPECIAL STUDIES: TUMOR CELLS ARE STRONGLY POSITIVE FOR BOTH CK7 AND EMA IMMUNOSTAINS.  S-100, WILMS TUMOR ANTIGEN AND CD10 ARE FOCALLY POSITIVE. CALRETININ, CD56 AND INHIBIN ARE  ALL NEGATIVE. THE CONTROLS STAIN APPROPRIATELY. THESE FINDINGS SUPPORT THE DIAGNOSIS  OF CARCINOMA RATHER THAN GRANULOSA CELL TUMOR.  TUMOR STAGE: pT1c2 Nx  2. UTERUS, CERVIX AND RIGHT ADNEXA WEIGHING 66 G SHOWING:  INACTIVE ENDOMETRIUM WITH A BENIGN ENDOMETRIAL POLYP  CERVIX WITH NABOTHIAN CYSTS  ATROPHIC RIGHT OVARY AND UNREMARKABLE RIGHT FALLOPIAN TUBE  Diagnosed by: Antwon Uribe M.D.      Patient here to re-evaluate her labs since last visit she feels well no fatigue no bleeding  Past Medical History:   Diagnosis Date    Allergy     Arthritis     Breast cyst     Foot pain     GERD (gastroesophageal reflux disease)     Joint pain     Peripheral vascular disease 1/15/2018    Squamous cell carcinoma 04/2016    Right Lower Leg    MUNIR (stress urinary incontinence, female) 4/17/2017     Past Surgical History:   Procedure Laterality Date    APPENDECTOMY      COLON SURGERY      removed about 10 inch    COLONOSCOPY N/A 12/24/2018    [facility]    EXCISION, MASS, FINGER Right 7/9/2018    [facility]    hemorhids      HYSTERECTOMY      HYSTERECTOMY, TOTAL, ABDOMINAL, WITH BILATERAL SALPINGO-OOPHORECTOMY Bilateral 12/28/2018    [facility] at [facility]    LYSIS, ADHESIONS N/A 12/28/2018    [facility] at [facility] OR    XI ROBOTIC HYSTERECTOMY N/A 12/28/2018    [facility]  at [facility]    XI ROBOTIC SALPINGO-OOPHORECTOMY Bilateral 12/28/2018    Performed by [facility] at [facility] OR    She remains on Nexium to help with intermittent symptoms of gastritis and is on calcium with vitamin-D for overall bone health    Current Outpatient Medications:     acetaminophen (TYLENOL) 650 MG TbSR, Take by mouth., Disp: , Rfl:     calcium-vitamin D3 (CALCIUM 500 + D) 500 mg(1,250mg) -200 unit per tablet, Take 1 tablet by mouth once daily. , Disp: , Rfl:     carboxymethylcellulose (REFRESH PLUS) 0.5 % Dpet, 1 drop daily as needed., Disp: , Rfl:     cyanocobalamin (VITAMIN B-12) 1000 MCG tablet, Take 1,000 mcg by mouth once daily. , Disp: , Rfl:     FLAXSEED ORAL, Take 650 mg by mouth once daily. , Disp: , Rfl:     ketotifen (ZADITOR) 0.025 % (0.035 %) ophthalmic solution, Place 1 drop into both eyes 2 (two) times daily as needed (Pt reports use approx once/week). , Disp: , Rfl:     MULTIVITAMIN W-MINERALS/LUTEIN (CENTRUM SILVER ORAL), Take 1 tablet by mouth once daily. , Disp: , Rfl:     NEXIUM 20 mg capsule, Take 20 mg by mouth before breakfast. , Disp: , Rfl:     prochlorperazine (COMPAZINE) 10 MG tablet, Take 1 tablet (10 mg total) by mouth every 6 (six) hours as needed., Disp: 30 tablet, Rfl: 1     She remains on intermittent Compazine with Nexium to help with gastritis and nausea.  She reports the Zofran is not helping her much.  For bone pain she has taken over-the-counter Tylenol Arthritis  Review of patient's allergies indicates:   Allergen Reactions    Pcn [penicillins]      Can not remember reaction     Tetanus vaccines and toxoid Swelling     Localized swelling to injection site     Social History     Tobacco Use    Smoking status: Never Smoker    Smokeless tobacco: Never Used   Substance Use Topics    Alcohol use: No     Alcohol/week: 0.0 oz     Comment: rare    Drug use: No     Family History   Adopted: Yes   Problem Relation Age of Onset    Cancer Mother     Stroke  Father     Breast cancer Sister     Cancer Sister     No Known Problems Daughter     No Known Problems Brother     No Known Problems Daughter     Diabetes Brother     Colon cancer Neg Hx     Ovarian cancer Neg Hx      +skin rash   CONSTITUTIONAL: No fevers, chills, night sweats, wt. loss,  SKIN:  Positive rash  ENT: No headaches, head trauma, vision changes  LYMPH NODES: None noticed   CV: No chest pain, palpitations.   RESP: No dyspnea on exertion, cough, wheezing, no further rib pain  GI: No nausea, emesis, severe heartburn and reflux unrelieved with medication worsening constipaton  : No dysuria, hematuria, urgency   HEME: No easy bruising, bleeding problems  PSYCHIATRIC: No depression, anxiety, psychosis  NEURO: No seizures, memory loss, no headaches  MSK:  Positive bone pain unrelieved with Tylenol over-the-counter       Wt Readings from Last 3 Encounters:   05/16/19 58.9 kg (129 lb 13.6 oz)   05/07/19 57.6 kg (126 lb 15.8 oz)   04/25/19 59.3 kg (130 lb 11.7 oz)     Temp Readings from Last 3 Encounters:   05/16/19 98.8 °F (37.1 °C)   04/25/19 98.7 °F (37.1 °C)   04/16/19 98.7 °F (37.1 °C)     BP Readings from Last 3 Encounters:   05/16/19 (!) 112/55   05/07/19 108/60   04/25/19 121/75     Pulse Readings from Last 3 Encounters:   05/16/19 88   05/07/19 85   04/25/19 88       Constitutional: oriented to person, place, and time.     HENT:   Head: Normocephalic and atraumatic.   Right Ear: External ear normal. Left Ear: External ear normal.   Nose: Nose normal.   Mouth/Throat: Oropharynx is clear and moist.   Eyes: Conjunctivae and EOM are normal. Pupils are equal,  Neck: Normal range of motion. Neck supple.   Cardiovascular: Normal rate, regular rhythm, normal heart sounds   No murmur heard.  Pulmonary/Chest: Effort normal and breath sounds normal.  no fremitus   Abdominal: Soft. Bowel sounds are normal.  no mass.   Musculoskeletal: Normal range of motion.  + edema of ankles    Lymphadenopathy:  no  cervical adenopathy.   Neurological: alert and oriented to person, place, Rash sandpaper erythematous longer face arms and legs  Lower extremity edema remains  legs consistent with peripheral vascular disease  Psychiatric: flat affect   Vitals reviewed.      Lab Results   Component Value Date    WBC 3.80 (L) 05/15/2019    RBC 3.75 (L) 05/15/2019    HGB 10.4 (L) 05/15/2019    HCT 32.0 (L) 05/15/2019    MCV 85 05/15/2019    MCH 27.8 05/15/2019    MCHC 32.6 05/15/2019    RDW 17.8 (H) 05/15/2019     05/15/2019    MPV 6.6 (L) 05/15/2019    GRAN 2.2 05/15/2019    GRAN 58.7 05/15/2019    LYMPH 1.1 05/15/2019    LYMPH 28.7 05/15/2019    MONO 0.4 05/15/2019    MONO 11.8 05/15/2019    EOS 0.0 05/15/2019    BASO 0.00 05/15/2019    EOSINOPHIL 0.2 05/15/2019    BASOPHIL 0.6 05/15/2019       CMP  Sodium   Date Value Ref Range Status   04/24/2019 139 136 - 145 mmol/L Final     Potassium   Date Value Ref Range Status   04/24/2019 4.3 3.5 - 5.1 mmol/L Final     Chloride   Date Value Ref Range Status   04/24/2019 102 95 - 110 mmol/L Final     CO2   Date Value Ref Range Status   04/24/2019 28 23 - 29 mmol/L Final     Glucose   Date Value Ref Range Status   04/24/2019 101 70 - 110 mg/dL Final     BUN, Bld   Date Value Ref Range Status   04/24/2019 9 8 - 23 mg/dL Final     Creatinine   Date Value Ref Range Status   04/24/2019 0.8 0.5 - 1.4 mg/dL Final     Calcium   Date Value Ref Range Status   04/24/2019 9.5 8.7 - 10.5 mg/dL Final     Total Protein   Date Value Ref Range Status   04/24/2019 6.8 6.0 - 8.4 g/dL Final     Albumin   Date Value Ref Range Status   04/24/2019 3.6 3.5 - 5.2 g/dL Final     Total Bilirubin   Date Value Ref Range Status   04/24/2019 0.3 0.1 - 1.0 mg/dL Final     Comment:     For infants and newborns, interpretation of results should be based  on gestational age, weight and in agreement with clinical  observations.  Premature Infant recommended reference ranges:  Up to 24 hours.............<8.0 mg/dL  Up to  48 hours............<12.0 mg/dL  3-5 days..................<15.0 mg/dL  6-29 days.................<15.0 mg/dL       Alkaline Phosphatase   Date Value Ref Range Status   04/24/2019 91 55 - 135 U/L Final     AST   Date Value Ref Range Status   04/24/2019 34 10 - 40 U/L Final     ALT   Date Value Ref Range Status   04/24/2019 24 10 - 44 U/L Final     Anion Gap   Date Value Ref Range Status   04/24/2019 9 8 - 16 mmol/L Final     eGFR if    Date Value Ref Range Status   04/24/2019 >60 >60 mL/min/1.73 m^2 Final     eGFR if non    Date Value Ref Range Status   04/24/2019 >60 >60 mL/min/1.73 m^2 Final     Comment:     Calculation used to obtain the estimated glomerular filtration  rate (eGFR) is the CKD-EPI equation.        COMPARISON:  None    FINDINGS:  Right thigh veins: The common femoral, femoral, popliteal, upper greater saphenous, and deep femoral veins are patent and free of thrombus. The veins are normally compressible and have normal phasic flow and augmentation response.    Right calf veins: The visualized calf veins are patent.    Left thigh veins: The common femoral, femoral, popliteal, upper greater saphenous, and deep femoral veins are patent and free of thrombus. The veins are normally compressible and have normal phasic flow and augmentation response.    Left calf veins: The visualized calf veins are patent.    Miscellaneous: None      Impression       No evidence of deep venous thrombosis in either lower extremity.      Electronically signed by: Alvarez Rodriguez MD  Date: 03/26/2019  Time: 10:21       Ref Range & [id number] ago  CA 1250 - 30 U/mL91 Abnormally high   Ref Range & Qyezp8s ago  CEA0.0 - 5.0 ng/mL7.6 Abnormally high      Chemotherapy induced neutropenia    Malignant neoplasm of ovary, unspecified laterality  -     CBC auto differential; Standing  -     CMP; Future; Expected date: 05/16/2019  -     ; Future; Expected date: 05/16/2019    Other orders  -      palonosetron (ALOXI) 0.25 mg, dexamethasone (DECADRON) 20 mg in sodium chloride 0.9% 50 mL  -     diphenhydrAMINE (BENADRYL) 50 mg in sodium chloride 0.9% 50 mL IVPB  -     famotidine (PF) injection 20 mg  -     DOCEtaxel (TAXOTERE) 96 mg in sodium chloride 0.9% 250 mL chemo infusion  -     CARBOplatin (PARAPLATIN) 240 mg in sodium chloride 0.9% 250 mL chemo infusion  -     sodium chloride 0.9% bolus 500 mL  -     sodium chloride 0.9% flush 10 mL  -     heparin, porcine (PF) 100 unit/mL injection flush 500 Units  -     alteplase injection 2 mg  -     pegfilgrastim injection 6 mg           Resume chemo for two more cycles  Steroids for rash   Start linzess : for chronic constipation and increase fluids  RTC 3 weeks   I discussed the available treatment option(s) in accordance with the latest NCCN Guidelines, their overall age and their co-morbidities. I went over the risks and benefits of the chemotherapy with regard to their particular cancer type, their cancer stage, their age, and their co-morbidities. I provided literature on the chemotherapy regimen and discussed the chemotherapy side-effect profiles of the drug(s). I discussed the importance of compliance with obtaining and monitoring weekly lab work, and went over the potential hematopathology issues and risks with anemia, leucopenia and thrombocytopenia that can occur with chemotherapy. I discussed the potential risks of liver and kidney damage, which could be permanent and could necessitate dialysis long-term if kidney failure developed. I discussed the emetic and/or diarrheal potential of the regimen and the potential need for use of antiemetic and anti-diarrheal medications. I discussed the risk for development of anaphylactic shock, bronchospasm, dysrhythmia, and respiratory/cardiovascular failure. I discussed the potential risks for development of alopecia, cold sensory issues, ringing in ears, vertigo and neuropathy, all of which could end up being  chronic and life-long. I discussed the risks of hand-foot syndrome and rashes, and development of other autoimmune mediated processes such as pneumonitis and colitis which could be life threatening. I discussed the risks of the potential development of leukemia and/or lymphoma from use of certain chemotherapy agents. I discussed the need for neutropenic precautions, basic hygiene/sanitation behaviors and dietary restrictions.     The patient's consent has been obtained to proceed with the chemotherapy.The patient will be referred to [facility] for training and education on chemotherapy, use of antiemetics and/or anti-diarrheals, use of NSAID's, potential chemotherapy side-effects, and any specific recommendations and precautions with the particular chemotherapy agents.       I answered all of the patient's (and family's, if applicable) questions to the best of my ability and to their complete satisfaction. The patient acknowledged full understanding of the risks, recommendations and plan(s).      Sincerely,  Kenyatta Tejeda MD FACCS    Changing chemo to Mondays sop she can get Neulasta on day 2   Her counts have been too low during her [place] and she has not recovered to stay on track   CLEARED for cycle 5   Edema NO salt in diet   Cleared for chemo   Will need neupogen   Start oral decadron daily   Elevate legs at home   [place] hose for legs I had explain how she is to measure legs and by couple of different sizes to alternate them accordingly      Advance Care Planning     Living Will  During this visit, I engaged the patient in the advance care planning process.  The patient and I reviewed the role for advance directives and their purpose in directing future healthcare if the patient's unable to speak for him/herself.  At this point in time, the patient does have full decision-making capacity.  We discussed different extreme health states that she could experience, and reviewed what kind of  medical care she would want in those situations.  The patient communicated that if she were comatose and had little chance of a meaningful recovery, she would not want machines/life-sustaining treatments used.  The patient has completed a living will to reflect these preferences.  The patient  Has  already designated a healthcare power of  to make decisions on her behalf.   I spent a total of  10  minutes engaging the patient in this advance care planning discussion.

## 2019-05-17 ENCOUNTER — TELEPHONE (OUTPATIENT)
Dept: HEMATOLOGY/ONCOLOGY | Facility: CLINIC | Age: 84
End: 2019-05-17

## 2019-05-17 NOTE — TELEPHONE ENCOUNTER
Message left instructing patient not to come for chemotherapy on Monday due to her insurance not approving her chemotherapy. Called placed to Ellen Greer and Kendra Mo. Kendra notified that Insurance has not approved her chemo yet and that her appointment has been cancelled.

## 2019-05-20 ENCOUNTER — TELEPHONE (OUTPATIENT)
Dept: HEMATOLOGY/ONCOLOGY | Facility: CLINIC | Age: 84
End: 2019-05-20

## 2019-05-30 ENCOUNTER — TELEPHONE (OUTPATIENT)
Dept: HEMATOLOGY/ONCOLOGY | Facility: CLINIC | Age: 84
End: 2019-05-30

## 2019-05-30 NOTE — TELEPHONE ENCOUNTER
Called pt to r/s apt with dr stoddard on 6/7 due to not being in office. Called twice and pt hung up, then phone went straight to voicemail. Will try back at a later time.

## 2019-05-31 ENCOUNTER — ANESTHESIA EVENT (OUTPATIENT)
Dept: SURGERY | Facility: HOSPITAL | Age: 84
DRG: 481 | End: 2019-05-31
Payer: MEDICARE

## 2019-05-31 ENCOUNTER — ANESTHESIA (OUTPATIENT)
Dept: SURGERY | Facility: HOSPITAL | Age: 84
DRG: 481 | End: 2019-05-31
Payer: MEDICARE

## 2019-05-31 ENCOUNTER — HOSPITAL ENCOUNTER (INPATIENT)
Facility: HOSPITAL | Age: 84
LOS: 4 days | Discharge: NURSING, REHAB OR LONG-TERM CARE | DRG: 481 | End: 2019-06-04
Attending: EMERGENCY MEDICINE | Admitting: INTERNAL MEDICINE
Payer: MEDICARE

## 2019-05-31 DIAGNOSIS — S72.002A CLOSED FRACTURE OF LEFT HIP, INITIAL ENCOUNTER: Primary | ICD-10-CM

## 2019-05-31 DIAGNOSIS — S72.142A CLOSED INTERTROCHANTERIC FRACTURE OF HIP, LEFT, INITIAL ENCOUNTER: ICD-10-CM
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
## 2019-05-31 DIAGNOSIS — S99.912A LEFT ANKLE INJURY, INITIAL ENCOUNTER: ICD-10-CM

## 2019-05-31 LAB
ABO + RH BLD: NORMAL
ALBUMIN SERPL BCP-MCNC: 3.5 G/DL (ref 3.5–5.2)
ALP SERPL-CCNC: 116 U/L (ref 55–135)
ALT SERPL W/O P-5'-P-CCNC: 19 U/L (ref 10–44)
ANION GAP SERPL CALC-SCNC: 9 MMOL/L (ref 8–16)
AST SERPL-CCNC: 26 U/L (ref 10–40)
BASOPHILS # BLD AUTO: 0 K/UL (ref 0–0.2)
BASOPHILS NFR BLD: 0.3 % (ref 0–1.9)
BILIRUB SERPL-MCNC: 0.2 MG/DL (ref 0.1–1)
BILIRUB UR QL STRIP: NEGATIVE
BLD GP AB SCN CELLS X3 SERPL QL: NORMAL
BLD PROD TYP BPU: NORMAL
BLD PROD TYP BPU: NORMAL
BLOOD UNIT EXPIRATION DATE: NORMAL
BLOOD UNIT EXPIRATION DATE: NORMAL
BLOOD UNIT TYPE CODE: 9500
BLOOD UNIT TYPE CODE: 9500
BLOOD UNIT TYPE: NORMAL
BLOOD UNIT TYPE: NORMAL
BUN SERPL-MCNC: 9 MG/DL (ref 8–23)
CALCIUM SERPL-MCNC: 8.9 MG/DL (ref 8.7–10.5)
CHLORIDE SERPL-SCNC: 101 MMOL/L (ref 95–110)
CLARITY UR: CLEAR
CO2 SERPL-SCNC: 26 MMOL/L (ref 23–29)
CODING SYSTEM: NORMAL
CODING SYSTEM: NORMAL
COLOR UR: YELLOW
CREAT SERPL-MCNC: 0.8 MG/DL (ref 0.5–1.4)
DIFFERENTIAL METHOD: ABNORMAL
DISPENSE STATUS: NORMAL
DISPENSE STATUS: NORMAL
EOSINOPHIL # BLD AUTO: 0 K/UL (ref 0–0.5)
EOSINOPHIL NFR BLD: 0.1 % (ref 0–8)
ERYTHROCYTE [DISTWIDTH] IN BLOOD BY AUTOMATED COUNT: 18.7 % (ref 11.5–14.5)
EST. GFR  (AFRICAN AMERICAN): >60 ML/MIN/1.73 M^2
EST. GFR  (NON AFRICAN AMERICAN): >60 ML/MIN/1.73 M^2
GLUCOSE SERPL-MCNC: 119 MG/DL (ref 70–110)
GLUCOSE UR QL STRIP: NEGATIVE
HCT VFR BLD AUTO: 28.6 % (ref 37–48.5)
HGB BLD-MCNC: 9.4 G/DL (ref 12–16)
HGB UR QL STRIP: NEGATIVE
KETONES UR QL STRIP: NEGATIVE
LEUKOCYTE ESTERASE UR QL STRIP: NEGATIVE
LYMPHOCYTES # BLD AUTO: 1.4 K/UL (ref 1–4.8)
LYMPHOCYTES NFR BLD: 9.4 % (ref 18–48)
MCH RBC QN AUTO: 28.7 PG (ref 27–31)
MCHC RBC AUTO-ENTMCNC: 32.8 G/DL (ref 32–36)
MCV RBC AUTO: 87 FL (ref 82–98)
MONOCYTES # BLD AUTO: 0.6 K/UL (ref 0.3–1)
MONOCYTES NFR BLD: 3.9 % (ref 4–15)
NEUTROPHILS # BLD AUTO: 12.9 K/UL (ref 1.8–7.7)
NEUTROPHILS NFR BLD: 86.3 % (ref 38–73)
NITRITE UR QL STRIP: NEGATIVE
NUM UNITS TRANS PACKED RBC: NORMAL
NUM UNITS TRANS PACKED RBC: NORMAL
PH UR STRIP: 8 [PH] (ref 5–8)
PLATELET # BLD AUTO: 210 K/UL (ref 150–350)
PMV BLD AUTO: 6.5 FL (ref 9.2–12.9)
POTASSIUM SERPL-SCNC: 3.5 MMOL/L (ref 3.5–5.1)
PROT SERPL-MCNC: 6.2 G/DL (ref 6–8.4)
PROT UR QL STRIP: NEGATIVE
RBC # BLD AUTO: 3.28 M/UL (ref 4–5.4)
SODIUM SERPL-SCNC: 136 MMOL/L (ref 136–145)
SP GR UR STRIP: 1.01 (ref 1–1.03)
URN SPEC COLLECT METH UR: NORMAL
UROBILINOGEN UR STRIP-ACNC: NEGATIVE EU/DL
WBC # BLD AUTO: 14.9 K/UL (ref 3.9–12.7)

## 2019-05-31 PROCEDURE — 63600175 PHARM REV CODE 636 W HCPCS: Performed by: NURSE ANESTHETIST, CERTIFIED REGISTERED

## 2019-05-31 PROCEDURE — 37000009 HC ANESTHESIA EA ADD 15 MINS: Performed by: ORTHOPAEDIC SURGERY

## 2019-05-31 PROCEDURE — 25000003 PHARM REV CODE 250: Performed by: ANESTHESIOLOGY

## 2019-05-31 PROCEDURE — C1713 ANCHOR/SCREW BN/BN,TIS/BN: HCPCS | Performed by: ORTHOPAEDIC SURGERY

## 2019-05-31 PROCEDURE — 71000033 HC RECOVERY, INTIAL HOUR: Performed by: ORTHOPAEDIC SURGERY

## 2019-05-31 PROCEDURE — 25000003 PHARM REV CODE 250: Performed by: NURSE ANESTHETIST, CERTIFIED REGISTERED

## 2019-05-31 PROCEDURE — 88305 TISSUE SPECIMEN TO PATHOLOGY - SURGERY: ICD-10-PCS | Mod: 26,,, | Performed by: PATHOLOGY

## 2019-05-31 PROCEDURE — 99223 1ST HOSP IP/OBS HIGH 75: CPT | Mod: 57,,, | Performed by: ORTHOPAEDIC SURGERY

## 2019-05-31 PROCEDURE — 85025 COMPLETE CBC W/AUTO DIFF WBC: CPT

## 2019-05-31 PROCEDURE — 80053 COMPREHEN METABOLIC PANEL: CPT

## 2019-05-31 PROCEDURE — 36415 COLL VENOUS BLD VENIPUNCTURE: CPT

## 2019-05-31 PROCEDURE — 37000008 HC ANESTHESIA 1ST 15 MINUTES: Performed by: ORTHOPAEDIC SURGERY

## 2019-05-31 PROCEDURE — 99285 EMERGENCY DEPT VISIT HI MDM: CPT | Mod: 25

## 2019-05-31 PROCEDURE — 63600175 PHARM REV CODE 636 W HCPCS: Performed by: EMERGENCY MEDICINE

## 2019-05-31 PROCEDURE — 86901 BLOOD TYPING SEROLOGIC RH(D): CPT

## 2019-05-31 PROCEDURE — 25000003 PHARM REV CODE 250: Performed by: ORTHOPAEDIC SURGERY

## 2019-05-31 PROCEDURE — 93005 ELECTROCARDIOGRAM TRACING: CPT

## 2019-05-31 PROCEDURE — 96374 THER/PROPH/DIAG INJ IV PUSH: CPT

## 2019-05-31 PROCEDURE — D9220A PRA ANESTHESIA: ICD-10-PCS | Mod: ANES,,, | Performed by: ANESTHESIOLOGY

## 2019-05-31 PROCEDURE — 63600175 PHARM REV CODE 636 W HCPCS: Performed by: ANESTHESIOLOGY

## 2019-05-31 PROCEDURE — 27245 TREAT THIGH FRACTURE: CPT | Mod: LT,,, | Performed by: ORTHOPAEDIC SURGERY

## 2019-05-31 PROCEDURE — D9220A PRA ANESTHESIA: ICD-10-PCS | Mod: CRNA,,, | Performed by: NURSE ANESTHETIST, CERTIFIED REGISTERED

## 2019-05-31 PROCEDURE — 88305 TISSUE EXAM BY PATHOLOGIST: CPT | Performed by: PATHOLOGY

## 2019-05-31 PROCEDURE — 88311 TISSUE SPECIMEN TO PATHOLOGY - SURGERY: ICD-10-PCS | Mod: 26,,, | Performed by: PATHOLOGY

## 2019-05-31 PROCEDURE — 99223 PR INITIAL HOSPITAL CARE,LEVL III: ICD-10-PCS | Mod: 57,,, | Performed by: ORTHOPAEDIC SURGERY

## 2019-05-31 PROCEDURE — D9220A PRA ANESTHESIA: Mod: CRNA,,, | Performed by: NURSE ANESTHETIST, CERTIFIED REGISTERED

## 2019-05-31 PROCEDURE — 36000710: Performed by: ORTHOPAEDIC SURGERY

## 2019-05-31 PROCEDURE — 99900103 DSU ONLY-NO CHARGE-INITIAL HR (STAT): Performed by: ORTHOPAEDIC SURGERY

## 2019-05-31 PROCEDURE — 94761 N-INVAS EAR/PLS OXIMETRY MLT: CPT

## 2019-05-31 PROCEDURE — 27245 PR OPEN FIX INTER/SUBTROCH FX,IMPLNT: ICD-10-PCS | Mod: LT,,, | Performed by: ORTHOPAEDIC SURGERY

## 2019-05-31 PROCEDURE — P9612 CATHETERIZE FOR URINE SPEC: HCPCS

## 2019-05-31 PROCEDURE — 63600175 PHARM REV CODE 636 W HCPCS: Performed by: ORTHOPAEDIC SURGERY

## 2019-05-31 PROCEDURE — 86920 COMPATIBILITY TEST SPIN: CPT

## 2019-05-31 PROCEDURE — 12000002 HC ACUTE/MED SURGE SEMI-PRIVATE ROOM

## 2019-05-31 PROCEDURE — C1769 GUIDE WIRE: HCPCS | Performed by: ORTHOPAEDIC SURGERY

## 2019-05-31 PROCEDURE — 36430 TRANSFUSION BLD/BLD COMPNT: CPT

## 2019-05-31 PROCEDURE — 99900104 DSU ONLY-NO CHARGE-EA ADD'L HR (STAT): Performed by: ORTHOPAEDIC SURGERY

## 2019-05-31 PROCEDURE — 88311 DECALCIFY TISSUE: CPT | Mod: 26,,, | Performed by: PATHOLOGY

## 2019-05-31 PROCEDURE — 71000039 HC RECOVERY, EACH ADD'L HOUR: Performed by: ORTHOPAEDIC SURGERY

## 2019-05-31 PROCEDURE — 51702 INSERT TEMP BLADDER CATH: CPT

## 2019-05-31 PROCEDURE — P9045 ALBUMIN (HUMAN), 5%, 250 ML: HCPCS | Performed by: NURSE ANESTHETIST, CERTIFIED REGISTERED

## 2019-05-31 PROCEDURE — P9016 RBC LEUKOCYTES REDUCED: HCPCS

## 2019-05-31 PROCEDURE — 36000711: Performed by: ORTHOPAEDIC SURGERY

## 2019-05-31 PROCEDURE — 27000221 HC OXYGEN, UP TO 24 HOURS

## 2019-05-31 PROCEDURE — S0077 INJECTION, CLINDAMYCIN PHOSP: HCPCS | Performed by: ORTHOPAEDIC SURGERY

## 2019-05-31 PROCEDURE — D9220A PRA ANESTHESIA: Mod: ANES,,, | Performed by: ANESTHESIOLOGY

## 2019-05-31 PROCEDURE — 96375 TX/PRO/DX INJ NEW DRUG ADDON: CPT

## 2019-05-31 PROCEDURE — 27201423 OPTIME MED/SURG SUP & DEVICES STERILE SUPPLY: Performed by: ORTHOPAEDIC SURGERY

## 2019-05-31 PROCEDURE — 81003 URINALYSIS AUTO W/O SCOPE: CPT

## 2019-05-31 PROCEDURE — 88305 TISSUE EXAM BY PATHOLOGIST: CPT | Mod: 26,,, | Performed by: PATHOLOGY

## 2019-05-31 DEVICE — SCREW TFN ADVANCE 100MM: Type: IMPLANTABLE DEVICE | Site: HIP | Status: FUNCTIONAL

## 2019-05-31 DEVICE — NAIL TFNA 130DEG 11MM L STRL: Type: IMPLANTABLE DEVICE | Site: HIP | Status: FUNCTIONAL

## 2019-05-31 RX ORDER — PREGABALIN 75 MG/1
75 CAPSULE ORAL 2 TIMES DAILY
Status: DISCONTINUED | OUTPATIENT
Start: 2019-05-31 | End: 2019-06-04 | Stop reason: HOSPADM

## 2019-05-31 RX ORDER — HYDROCODONE BITARTRATE AND ACETAMINOPHEN 5; 325 MG/1; MG/1
1 TABLET ORAL EVERY 4 HOURS PRN
Status: DISCONTINUED | OUTPATIENT
Start: 2019-05-31 | End: 2019-06-04 | Stop reason: HOSPADM

## 2019-05-31 RX ORDER — SODIUM CHLORIDE, SODIUM LACTATE, POTASSIUM CHLORIDE, CALCIUM CHLORIDE 600; 310; 30; 20 MG/100ML; MG/100ML; MG/100ML; MG/100ML
INJECTION, SOLUTION INTRAVENOUS CONTINUOUS
Status: DISCONTINUED | OUTPATIENT
Start: 2019-05-31 | End: 2019-05-31

## 2019-05-31 RX ORDER — HYDROMORPHONE HYDROCHLORIDE 2 MG/ML
1 INJECTION, SOLUTION INTRAMUSCULAR; INTRAVENOUS; SUBCUTANEOUS EVERY 4 HOURS PRN
Status: DISCONTINUED | OUTPATIENT
Start: 2019-05-31 | End: 2019-06-03

## 2019-05-31 RX ORDER — ACETAMINOPHEN 325 MG/1
650 TABLET ORAL EVERY 6 HOURS PRN
Status: DISCONTINUED | OUTPATIENT
Start: 2019-05-31 | End: 2019-06-04 | Stop reason: HOSPADM

## 2019-05-31 RX ORDER — ENOXAPARIN SODIUM 100 MG/ML
40 INJECTION SUBCUTANEOUS EVERY 24 HOURS
Status: DISCONTINUED | OUTPATIENT
Start: 2019-06-01 | End: 2019-06-04 | Stop reason: HOSPADM

## 2019-05-31 RX ORDER — ACETAMINOPHEN 500 MG
1000 TABLET ORAL EVERY 8 HOURS
Status: DISPENSED | OUTPATIENT
Start: 2019-05-31 | End: 2019-06-03

## 2019-05-31 RX ORDER — MUPIROCIN 20 MG/G
1 OINTMENT TOPICAL 2 TIMES DAILY
Status: DISCONTINUED | OUTPATIENT
Start: 2019-05-31 | End: 2019-06-04 | Stop reason: HOSPADM

## 2019-05-31 RX ORDER — PROPOFOL 10 MG/ML
VIAL (ML) INTRAVENOUS
Status: DISCONTINUED | OUTPATIENT
Start: 2019-05-31 | End: 2019-05-31

## 2019-05-31 RX ORDER — KETOROLAC TROMETHAMINE 30 MG/ML
15 INJECTION, SOLUTION INTRAMUSCULAR; INTRAVENOUS EVERY 6 HOURS
Status: DISPENSED | OUTPATIENT
Start: 2019-05-31 | End: 2019-06-03

## 2019-05-31 RX ORDER — AMOXICILLIN 250 MG
1 CAPSULE ORAL 2 TIMES DAILY PRN
Status: DISCONTINUED | OUTPATIENT
Start: 2019-05-31 | End: 2019-06-04 | Stop reason: HOSPADM

## 2019-05-31 RX ORDER — PROMETHAZINE HYDROCHLORIDE 25 MG/1
25 TABLET ORAL EVERY 6 HOURS PRN
Status: DISCONTINUED | OUTPATIENT
Start: 2019-05-31 | End: 2019-06-03

## 2019-05-31 RX ORDER — ONDANSETRON 4 MG/1
8 TABLET, ORALLY DISINTEGRATING ORAL EVERY 8 HOURS PRN
Status: DISCONTINUED | OUTPATIENT
Start: 2019-05-31 | End: 2019-06-04 | Stop reason: HOSPADM

## 2019-05-31 RX ORDER — AMOXICILLIN 250 MG
1 CAPSULE ORAL 2 TIMES DAILY
Status: DISCONTINUED | OUTPATIENT
Start: 2019-05-31 | End: 2019-06-04 | Stop reason: HOSPADM

## 2019-05-31 RX ORDER — SUCCINYLCHOLINE CHLORIDE 20 MG/ML
INJECTION INTRAMUSCULAR; INTRAVENOUS
Status: DISCONTINUED | OUTPATIENT
Start: 2019-05-31 | End: 2019-05-31

## 2019-05-31 RX ORDER — SODIUM CHLORIDE 0.9 % (FLUSH) 0.9 %
3 SYRINGE (ML) INJECTION
Status: DISCONTINUED | OUTPATIENT
Start: 2019-05-31 | End: 2019-05-31 | Stop reason: HOSPADM

## 2019-05-31 RX ORDER — BISACODYL 10 MG
10 SUPPOSITORY, RECTAL RECTAL DAILY PRN
Status: DISCONTINUED | OUTPATIENT
Start: 2019-05-31 | End: 2019-06-04 | Stop reason: HOSPADM

## 2019-05-31 RX ORDER — MORPHINE SULFATE 2 MG/ML
6 INJECTION, SOLUTION INTRAMUSCULAR; INTRAVENOUS
Status: COMPLETED | OUTPATIENT
Start: 2019-05-31 | End: 2019-05-31

## 2019-05-31 RX ORDER — CLINDAMYCIN PHOSPHATE 600 MG/50ML
600 INJECTION, SOLUTION INTRAVENOUS
Status: COMPLETED | OUTPATIENT
Start: 2019-05-31 | End: 2019-05-31

## 2019-05-31 RX ORDER — FENTANYL CITRATE 50 UG/ML
25 INJECTION, SOLUTION INTRAMUSCULAR; INTRAVENOUS EVERY 5 MIN PRN
Status: DISCONTINUED | OUTPATIENT
Start: 2019-05-31 | End: 2019-05-31 | Stop reason: HOSPADM

## 2019-05-31 RX ORDER — HYDROCODONE BITARTRATE AND ACETAMINOPHEN 500; 5 MG/1; MG/1
TABLET ORAL
Status: DISCONTINUED | OUTPATIENT
Start: 2019-05-31 | End: 2019-06-04 | Stop reason: HOSPADM

## 2019-05-31 RX ORDER — MEPERIDINE HYDROCHLORIDE 50 MG/ML
12.5 INJECTION INTRAMUSCULAR; INTRAVENOUS; SUBCUTANEOUS ONCE AS NEEDED
Status: COMPLETED | OUTPATIENT
Start: 2019-05-31 | End: 2019-05-31

## 2019-05-31 RX ORDER — HYDROMORPHONE HYDROCHLORIDE 2 MG/ML
0.2 INJECTION, SOLUTION INTRAMUSCULAR; INTRAVENOUS; SUBCUTANEOUS EVERY 5 MIN PRN
Status: DISCONTINUED | OUTPATIENT
Start: 2019-05-31 | End: 2019-05-31 | Stop reason: HOSPADM

## 2019-05-31 RX ORDER — DEXAMETHASONE SODIUM PHOSPHATE 4 MG/ML
INJECTION, SOLUTION INTRA-ARTICULAR; INTRALESIONAL; INTRAMUSCULAR; INTRAVENOUS; SOFT TISSUE
Status: DISCONTINUED | OUTPATIENT
Start: 2019-05-31 | End: 2019-05-31

## 2019-05-31 RX ORDER — DIPHENHYDRAMINE HYDROCHLORIDE 50 MG/ML
25 INJECTION INTRAMUSCULAR; INTRAVENOUS EVERY 6 HOURS PRN
Status: DISCONTINUED | OUTPATIENT
Start: 2019-05-31 | End: 2019-05-31 | Stop reason: HOSPADM

## 2019-05-31 RX ORDER — LOPERAMIDE HYDROCHLORIDE 2 MG/1
4 CAPSULE ORAL ONCE
Status: COMPLETED | OUTPATIENT
Start: 2019-05-31 | End: 2019-05-31

## 2019-05-31 RX ORDER — ONDANSETRON 2 MG/ML
4 INJECTION INTRAMUSCULAR; INTRAVENOUS ONCE
Status: COMPLETED | OUTPATIENT
Start: 2019-05-31 | End: 2019-05-31

## 2019-05-31 RX ORDER — FERROUS SULFATE, DRIED 160(50) MG
1 TABLET, EXTENDED RELEASE ORAL DAILY
Status: DISCONTINUED | OUTPATIENT
Start: 2019-05-31 | End: 2019-06-04 | Stop reason: HOSPADM

## 2019-05-31 RX ORDER — FENTANYL CITRATE 50 UG/ML
INJECTION, SOLUTION INTRAMUSCULAR; INTRAVENOUS
Status: DISCONTINUED | OUTPATIENT
Start: 2019-05-31 | End: 2019-05-31

## 2019-05-31 RX ORDER — LIDOCAINE HCL/PF 100 MG/5ML
SYRINGE (ML) INTRAVENOUS
Status: DISCONTINUED | OUTPATIENT
Start: 2019-05-31 | End: 2019-05-31

## 2019-05-31 RX ORDER — PANTOPRAZOLE SODIUM 40 MG/1
40 TABLET, DELAYED RELEASE ORAL DAILY
Status: DISCONTINUED | OUTPATIENT
Start: 2019-05-31 | End: 2019-06-04 | Stop reason: HOSPADM

## 2019-05-31 RX ORDER — ONDANSETRON 2 MG/ML
4 INJECTION INTRAMUSCULAR; INTRAVENOUS EVERY 8 HOURS PRN
Status: DISCONTINUED | OUTPATIENT
Start: 2019-05-31 | End: 2019-06-03

## 2019-05-31 RX ORDER — HYDROCODONE BITARTRATE AND ACETAMINOPHEN 500; 5 MG/1; MG/1
TABLET ORAL
Status: DISCONTINUED | OUTPATIENT
Start: 2019-05-31 | End: 2019-05-31 | Stop reason: HOSPADM

## 2019-05-31 RX ORDER — SODIUM CHLORIDE 0.9 % (FLUSH) 0.9 %
10 SYRINGE (ML) INJECTION
Status: DISCONTINUED | OUTPATIENT
Start: 2019-05-31 | End: 2019-06-04 | Stop reason: HOSPADM

## 2019-05-31 RX ORDER — ROCURONIUM BROMIDE 10 MG/ML
INJECTION, SOLUTION INTRAVENOUS
Status: DISCONTINUED | OUTPATIENT
Start: 2019-05-31 | End: 2019-05-31

## 2019-05-31 RX ORDER — MORPHINE SULFATE 2 MG/ML
2 INJECTION, SOLUTION INTRAMUSCULAR; INTRAVENOUS EVERY 4 HOURS PRN
Status: DISCONTINUED | OUTPATIENT
Start: 2019-05-31 | End: 2019-06-03

## 2019-05-31 RX ORDER — ONDANSETRON 2 MG/ML
4 INJECTION INTRAMUSCULAR; INTRAVENOUS
Status: COMPLETED | OUTPATIENT
Start: 2019-05-31 | End: 2019-05-31

## 2019-05-31 RX ORDER — OXYCODONE HYDROCHLORIDE 5 MG/1
5 TABLET ORAL
Status: DISCONTINUED | OUTPATIENT
Start: 2019-05-31 | End: 2019-05-31 | Stop reason: HOSPADM

## 2019-05-31 RX ORDER — CARBOXYMETHYLCELLULOSE SODIUM 10 MG/ML
1 GEL OPHTHALMIC 3 TIMES DAILY
Status: DISCONTINUED | OUTPATIENT
Start: 2019-05-31 | End: 2019-06-01

## 2019-05-31 RX ORDER — MORPHINE SULFATE 4 MG/ML
4 INJECTION, SOLUTION INTRAMUSCULAR; INTRAVENOUS EVERY 4 HOURS PRN
Status: DISCONTINUED | OUTPATIENT
Start: 2019-05-31 | End: 2019-06-03

## 2019-05-31 RX ORDER — SODIUM CHLORIDE, SODIUM LACTATE, POTASSIUM CHLORIDE, CALCIUM CHLORIDE 600; 310; 30; 20 MG/100ML; MG/100ML; MG/100ML; MG/100ML
75 INJECTION, SOLUTION INTRAVENOUS CONTINUOUS
Status: DISCONTINUED | OUTPATIENT
Start: 2019-05-31 | End: 2019-05-31

## 2019-05-31 RX ORDER — LACTULOSE 10 G/15ML
20 SOLUTION ORAL EVERY 6 HOURS PRN
Status: DISCONTINUED | OUTPATIENT
Start: 2019-05-31 | End: 2019-06-04 | Stop reason: HOSPADM

## 2019-05-31 RX ORDER — ALBUMIN HUMAN 50 G/1000ML
SOLUTION INTRAVENOUS CONTINUOUS PRN
Status: DISCONTINUED | OUTPATIENT
Start: 2019-05-31 | End: 2019-05-31

## 2019-05-31 RX ADMIN — DEXAMETHASONE SODIUM PHOSPHATE 4 MG: 4 INJECTION, SOLUTION INTRAMUSCULAR; INTRAVENOUS at 02:05

## 2019-05-31 RX ADMIN — ONDANSETRON 4 MG: 2 INJECTION INTRAMUSCULAR; INTRAVENOUS at 03:05

## 2019-05-31 RX ADMIN — SODIUM CHLORIDE, SODIUM LACTATE, POTASSIUM CHLORIDE, AND CALCIUM CHLORIDE: .6; .31; .03; .02 INJECTION, SOLUTION INTRAVENOUS at 03:05

## 2019-05-31 RX ADMIN — LOPERAMIDE HYDROCHLORIDE 4 MG: 2 CAPSULE ORAL at 06:05

## 2019-05-31 RX ADMIN — PROMETHAZINE HYDROCHLORIDE 6.25 MG: 25 INJECTION INTRAMUSCULAR; INTRAVENOUS at 04:05

## 2019-05-31 RX ADMIN — ONDANSETRON 4 MG: 2 INJECTION INTRAMUSCULAR; INTRAVENOUS at 09:05

## 2019-05-31 RX ADMIN — MORPHINE SULFATE 6 MG: 2 INJECTION, SOLUTION INTRAMUSCULAR; INTRAVENOUS at 09:05

## 2019-05-31 RX ADMIN — ROCURONIUM BROMIDE 5 MG: 10 INJECTION, SOLUTION INTRAVENOUS at 02:05

## 2019-05-31 RX ADMIN — CARBOXYMETHYLCELLULOSE SODIUM 1 DROP: 10 GEL OPHTHALMIC at 10:05

## 2019-05-31 RX ADMIN — CARBOXYMETHYLCELLULOSE SODIUM 1 DROP: 10 GEL OPHTHALMIC at 06:05

## 2019-05-31 RX ADMIN — KETOROLAC TROMETHAMINE 15 MG: 30 INJECTION, SOLUTION INTRAMUSCULAR at 11:05

## 2019-05-31 RX ADMIN — LIDOCAINE HYDROCHLORIDE 75 MG: 20 INJECTION, SOLUTION INTRAVENOUS at 02:05

## 2019-05-31 RX ADMIN — PREGABALIN 75 MG: 75 CAPSULE ORAL at 10:05

## 2019-05-31 RX ADMIN — FENTANYL CITRATE 50 MCG: 50 INJECTION, SOLUTION INTRAMUSCULAR; INTRAVENOUS at 02:05

## 2019-05-31 RX ADMIN — SUCCINYLCHOLINE CHLORIDE 100 MG: 20 INJECTION, SOLUTION INTRAMUSCULAR; INTRAVENOUS at 02:05

## 2019-05-31 RX ADMIN — ALBUMIN (HUMAN): 12.5 SOLUTION INTRAVENOUS at 03:05

## 2019-05-31 RX ADMIN — SENNOSIDES AND DOCUSATE SODIUM 1 TABLET: 8.6; 5 TABLET ORAL at 10:05

## 2019-05-31 RX ADMIN — ONDANSETRON 4 MG: 2 INJECTION, SOLUTION INTRAMUSCULAR; INTRAVENOUS at 02:05

## 2019-05-31 RX ADMIN — MEPERIDINE HYDROCHLORIDE 12.5 MG: 50 INJECTION INTRAMUSCULAR; INTRAVENOUS; SUBCUTANEOUS at 03:05

## 2019-05-31 RX ADMIN — FENTANYL CITRATE 25 MCG: 50 INJECTION, SOLUTION INTRAMUSCULAR; INTRAVENOUS at 03:05

## 2019-05-31 RX ADMIN — KETOROLAC TROMETHAMINE 15 MG: 30 INJECTION, SOLUTION INTRAMUSCULAR at 06:05

## 2019-05-31 RX ADMIN — ACETAMINOPHEN 1000 MG: 500 TABLET ORAL at 11:05

## 2019-05-31 RX ADMIN — PROPOFOL 70 MG: 10 INJECTION, EMULSION INTRAVENOUS at 02:05

## 2019-05-31 RX ADMIN — SODIUM CHLORIDE, SODIUM LACTATE, POTASSIUM CHLORIDE, AND CALCIUM CHLORIDE: .6; .31; .03; .02 INJECTION, SOLUTION INTRAVENOUS at 12:05

## 2019-05-31 RX ADMIN — CLINDAMYCIN IN 5 PERCENT DEXTROSE 600 MG: 12 INJECTION, SOLUTION INTRAVENOUS at 02:05

## 2019-05-31 NOTE — SUBJECTIVE & OBJECTIVE
Past Medical History:   Diagnosis Date    Allergy     Arthritis     Breast cyst     Foot pain     GERD (gastroesophageal reflux disease)     Joint pain     Peripheral vascular disease 1/15/2018    Squamous cell carcinoma 04/2016    Right Lower Leg    MUNIR (stress urinary incontinence, female) 4/17/2017       Past Surgical History:   Procedure Laterality Date    APPENDECTOMY      COLON SURGERY      removed about 10 inch    COLONOSCOPY N/A 12/24/2018    [facility]O    EXCISION, MASS, FINGER Right 7/9/2018    [facility]    hemorhids      HYSTERECTOMY      HYSTERECTOMY, TOTAL, ABDOMINAL, WITH BILATERAL SALPINGO-OOPHORECTOMY Bilateral 12/28/2018    [facility] at [facility] OR    LYSIS, ADHESIONS N/A 12/28/2018    [facility] at [facility] OR    XI ROBOTIC HYSTERECTOMY N/A 12/28/2018    [facility] at [facility] OR    XI ROBOTIC SALPINGO-OOPHORECTOMY Bilateral 12/28/2018    Performed by [facility] at [facility] OR       Review of patient's allergies indicates:   Allergen Reactions    Pcn [penicillins]      Can not remember reaction     Tetanus vaccines and toxoid Swelling     Localized swelling to injection site       No current facility-administered medications for this encounter.      Family History     Problem Relation (Age of Onset)    Breast cancer Sister    Cancer Mother, Sister    Diabetes Brother    No Known Problems Daughter, Brother, Daughter    Stroke Father        Tobacco Use    Smoking status: Never Smoker    Smokeless tobacco: Never Used   Substance and Sexual Activity    Alcohol use: No     Alcohol/week: 0.0 oz     Comment: rare    Drug use: No    Sexual activity: Not Currently     Review of Systems   Constitution: Positive for decreased appetite, malaise/fatigue and weight loss.   HENT: Negative.    Eyes: Negative.    Cardiovascular: Negative.    Respiratory:  "Negative.    Endocrine: Negative.    Hematologic/Lymphatic:        Patient undergoing chemo for ovarian cancer   Skin: Positive for unusual hair distribution.   Musculoskeletal: Positive for joint pain and joint swelling.   Gastrointestinal: Positive for constipation, nausea and vomiting.   Genitourinary:        Patient undergoing treatment for ovarian cancer   Neurological: Positive for weakness.   Psychiatric/Behavioral: Negative.    Allergic/Immunologic: Negative.      Objective:     Vital Signs (Most Recent):  Temp: 99.9 °F (37.7 °C) (05/31/19 1100)  Pulse: 84 (05/31/19 1100)  Resp: 18 (05/31/19 1100)  BP: 137/62 (05/31/19 1100)  SpO2: 95 % (05/31/19 1100) Vital Signs (24h Range):  Temp:  [97 °F (36.1 °C)-99.9 °F (37.7 °C)] 99.9 °F (37.7 °C)  Pulse:  [84-93] 84  Resp:  [16-18] 18  SpO2:  [94 %-100 %] 95 %  BP: (137-170)/(59-93) 137/62     Weight: 59 kg (130 lb)  Height: 5' 3" (160 cm)  Body mass index is 23.03 kg/m².    No intake or output data in the 24 hours ending 05/31/19 1110    General    Constitutional: She is oriented to person, place, and time.   Thin and cachectic     HENT:   Head: Normocephalic and atraumatic.   Nose: Nose normal.   Eyes: EOM are normal. Pupils are equal, round, and reactive to light.   Neck: Neck supple.   Cardiovascular: Normal rate, regular rhythm and normal heart sounds.    Pulmonary/Chest: Effort normal and breath sounds normal.   Abdominal: Soft. Bowel sounds are normal.   Neurological: She is alert and oriented to person, place, and time.   Psychiatric: She has a normal mood and affect. Her behavior is normal. Thought content normal.         Left Hip Exam     Comments:  LLE is shortened and externally rotated  Motor intact distally EHL/FHL/TA/juju  Sensation LT intact D/P/1st  +2 DP/PT               Significant Labs:   CBC:   Recent Labs   Lab 05/31/19  0918   WBC 14.90*   HGB 9.4*   HCT 28.6*        All pertinent labs within the past 24 hours have been " reviewed.    Significant Imaging: I have reviewed and interpreted all pertinent imaging results/findings. There is a displaced left intertrochanteric hip fracture.

## 2019-05-31 NOTE — CONSULTS
[facility]  Orthopedics  Consult Note    Patient Name: Pau Cook  MRN: [id number]  Admission Date: 5/31/2019  Hospital Length of Stay: 0 days  Attending Provider: Vicente Curran MD  Primary Care Provider: Rosi Ramires MD    Patient information was obtained from patient and ER records.     Consults  Subjective:     Principal Problem:<principal problem not specified>    Chief Complaint:   Chief Complaint   Patient presents with    Hip Pain     pt slipped and fell at nursing home this morning, +shortening and rotation L leg c/o hip pain        HPI: 89 year old female fell running to the bathroom today, immediate onset of pain and inability to bear weight on the left lower extremity.  Brought by ambulance to the ER, diagnosed with left intertrochanteric hip fracture.  Admitted by Hospitalist service and Ortho consulted for fracture management.    Past Medical History:   Diagnosis Date    Allergy     Arthritis     Breast cyst     Foot pain     GERD (gastroesophageal reflux disease)     Joint pain     Peripheral vascular disease 1/15/2018    Squamous cell carcinoma 04/2016    Right Lower Leg    MUNIR (stress urinary incontinence, female) 4/17/2017       Past Surgical History:   Procedure Laterality Date    APPENDECTOMY      COLON SURGERY      removed about 10 inch    COLONOSCOPY N/A 12/24/2018    Performed [facility] at [facility] ENDO    EXCISION, MASS, FINGER Right 7/9/2018    [facility] OR    hemorhids      HYSTERECTOMY      HYSTERECTOMY, TOTAL, ABDOMINAL, WITH BILATERAL SALPINGO-OOPHORECTOMY Bilateral 12/28/2018    Performed by [facility] at [facility] OR    LYSIS, ADHESIONS N/A 12/28/2018    Performed by [facility] at [facility] OR    XI ROBOTIC HYSTERECTOMY N/A 12/28/2018    Performed by [facility] at [facility] OR    XI ROBOTIC SALPINGO-OOPHORECTOMY Bilateral 12/28/2018    Performed by [facility] at [facility] OR  "      Review of patient's allergies indicates:   Allergen Reactions    Pcn [penicillins]      Can not remember reaction     Tetanus vaccines and toxoid Swelling     Localized swelling to injection site       No current facility-administered medications for this encounter.      Family History     Problem Relation (Age of Onset)    Breast cancer Sister    Cancer Mother, Sister    Diabetes Brother    No Known Problems Daughter, Brother, Daughter    Stroke Father        Tobacco Use    Smoking status: Never Smoker    Smokeless tobacco: Never Used   Substance and Sexual Activity    Alcohol use: No     Alcohol/week: 0.0 oz     Comment: rare    Drug use: No    Sexual activity: Not Currently     Review of Systems   Constitution: Positive for decreased appetite, malaise/fatigue and weight loss.   HENT: Negative.    Eyes: Negative.    Cardiovascular: Negative.    Respiratory: Negative.    Endocrine: Negative.    Hematologic/Lymphatic:        Patient undergoing chemo for ovarian cancer   Skin: Positive for unusual hair distribution.   Musculoskeletal: Positive for joint pain and joint swelling.   Gastrointestinal: Positive for constipation, nausea and vomiting.   Genitourinary:        Patient undergoing treatment for ovarian cancer   Neurological: Positive for weakness.   Psychiatric/Behavioral: Negative.    Allergic/Immunologic: Negative.      Objective:     Vital Signs (Most Recent):  Temp: 99.9 °F (37.7 °C) (05/31/19 1100)  Pulse: 84 (05/31/19 1100)  Resp: 18 (05/31/19 1100)  BP: 137/62 (05/31/19 1100)  SpO2: 95 % (05/31/19 1100) Vital Signs (24h Range):  Temp:  [97 °F (36.1 °C)-99.9 °F (37.7 °C)] 99.9 °F (37.7 °C)  Pulse:  [84-93] 84  Resp:  [16-18] 18  SpO2:  [94 %-100 %] 95 %  BP: (137-170)/(59-93) 137/62     Weight: 59 kg (130 lb)  Height: 5' 3" (160 cm)  Body mass index is 23.03 kg/m².    No intake or output data in the 24 hours ending 05/31/19 1110    General    Constitutional: She is oriented to person, place, " and time.   Thin and cachectic     HENT:   Head: Normocephalic and atraumatic.   Nose: Nose normal.   Eyes: EOM are normal. Pupils are equal, round, and reactive to light.   Neck: Neck supple.   Cardiovascular: Normal rate, regular rhythm and normal heart sounds.    Pulmonary/Chest: Effort normal and breath sounds normal.   Abdominal: Soft. Bowel sounds are normal.   Neurological: She is alert and oriented to person, place, and time.   Psychiatric: She has a normal mood and affect. Her behavior is normal. Thought content normal.         Left Hip Exam     Comments:  LLE is shortened and externally rotated  Motor intact distally EHL/FHL/TA/juju  Sensation LT intact D/P/1st  +2 DP/PT               Significant Labs:   CBC:   Recent Labs   Lab 05/31/19  0918   WBC 14.90*   HGB 9.4*   HCT 28.6*        All pertinent labs within the past 24 hours have been reviewed.    Significant Imaging: I have reviewed and interpreted all pertinent imaging results/findings. There is a displaced left intertrochanteric hip fracture.    Assessment/Plan:     Left intertrochanteric hip fracture  To OR for CRIMN once cleared by surgery    Thank you for your consult. I will follow-up with patient. Please contact us if you have any additional questions.    Bacilio Lockwood II, MD  Orthopedics  [facility]

## 2019-05-31 NOTE — TRANSFER OF CARE
"Anesthesia Transfer of Care Note    Patient: Pau Cook    Procedure(s) Performed: Procedure(s) (LRB):  ORIF, FRACTURE, FEMUR, INTERTROCHANTERIC (Left)    Patient location: PACU    Anesthesia Type: general    Transport from OR: Transported from OR on 2-3 L/min O2 by NC with adequate spontaneous ventilation    Post pain: adequate analgesia    Post assessment: no apparent anesthetic complications    Post vital signs: stable    Level of consciousness: awake    Nausea/Vomiting: no nausea/vomiting    Complications: none    Transfer of care protocol was followed      Last vitals:   Visit Vitals  BP (!) 141/60 (BP Location: Left arm, Patient Position: Sitting)   Pulse 89   Temp 36.4 °C (97.5 °F) (Skin)   Resp 16   Ht 5' 3" (1.6 m)   Wt 56.2 kg (124 lb)   SpO2 100%   Breastfeeding? No   BMI 21.97 kg/m²     "

## 2019-05-31 NOTE — ANESTHESIA POSTPROCEDURE EVALUATION
Anesthesia Post Evaluation    Patient: Pau Cook    Procedure(s) Performed: Procedure(s) (LRB):  ORIF, FRACTURE, FEMUR, INTERTROCHANTERIC (Left)    Final Anesthesia Type: general  Patient location during evaluation: PACU  Patient participation: Yes- Able to Participate  Level of consciousness: awake and alert and oriented  Post-procedure vital signs: reviewed and stable  Pain management: adequate  Airway patency: patent  PONV status at discharge: No PONV  Anesthetic complications: no      Cardiovascular status: blood pressure returned to baseline  Respiratory status: unassisted, spontaneous ventilation and room air  Hydration status: euvolemic  Follow-up not needed.          Vitals Value Taken Time   /61 5/31/2019  4:43 PM   Temp 36.6 °C (97.8 °F) 5/31/2019  4:43 PM   Pulse 97 5/31/2019  4:43 PM   Resp 16 5/31/2019  4:43 PM   SpO2 96 % 5/31/2019  4:43 PM         Event Time     Out of Recovery 17:07:45          Pain/Florian Score: Pain Rating Prior to Med Admin: 0 (5/31/2019  3:56 PM)  Florian Score: 8 (5/31/2019  4:00 PM)        
none

## 2019-05-31 NOTE — H&P
PCP: Rosi Ramires MD    History & Physical    Chief Complaint:  Left hip pain status post fall    History of Present Illness:  Patient is a 89 y.o. female admitted to Hospitalist Service from [facility] Room with complaint of left hip pain status post fall prior to presentation. Patient reportedly has past medical history significant for gastroesophageal reflux disease, peripheral vascular disease and history of metastatic Ovarian cancer. Patient is a resident of [facility].  Reportedly patient tripped over when she was rushing to the bathroom.  During fall patient hit her right forehead.  Patient denied any head and neck pain or loss of consciousness.  Patient stated she has been getting chemotherapy for metastatic liver carcinoma. Patient denied chest pain, shortness of breath, abdominal pain, nausea, vomiting, headache, vision changes, focal neuro-deficits, cough or fever.    Past Medical History:   Diagnosis Date    Allergy     Arthritis     Breast cyst     Foot pain     GERD (gastroesophageal reflux disease)     Joint pain     Peripheral vascular disease 1/15/2018    Squamous cell carcinoma 04/2016    Right Lower Leg    MUNIR (stress urinary incontinence, female) 4/17/2017     Past Surgical History:   Procedure Laterality Date    APPENDECTOMY      COLON SURGERY      removed about 10 inch    COLONOSCOPY N/A 12/24/2018    [facility]    EXCISION, MASS, FINGER Right 7/9/2018    Performed by [facility] at [facility] OR    hemorhids      HYSTERECTOMY      HYSTERECTOMY, TOTAL, ABDOMINAL, WITH BILATERAL SALPINGO-OOPHORECTOMY Bilateral 12/28/2018    [facility] at [facility] OR    LYSIS, ADHESIONS N/A 12/28/2018    [facility] at [facility] OR    XI ROBOTIC HYSTERECTOMY N/A 12/28/2018    [facility] OR    XI ROBOTIC SALPINGO-OOPHORECTOMY Bilateral 12/28/2018     [facility] by [facility] at [facility] OR     Family History   Adopted: Yes   Problem Relation Age of Onset    Cancer Mother     Stroke Father     Breast cancer Sister     Cancer Sister     No Known Problems Daughter     No Known Problems Brother     No Known Problems Daughter     Diabetes Brother     Colon cancer Neg Hx     Ovarian cancer Neg Hx      Social History     Tobacco Use    Smoking status: Never Smoker    Smokeless tobacco: Never Used   Substance Use Topics    Alcohol use: No     Alcohol/week: 0.0 oz     Comment: rare    Drug use: No      Review of patient's allergies indicates:   Allergen Reactions    Pcn [penicillins]      Can not remember reaction     Tetanus vaccines and toxoid Swelling     Localized swelling to injection site       (Not in a hospital admission)  Review of Systems:  Constitutional: no fever or chills  Eyes: no visual changes  Ears, nose, mouth, throat, and face: no nasal congestion or sore throat  Respiratory: no cough or shorness of breath  Cardiovascular: no chest pain or palpitations  Gastrointestinal: no nausea or vomiting, no abdominal pain or change in bowel habits  Genitourinary: no hematuria or dysuria  Integument/breast: no rash or pruritis  Hematologic/lymphatic: no easy bruising or lymphadenopathy  Musculoskeletal:  See history of present illness  Neurological: no seizures or tremors.  Behavioral/Psych: no auditory or visual hallucinations  Endocrine: no heat or cold intolerance     OBJECTIVE:     Vital Signs (Most Recent)  Temp: 97 °F (36.1 °C) (05/31/19 0819)  Pulse: 86 (05/31/19 0944)  Resp: 16 (05/31/19 0819)  BP: (!) 142/59 (05/31/19 0932)  SpO2: 98 % (05/31/19 0944)    Physical Exam:  General appearance: well developed, appears stated age  Head: normocephalic, atraumatic  Eyes:  conjunctivae/corneas clear. PERRL.  Nose: Nares normal. Septum midline.  Throat: lips, mucosa, and tongue normal; teeth and gums normal, no throat erythema.  Neck: supple,  symmetrical, trachea midline, no JVD and thyroid not enlarged, symmetric, no tenderness/mass/nodules  Lungs:  clear to auscultation bilaterally and normal respiratory effort  Chest wall: no tenderness  Heart: regular rate and rhythm, S1, S2 normal, no murmur, click, rub or gallop  Abdomen: soft, non-tender non-distended; bowel sounds normal; no masses,  no organomegaly  Extremities: no cyanosis, clubbing or edema. Left leg shortening and external rotation noted.  Left ankle and left hip area are tender to touch.  Pulses: 2+ and symmetric  Skin: Skin color, texture, turgor normal. No rashes or lesions.  Lymph nodes: Cervical, supraclavicular, and axillary nodes normal.  Neurologic: Normal strength and tone. No focal numbness or weakness. CNII-XII intact.      Laboratory:   CBC:   Recent Labs   Lab 05/31/19  0918   WBC 14.90*   RBC 3.28*   HGB 9.4*   HCT 28.6*      MCV 87   MCH 28.7   MCHC 32.8     CMP:   Recent Labs   Lab 05/31/19  0918   *   CALCIUM 8.9   ALBUMIN 3.5   PROT 6.2      K 3.5   CO2 26      BUN 9   CREATININE 0.8   ALKPHOS 116   ALT 19   AST 26   BILITOT 0.2     Microbiology Results (last 7 days)     ** No results found for the last 168 hours. **        Recent Labs   Lab 05/31/19  0909   COLORU Yellow   SPECGRAV 1.015   PHUR 8.0   PROTEINUA Negative   NITRITE Negative   LEUKOCYTESUR Negative   UROBILINOGEN Negative     Diagnostic Results:  Chest X-Ray: No acute abnormality.    CT head without contrast: Moderate generalized brain atrophy with deep white matter ischemic changes.  Otherwise negative head CT.    Left hip x-ray: Comminuted angulated intertrochanteric fracture of the left femur.    Left ankle x-ray: Negative x-rays of the left ankle.    EKG: NSR, 94, LAHB, no acute changes.    Assessment/Plan:     Active Hospital Problems    Diagnosis  POA    *Closed intertrochanteric fracture of hip, left, initial encounter [L25.533A]  Patient may proceed for hip fracture repair  surgery. Patient is scheduled for OR by Dr. Lockwood this afternoon. Patient denied any history of cardiopulmonary disorder. Patient denied any angina-like complaints of breathing difficulties.  Continue to follow Orthopedic recommendations.  Needs aggressive incentive spirometry post-operatively.  Follow hemoglobin and hematocrit closely.  Pain control with IV narcotics and antiemetics as needed.  Physical therapy as per Orthopedics protocol with fall precautions postoperatively.    Yes    Ovarian cancer [C56.9]  Under care of Dr. Tejeda.    Yes    Gastroesophageal reflux disease with esophagitis [K21.0]  On PPI.  Yes     Discussed with patient's daughter, answered all the questions.      DVT prophylaxis: Use SCD and TEDs. Post op anticoagulation as per Dr. Lockwood's recommendations.     Vicente Curran MD  Department of Hospital Medicine   [facility]

## 2019-05-31 NOTE — OR NURSING
Hematoma noted to upper hip incision. Dr Lockwood called to bedside to examine patient. No new orders.

## 2019-05-31 NOTE — NURSING
Pt transferred via stretcher with 4 people assist. Pt is AAOx4. LEFT hip pain noted. Rosa intact, clear urine noted. Neurovasculars intact, +2 pulses noted. Pt has persistent belching. No actual emesis noted. Dr. RADHA Lockwood at bedside now for consult.

## 2019-05-31 NOTE — ED PROVIDER NOTES
Encounter Date: 5/31/2019    SCRIBE #1 NOTE: IRuthann, mitesh scribing for, and in the presence of, Fabien Shore MD.       History     Chief Complaint   Patient presents with    Hip Pain     pt slipped and fell at nursing home this morning, +shortening and rotation L leg c/o hip pain       Time seen by provider: 8:16 AM on 05/31/2019    Pau Cook is a 89 y.o. female with ovarian CA who presents to the ED via EMS from [facility] with an onset of left hip and left ankle pain status post fall. Per EMS, the patient tripped and fall as she was rushing to the bathroom. She believes she tripped on the leg table and states she hit her right forehead but does not endorse neck or back pain. The patient was given Fentanyl 100 mcg en route. She is taking Laxatives secondary to undergoing chemotherapy for metastatic liver CA. The patient denies being on a blood thinner or any other symptoms at this time. No orthopedic PSHx noted. Penicillin and Tetanus drug allergies noted.    The history is provided by the patient and the EMS personnel.     Review of patient's allergies indicates:   Allergen Reactions    Pcn [penicillins]      Can not remember reaction     Tetanus vaccines and toxoid Swelling     Localized swelling to injection site     Past Medical History:   Diagnosis Date    Allergy     Arthritis     Breast cyst     Foot pain     GERD (gastroesophageal reflux disease)     Joint pain     Peripheral vascular disease 1/15/2018    Squamous cell carcinoma 04/2016    Right Lower Leg    MUNIR (stress urinary incontinence, female) 4/17/2017     Past Surgical History:   Procedure Laterality Date    APPENDECTOMY      COLON SURGERY      removed about 10 inch    COLONOSCOPY N/A 12/24/2018    Per[facility]    EXCISION, MASS, FINGER Right 7/9/2018    [facility]    hemorhids      HYSTERECTOMY      HYSTERECTOMY, TOTAL,  ABDOMINAL, WITH BILATERAL SALPINGO-OOPHORECTOMY Bilateral 12/28/2018    Performed by [facility]e Rose [facility] at [facility] OR    LYSIS, ADHESIONS N/A 12/28/2018    Performed by [facility] at [facility] OR    XI ROBOTIC HYSTERECTOMY N/A 12/28/2018    Performed by [facility] at [facility] OR    XI ROBOTIC SALPINGO-OOPHORECTOMY Bilateral 12/28/2018    Performed by [facility] Rose [facility] at [facility] OR     Family History   Adopted: Yes   Problem Relation Age of Onset    Cancer Mother     Stroke Father     Breast cancer Sister     Cancer Sister     No Known Problems Daughter     No Known Problems Brother     No Known Problems Daughter     Diabetes Brother     Colon cancer Neg Hx     Ovarian cancer Neg Hx      Social History     Tobacco Use    Smoking status: Never Smoker    Smokeless tobacco: Never Used   Substance Use Topics    Alcohol use: No     Alcohol/week: 0.0 oz     Comment: rare    Drug use: No     Review of Systems   Constitutional: Negative for chills and fever.   HENT: Negative for nosebleeds.    Eyes: Negative for visual disturbance.   Respiratory: Negative for cough and shortness of breath.    Cardiovascular: Negative for chest pain and palpitations.   Gastrointestinal: Negative for abdominal pain, diarrhea, nausea and vomiting.   Genitourinary: Negative for dysuria and hematuria.   Musculoskeletal: Positive for arthralgias (left hip & ankle). Negative for back pain and neck pain.   Skin: Negative for rash.   Allergic/Immunologic: Positive for immunocompromised state (secondary to chemotherapy).   Neurological: Negative for seizures, syncope and headaches.     Physical Exam     Initial Vitals [05/31/19 0819]   BP Pulse Resp Temp SpO2   (!) 141/93 93 16 97 °F (36.1 °C) (!) 94 %      MAP       --         Physical Exam    Nursing note and vitals reviewed.  Constitutional: She appears well-developed and well-nourished.  Non-toxic appearance. No distress.   HENT:   Head: Normocephalic. Head is with  contusion.   Small right-sided scalp contusion.    Eyes: EOM are normal. Pupils are equal, round, and reactive to light.   Neck: Normal range of motion. Neck supple. No neck rigidity. No JVD present.   Cardiovascular: Normal rate, regular rhythm, normal heart sounds and intact distal pulses. Exam reveals no gallop and no friction rub.    No murmur heard.  Pulses:       Dorsalis pedis pulses are 2+ on the left side.   Pulmonary/Chest: Breath sounds normal. She has no wheezes. She has no rhonchi. She has no rales.   Musculoskeletal: Normal range of motion.        Right shoulder: She exhibits deformity.        Left ankle: Tenderness.   Left leg is shortened and externally rotated. Able to wiggle toes. Tenderness of the left lateral ankle.    Neurological: She is alert and oriented to person, place, and time. She has normal strength and normal reflexes. No cranial nerve deficit or sensory deficit. She exhibits normal muscle tone. Coordination normal. GCS eye subscore is 4. GCS verbal subscore is 5. GCS motor subscore is 6.   Skin: Skin is warm and dry.   Psychiatric: She has a normal mood and affect. Her speech is normal and behavior is normal. She is not actively hallucinating.       ED Course   Procedures  Labs Reviewed   CBC W/ AUTO DIFFERENTIAL - Abnormal; Notable for the following components:       Result Value    WBC 14.90 (*)     RBC 3.28 (*)     Hemoglobin 9.4 (*)     Hematocrit 28.6 (*)     RDW 18.7 (*)     MPV 6.5 (*)     Gran # (ANC) 12.9 (*)     Gran% 86.3 (*)     Lymph% 9.4 (*)     Mono% 3.9 (*)     All other components within normal limits   COMPREHENSIVE METABOLIC PANEL - Abnormal; Notable for the following components:    Glucose 119 (*)     All other components within normal limits   URINALYSIS, REFLEX TO URINE CULTURE    Narrative:     Preferred Collection Type->Urine, Catheterized     EKG Readings: (Independently Interpreted)   Initial Reading: No STEMI.   Normal sinus rhythm with sinus arrhythmia at a  rate of 88 bpm with a left anterior vesicular block and normal ST segments.      Imaging Results          X-Ray Chest AP Portable (Final result)  Result time 05/31/19 10:06:42    Final result by Ramses Prajapati Jr., MD (05/31/19 10:06:42)                 Impression:      No acute abnormality.      Electronically signed by: Ramses Prajapati MD  Date:    05/31/2019  Time:    10:06             Narrative:    EXAMINATION:  XR CHEST AP PORTABLE    TECHNIQUE:  Single frontal view of the chest was performed.    COMPARISON:  Chest of January 25, 2018    FINDINGS:  The lungs are clear, with normal appearance of pulmonary vasculature and no pleural effusion or pneumothorax.    The cardiac silhouette is normal in size. The hilar and mediastinal contours are unremarkable.    Bones are intact.                               CT Head Without Contrast (Final result)  Result time 05/31/19 08:48:54    Final result by Ramses Prajapati Jr., MD (05/31/19 08:48:54)                 Impression:      Moderate generalized brain atrophy with deep white matter ischemic changes.  Otherwise negative head CT.      Electronically signed by: Ramses Prajapati MD  Date:    05/31/2019  Time:    08:48             Narrative:    EXAMINATION:  CT HEAD WITHOUT CONTRAST    CLINICAL HISTORY:  Head trauma, minor, GCS>=13, NOC/NEXUS/CCR positive, first study;    TECHNIQUE:  Low dose axial images were obtained through the head.  Coronal and sagittal reformations were also performed. Contrast was not administered.    COMPARISON:  Prior head CT of March 25, 2009.    FINDINGS:  No cranial fracture is identified.  Scalp edema or hematoma is not noted.  The internal auditory canals are sharp and symmetric.    The basal cisterns are clear and symmetric.  There is no mass effect or midline shift.  There is moderate enlargement of the cerebral ventricles, sylvian fissures and cerebral sulci consistent with brain atrophy.  Mild hypodensity in the central white matter  is consistent with deep white matter ischemic changes.  A focal lesion of increased or decreased CT density consistent with tumor, edema, CVA or hemorrhage is not seen.  No intracranial hemorrhage or hematoma is noted.                               X-Ray Ankle Complete Left (Final result)  Result time 05/31/19 08:43:51    Final result by Ramses Prajapati Jr., MD (05/31/19 08:43:51)                 Impression:      Negative x-rays of the left ankle.      Electronically signed by: Ramses Prajapati MD  Date:    05/31/2019  Time:    08:43             Narrative:    EXAMINATION:  XR ANKLE COMPLETE 3 VIEW LEFT    CLINICAL HISTORY:  Unspecified injury of left ankle, initial encounter    TECHNIQUE:  AP, lateral and oblique views of the left ankle were performed.    COMPARISON:  None    FINDINGS:  A fracture of the tibia, fibula or talus is not seen.  The ankle mortise is intact.  Soft tissue swelling is not identified.                               X-Ray Hip 2 View Left (Final result)  Result time 05/31/19 08:44:37    Final result by Ramess Prajapati Jr., MD (05/31/19 08:44:37)                 Impression:      Comminuted angulated intertrochanteric fracture of the left femur.      Electronically signed by: Ramses Prajapati MD  Date:    05/31/2019  Time:    08:44             Narrative:    EXAMINATION:  XR HIP 2 VIEW LEFT    CLINICAL HISTORY:  left hip pain- suspect fracture;    TECHNIQUE:  AP view of the pelvis and frog leg lateral view of the left hip were performed.    COMPARISON:  None    FINDINGS:  There is a comminuted angulated intertrochanteric fracture of the left femur.  The femoral head remains in the acetabulum.  The bones of the pelvis and the right hip are intact.  The sacroiliac joints are sharp and symmetric.                                 Medical Decision Making:   History:   Old Medical Records: I decided to obtain old medical records.  Initial Assessment:   Patient is a 89-year-old woman who presents  emergency department with mechanical fall complaining of left hip and left ankle pain.  Patient does endorse hitting her head on the floor without loss of conscious.  Examination reveals shortening and external rotation of the left hip, neurovascularly intact. Also tenderness of the left ankle.  X-rays obtained and reveal comminuted intertrochanteric fracture of the left hip.  CT head and x-ray of the left ankle were negative. Pain medicine addressed.  Discussed with Dr. Lockwood who recommends admission to Medicine for medical clearance and he may be able to operate on the patient today if cleared.  Patient is left with nothing by mouth.  Discussed with Dr. Curran who agrees to admit the patient.  Clinical Tests:   Lab Tests: Ordered and Reviewed  Radiological Study: Reviewed and Ordered  Medical Tests: Reviewed and Ordered            Scribe Attestation:   Scribe #1: I performed the above scribed service and the documentation accurately describes the services I performed. I attest to the accuracy of the note.    I, Silviano Coronel, personally performed the services described in this documentation. All medical record entries made by the scribe were at my direction and in my presence.  I have reviewed the chart and agree that the record reflects my personal performance and is accurate and complete. Fabien Shore MD.  10:37 AM 05/31/2019       DISCLAIMER: This note was prepared with [employer] voice recognition transcription software. Garbled syntax, mangled pronouns, and other bizarre constructions may be attributed to that software system.             Clinical Impression:       ICD-10-CM ICD-9-CM   1. Closed fracture of left hip, initial encounter S72.002A 820.8   2. Left ankle injury, initial encounter S99.912A 959.7   3. Fall W19.XXXA E888.9         Disposition:   Disposition: Admitted                        Fabien Shore MD  05/31/19 1037

## 2019-05-31 NOTE — OP NOTE
[facility]  Orthopedic Surgery Department  Operative Note    SUMMARY     Date of Procedure: 5/31/2019     Procedure: Procedure(s) (LRB):  ORIF, FRACTURE, FEMUR, INTERTROCHANTERIC (Left)     Surgeon(s) and Role:     * Bacilio Lockwood II, MD - Primary    Assisting Surgeon: None    First Assist:  Leandro Cornelius CFA    Pre-Operative Diagnosis: Fall [W19.XXXA]    Post-Operative Diagnosis: Post-Op Diagnosis Codes:     * Fall [W19.XXXA]    Anesthesia: General    Technical Procedures Used:  Intramedullary nailing of a left intertrochanteric femur fracture    Description of the Findings of the Procedure:  Dictated    Significant Surgical Tasks Conducted by the Assistant(s), if Applicable:  Wound closure and bandage application    Complications: No    Estimated Blood Loss (EBL): 200 mL           Implants:   Implant Name Type Inv. Item Serial No.  Lot No. LRB No. Used   SCREW TFN ADVANCE 100MM - [id number]  SCREW TFN ADVANCE 100MM  [employer] [id number] Left 1   NAIL TFNA 130DEG 11MM L STRL - [id number]  NAIL TFNA 130DEG 11MM L STRL  [employer] [id number] Left 1   WIRE GUIDE 3.2MM 400MM - [id number]  WIRE GUIDE 3.2MM 400MM  [employer]  Left 2       Specimens:   Specimen (12h ago, onward)    None                  Condition: Good    Disposition: PACU - hemodynamically stable.    Attestation: I was present and scrubbed for the entire procedure.    Procedure In Detail:  The patient was brought to the operating room and intubated on the transport gurney.  She was then transferred to the fracture table. A well-padded perineal post was placed and the patient was pulled down on the post.  The left leg was placed in the traction boot and the right leg was flexed and abducted out of the way in a well leg chris.  Traction was applied and the fracture was reduced under fluoroscopic visualization.  The reduction was checked in both AP and lateral planes.  The left lower extremity was then prepped and draped in the  normal sterile fashion.  A stab incision was made proximal to the tip of the greater trochanter and dissection was taken down to the tip of the greater trochanter.  Under fluoroscopic guidance a guide pin was placed on the tip of the greater trochanter and advanced into the proximal shaft of the femur. Proper placement of the guide pin was verified with fluoroscopy in AP and lateral planes.  This guide pin was then over reamed with an entry Reamer and removed.  A guidewire was then placed down the femoral shaft and seated distally at the level of the physeal scar.  Measurements off of the guidewire determined that a 38 cm nail would be the appropriate length.  We then took 1 step reaming with a 12 mm Reamer to accommodate a 10 mm nail.  A 38 cm long by 11 mm diameter TFN a [employer] nail was then passed over the wire and seated in the femur.  The guidewire was then removed. Using insertion handle as a guide to place the screw a skin incision was made and dissection was taken to the lateral cortex of proximal femur the trocar was inserted through the insertion handle.  A guidewire was then fired through the trocar through the lateral cortex of the femur through the nail and into the femoral head.  Proper placement a wire was verified with fluoroscopy in AP and lateral planes.  Measurements off of the wire determined that a 100 mm lag screw would be the appropriate length. The wire was then over drilled. A 100 mm lag screw was then placed over the guidewire and seated fully into the femoral head and once it was seated the set screw in the nail was locked down to complete the construct.  Once construct was completely insertion handle was removed and fluoroscopy was again used to verify acceptable reduction of the fracture and proper placement of all hardware in AP and lateral planes.  Once that was verified the wounds were then irrigated with normal saline and closed in layered fashion. Sterile intraoperative dressings  were placed along with a polar Care device for postoperative pain control and the patient was awakened from anesthesia and taken recovery where she was noted be stable postoperatively.  Needle and lap counts were correct at the end of the case.

## 2019-05-31 NOTE — HPI
89 year old female fell running to the bathroom today, immediate onset of pain and inability to bear weight on the left lower extremity.  Brought by ambulance to the ER, diagnosed with left intertrochanteric hip fracture.  Admitted by Hospitalist service and Ortho consulted for fracture management.    Patient now POD #2 s/p CRIMN left intertroch femur fracture.

## 2019-05-31 NOTE — HOSPITAL COURSE
Surgery went well    No issues overnight    Patient was able to transfer and sit up in chair today

## 2019-06-01 LAB
ANION GAP SERPL CALC-SCNC: 9 MMOL/L (ref 8–16)
BASOPHILS # BLD AUTO: 0 K/UL (ref 0–0.2)
BASOPHILS NFR BLD: 0.1 % (ref 0–1.9)
BUN SERPL-MCNC: 16 MG/DL (ref 8–23)
CALCIUM SERPL-MCNC: 8.5 MG/DL (ref 8.7–10.5)
CHLORIDE SERPL-SCNC: 98 MMOL/L (ref 95–110)
CO2 SERPL-SCNC: 28 MMOL/L (ref 23–29)
CREAT SERPL-MCNC: 0.8 MG/DL (ref 0.5–1.4)
DIFFERENTIAL METHOD: ABNORMAL
EOSINOPHIL # BLD AUTO: 0 K/UL (ref 0–0.5)
EOSINOPHIL NFR BLD: 0 % (ref 0–8)
ERYTHROCYTE [DISTWIDTH] IN BLOOD BY AUTOMATED COUNT: 18.1 % (ref 11.5–14.5)
EST. GFR  (AFRICAN AMERICAN): >60 ML/MIN/1.73 M^2
EST. GFR  (NON AFRICAN AMERICAN): >60 ML/MIN/1.73 M^2
GLUCOSE SERPL-MCNC: 129 MG/DL (ref 70–110)
HCT VFR BLD AUTO: 30.3 % (ref 37–48.5)
HGB BLD-MCNC: 10.1 G/DL (ref 12–16)
LYMPHOCYTES # BLD AUTO: 0.9 K/UL (ref 1–4.8)
LYMPHOCYTES NFR BLD: 3.5 % (ref 18–48)
MCH RBC QN AUTO: 29.5 PG (ref 27–31)
MCHC RBC AUTO-ENTMCNC: 33.3 G/DL (ref 32–36)
MCV RBC AUTO: 88 FL (ref 82–98)
MONOCYTES # BLD AUTO: 1.1 K/UL (ref 0.3–1)
MONOCYTES NFR BLD: 4.4 % (ref 4–15)
NEUTROPHILS # BLD AUTO: 23.9 K/UL (ref 1.8–7.7)
NEUTROPHILS NFR BLD: 92 % (ref 38–73)
PLATELET # BLD AUTO: 168 K/UL (ref 150–350)
PMV BLD AUTO: 7.2 FL (ref 9.2–12.9)
POTASSIUM SERPL-SCNC: 4.4 MMOL/L (ref 3.5–5.1)
RBC # BLD AUTO: 3.42 M/UL (ref 4–5.4)
SODIUM SERPL-SCNC: 135 MMOL/L (ref 136–145)
WBC # BLD AUTO: 25.9 K/UL (ref 3.9–12.7)

## 2019-06-01 PROCEDURE — 36415 COLL VENOUS BLD VENIPUNCTURE: CPT

## 2019-06-01 PROCEDURE — 94761 N-INVAS EAR/PLS OXIMETRY MLT: CPT

## 2019-06-01 PROCEDURE — G8978 MOBILITY CURRENT STATUS: HCPCS | Mod: CL | Performed by: PHYSICAL THERAPIST

## 2019-06-01 PROCEDURE — 85025 COMPLETE CBC W/AUTO DIFF WBC: CPT

## 2019-06-01 PROCEDURE — 63600175 PHARM REV CODE 636 W HCPCS: Performed by: ORTHOPAEDIC SURGERY

## 2019-06-01 PROCEDURE — 80048 BASIC METABOLIC PNL TOTAL CA: CPT

## 2019-06-01 PROCEDURE — 25000003 PHARM REV CODE 250: Performed by: INTERNAL MEDICINE

## 2019-06-01 PROCEDURE — 27000221 HC OXYGEN, UP TO 24 HOURS

## 2019-06-01 PROCEDURE — 12000002 HC ACUTE/MED SURGE SEMI-PRIVATE ROOM

## 2019-06-01 PROCEDURE — 97161 PT EVAL LOW COMPLEX 20 MIN: CPT | Performed by: PHYSICAL THERAPIST

## 2019-06-01 PROCEDURE — 25000003 PHARM REV CODE 250: Performed by: ORTHOPAEDIC SURGERY

## 2019-06-01 PROCEDURE — G8979 MOBILITY GOAL STATUS: HCPCS | Mod: CJ | Performed by: PHYSICAL THERAPIST

## 2019-06-01 RX ORDER — CALCIUM CARBONATE 200(500)MG
500 TABLET,CHEWABLE ORAL 3 TIMES DAILY PRN
Status: DISCONTINUED | OUTPATIENT
Start: 2019-06-01 | End: 2019-06-04 | Stop reason: HOSPADM

## 2019-06-01 RX ORDER — CARBOXYMETHYLCELLULOSE SODIUM 10 MG/ML
1 GEL OPHTHALMIC 3 TIMES DAILY
Status: DISCONTINUED | OUTPATIENT
Start: 2019-06-01 | End: 2019-06-04 | Stop reason: HOSPADM

## 2019-06-01 RX ADMIN — CARBOXYMETHYLCELLULOSE SODIUM 1 DROP: 10 GEL OPHTHALMIC at 09:06

## 2019-06-01 RX ADMIN — PREGABALIN 75 MG: 75 CAPSULE ORAL at 09:06

## 2019-06-01 RX ADMIN — MUPIROCIN 1 G: 20 OINTMENT TOPICAL at 08:06

## 2019-06-01 RX ADMIN — SENNOSIDES AND DOCUSATE SODIUM 1 TABLET: 8.6; 5 TABLET ORAL at 08:06

## 2019-06-01 RX ADMIN — MUPIROCIN 1 G: 20 OINTMENT TOPICAL at 09:06

## 2019-06-01 RX ADMIN — ACETAMINOPHEN 1000 MG: 500 TABLET ORAL at 08:06

## 2019-06-01 RX ADMIN — OYSTER SHELL CALCIUM WITH VITAMIN D 1 TABLET: 500; 200 TABLET, FILM COATED ORAL at 09:06

## 2019-06-01 RX ADMIN — CARBOXYMETHYLCELLULOSE SODIUM 1 DROP: 10 GEL OPHTHALMIC at 08:06

## 2019-06-01 RX ADMIN — KETOROLAC TROMETHAMINE 15 MG: 30 INJECTION, SOLUTION INTRAMUSCULAR at 01:06

## 2019-06-01 RX ADMIN — PANTOPRAZOLE SODIUM 40 MG: 40 TABLET, DELAYED RELEASE ORAL at 09:06

## 2019-06-01 RX ADMIN — KETOROLAC TROMETHAMINE 15 MG: 30 INJECTION, SOLUTION INTRAMUSCULAR at 06:06

## 2019-06-01 RX ADMIN — ACETAMINOPHEN 1000 MG: 500 TABLET ORAL at 06:06

## 2019-06-01 RX ADMIN — ACETAMINOPHEN 1000 MG: 500 TABLET ORAL at 01:06

## 2019-06-01 RX ADMIN — PREGABALIN 75 MG: 75 CAPSULE ORAL at 08:06

## 2019-06-01 RX ADMIN — ENOXAPARIN SODIUM 40 MG: 100 INJECTION SUBCUTANEOUS at 09:06

## 2019-06-01 NOTE — PROGRESS NOTES
PCP: Rosi Ramires MD    Progress Note    Chief Complaint:  Left hip pain status post fall    History of Present Illness:  Patient is a 89 y.o. female admitted to Hospitalist Service from [facility] Emergency Room with complaint of left hip pain status post fall prior to presentation. Patient reportedly has past medical history significant for gastroesophageal reflux disease, peripheral vascular disease and history of metastatic Ovarian cancer. Patient is a resident of [facility].  Reportedly patient tripped over when she was rushing to the bathroom.  During fall patient hit her right forehead.  Patient denied any head and neck pain or loss of consciousness.  Patient stated she has been getting chemotherapy for metastatic liver carcinoma. Patient denied chest pain, shortness of breath, abdominal pain, nausea, vomiting, headache, vision changes, focal neuro-deficits, cough or fever.    Interval History:  POD #1 with no new complaints. Breathing well and little pain.     Past Medical History:   Diagnosis Date    Allergy     Arthritis     Breast cyst     Foot pain     GERD (gastroesophageal reflux disease)     Joint pain     Ovarian cancer     Peripheral vascular disease 1/15/2018    Squamous cell carcinoma 04/2016    Right Lower Leg    MUNIR (stress urinary incontinence, female) 4/17/2017     Past Surgical History:   Procedure Laterality Date    APPENDECTOMY      COLON SURGERY      removed about 10 inch    COLONOSCOPY N/A 12/24/2018    Performed by [facility]al [facility] at [facility] ENDO    EXCISION, MASS, FINGER Right 7/9/2018    Performed by [facility] at [facility] OR    hemorhids      HYSTERECTOMY      HYSTERECTOMY, TOTAL, ABDOMINAL, WITH BILATERAL SALPINGO-OOPHORECTOMY Bilateral 12/28/2018    Performed by [facility]mee Rose MD at [facility] [place] OR    LYSIS, ADHESIONS N/A 12/28/2018    Performed by A[facility]e Rose MD at [facility] [place] OR    XI ROBOTIC HYSTERECTOMY N/A 12/28/2018     Performed by [facility] at [facility] OR    XI ROBOTIC SALPINGO-OOPHORECTOMY Bilateral 12/28/2018    Performed by [facility] Rose MD at [facility] OR     Family History   Adopted: Yes   Problem Relation Age of Onset    Cancer Mother     Stroke Father     Breast cancer Sister     Cancer Sister     No Known Problems Daughter     No Known Problems Brother     No Known Problems Daughter     Diabetes Brother     Colon cancer Neg Hx     Ovarian cancer Neg Hx      Social History     Tobacco Use    Smoking status: Never Smoker    Smokeless tobacco: Never Used   Substance Use Topics    Alcohol use: No     Alcohol/week: 0.0 oz     Comment: rare    Drug use: No      Review of patient's allergies indicates:   Allergen Reactions    Pcn [penicillins]      Can not remember reaction     Tetanus vaccines and toxoid Swelling     Localized swelling to injection site     PTA Medications   Medication Sig    acetaminophen (TYLENOL) 650 MG TbSR Take by mouth.    calcium-vitamin D3 (CALCIUM 500 + D) 500 mg(1,250mg) -200 unit per tablet Take 1 tablet by mouth once daily.     carboxymethylcellulose (REFRESH PLUS) 0.5 % Dpet 1 drop daily as needed.    cyanocobalamin (VITAMIN B-12) 1000 MCG tablet Take 1,000 mcg by mouth once daily.     FLAXSEED ORAL Take 650 mg by mouth once daily.     ketotifen (ZADITOR) 0.025 % (0.035 %) ophthalmic solution Place 1 drop into both eyes 2 (two) times daily as needed (Pt reports use approx once/week).     MULTIVITAMIN W-MINERALS/LUTEIN (CENTRUM SILVER ORAL) Take 1 tablet by mouth once daily.     NEXIUM 20 mg capsule Take 20 mg by mouth before breakfast.     prochlorperazine (COMPAZINE) 10 MG tablet Take 1 tablet (10 mg total) by mouth every 6 (six) hours as needed.     Review of Systems:  Constitutional: no fever or chills  Eyes: no visual changes  Ears, nose, mouth, throat, and face: no nasal congestion or sore throat  Respiratory: no cough or shorness of breath  Cardiovascular: no  chest pain or palpitations  Gastrointestinal: no nausea or vomiting, no abdominal pain or change in bowel habits  Genitourinary: no hematuria or dysuria  Integument/breast: no rash or pruritis  Hematologic/lymphatic: no easy bruising or lymphadenopathy  Musculoskeletal:  See history of present illness  Neurological: no seizures or tremors.  Behavioral/Psych: no auditory or visual hallucinations  Endocrine: no heat or cold intolerance     OBJECTIVE:     Vital Signs (Most Recent)  Temp: 99.7 °F (37.6 °C) (06/01/19 0735)  Pulse: 86 (06/01/19 0736)  Resp: 18 (06/01/19 0735)  BP: (!) 96/51 (06/01/19 0736)  SpO2: 95 % (06/01/19 0800)    Physical Exam:  General appearance: well developed, appears stated age  Head: normocephalic, atraumatic  Eyes:  conjunctivae/corneas clear. PERRL.  Nose: Nares normal. Septum midline.  Throat: lips, mucosa, and tongue normal; teeth and gums normal, no throat erythema.  Neck: supple, symmetrical, trachea midline, no JVD and thyroid not enlarged, symmetric, no tenderness/mass/nodules  Lungs:  clear to auscultation bilaterally and normal respiratory effort  Chest wall: no tenderness  Heart: regular rate and rhythm, S1, S2 normal, no murmur, click, rub or gallop  Abdomen: soft, non-tender non-distended; bowel sounds normal; no masses,  no organomegaly  Extremities: no cyanosis, clubbing or edema. Left leg shortening and external rotation noted.  Left ankle and left hip area are tender to touch.  Pulses: 2+ and symmetric  Skin: Skin color, texture, turgor normal. No rashes or lesions.  Lymph nodes: Cervical, supraclavicular, and axillary nodes normal.  Neurologic: Normal strength and tone. No focal numbness or weakness. CNII-XII intact.      Laboratory:   CBC:   Recent Labs   Lab 06/01/19  0550   WBC 25.90*   RBC 3.42*   HGB 10.1*   HCT 30.3*      MCV 88   MCH 29.5   MCHC 33.3     CMP:   Recent Labs   Lab 05/31/19  0918 06/01/19  0550   * 129*   CALCIUM 8.9 8.5*   ALBUMIN 3.5  --     PROT 6.2  --     135*   K 3.5 4.4   CO2 26 28    98   BUN 9 16   CREATININE 0.8 0.8   ALKPHOS 116  --    ALT 19  --    AST 26  --    BILITOT 0.2  --      Microbiology Results (last 7 days)     ** No results found for the last 168 hours. **        Recent Labs   Lab 05/31/19  0909   COLORU Yellow   SPECGRAV 1.015   PHUR 8.0   PROTEINUA Negative   NITRITE Negative   LEUKOCYTESUR Negative   UROBILINOGEN Negative     Diagnostic Results:  Chest X-Ray: No acute abnormality.    CT head without contrast: Moderate generalized brain atrophy with deep white matter ischemic changes.  Otherwise negative head CT.    Left hip x-ray: Comminuted angulated intertrochanteric fracture of the left femur.    Left ankle x-ray: Negative x-rays of the left ankle.    EKG: NSR, 94, LAHB, no acute changes.    Assessment/Plan:     Active Hospital Problems    Diagnosis  POA    *Closed intertrochanteric fracture of hip, left, initial encounter [S72.142A]  Doing well POD 1  Continue to follow Orthopedic recommendations.  Needs aggressive incentive spirometry post-operatively.  Follow hemoglobin and hematocrit closely.  Pain control with IV narcotics and antiemetics as needed.  Physical therapy as per Orthopedics protocol with fall precautions postoperatively.    Yes    Ovarian cancer [C56.9]  Under care of Dr. Tejeda.    Yes    Gastroesophageal reflux disease with esophagitis [K21.0]  On PPI.  Yes     Discussed with patient's daughter, answered all the questions.      DVT prophylaxis: Use SCD and TEDs. Post op anticoagulation as per Dr. Lockwood's recommendations.     Eduardo Hodge MD  Department of Hospital Medicine   [facility]

## 2019-06-01 NOTE — PROGRESS NOTES
[facility]  Orthopedics  Progress Note    Patient Name: Pau Cook  MRN: [id number]  Admission Date: 5/31/2019  Hospital Length of Stay: 1 days  Attending Provider: Vicente Curran MD  Primary Care Provider: Rosi Ramires MD  Follow-up For: Procedure(s) (LRB):  ORIF, FRACTURE, FEMUR, INTERTROCHANTERIC (Left)    Post-Operative Day: 1 Day Post-Op  Subjective:     Principal Problem:Closed intertrochanteric fracture of hip, left, initial encounter    Principal Orthopedic Problem: same    Interval History: POD #1 s/p CRIMN left intertroch fx    Review of patient's allergies indicates:   Allergen Reactions    Pcn [penicillins]      Can not remember reaction     Tetanus vaccines and toxoid Swelling     Localized swelling to injection site       Current Facility-Administered Medications   Medication    0.9%  NaCl infusion (for blood administration)    acetaminophen tablet 1,000 mg    acetaminophen tablet 650 mg    bisacodyl suppository 10 mg    calcium-vitamin D3 500 mg(1,250mg) -200 unit per tablet 1 tablet    carboxymethylcellulose sodium 1 % DpGe 1 drop    enoxaparin injection 40 mg    HYDROcodone-acetaminophen 5-325 mg per tablet 1 tablet    hydromorphone (PF) injection 1 mg    ketorolac injection 15 mg    lactulose 20 gram/30 mL solution Soln 20 g    morphine injection 2 mg    morphine injection 4 mg    mupirocin 2 % ointment 1 g    ondansetron disintegrating tablet 8 mg    ondansetron injection 4 mg    pantoprazole EC tablet 40 mg    pregabalin capsule 75 mg    promethazine tablet 25 mg    senna-docusate 8.6-50 mg per tablet 1 tablet    senna-docusate 8.6-50 mg per tablet 1 tablet    sodium chloride 0.9% flush 10 mL     Objective:     Vital Signs (Most Recent):  Temp: 99.7 °F (37.6 °C) (06/01/19 0735)  Pulse: 86 (06/01/19 0736)  Resp: 18 (06/01/19 0735)  BP: (!) 96/51 (06/01/19 0736)  SpO2: 100 % (06/01/19 0736) Vital Signs (24h Range):  Temp:  [96.5 °F (35.8 °C)-99.9 °F  "(37.7 °C)] 99.7 °F (37.6 °C)  Pulse:  [81-98] 86  Resp:  [10-18] 18  SpO2:  [94 %-100 %] 100 %  BP: ()/(46-73) 96/51     Weight: 56.2 kg (124 lb)  Height: 5' 3" (160 cm)  Body mass index is 21.97 kg/m².      Intake/Output Summary (Last 24 hours) at 6/1/2019 0922  Last data filed at 6/1/2019 0400  Gross per 24 hour   Intake 1250 ml   Output 1000 ml   Net 250 ml       General    Constitutional: She is oriented to person, place, and time.   HENT:   Head: Normocephalic and atraumatic.   Eyes: EOM are normal. Pupils are equal, round, and reactive to light.   Neck: Neck supple.   Cardiovascular: Normal rate and regular rhythm.    Pulmonary/Chest: Effort normal and breath sounds normal.   Abdominal: Soft.   Neurological: She is alert and oriented to person, place, and time.   Psychiatric: She has a normal mood and affect. Her behavior is normal. Thought content normal.         Left Hip Exam     Comments:  Polar care in place and working  Incisions C/D/I with no erythema, no sign of infection  Motor intact EHL/FHL/TA/juju  +2 DP/PT  Sensation LT intact D/P/1st            Significant Labs:   CBC:   Recent Labs   Lab 05/31/19  0918 06/01/19  0550   WBC 14.90* 25.90*   HGB 9.4* 10.1*   HCT 28.6* 30.3*    168     All pertinent labs within the past 24 hours have been reviewed.    Significant Imaging: None    Assessment/Plan:     POD #1 s/p CRIMN left intertroch hip fx, stable and doing well  Up with PT  Supportive care  D/C planning      Bacilio Lockwood II, MD  Orthopedics  [facility]  "

## 2019-06-01 NOTE — PLAN OF CARE
Problem: Physical Therapy Goal  Goal: Physical Therapy Goal  Goals to be met by: 6/15/2019     Patient will increase functional independence with mobility by performin. Supine to sit with Modified [place]  2. Sit to supine with Modified [place]  3. Sit to stand transfer with Supervision  4. Bed to chair transfer with Contact Guard Assistance using Rolling Walker, and TTWB on L LE.  5. Gait  x 50 feet with Contact Guard Assistance using Rolling Walker, and TTWB on L LE.   6. Lower extremity exercise program x10 reps per handout, with independence    Outcome: Ongoing (interventions implemented as appropriate)  Pt was able to transfer bed<>chair with RW and Min A while maintaining TTWB on L LE.

## 2019-06-01 NOTE — SUBJECTIVE & OBJECTIVE
"Principal Problem:Closed intertrochanteric fracture of hip, left, initial encounter    Principal Orthopedic Problem: same    Interval History: POD #1 s/p CRIMN left intertroch fx    Review of patient's allergies indicates:   Allergen Reactions    Pcn [penicillins]      Can not remember reaction     Tetanus vaccines and toxoid Swelling     Localized swelling to injection site       Current Facility-Administered Medications   Medication    0.9%  NaCl infusion (for blood administration)    acetaminophen tablet 1,000 mg    acetaminophen tablet 650 mg    bisacodyl suppository 10 mg    calcium-vitamin D3 500 mg(1,250mg) -200 unit per tablet 1 tablet    carboxymethylcellulose sodium 1 % DpGe 1 drop    enoxaparin injection 40 mg    HYDROcodone-acetaminophen 5-325 mg per tablet 1 tablet    hydromorphone (PF) injection 1 mg    ketorolac injection 15 mg    lactulose 20 gram/30 mL solution Soln 20 g    morphine injection 2 mg    morphine injection 4 mg    mupirocin 2 % ointment 1 g    ondansetron disintegrating tablet 8 mg    ondansetron injection 4 mg    pantoprazole EC tablet 40 mg    pregabalin capsule 75 mg    promethazine tablet 25 mg    senna-docusate 8.6-50 mg per tablet 1 tablet    senna-docusate 8.6-50 mg per tablet 1 tablet    sodium chloride 0.9% flush 10 mL     Objective:     Vital Signs (Most Recent):  Temp: 99.7 °F (37.6 °C) (06/01/19 0735)  Pulse: 86 (06/01/19 0736)  Resp: 18 (06/01/19 0735)  BP: (!) 96/51 (06/01/19 0736)  SpO2: 100 % (06/01/19 0736) Vital Signs (24h Range):  Temp:  [96.5 °F (35.8 °C)-99.9 °F (37.7 °C)] 99.7 °F (37.6 °C)  Pulse:  [81-98] 86  Resp:  [10-18] 18  SpO2:  [94 %-100 %] 100 %  BP: ()/(46-73) 96/51     Weight: 56.2 kg (124 lb)  Height: 5' 3" (160 cm)  Body mass index is 21.97 kg/m².      Intake/Output Summary (Last 24 hours) at 6/1/2019 0922  Last data filed at 6/1/2019 0400  Gross per 24 hour   Intake 1250 ml   Output 1000 ml   Net 250 ml "       General    Constitutional: She is oriented to person, place, and time.   HENT:   Head: Normocephalic and atraumatic.   Eyes: EOM are normal. Pupils are equal, round, and reactive to light.   Neck: Neck supple.   Cardiovascular: Normal rate and regular rhythm.    Pulmonary/Chest: Effort normal and breath sounds normal.   Abdominal: Soft.   Neurological: She is alert and oriented to person, place, and time.   Psychiatric: She has a normal mood and affect. Her behavior is normal. Thought content normal.         Left Hip Exam     Comments:  Polar care in place and working  Incisions C/D/I with no erythema, no sign of infection  Motor intact EHL/FHL/TA/juju  +2 DP/PT  Sensation LT intact D/P/1st            Significant Labs:   CBC:   Recent Labs   Lab 05/31/19  0918 06/01/19  0550   WBC 14.90* 25.90*   HGB 9.4* 10.1*   HCT 28.6* 30.3*    168     All pertinent labs within the past 24 hours have been reviewed.    Significant Imaging: None

## 2019-06-01 NOTE — PLAN OF CARE
Problem: Adult Inpatient Plan of Care  Goal: Plan of Care Review  Outcome: Ongoing (interventions implemented as appropriate)  o2 saturation 95% on room air.  92 on standby

## 2019-06-01 NOTE — PLAN OF CARE
Problem: Adult Inpatient Plan of Care  Goal: Plan of Care Review  Outcome: Ongoing (interventions implemented as appropriate)  Plan of care reviewed with pt she verbalized understanding. POD 1 left hip with Dr. Lockwood. Pedal pulses present 2+. Worked with PT today up in chair with cryo she is tolerating well she is toe touch weight bearing status. Able to make her needs known. Lovenox given for VTE. Remained free from falls on scheduled tylenol and toradal for for pain. Call light within reach cardiac monitoring in place will cont to monitor.

## 2019-06-01 NOTE — PT/OT/SLP EVAL
Physical Therapy Evaluation    Patient Name:  Pau Cook   MRN:  [id number]    Recommendations:     Discharge Recommendations:  nursing facility, skilled   Discharge Equipment Recommendations: wheelchair, manual, commode, shower chair(TBD)   Barriers to discharge: None    Assessment:     Pau Cook is a 89 y.o. female admitted with a medical diagnosis of Closed intertrochanteric fracture of hip, left, initial encounter.  She presents with the following impairments/functional limitations:  weakness, impaired self care skills, impaired balance, decreased safety awareness, decreased ROM, impaired skin, impaired endurance, impaired functional mobilty, decreased upper extremity function, pain, gait instability, decreased lower extremity function, orthopedic precautions.  During PT evaluation, pt required CGA for transfer supine<>sit, and Min/Mod A for sit<>Stand and transfer bed<>chair with RW and TTWB on L LE.  Pt will need SNF upon d/c.     Rehab Prognosis: Good; patient would benefit from acute skilled PT services to address these deficits and reach maximum level of function.    Recent Surgery: Procedure(s) (LRB):  ORIF, FRACTURE, FEMUR, INTERTROCHANTERIC (Left) 1 Day Post-Op    Plan:     During this hospitalization, patient to be seen BID(QD on Sat and Sun) to address the identified rehab impairments via gait training, therapeutic activities, therapeutic exercises and progress toward the following goals:    · Plan of Care Expires:  06/15/19    Subjective     Chief Complaint: none  Patient/Family Comments/goals: to get back to her home  Pain/Comfort:  · Pain Rating 1: (Pt did not rate pain)  · Location - Side 1: Left  · Location - Orientation 1: upper  · Location 1: leg  · Pain Addressed 1: Reposition, Distraction    Patients cultural, spiritual, Pentec[employer]alism conflicts given the current situation: no    Living Environment:  Pt lives in [facility].  She lives alone.   Prior to  admission, patients level of function was independent.  Equipment used at home: none(Pt has access to a RW).  DME owned (not currently used): none.  Upon discharge, patient will have assistance from dtr.    Objective:     Communicated with nurse Beckett prior to session.  Patient found HOB elevated with peripheral IV, telemetry  upon PT entry to room.    General Precautions: Standard, fall   Orthopedic Precautions:LLE toe touch weight bearing   Braces: N/A     Exams:  · Cognitive Exam:  Patient is oriented to Person, Place, Time and Situation  · Postural Exam:  Patient presented with the following abnormalities:    · -       Rounded shoulders  · -       Forward head  · -       Abnormal trunk flexion  · RLE ROM: WFL  · RLE Strength: WFL  · LLE ROM: Deficits: ankle and knee are WFL, but hip is limited due to surgical pain and restrictions  · LLE Strength: Deficits: ankle and knee are WFL, but hip is limited due to surgical pain and restrictions    Functional Mobility:  · Bed Mobility:     · Supine to Sit: contact guard assistance  · Transfers:     · Sit to Stand:  minimum assistance and moderate assistance with rolling walker and TTWB L LE  · Bed to Chair: minimum assistance with  rolling walker  using  Step Transfer and TTWB L LE  · Balance: fair      Therapeutic Activities and Exercises:   Supine AP's, QS, L Hip abduction all x 10 reps    AM-PAC 6 CLICK MOBILITY  Total Score:14     Patient left up in chair with all lines intact, call button in reach, nurse Beckett notified and dtr present.    GOALS:   Multidisciplinary Problems     Physical Therapy Goals        Problem: Physical Therapy Goal    Goal Priority Disciplines Outcome Goal Variances Interventions   Physical Therapy Goal     PT, PT/OT Ongoing (interventions implemented as appropriate)     Description:  Goals to be met by: 6/15/2019     Patient will increase functional independence with mobility by performin. Supine to sit with Modified [place]  2.  Sit to supine with Modified [place]  3. Sit to stand transfer with Supervision  4. Bed to chair transfer with Contact Guard Assistance using Rolling Walker, and TTWB on L LE.  5. Gait  x 50 feet with Contact Guard Assistance using Rolling Walker, and TTWB on L LE.   6. Lower extremity exercise program x10 reps per handout, with independence                      History:     Past Medical History:   Diagnosis Date    Allergy     Arthritis     Breast cyst     Foot pain     GERD (gastroesophageal reflux disease)     Joint pain     Ovarian cancer     Peripheral vascular disease 1/15/2018    Squamous cell carcinoma 04/2016    Right Lower Leg    MUNIR (stress urinary incontinence, female) 4/17/2017       Past Surgical History:   Procedure Laterality Date    APPENDECTOMY      COLON SURGERY      removed about 10 inch    COLONOSCOPY N/A 12/24/2018    [facility]    EXCISION, MASS, FINGER Right 7/9/2018    [facility]    hemorhids      HYSTERECTOMY      HYSTERECTOMY, TOTAL, ABDOMINAL, WITH BILATERAL SALPINGO-OOPHORECTOMY Bilateral 12/28/2018    [facility] by [facility] at [facility] OR    LYSIS, ADHESIONS N/A 12/28/2018    Performed by [facility] at [facility] OR    XI ROBOTIC HYSTERECTOMY N/A 12/28/2018    Performed by [facility] at [facility] OR    XI ROBOTIC SALPINGO-OOPHORECTOMY Bilateral 12/28/2018    Performed by [facility]e Rose MD at [facility] OR       Time Tracking:     PT Received On: 06/01/19  PT Start Time: 0926     PT Stop Time: 0946  PT Total Time (min): 20 min     Billable Minutes: Evaluation 20      Manasa Acevedo, PT  06/01/2019

## 2019-06-02 LAB
ANION GAP SERPL CALC-SCNC: 5 MMOL/L (ref 8–16)
BASOPHILS # BLD AUTO: 0 K/UL (ref 0–0.2)
BASOPHILS NFR BLD: 0.1 % (ref 0–1.9)
BUN SERPL-MCNC: 23 MG/DL (ref 8–23)
CALCIUM SERPL-MCNC: 8.2 MG/DL (ref 8.7–10.5)
CHLORIDE SERPL-SCNC: 98 MMOL/L (ref 95–110)
CO2 SERPL-SCNC: 27 MMOL/L (ref 23–29)
CREAT SERPL-MCNC: 0.9 MG/DL (ref 0.5–1.4)
DIFFERENTIAL METHOD: ABNORMAL
EOSINOPHIL # BLD AUTO: 0 K/UL (ref 0–0.5)
EOSINOPHIL NFR BLD: 0 % (ref 0–8)
ERYTHROCYTE [DISTWIDTH] IN BLOOD BY AUTOMATED COUNT: 18.4 % (ref 11.5–14.5)
EST. GFR  (AFRICAN AMERICAN): >60 ML/MIN/1.73 M^2
EST. GFR  (NON AFRICAN AMERICAN): 57 ML/MIN/1.73 M^2
GLUCOSE SERPL-MCNC: 95 MG/DL (ref 70–110)
HCT VFR BLD AUTO: 24.4 % (ref 37–48.5)
HGB BLD-MCNC: 8.1 G/DL (ref 12–16)
LYMPHOCYTES # BLD AUTO: 1.3 K/UL (ref 1–4.8)
LYMPHOCYTES NFR BLD: 9.3 % (ref 18–48)
MCH RBC QN AUTO: 29.5 PG (ref 27–31)
MCHC RBC AUTO-ENTMCNC: 33.3 G/DL (ref 32–36)
MCV RBC AUTO: 89 FL (ref 82–98)
MONOCYTES # BLD AUTO: 0.8 K/UL (ref 0.3–1)
MONOCYTES NFR BLD: 6 % (ref 4–15)
NEUTROPHILS # BLD AUTO: 11.6 K/UL (ref 1.8–7.7)
NEUTROPHILS NFR BLD: 84.6 % (ref 38–73)
PLATELET # BLD AUTO: 150 K/UL (ref 150–350)
PMV BLD AUTO: 7.1 FL (ref 9.2–12.9)
POTASSIUM SERPL-SCNC: 4.2 MMOL/L (ref 3.5–5.1)
RBC # BLD AUTO: 2.75 M/UL (ref 4–5.4)
SODIUM SERPL-SCNC: 130 MMOL/L (ref 136–145)
WBC # BLD AUTO: 13.7 K/UL (ref 3.9–12.7)

## 2019-06-02 PROCEDURE — 97535 SELF CARE MNGMENT TRAINING: CPT

## 2019-06-02 PROCEDURE — 97166 OT EVAL MOD COMPLEX 45 MIN: CPT

## 2019-06-02 PROCEDURE — 63600175 PHARM REV CODE 636 W HCPCS: Performed by: ORTHOPAEDIC SURGERY

## 2019-06-02 PROCEDURE — 80048 BASIC METABOLIC PNL TOTAL CA: CPT

## 2019-06-02 PROCEDURE — 36415 COLL VENOUS BLD VENIPUNCTURE: CPT

## 2019-06-02 PROCEDURE — 12000002 HC ACUTE/MED SURGE SEMI-PRIVATE ROOM

## 2019-06-02 PROCEDURE — G8987 SELF CARE CURRENT STATUS: HCPCS | Mod: CK

## 2019-06-02 PROCEDURE — 85025 COMPLETE CBC W/AUTO DIFF WBC: CPT

## 2019-06-02 PROCEDURE — 25000003 PHARM REV CODE 250: Performed by: INTERNAL MEDICINE

## 2019-06-02 PROCEDURE — G8988 SELF CARE GOAL STATUS: HCPCS | Mod: CK

## 2019-06-02 PROCEDURE — 94761 N-INVAS EAR/PLS OXIMETRY MLT: CPT

## 2019-06-02 PROCEDURE — 97116 GAIT TRAINING THERAPY: CPT

## 2019-06-02 PROCEDURE — 25000003 PHARM REV CODE 250: Performed by: ORTHOPAEDIC SURGERY

## 2019-06-02 RX ADMIN — PREGABALIN 75 MG: 75 CAPSULE ORAL at 08:06

## 2019-06-02 RX ADMIN — CARBOXYMETHYLCELLULOSE SODIUM 1 DROP: 10 GEL OPHTHALMIC at 08:06

## 2019-06-02 RX ADMIN — KETOROLAC TROMETHAMINE 15 MG: 30 INJECTION, SOLUTION INTRAMUSCULAR at 12:06

## 2019-06-02 RX ADMIN — ENOXAPARIN SODIUM 40 MG: 100 INJECTION SUBCUTANEOUS at 08:06

## 2019-06-02 RX ADMIN — PANTOPRAZOLE SODIUM 40 MG: 40 TABLET, DELAYED RELEASE ORAL at 08:06

## 2019-06-02 RX ADMIN — OYSTER SHELL CALCIUM WITH VITAMIN D 1 TABLET: 500; 200 TABLET, FILM COATED ORAL at 08:06

## 2019-06-02 RX ADMIN — SENNOSIDES AND DOCUSATE SODIUM 1 TABLET: 8.6; 5 TABLET ORAL at 08:06

## 2019-06-02 RX ADMIN — KETOROLAC TROMETHAMINE 15 MG: 30 INJECTION, SOLUTION INTRAMUSCULAR at 05:06

## 2019-06-02 RX ADMIN — ACETAMINOPHEN 1000 MG: 500 TABLET ORAL at 08:06

## 2019-06-02 RX ADMIN — HYDROCODONE BITARTRATE AND ACETAMINOPHEN 1 TABLET: 5; 325 TABLET ORAL at 04:06

## 2019-06-02 RX ADMIN — MUPIROCIN 1 G: 20 OINTMENT TOPICAL at 08:06

## 2019-06-02 RX ADMIN — CALCIUM CARBONATE (ANTACID) CHEW TAB 500 MG 500 MG: 500 CHEW TAB at 09:06

## 2019-06-02 RX ADMIN — KETOROLAC TROMETHAMINE 15 MG: 30 INJECTION, SOLUTION INTRAMUSCULAR at 06:06

## 2019-06-02 NOTE — PLAN OF CARE
Problem: Adult Inpatient Plan of Care  Goal: Plan of Care Review  Outcome: Ongoing (interventions implemented as appropriate)  Plan of care reviewed with pt she verbalized understanding POD 2 with left hip surgery with Dr. Lockwood worked with PT/OT today ambulating in room with Toe touch weight wearing. Able to make her needs known. Medicated for pain with prn which was effective. Rosa catheter was removed pt voiding on her own. Ambulated to bedside commode with FWW . Will cont to monitor hourly call light within reach will cont to monitor.

## 2019-06-02 NOTE — PT/OT/SLP PROGRESS
Physical Therapy  Treatment    Pau Cook   MRN: [id number]   Admitting Diagnosis: Closed intertrochanteric fracture of hip, left, initial encounter    PT Received On: 06/02/19  PT Start Time: 1127     PT Stop Time: 1142    PT Total Time (min): 15 min       Billable Minutes:  Gait Training 8 and Therapeutic Activity 7    Treatment Type: Treatment  PT/PTA: PTA     PTA Visit Number: 1       General Precautions: Standard, fall  Orthopedic Precautions: LLE toe touch weight bearing   Braces:      Spiritual, Cultural Beliefs, Restoration Practices, Values that Affect Care: no    Subjective:  Communicated with patient and nursing prior to session.      Pain/Comfort  Pain Rating 1: 5/10  Location - Side 1: Left  Location 1: leg  Pain Addressed 1: Nurse notified, Distraction, Reposition  Pain Rating Post-Intervention 1: 5/10  Pain Rating 2: 5/10    Objective:   Patient found with: SCD, telemetry    Functional Mobility:  Bed Mobility:   Rolling to the L with mod A  Sup to sit with min A       Transfers:   Sit to stand mod A 2  Stand to sit on BSC with Min A and VC's for technique    Gait:    12ft with RW and Min A, VC's to ensure TTWB on L LE, patient very good at adhering to WB precautions.      Balance:   Static Sit: FAIR: Maintains without assist, but unable to take any challenges   Dynamic Sit: FAIR+: Maintains balance through MINIMAL excursions of active trunk motion  Static Stand: POOR: Needs MODERATE assist to maintain  Dynamic stand: POOR+: Needs MIN (minimal ) assist during gait     Therapeutic Activities and Exercises:  Patient and daughters given HEP handout with written and pictoral instructions, as well as patient performing for PTA as follows:  AP x 10  LAQ x 10 with AA NICHOLAS ARZOLA  Marching x 10 with HARRY ARZOLA     AM-PAC 6 CLICK MOBILITY  How much help from another person does this patient currently need?   1 = Unable, Total/Dependent Assistance  2 = A lot, Maximum/Moderate Assistance  3 = A little, Minimum/Contact  Guard/Supervision  4 = None, Modified [place]/Independent    Turning over in bed (including adjusting bedclothes, sheets and blankets)?: 3  Sitting down on and standing up from a chair with arms (e.g., wheelchair, bedside commode, etc.): 2  Moving from lying on back to sitting on the side of the bed?: 2  Moving to and from a bed to a chair (including a wheelchair)?: 3  Need to walk in hospital room?: 3  Climbing 3-5 steps with a railing?: 1  Basic Mobility Total Score: 14    AM-PAC Raw Score CMS G-Code Modifier Level of Impairment Assistance   6 % Total / Unable   7 - 9 CM 80 - 100% Maximal Assist   10 - 14 CL 60 - 80% Moderate Assist   15 - 19 CK 40 - 60% Moderate Assist   20 - 22 CJ 20 - 40% Minimal Assist   23 CI 1-20% SBA / CGA   24 CH 0% Independent/ Mod I     Patient left up in chair with all lines intact, call button in reach and nursing notified.    Assessment:  Pau Cook is a 89 y.o. female with a medical diagnosis of Closed intertrochanteric fracture of hip, left, initial encounter and presents with increased tolerance for ambulation today while adhering to TTWB, good tolerance of TE and following VC's very well..    Rehab identified problem list/impairments: Rehab identified problem list/impairments: weakness, impaired endurance, impaired self care skills, gait instability, impaired functional mobilty, impaired balance, decreased safety awareness, pain, decreased ROM, impaired cardiopulmonary response to activity, impaired muscle length, impaired joint extensibility, orthopedic precautions    Rehab potential is good.    Activity tolerance: Fair    Discharge recommendations: Discharge Facility/Level of Care Needs: LTACH (long-term acute care hospital)     Barriers to discharge:      Equipment recommendations:       GOALS:   Multidisciplinary Problems     Physical Therapy Goals        Problem: Physical Therapy Goal    Goal Priority Disciplines Outcome Goal Variances Interventions    Physical Therapy Goal     PT, PT/OT Ongoing (interventions implemented as appropriate)     Description:  Goals to be met by: 6/15/2019     Patient will increase functional independence with mobility by performin. Supine to sit with Modified [place]  2. Sit to supine with Modified [place]  3. Sit to stand transfer with Supervision  4. Bed to chair transfer with Contact Guard Assistance using Rolling Walker, and TTWB on L LE.  5. Gait  x 50 feet with Contact Guard Assistance using Rolling Walker, and TTWB on L LE.   6. Lower extremity exercise program x10 reps per handout, with independence                      PLAN:    Patient to be seen BID  to address the above listed problems via gait training, therapeutic activities, therapeutic exercises  Plan of Care expires: 06/15/19  Plan of Care reviewed with: patient, daughter         Aileen Webster, PTA  2019

## 2019-06-02 NOTE — SUBJECTIVE & OBJECTIVE
"Principal Problem:Closed intertrochanteric fracture of hip, left, initial encounter    Principal Orthopedic Problem: Same    Interval History: Patient progressing well    Review of patient's allergies indicates:   Allergen Reactions    Pcn [penicillins]      Can not remember reaction     Tetanus vaccines and toxoid Swelling     Localized swelling to injection site       Current Facility-Administered Medications   Medication    0.9%  NaCl infusion (for blood administration)    acetaminophen tablet 1,000 mg    acetaminophen tablet 650 mg    bisacodyl suppository 10 mg    calcium carbonate 200 mg calcium (500 mg) chewable tablet 500 mg    calcium-vitamin D3 500 mg(1,250mg) -200 unit per tablet 1 tablet    carboxymethylcellulose sodium 1 % DpGe 1 drop    enoxaparin injection 40 mg    HYDROcodone-acetaminophen 5-325 mg per tablet 1 tablet    hydromorphone (PF) injection 1 mg    ketorolac injection 15 mg    lactulose 20 gram/30 mL solution Soln 20 g    morphine injection 2 mg    morphine injection 4 mg    mupirocin 2 % ointment 1 g    ondansetron disintegrating tablet 8 mg    ondansetron injection 4 mg    pantoprazole EC tablet 40 mg    pregabalin capsule 75 mg    promethazine tablet 25 mg    senna-docusate 8.6-50 mg per tablet 1 tablet    senna-docusate 8.6-50 mg per tablet 1 tablet    sodium chloride 0.9% flush 10 mL     Objective:     Vital Signs (Most Recent):  Temp: 98.5 °F (36.9 °C) (06/02/19 1116)  Pulse: 95 (06/02/19 1116)  Resp: 18 (06/02/19 1116)  BP: 129/60 (06/02/19 1116)  SpO2: (!) 93 % (06/02/19 1116) Vital Signs (24h Range):  Temp:  [96.3 °F (35.7 °C)-99.2 °F (37.3 °C)] 98.5 °F (36.9 °C)  Pulse:  [] 95  Resp:  [12-18] 18  SpO2:  [93 %-98 %] 93 %  BP: ()/(50-60) 129/60     Weight: 56.2 kg (124 lb)  Height: 5' 3" (160 cm)  Body mass index is 21.97 kg/m².      Intake/Output Summary (Last 24 hours) at 6/2/2019 1436  Last data filed at 6/2/2019 1200  Gross per 24 hour "   Intake 550 ml   Output 950 ml   Net -400 ml               Left Hip Exam     Comments:  Bandages C/D/I  Motor intact EHL/FHL/TA/juju  +2 DP/PT  Sensation LT intact D/P/1st            Significant Labs:   CBC:   Recent Labs   Lab 06/01/19  0550 06/02/19  0433   WBC 25.90* 13.70*   HGB 10.1* 8.1*   HCT 30.3* 24.4*    150     CMP:   Recent Labs   Lab 06/01/19  0550 06/02/19  0433   * 130*   K 4.4 4.2   CL 98 98   CO2 28 27   * 95   BUN 16 23   CREATININE 0.8 0.9   CALCIUM 8.5* 8.2*   ANIONGAP 9 5*   EGFRNONAA >60 57*     All pertinent labs within the past 24 hours have been reviewed.    Significant Imaging: None

## 2019-06-02 NOTE — PROGRESS NOTES
PCP: Rosi Ramires MD    Progress Note    Chief Complaint:  Left hip pain status post fall    History of Present Illness:  Patient is a 89 y.o. female admitted to Hospitalist Service from [facility] Emergency Room with complaint of left hip pain status post fall prior to presentation. Patient reportedly has past medical history significant for gastroesophageal reflux disease, peripheral vascular disease and history of metastatic Ovarian cancer. Patient is a resident of [facility].  Reportedly patient tripped over when she was rushing to the bathroom.  During fall patient hit her right forehead.  Patient denied any head and neck pain or loss of consciousness.  Patient stated she has been getting chemotherapy for metastatic liver carcinoma. Patient denied chest pain, shortness of breath, abdominal pain, nausea, vomiting, headache, vision changes, focal neuro-deficits, cough or fever.    Interval History:  POD #2 with no new complaints.  Pain well controlled    Past Medical History:   Diagnosis Date    Allergy     Arthritis     Breast cyst     Foot pain     GERD (gastroesophageal reflux disease)     Joint pain     Ovarian cancer     Peripheral vascular disease 1/15/2018    Squamous cell carcinoma 04/2016    Right Lower Leg    MUNIR (stress urinary incontinence, female) 4/17/2017     Past Surgical History:   Procedure Laterality Date    APPENDECTOMY      COLON SURGERY      removed about 10 inch    COLONOSCOPY N/A 12/24/2018    [facility]    EXCISION, MASS, FINGER Right 7/9/2018    [facility] OR    hemorhids      HYSTERECTOMY      HYSTERECTOMY, TOTAL, ABDOMINAL, WITH BILATERAL SALPINGO-OOPHORECTOMY Bilateral 12/28/2018    [facility] at [facility]    LYSIS, ADHESIONS N/A 12/28/2018    [facility] at [facility] OR    XI ROBOTIC HYSTERECTOMY N/A 12/28/2018    Per[facility] at [facility] OR    XI ROBOTIC SALPINGO-OOPHORECTOMY Bilateral 12/28/2018    [facility] at [facility] OR     Family History   Adopted: Yes   Problem Relation Age of Onset    Cancer Mother     Stroke Father     Breast cancer Sister     Cancer Sister     No Known Problems Daughter     No Known Problems Brother     No Known Problems Daughter     Diabetes Brother     Colon cancer Neg Hx     Ovarian cancer Neg Hx      Social History     Tobacco Use    Smoking status: Never Smoker    Smokeless tobacco: Never Used   Substance Use Topics    Alcohol use: No     Alcohol/week: 0.0 oz     Comment: rare    Drug use: No      Review of patient's allergies indicates:   Allergen Reactions    Pcn [penicillins]      Can not remember reaction     Tetanus vaccines and toxoid Swelling     Localized swelling to injection site     PTA Medications   Medication Sig    acetaminophen (TYLENOL) 650 MG TbSR Take by mouth.    calcium-vitamin D3 (CALCIUM 500 + D) 500 mg(1,250mg) -200 unit per tablet Take 1 tablet by mouth once daily.     carboxymethylcellulose (REFRESH PLUS) 0.5 % Dpet 1 drop daily as needed.    cyanocobalamin (VITAMIN B-12) 1000 MCG tablet Take 1,000 mcg by mouth once daily.     FLAXSEED ORAL Take 650 mg by mouth once daily.     ketotifen (ZADITOR) 0.025 % (0.035 %) ophthalmic solution Place 1 drop into both eyes 2 (two) times daily as needed (Pt reports use approx once/week).     MULTIVITAMIN W-MINERALS/LUTEIN (CENTRUM SILVER ORAL) Take 1 tablet by mouth once daily.     NEXIUM 20 mg capsule Take 20 mg by mouth before breakfast.     prochlorperazine (COMPAZINE) 10 MG tablet Take 1 tablet (10 mg total) by mouth every 6 (six) hours as needed.     Review of Systems:  Constitutional: no fever or chills  Eyes: no visual changes  Ears, nose, mouth, throat, and face: no nasal congestion or sore throat  Respiratory: no cough or shorness of breath  Cardiovascular: no chest pain or  palpitations  Gastrointestinal: no nausea or vomiting, no abdominal pain or change in bowel habits  Genitourinary: no hematuria or dysuria  Integument/breast: no rash or pruritis  Hematologic/lymphatic: no easy bruising or lymphadenopathy  Musculoskeletal:  See history of present illness  Neurological: no seizures or tremors.  Behavioral/Psych: no auditory or visual hallucinations  Endocrine: no heat or cold intolerance     OBJECTIVE:     Vital Signs (Most Recent)  Temp: 98.5 °F (36.9 °C) (06/02/19 0754)  Pulse: 85 (06/02/19 0754)  Resp: 18 (06/02/19 0754)  BP: (!) 120/58 (06/02/19 0754)  SpO2: 98 % (06/02/19 0754)    Physical Exam:  General appearance: well developed, appears stated age  Head: normocephalic, atraumatic  Eyes:  conjunctivae/corneas clear. PERRL.  Nose: Nares normal. Septum midline.  Throat: lips, mucosa, and tongue normal; teeth and gums normal, no throat erythema.  Neck: supple, symmetrical, trachea midline, no JVD and thyroid not enlarged, symmetric, no tenderness/mass/nodules  Lungs:  clear to auscultation bilaterally and normal respiratory effort  Chest wall: no tenderness  Heart: regular rate and rhythm, S1, S2 normal, no murmur, click, rub or gallop  Abdomen: soft, non-tender non-distended; bowel sounds normal; no masses,  no organomegaly  Extremities: no cyanosis, clubbing or edema. Left leg shortening and external rotation noted.  Left ankle and left hip area are tender to touch.  Pulses: 2+ and symmetric  Skin: Skin color, texture, turgor normal. No rashes or lesions.  Lymph nodes: Cervical, supraclavicular, and axillary nodes normal.  Neurologic: Normal strength and tone. No focal numbness or weakness. CNII-XII intact.      Laboratory:   CBC:   Recent Labs   Lab 06/02/19 0433   WBC 13.70*   RBC 2.75*   HGB 8.1*   HCT 24.4*      MCV 89   MCH 29.5   MCHC 33.3     CMP:   Recent Labs   Lab 05/31/19  0918  06/02/19 0433   *   < > 95   CALCIUM 8.9   < > 8.2*   ALBUMIN 3.5  --    --    PROT 6.2  --   --       < > 130*   K 3.5   < > 4.2   CO2 26   < > 27      < > 98   BUN 9   < > 23   CREATININE 0.8   < > 0.9   ALKPHOS 116  --   --    ALT 19  --   --    AST 26  --   --    BILITOT 0.2  --   --     < > = values in this interval not displayed.     Microbiology Results (last 7 days)     ** No results found for the last 168 hours. **        Recent Labs   Lab 05/31/19  0909   COLORU Yellow   SPECGRAV 1.015   PHUR 8.0   PROTEINUA Negative   NITRITE Negative   LEUKOCYTESUR Negative   UROBILINOGEN Negative     Diagnostic Results:  Chest X-Ray: No acute abnormality.    CT head without contrast: Moderate generalized brain atrophy with deep white matter ischemic changes.  Otherwise negative head CT.    Left hip x-ray: Comminuted angulated intertrochanteric fracture of the left femur.    Left ankle x-ray: Negative x-rays of the left ankle.    EKG: NSR, 94, LAHB, no acute changes.    Assessment/Plan:     Active Hospital Problems    Diagnosis  POA    *Closed intertrochanteric fracture of hip, left, initial encounter [S72.302A]  Doing well POD 2  Continue to follow Orthopedic recommendations.  Needs aggressive incentive spirometry post-operatively.  Follow hemoglobin and hematocrit closely.  Pain control with IV narcotics and antiemetics as needed.  Physical therapy as per Orthopedics protocol with fall precautions postoperatively.    Yes    Ovarian cancer [C56.9]  Under care of Dr. Tejeda.    Yes    Gastroesophageal reflux disease with esophagitis [K21.0]  On PPI.  Yes     Discussed with patient's daughter, answered all the questions. They are deciding between SNF and home with home health.      DVT prophylaxis: Use SCD and TEDs. Post op anticoagulation as per Dr. Lockwood's recommendations.     Eduardo Hodge MD  Department of Hospital Medicine   [facility]

## 2019-06-02 NOTE — PT/OT/SLP EVAL
Occupational Therapy   Evaluation    Name: Pau Cook  MRN: [id number]  Admitting Diagnosis:  Closed intertrochanteric fracture of hip, left, initial encounter 2 Days Post-Op    Recommendations:     Discharge Recommendations: nursing facility, skilled  Discharge Equipment Recommendations:  tub bench, wheelchair, manual, commode  Barriers to discharge:  Decreased caregiver support    Assessment:     Pau Cook is a 89 y.o. female with a medical diagnosis of Closed intertrochanteric fracture of hip, left, initial encounter.  She presents with a decline in functional status due to the listed impairments, impacting ADLs and functional mobility. Pt displays impaired activity tolerance, decreased L LE function and L LE pain with movement. Cues needed for L LE TTWB technique with Min A of two persons for sit to stand and taking several steps in room with walker. Pt has two supportive daughters present who, in addition to pt are in agreement of discharge plan to SNF and then to daughter's home. Recommend OT treatment to maximize endurance, safety & independence with ADL's & functional mobility.  Performance deficits affecting function: weakness, impaired self care skills, orthopedic precautions, gait instability, decreased lower extremity function, pain, impaired functional mobilty, impaired endurance, impaired fine motor, impaired balance.    Pt will benefit from further inpatient therapy to maximize return to prior level of function, due to pt currently needing extensive assistance for ADLs and functional mobility.      Rehab Prognosis: Fair; patient would benefit from acute skilled OT services to address these deficits and reach maximum level of function.       Plan:     Patient to be seen 2 x/week to address the above listed problems via self-care/home management, therapeutic activities, therapeutic exercises  · Plan of Care Expires: 06/30/19  · Plan of Care Reviewed with: patient, daughter    Subjective      Chief Complaint: None  Patient/Family Comments/goals: To regain my independence.    Occupational Profile:  Living Environment: Pt lives in an independent living apartment at [place].  Previous level of function: Mod I/(I) with ADL's, using a grab bar and built-in seat in her walk-in shower.  Roles and Routines: Pt walked to the dining room for meals. She was getting chemotherapy as well.  Equipment Used at Home:  walker, rolling, rollator, grab bar(Pt was walking without a walker.)  Assistance upon Discharge: Nursing staff and then family will help patient.    Pain/Comfort:  · Pain Rating 1: 0/10  · Pain Rating Post-Intervention 1: 0/10    Patients cultural, spiritual, Sikh conflicts given the current situation:      Objective:     Communicated with: Sophy nurse prior to session.  Patient found supine with SCD, telemetry upon OT entry to room.    General Precautions: Standard, fall   Orthopedic Precautions:LLE toe touch weight bearing   Braces: N/A     Occupational Performance:    Bed Mobility:    · Patient completed Scooting/Bridging with minimum assistance and with leg lift  · Patient completed Supine to Sit with minimum assistance and with leg lift    Functional Mobility/Transfers:  · Patient completed Sit <> Stand Transfer with minimum assistance and of 2 persons  with  rolling walker and cues for TTWB technique.   · Functional Mobility: Pt walked from bedside several steps in room  using walker with Min A of two persons, cues for TTWB & no LOB noted.    Activities of Daily Living:  · Feeding:  modified independence    · Grooming: set-up with HOB raised    · Upper Body Dressing: minimum assistance to don and fasten gown at EOB    Cognitive/Visual Perceptual:  Cognitive/Psychosocial Skills:     -       Oriented to: Person, Place, Time and Situation   -       Follows Commands/attention:Follows two-step commands  -       Communication: clear/fluent  -       Memory: No Deficits noted  -       Safety  awareness/insight to disability: intact   -       Mood/Affect/Coping skills/emotional control: Appropriate to situation, Cooperative and Pleasant  Visual/Perceptual:      -Intact      Physical Exam:  Postural examination/scapula alignment:    -       Rounded shoulders  -       Forward head  Skin integrity: Visible skin intact  Edema:  None noted  Dominant hand:    -       right  Upper Extremity Range of Motion:     -       Right Upper Extremity: WFL  -       Left Upper Extremity: WFL  Upper Extremity Strength:    -       Right Upper Extremity: WFL  -       Left Upper Extremity: WFL   Strength:    -       Right Upper Extremity: WFL  -       Left Upper Extremity: WFL  Fine Motor Coordination:    -       Intact  Gross motor coordination:   WFL    AMPAC 6 Click ADL:  AMPAC Total Score: 15    Treatment & Education:  OT ed pt on OT role & POC as well as discharge recommendations, explaining benefits of SNF stay for therapy prior to discharge home.  Pt & daughters verbalized understanding.  OT discussed with pt & family the use of adaptive equipment for LB dressing, bathing & safe item retrieval with reacher with demonstration provided. Daughter stated pt has some of the equipment and uses it but can't manage to use the sock aid.  OT ed patient on safety with walker use for functional mobility with cues for hand placement & sequencing.   OT reviewed L LE TTWB precautions and technique.  OT ed pt on fall risk and strongly advised pt to call for help for all OOB mobility.  Education:    Patient left standing in room with VIELKA Gallagher and Rosi [employer] tech working with pt with nurse shawna Beckett and daughters present    GOALS:   Multidisciplinary Problems     Occupational Therapy Goals        Problem: Occupational Therapy Goal    Goal Priority Disciplines Outcome Interventions   Occupational Therapy Goal     OT, PT/OT Ongoing (interventions implemented as appropriate)    Description:  Goals to be met by: 6/9/19      Patient will increase functional independence with ADLs by performing:    Don/doff socks and pull pants over feet with Set-up Assistance, Standby Assistance and Assistive Devices as needed.  Toileting from bedside commode with Minimal Assistance and Assistive Devices as needed for hygiene and clothing management.   Toilet transfer to bedside commode with Minimal Assistance and maintaining weight-bearing precaution(s).  Upper extremity exercise program x10 reps per handout, with independence.                        History:     Past Medical History:   Diagnosis Date    Allergy     Arthritis     Breast cyst     Foot pain     GERD (gastroesophageal reflux disease)     Joint pain     Ovarian cancer     Peripheral vascular disease 1/15/2018    Squamous cell carcinoma 04/2016    Right Lower Leg    MUNIR (stress urinary incontinence, female) 4/17/2017       Past Surgical History:   Procedure Laterality Date    APPENDECTOMY      COLON SURGERY      removed about 10 inch    COLONOSCOPY N/A 12/24/2018    [facility]O    EXCISION, MASS, FINGER Right 7/9/2018    [facility]    hemorhids      HYSTERECTOMY      HYSTERECTOMY, TOTAL, ABDOMINAL, WITH BILATERAL SALPINGO-OOPHORECTOMY Bilateral 12/28/2018    [facility] OR    LYSIS, ADHESIONS N/A 12/28/2018    [facility] at [facility] OR    XI ROBOTIC HYSTERECTOMY N/A 12/28/2018    [facility] at [facility] OR    XI ROBOTIC SALPINGO-OOPHORECTOMY Bilateral 12/28/2018    Performed by [facility] at [facility] OR       Time Tracking:     OT Date of Treatment: 11/09/19  OT Start Time: 1109  OT Stop Time: 1135  OT Total Time (min): 26 min    Billable Minutes:Evaluation 10  Self Care/Home Management 16    YOLANDA Hooks  6/2/2019

## 2019-06-02 NOTE — PLAN OF CARE
Problem: Physical Therapy Goal  Goal: Physical Therapy Goal  Goals to be met by: 6/15/2019     Patient will increase functional independence with mobility by performin. Supine to sit with Modified [place]  2. Sit to supine with Modified [place]  3. Sit to stand transfer with Supervision  4. Bed to chair transfer with Contact Guard Assistance using Rolling Walker, and TTWB on L LE.  5. Gait  x 50 feet with Contact Guard Assistance using Rolling Walker, and TTWB on L LE.   6. Lower extremity exercise program x10 reps per handout, with independence     Outcome: Ongoing (interventions implemented as appropriate)  Patient t/f supine to EOB with mod A of 2, sit to stand with min A of 2, GT 12ft with RW TTWB L LE and VC's for posturing.  Cues to safely sit while reaching back for chair and lowering self.  HEP handout given and demonstrated and discussed with patient and daughters.

## 2019-06-02 NOTE — PLAN OF CARE
Problem: Occupational Therapy Goal  Goal: Occupational Therapy Goal  Goals to be met by: 6/9/19     Patient will increase functional independence with ADLs by performing:    Don/doff socks and pull pants over feet with Set-up Assistance, Standby Assistance and Assistive Devices as needed.  Toileting from bedside commode with Minimal Assistance and Assistive Devices as needed for hygiene and clothing management.   Toilet transfer to bedside commode with Minimal Assistance and maintaining weight-bearing precaution(s).  Upper extremity exercise program x10 reps per handout, with independence.      Outcome: Ongoing (interventions implemented as appropriate)  OT evaluation completed today. Goals & care plan established.    YOLANDA Chirinos  6/2/2019

## 2019-06-02 NOTE — PLAN OF CARE
GARRICK met w/ pt and pt's daughter, June, for assessment.  Pt currently lives at [facility]  but pt is in the Independent Living section.  Pt and daughter plan for pt to discharge home to daughter's @ [address] [place] [address] with Home Health ([facility]).  Pt is not currently receiving home health but was has had [facility] earlier this year.  In the event that SNF is recommended for pt, pt and daughter state their first preference would be [facility] (SNF) and [facility] as their 2nd choice.  Pt and daughter requesting DME for discharge:   Wheelchair and Bedside Commode.      Pt reports she has one more chemo treatment remaining at [facility].       06/02/19 0911   Discharge Assessment   Assessment Type Discharge Planning Assessment   Confirmed/corrected address and phone number on facesheet? Yes   Assessment information obtained from? Patient;Caregiver   Prior to hospitilization cognitive status: Alert/Oriented   Prior to hospitalization functional status: Independent   Current cognitive status: Alert/Oriented   Current Functional Status: Assistive Equipment;Needs Assistance   Facility Arrived From: home   Lives With alone   Able to Return to Prior Arrangements yes   Is patient able to care for self after discharge? Unable to determine at this time (comments)   Who are your caregiver(s) and their phone number(s)? daughter:   June Chely  [phone]   Patient's perception of discharge disposition home or selfcare;home health   Readmission Within the Last 30 Days no previous admission in last 30 days   Patient currently being followed by outpatient case management? No   Patient currently receives any other outside agency services?   (Chemo @ [facility])   Equipment Currently Used at Home walker, rolling;rollator  (Has access, does not currently use)   Do you have any problems affording any of your prescribed medications? No   Is the patient taking  medications as prescribed? yes   Does the patient have transportation home? Yes   Transportation Anticipated family or friend will provide   Does the patient receive services at the [facility]? No   Discharge Plan A Home Health;Home with family   Discharge Plan B Skilled Nursing Facility   DME Needed Upon Discharge  wheelchair;bedside commode   Patient/Family in Agreement with Plan yes

## 2019-06-03 ENCOUNTER — TELEPHONE (OUTPATIENT)
Dept: HEMATOLOGY/ONCOLOGY | Facility: CLINIC | Age: 84
End: 2019-06-03

## 2019-06-03 PROBLEM — E44.0 MALNUTRITION OF MODERATE DEGREE: Status: ACTIVE | Noted: 2019-06-03

## 2019-06-03 LAB
ANION GAP SERPL CALC-SCNC: 5 MMOL/L (ref 8–16)
BASOPHILS # BLD AUTO: 0 K/UL (ref 0–0.2)
BASOPHILS NFR BLD: 0.1 % (ref 0–1.9)
BUN SERPL-MCNC: 18 MG/DL (ref 8–23)
CALCIUM SERPL-MCNC: 8.4 MG/DL (ref 8.7–10.5)
CHLORIDE SERPL-SCNC: 97 MMOL/L (ref 95–110)
CO2 SERPL-SCNC: 29 MMOL/L (ref 23–29)
CREAT SERPL-MCNC: 0.8 MG/DL (ref 0.5–1.4)
DIFFERENTIAL METHOD: ABNORMAL
EOSINOPHIL # BLD AUTO: 0 K/UL (ref 0–0.5)
EOSINOPHIL NFR BLD: 0 % (ref 0–8)
ERYTHROCYTE [DISTWIDTH] IN BLOOD BY AUTOMATED COUNT: 18.3 % (ref 11.5–14.5)
EST. GFR  (AFRICAN AMERICAN): >60 ML/MIN/1.73 M^2
EST. GFR  (NON AFRICAN AMERICAN): >60 ML/MIN/1.73 M^2
GLUCOSE SERPL-MCNC: 107 MG/DL (ref 70–110)
HCT VFR BLD AUTO: 24.6 % (ref 37–48.5)
HGB BLD-MCNC: 8.2 G/DL (ref 12–16)
LYMPHOCYTES # BLD AUTO: 1.1 K/UL (ref 1–4.8)
LYMPHOCYTES NFR BLD: 7.3 % (ref 18–48)
MCH RBC QN AUTO: 29.8 PG (ref 27–31)
MCHC RBC AUTO-ENTMCNC: 33.3 G/DL (ref 32–36)
MCV RBC AUTO: 89 FL (ref 82–98)
MONOCYTES # BLD AUTO: 0.6 K/UL (ref 0.3–1)
MONOCYTES NFR BLD: 4.1 % (ref 4–15)
NEUTROPHILS # BLD AUTO: 12.9 K/UL (ref 1.8–7.7)
NEUTROPHILS NFR BLD: 88.5 % (ref 38–73)
PLATELET # BLD AUTO: 184 K/UL (ref 150–350)
PMV BLD AUTO: 6.7 FL (ref 9.2–12.9)
POTASSIUM SERPL-SCNC: 4.8 MMOL/L (ref 3.5–5.1)
RBC # BLD AUTO: 2.75 M/UL (ref 4–5.4)
SODIUM SERPL-SCNC: 131 MMOL/L (ref 136–145)
WBC # BLD AUTO: 14.6 K/UL (ref 3.9–12.7)

## 2019-06-03 PROCEDURE — 97116 GAIT TRAINING THERAPY: CPT

## 2019-06-03 PROCEDURE — 25000003 PHARM REV CODE 250: Performed by: INTERNAL MEDICINE

## 2019-06-03 PROCEDURE — 85025 COMPLETE CBC W/AUTO DIFF WBC: CPT

## 2019-06-03 PROCEDURE — 12000002 HC ACUTE/MED SURGE SEMI-PRIVATE ROOM

## 2019-06-03 PROCEDURE — 80048 BASIC METABOLIC PNL TOTAL CA: CPT

## 2019-06-03 PROCEDURE — 63600175 PHARM REV CODE 636 W HCPCS: Performed by: ORTHOPAEDIC SURGERY

## 2019-06-03 PROCEDURE — 25000003 PHARM REV CODE 250: Performed by: ORTHOPAEDIC SURGERY

## 2019-06-03 PROCEDURE — 30200315 PPD INTRADERMAL TEST REV CODE 302: Performed by: HOSPITALIST

## 2019-06-03 PROCEDURE — 86580 TB INTRADERMAL TEST: CPT | Performed by: HOSPITALIST

## 2019-06-03 PROCEDURE — 36415 COLL VENOUS BLD VENIPUNCTURE: CPT

## 2019-06-03 PROCEDURE — 94761 N-INVAS EAR/PLS OXIMETRY MLT: CPT

## 2019-06-03 RX ORDER — HYDROCODONE BITARTRATE AND ACETAMINOPHEN 5; 325 MG/1; MG/1
1 TABLET ORAL EVERY 8 HOURS PRN
Qty: 20 TABLET | Refills: 0 | Status: SHIPPED | OUTPATIENT
Start: 2019-06-03 | End: 2019-06-03

## 2019-06-03 RX ORDER — ENOXAPARIN SODIUM 100 MG/ML
40 INJECTION SUBCUTANEOUS DAILY
Qty: 5.6 ML | Refills: 0 | Status: SHIPPED | OUTPATIENT
Start: 2019-06-03 | End: 2019-06-03

## 2019-06-03 RX ORDER — ACETAMINOPHEN 500 MG
1000 TABLET ORAL EVERY 8 HOURS
Refills: 0 | COMMUNITY
Start: 2019-06-03

## 2019-06-03 RX ORDER — PREGABALIN 75 MG/1
75 CAPSULE ORAL 2 TIMES DAILY
Qty: 60 CAPSULE | Refills: 0 | Status: SHIPPED | OUTPATIENT
Start: 2019-06-03 | End: 2019-06-03

## 2019-06-03 RX ORDER — ENOXAPARIN SODIUM 100 MG/ML
40 INJECTION SUBCUTANEOUS DAILY
Qty: 5.6 ML | Refills: 0 | Status: SHIPPED | OUTPATIENT
Start: 2019-06-03 | End: 2019-06-17

## 2019-06-03 RX ORDER — HYDROCODONE BITARTRATE AND ACETAMINOPHEN 5; 325 MG/1; MG/1
1 TABLET ORAL EVERY 8 HOURS PRN
Qty: 20 TABLET | Refills: 0 | Status: SHIPPED | OUTPATIENT
Start: 2019-06-03 | End: 2019-08-19

## 2019-06-03 RX ORDER — PREGABALIN 75 MG/1
75 CAPSULE ORAL 2 TIMES DAILY
Qty: 60 CAPSULE | Refills: 0 | Status: SHIPPED | OUTPATIENT
Start: 2019-06-03 | End: 2019-12-09 | Stop reason: SDUPTHER

## 2019-06-03 RX ORDER — PREGABALIN 75 MG/1
75 CAPSULE ORAL 2 TIMES DAILY
Qty: 60 CAPSULE | Refills: 6 | Status: SHIPPED | OUTPATIENT
Start: 2019-06-03 | End: 2019-06-03

## 2019-06-03 RX ADMIN — Medication 5 UNITS: at 01:06

## 2019-06-03 RX ADMIN — SENNOSIDES AND DOCUSATE SODIUM 1 TABLET: 8.6; 5 TABLET ORAL at 08:06

## 2019-06-03 RX ADMIN — ACETAMINOPHEN 1000 MG: 500 TABLET ORAL at 02:06

## 2019-06-03 RX ADMIN — MUPIROCIN 1 G: 20 OINTMENT TOPICAL at 08:06

## 2019-06-03 RX ADMIN — PREGABALIN 75 MG: 75 CAPSULE ORAL at 08:06

## 2019-06-03 RX ADMIN — CARBOXYMETHYLCELLULOSE SODIUM 1 DROP: 10 GEL OPHTHALMIC at 08:06

## 2019-06-03 RX ADMIN — ACETAMINOPHEN 1000 MG: 500 TABLET ORAL at 06:06

## 2019-06-03 RX ADMIN — SENNOSIDES AND DOCUSATE SODIUM 1 TABLET: 8.6; 5 TABLET ORAL at 09:06

## 2019-06-03 RX ADMIN — PREGABALIN 75 MG: 75 CAPSULE ORAL at 09:06

## 2019-06-03 RX ADMIN — KETOROLAC TROMETHAMINE 15 MG: 30 INJECTION, SOLUTION INTRAMUSCULAR at 06:06

## 2019-06-03 RX ADMIN — OYSTER SHELL CALCIUM WITH VITAMIN D 1 TABLET: 500; 200 TABLET, FILM COATED ORAL at 09:06

## 2019-06-03 RX ADMIN — MUPIROCIN 1 G: 20 OINTMENT TOPICAL at 09:06

## 2019-06-03 RX ADMIN — PANTOPRAZOLE SODIUM 40 MG: 40 TABLET, DELAYED RELEASE ORAL at 09:06

## 2019-06-03 RX ADMIN — KETOROLAC TROMETHAMINE 15 MG: 30 INJECTION, SOLUTION INTRAMUSCULAR at 01:06

## 2019-06-03 RX ADMIN — BISACODYL 10 MG: 10 SUPPOSITORY RECTAL at 01:06

## 2019-06-03 RX ADMIN — ENOXAPARIN SODIUM 40 MG: 100 INJECTION SUBCUTANEOUS at 09:06

## 2019-06-03 NOTE — DISCHARGE INSTRUCTIONS
[facility] Facility Transfer Orders        Admit to: SNF    Diagnoses:   Active Hospital Problems    Diagnosis  POA    *Closed intertrochanteric fracture of hip, left, initial encounter [S72.142A]  Yes    Closed fracture of left hip [S72.002A]  Yes    Ovarian cancer [C56.9]  Yes    Gastroesophageal reflux disease with esophagitis [K21.0]  Yes      Resolved Hospital Problems   No resolved problems to display.     Allergies:   Review of patient's allergies indicates:   Allergen Reactions    Pcn [penicillins]      Can not remember reaction     Tetanus vaccines and toxoid Swelling     Localized swelling to injection site       Code Status: Full    Vitals: Routine       Diet: regular diet    Activity: Activity as tolerated    Nursing Precautions: Fall     Orthopedic Precautions:LLE toe touch weight bearing    Bed/Surface: Pressure Redistribution    Consults: PT to evaluate and treat- 4 times a week, OT to evaluate and treat- 5 times a week and Wound Care    Oxygen: room air    Dialysis: Patient is not on dialysis.     Labs:  none   Pending Diagnostic Studies:     None        Imaging: none    Miscellaneous Care:   Routine Skin for Bedridden Patients:  Apply moisture barrier cream to all    IV Access: none     Medications: Discontinue all previous medication orders, if any. See new list below.  Current Discharge Medication List      START taking these medications    Details   acetaminophen (TYLENOL) 500 MG tablet Take 2 tablets (1,000 mg total) by mouth every 8 (eight) hours.  Refills: 0      enoxaparin (LOVENOX) 40 mg/0.4 mL Syrg Inject 0.4 mLs (40 mg total) into the skin once daily. for 14 days  Qty: 5.6 mL, Refills: 0      HYDROcodone-acetaminophen (NORCO) 5-325 mg per tablet Take 1 tablet by mouth every 8 (eight) hours as needed (severe pain unrelieved by tylenol).  Qty: 20 tablet, Refills: 0      pregabalin (LYRICA) 75 MG capsule Take 1 capsule (75 mg total) by mouth 2 (two) times  daily.  Qty: 60 capsule, Refills: 6         CONTINUE these medications which have NOT CHANGED    Details   calcium-vitamin D3 (CALCIUM 500 + D) 500 mg(1,250mg) -200 unit per tablet Take 1 tablet by mouth once daily.       carboxymethylcellulose (REFRESH PLUS) 0.5 % Dpet 1 drop daily as needed.      cyanocobalamin (VITAMIN B-12) 1000 MCG tablet Take 1,000 mcg by mouth once daily.       FLAXSEED ORAL Take 650 mg by mouth once daily.       ketotifen (ZADITOR) 0.025 % (0.035 %) ophthalmic solution Place 1 drop into both eyes 2 (two) times daily as needed (Pt reports use approx once/week).       MULTIVITAMIN W-MINERALS/LUTEIN (CENTRUM SILVER ORAL) Take 1 tablet by mouth once daily.       NEXIUM 20 mg capsule Take 20 mg by mouth before breakfast.       prochlorperazine (COMPAZINE) 10 MG tablet Take 1 tablet (10 mg total) by mouth every 6 (six) hours as needed.  Qty: 30 tablet, Refills: 1    Associated Diagnoses: Malignant neoplasm of ovary, unspecified laterality         STOP taking these medications       acetaminophen (TYLENOL) 650 MG TbSR Comments:   Reason for Stopping:             Follow up:   Follow-up Information     Rosi Ramires MD In 2 weeks.    Specialty:  Family Medicine  Contact information:  [address] [place] [address]  [phone]             Bacilio Lockwood II, MD In 2 weeks.    Specialty:  Orthopedic Surgery  Contact information:  [address] [place] [address] [address] [place] [address]  [phone]

## 2019-06-03 NOTE — PLAN OF CARE
Problem: Physical Therapy Goal  Goal: Physical Therapy Goal  Goals to be met by: 6/15/2019     Patient will increase functional independence with mobility by performin. Supine to sit with Modified [place]  2. Sit to supine with Modified [place]  3. Sit to stand transfer with Supervision  4. Bed to chair transfer with Contact Guard Assistance using Rolling Walker, and TTWB on L LE.  5. Gait  x 50 feet with Contact Guard Assistance using Rolling Walker, and TTWB on L LE.   6. Lower extremity exercise program x10 reps per handout, with independence     Outcome: Ongoing (interventions implemented as appropriate)  Ambulated with rw and Min A, TTWB LLE.

## 2019-06-03 NOTE — PLAN OF CARE
Problem: Adult Inpatient Plan of Care  Goal: Plan of Care Review  Outcome: Ongoing (interventions implemented as appropriate)  Pt remained free from fall or injury throughout the shift.  Pain controlled w/ current pain regimen. Pt is OOB to the BSC and voiding freely w/ x2 assist and a walker.  L hip incisional dx intact w/ cryo in use. 2+ DPP, X2 PIV's SL, VSS, telemetry monitor in use w/ pt running NSR.  Pt is able to reposition independently, plexi pulse compression pumps in use.  POC was reviewed w/ pt and pt's family.  Bedside rails are raised x3 w/ call bell and bedside table within reach. Bed alarm in place, Nurse rounded hourly to ensure pt safety.

## 2019-06-03 NOTE — PT/OT/SLP PROGRESS
Physical Therapy Treatment    Patient Name:  Pau Cook   MRN:  [id number]    Recommendations:     Discharge Recommendations:  LTACH (long-term acute care hospital)   Discharge Equipment Recommendations: wheelchair, manual, commode, shower chair(TBD)   Barriers to discharge: None    Assessment:     Pau Cook is a 89 y.o. female admitted with a medical diagnosis of Closed intertrochanteric fracture of hip, left, initial encounter.  She presents with the following impairments/functional limitations:  weakness, impaired endurance, impaired self care skills, impaired functional mobilty, gait instability, pain, decreased lower extremity function, orthopedic precautions . Tolerated activity with reports of L leg pain with movement.     Rehab Prognosis: Good; patient would benefit from acute skilled PT services to address these deficits and reach maximum level of function.    Recent Surgery: Procedure(s) (LRB):  ORIF, FRACTURE, FEMUR, INTERTROCHANTERIC (Left) 3 Days Post-Op    Plan:     During this hospitalization, patient to be seen BID to address the identified rehab impairments via gait training, therapeutic activities, therapeutic exercises and progress toward the following goals:    · Plan of Care Expires:  06/15/19    Subjective     Chief Complaint: pain with movement L leg.  Patient/Family Comments/goals: to walk better.  Pain/Comfort:  · Pain Rating 1: 6/10  · Location - Side 1: Left  · Location - Orientation 1: generalized  · Location 1: leg  · Pain Addressed 1: Reposition, Nurse notified, Pre-medicate for activity      Objective:     Communicated with nurse Ackerman prior to session.  Patient found supine with telemetry, SCD, bed alarm upon PT entry to room.     General Precautions: Standard, fall   Orthopedic Precautions:LLE toe touch weight bearing   Braces: N/A     Functional Mobility:  · Bed Mobility:     · Rolling Left:  minimum assistance  · Supine to Sit: moderate assistance  · Transfers:      · Sit to Stand:  maximal assistance with rolling walker  · Gait: 10' with rw and Min A, TTWB LLE.      AM-PAC 6 CLICK MOBILITY          Therapeutic Activities and Exercises:   Sat up in chair following with Min A and verbal cues for technique.    Patient left up in chair with all lines intact, call button in reach and nurse Meka notified..    GOALS:   Multidisciplinary Problems     Physical Therapy Goals        Problem: Physical Therapy Goal    Goal Priority Disciplines Outcome Goal Variances Interventions   Physical Therapy Goal     PT, PT/OT Ongoing (interventions implemented as appropriate)     Description:  Goals to be met by: 6/15/2019     Patient will increase functional independence with mobility by performin. Supine to sit with Modified [place]  2. Sit to supine with Modified [place]  3. Sit to stand transfer with Supervision  4. Bed to chair transfer with Contact Guard Assistance using Rolling Walker, and TTWB on L LE.  5. Gait  x 50 feet with Contact Guard Assistance using Rolling Walker, and TTWB on L LE.   6. Lower extremity exercise program x10 reps per handout, with independence                      Time Tracking:     PT Received On: 19  PT Start Time: 55     PT Stop Time: 1010  PT Total Time (min): 15 min     Billable Minutes: Gait Training 15min    Treatment Type: Treatment  PT/PTA: PTA     PTA Visit Number: 2     Jaclyn Perla PTA  2019

## 2019-06-03 NOTE — PT/OT/SLP PROGRESS
Physical Therapy Treatment    Patient Name:  Pau Cook   MRN:  [id number]    Recommendations:     Discharge Recommendations:  LTACH (long-term acute care hospital)   Discharge Equipment Recommendations: wheelchair, manual, commode, shower chair(TBD)   Barriers to discharge: None    Assessment:     Pau Cook is a 89 y.o. female admitted with a medical diagnosis of Closed intertrochanteric fracture of hip, left, initial encounter.  She presents with the following impairments/functional limitations:  weakness, impaired endurance, impaired self care skills, impaired functional mobilty, gait instability, pain, decreased lower extremity function, orthopedic precautions, decreased safety awareness . Requires A for safety with mobility.    Rehab Prognosis: Fair; patient would benefit from acute skilled PT services to address these deficits and reach maximum level of function.    Recent Surgery: Procedure(s) (LRB):  ORIF, FRACTURE, FEMUR, INTERTROCHANTERIC (Left) 3 Days Post-Op    Plan:     During this hospitalization, patient to be seen BID to address the identified rehab impairments via gait training, therapeutic activities, therapeutic exercises and progress toward the following goals:    · Plan of Care Expires:  06/15/19    Subjective     Chief Complaint: increased pain L leg  Patient/Family Comments/goals: none stated  Pain/Comfort:  · Pain Rating 1: 8/10  · Location - Side 1: Left  · Location - Orientation 1: generalized  · Location 1: leg  · Pain Addressed 1: Reposition, Nurse notified(requested pain medicine from nurse during session,.)      Objective:     Communicated with nurse Ackerman prior to session.  Patient found up in chair with telemetry upon PT entry to room.     General Precautions: Standard, fall   Orthopedic Precautions:LLE toe touch weight bearing   Braces: N/A     Functional Mobility:  · Transfers:     · Sit to Stand:  maximal assistance with rolling walker  · Gait: 10' with rw and Mod A  , TTWB LLE.       AM-PAC 6 CLICK MOBILITY          Therapeutic Activities and Exercises:   Ambulated with rw and Mod A with verbal cues to maintain TTWB LLE.    Sat prematurely at foot of bed due to increased pain . Able to scoot to HOB with CGA.    Patient left supine with all lines intact, call button in reach, bed alarm on and nurse Meka present..    GOALS:   Multidisciplinary Problems     Physical Therapy Goals        Problem: Physical Therapy Goal    Goal Priority Disciplines Outcome Goal Variances Interventions   Physical Therapy Goal     PT, PT/OT Ongoing (interventions implemented as appropriate)     Description:  Goals to be met by: 6/15/2019     Patient will increase functional independence with mobility by performin. Supine to sit with Modified [place]  2. Sit to supine with Modified [place]  3. Sit to stand transfer with Supervision  4. Bed to chair transfer with Contact Guard Assistance using Rolling Walker, and TTWB on L LE.  5. Gait  x 50 feet with Contact Guard Assistance using Rolling Walker, and TTWB on L LE.   6. Lower extremity exercise program x10 reps per handout, with independence                      Time Tracking:     PT Received On: 19  PT Start Time: 1310     PT Stop Time: 1320  PT Total Time (min): 10 min     Billable Minutes: Gait Training 10min    Treatment Type: Treatment  PT/PTA: PTA     PTA Visit Number: 3     Jaclyn Perla PTA  2019

## 2019-06-03 NOTE — TELEPHONE ENCOUNTER
Daughter notified that I can get her a list but the hospital will give her one and the Nursing home. Daughter also notified that chemo may have to wait until 6 weeks after surgery and that I will notify Dr Tejeda and let her know what Dr Valenzuela says.

## 2019-06-03 NOTE — PLAN OF CARE
Problem: Adult Inpatient Plan of Care  Goal: Plan of Care Review  Outcome: Ongoing (interventions implemented as appropriate)  Patient is AAOx4. Complains of left hip pain only when moving, controlled with scheduled meds. Plan of care reviewed with patient and daughter, patient verbalized understanding. Patient ambulated with PT, up in chair. Toe touch only. Weight shifted provided. Bowel movement noted today. Call light within reach.

## 2019-06-03 NOTE — TELEPHONE ENCOUNTER
----- Message from Ellen Tuttle sent at 6/3/2019  1:31 PM CDT -----  Type: Needs Medical Advice    Who Called:  Kendra Mo - daughter  Best Call Back Number: [phone]  Additional Information: patient broke hip is being discharged for the hospital today, she is trying to get her admitted to [facility], but they need a list of her medication in order to approve her admission, to see if insurance will cover and have her medication at their pharmacy, please contact to advise, she is trying to have patient admitted to skilled nursing tonight.     Thank you

## 2019-06-03 NOTE — PLAN OF CARE
Cm spoke with patient about SNF and she signed the Pt's Choice Disclosure Form.  Tyrone sent the referral to [facility] (1st choice) and [facility] (2nd choice).  Also, Tyrone notified PHN and sent referral as well.       06/03/19 1011   Post-Acute Status   Post-Acute Authorization Placement   Post-Acute Placement Status Referrals Sent

## 2019-06-03 NOTE — PLAN OF CARE
06/03/19 0900   PRE-TX-O2   O2 Device (Oxygen Therapy) room air   SpO2 (!) 94 %   Pulse Oximetry Type Intermittent   $ Pulse Oximetry - Multiple Charge Pulse Oximetry - Multiple

## 2019-06-04 ENCOUNTER — TELEPHONE (OUTPATIENT)
Dept: HEMATOLOGY/ONCOLOGY | Facility: CLINIC | Age: 84
End: 2019-06-04

## 2019-06-04 VITALS
OXYGEN SATURATION: 95 % | SYSTOLIC BLOOD PRESSURE: 134 MMHG | WEIGHT: 124 LBS | BODY MASS INDEX: 21.97 KG/M2 | HEART RATE: 104 BPM | RESPIRATION RATE: 18 BRPM | DIASTOLIC BLOOD PRESSURE: 60 MMHG | TEMPERATURE: 98 F | HEIGHT: 63 IN

## 2019-06-04 LAB
ANION GAP SERPL CALC-SCNC: 7 MMOL/L (ref 8–16)
BASOPHILS # BLD AUTO: 0 K/UL (ref 0–0.2)
BASOPHILS NFR BLD: 0.3 % (ref 0–1.9)
BUN SERPL-MCNC: 14 MG/DL (ref 8–23)
CALCIUM SERPL-MCNC: 8.7 MG/DL (ref 8.7–10.5)
CHLORIDE SERPL-SCNC: 98 MMOL/L (ref 95–110)
CO2 SERPL-SCNC: 28 MMOL/L (ref 23–29)
CREAT SERPL-MCNC: 0.7 MG/DL (ref 0.5–1.4)
DIFFERENTIAL METHOD: ABNORMAL
EOSINOPHIL # BLD AUTO: 0 K/UL (ref 0–0.5)
EOSINOPHIL NFR BLD: 0 % (ref 0–8)
ERYTHROCYTE [DISTWIDTH] IN BLOOD BY AUTOMATED COUNT: 18.4 % (ref 11.5–14.5)
EST. GFR  (AFRICAN AMERICAN): >60 ML/MIN/1.73 M^2
EST. GFR  (NON AFRICAN AMERICAN): >60 ML/MIN/1.73 M^2
GLUCOSE SERPL-MCNC: 127 MG/DL (ref 70–110)
HCT VFR BLD AUTO: 27.2 % (ref 37–48.5)
HGB BLD-MCNC: 9 G/DL (ref 12–16)
LYMPHOCYTES # BLD AUTO: 0.9 K/UL (ref 1–4.8)
LYMPHOCYTES NFR BLD: 6.2 % (ref 18–48)
MCH RBC QN AUTO: 29.4 PG (ref 27–31)
MCHC RBC AUTO-ENTMCNC: 33.1 G/DL (ref 32–36)
MCV RBC AUTO: 89 FL (ref 82–98)
MONOCYTES # BLD AUTO: 0.5 K/UL (ref 0.3–1)
MONOCYTES NFR BLD: 3.1 % (ref 4–15)
NEUTROPHILS # BLD AUTO: 13.9 K/UL (ref 1.8–7.7)
NEUTROPHILS NFR BLD: 90.4 % (ref 38–73)
PLATELET # BLD AUTO: 248 K/UL (ref 150–350)
PMV BLD AUTO: 6.7 FL (ref 9.2–12.9)
POTASSIUM SERPL-SCNC: 4.5 MMOL/L (ref 3.5–5.1)
RBC # BLD AUTO: 3.06 M/UL (ref 4–5.4)
SODIUM SERPL-SCNC: 133 MMOL/L (ref 136–145)
WBC # BLD AUTO: 15.4 K/UL (ref 3.9–12.7)

## 2019-06-04 PROCEDURE — 36415 COLL VENOUS BLD VENIPUNCTURE: CPT

## 2019-06-04 PROCEDURE — 25000003 PHARM REV CODE 250: Performed by: INTERNAL MEDICINE

## 2019-06-04 PROCEDURE — 85025 COMPLETE CBC W/AUTO DIFF WBC: CPT

## 2019-06-04 PROCEDURE — 63600175 PHARM REV CODE 636 W HCPCS: Performed by: ORTHOPAEDIC SURGERY

## 2019-06-04 PROCEDURE — 94761 N-INVAS EAR/PLS OXIMETRY MLT: CPT

## 2019-06-04 PROCEDURE — 80048 BASIC METABOLIC PNL TOTAL CA: CPT

## 2019-06-04 PROCEDURE — 97116 GAIT TRAINING THERAPY: CPT

## 2019-06-04 PROCEDURE — 25000003 PHARM REV CODE 250: Performed by: ORTHOPAEDIC SURGERY

## 2019-06-04 RX ADMIN — SENNOSIDES AND DOCUSATE SODIUM 1 TABLET: 8.6; 5 TABLET ORAL at 08:06

## 2019-06-04 RX ADMIN — ENOXAPARIN SODIUM 40 MG: 100 INJECTION SUBCUTANEOUS at 08:06

## 2019-06-04 RX ADMIN — OYSTER SHELL CALCIUM WITH VITAMIN D 1 TABLET: 500; 200 TABLET, FILM COATED ORAL at 08:06

## 2019-06-04 RX ADMIN — MUPIROCIN 1 G: 20 OINTMENT TOPICAL at 08:06

## 2019-06-04 RX ADMIN — PREGABALIN 75 MG: 75 CAPSULE ORAL at 08:06

## 2019-06-04 RX ADMIN — HYDROCODONE BITARTRATE AND ACETAMINOPHEN 1 TABLET: 5; 325 TABLET ORAL at 05:06

## 2019-06-04 RX ADMIN — PANTOPRAZOLE SODIUM 40 MG: 40 TABLET, DELAYED RELEASE ORAL at 08:06

## 2019-06-04 RX ADMIN — HYDROCODONE BITARTRATE AND ACETAMINOPHEN 1 TABLET: 5; 325 TABLET ORAL at 11:06

## 2019-06-04 RX ADMIN — CARBOXYMETHYLCELLULOSE SODIUM 1 DROP: 10 GEL OPHTHALMIC at 08:06

## 2019-06-04 NOTE — ASSESSMENT & PLAN NOTE
Continue pain control with nonnarcotic meds.  Continue PT/ OT.  Defer wound management to Orthopedic surgery.  Patient not progressing well with physical therapy, will likely need skilled nursing facility placement.  Currently awaiting for insurance approval for such.

## 2019-06-04 NOTE — PHYSICIAN QUERY
"PT Name: Pau Cook  MR #: [id number]    Physician Query Form - Hematology Clarification      CDS/: Romy Armendariz RN               Contact information:[email or web address]    This form is a permanent document in the medical record.      Query Date: June 4, 2019    By submitting this query, we are merely seeking further clarification of documentation. Please utilize your independent clinical judgment when addressing the question(s) below.    The Medical record contains the following:   Indicators  Supporting Clinical Findings Location in Medical Record    "Anemia" documented     x H & H = H&H=9.4/28.6  H&H=10.1/30.3  H&H=8.1/24.4  H&H=8.2/24.6  H&H=9/27.2 Labs 5/31  Labs 6/1  Labs 6/2  Labs 6/3  Labs 6/4   x BP =                     HR= /59  HR 86  BP 96/51  HR 86  /58  HR 85  /56  HR 94 Vital signs 5/31@0932  Vital signs 6/1@0736  Vital signs 6/2@0754  Vital signs 6/3@2000    "GI bleeding" documented      Acute bleeding (Non GI site)     x Transfusion(s) Transfused with 2 units PRBC's Blood bank record 5/31    Treatment:     x Other:  ORIF, FRACTURE, FEMUR, INTERTROCHANTERIC (Left)    mL    H/H borderline for transfusion    Moderate malnutrition, ovarian cancer on chemo OP note 5/31      Orthopedic surgery PN 6/2    Hospital Medicine PN 6/3     Provider, please specify diagnosis or diagnoses associated with above clinical findings.    [  ] Acute blood loss anemia expected post-operatively   [  ] Acute blood loss anemia     [  x] Anemia of chronic disease ( Specify chronic disease)       [x  ] Neoplastic disease    [  ] Due to Chemotherapy    [  ] Other (Specify):   [  ] Clinically Undetermined       [  ] Other Hematological Diagnosis (please specify):     [  ] Clinically Undetermined       Please document in your progress notes daily for the duration of treatment, until resolved, and include in your discharge summary.                                                        "

## 2019-06-04 NOTE — PLAN OF CARE
Problem: Physical Therapy Goal  Goal: Physical Therapy Goal  Goals to be met by: 6/15/2019     Patient will increase functional independence with mobility by performin. Supine to sit with Modified [place]  2. Sit to supine with Modified [place]  3. Sit to stand transfer with Supervision  4. Bed to chair transfer with Contact Guard Assistance using Rolling Walker, and TTWB on L LE.  5. Gait  x 50 feet with Contact Guard Assistance using Rolling Walker, and TTWB on L LE.   6. Lower extremity exercise program x10 reps per handout, with independence     Outcome: Ongoing (interventions implemented as appropriate)  Ambulated with rw and Mod A, TTWB LLE.

## 2019-06-04 NOTE — TELEPHONE ENCOUNTER
Spoke to pt daughter in regards to message left. Pt daughter still had concerns about pt chemo treatment. Kristyn handled encounter.

## 2019-06-04 NOTE — PLAN OF CARE
Pt will discharge to [facility] today.  Pending Authorization from N and the [facility].   did notify Bessy with [facility] that pt was accepted to [facility].       06/04/19 0947   Post-Acute Status   Post-Acute Authorization Placement   Post-Acute Placement Status Pending Payor Review

## 2019-06-04 NOTE — ASSESSMENT & PLAN NOTE
Patient currently on chemotherapy per Oncology.  I have discussed the case with the patient's oncologist, who would recommend holding her chemotherapy until she Finishes recovering from her hip fracture.  No acute inpatient needs noted at this point in time.

## 2019-06-04 NOTE — PLAN OF CARE
06/03/19 1902   Patient Assessment/Suction   Level of Consciousness (AVPU) alert   Respiratory Effort Unlabored   PRE-TX-O2   O2 Device (Oxygen Therapy) room air   SpO2 95 %   Pulse Oximetry Type Intermittent   $ Pulse Oximetry - Multiple Charge Pulse Oximetry - Multiple

## 2019-06-04 NOTE — SUBJECTIVE & OBJECTIVE
Interval History:  Patient seen and examined.  No acute events overnight.  Currently waiting for skilled nursing facility evaluation.    Review of Systems   Constitutional: Negative for fatigue.   Respiratory: Positive for cough. Negative for shortness of breath.    Cardiovascular: Negative for chest pain and leg swelling.   Gastrointestinal: Negative for abdominal pain and nausea.   Musculoskeletal: Positive for back pain, gait problem and joint swelling.   Neurological: Negative for weakness.   Psychiatric/Behavioral: Negative for confusion.   All other systems reviewed and are negative.    Objective:     Vital Signs (Most Recent):  Temp: 98.8 °F (37.1 °C) (06/03/19 2000)  Pulse: 94 (06/03/19 2000)  Resp: 18 (06/03/19 2000)  BP: (!) 119/56 (06/03/19 2000)  SpO2: 96 % (06/03/19 2000) Vital Signs (24h Range):  Temp:  [97.9 °F (36.6 °C)-98.9 °F (37.2 °C)] 98.8 °F (37.1 °C)  Pulse:  [74-98] 94  Resp:  [16-18] 18  SpO2:  [94 %-99 %] 96 %  BP: ()/(51-56) 119/56     Weight: 56.2 kg (124 lb)  Body mass index is 21.97 kg/m².    Intake/Output Summary (Last 24 hours) at 6/3/2019 2141  Last data filed at 6/3/2019 1700  Gross per 24 hour   Intake 780 ml   Output 575 ml   Net 205 ml      Physical Exam   Constitutional: She is oriented to person, place, and time.   Frail, elderly female in no acute distress   HENT:   Noted alopecia   Eyes: Pupils are equal, round, and reactive to light. EOM are normal.   Cardiovascular: Normal rate, regular rhythm and intact distal pulses.   No murmur heard.  Pulmonary/Chest: Effort normal and breath sounds normal.   Abdominal: Soft. She exhibits no distension. There is no tenderness.   Musculoskeletal: She exhibits edema.   Fracture site appears intact with dressing in place.  Dressing was not taken down.   Neurological: She is alert and oriented to person, place, and time.   Skin: No rash noted. No pallor.   Nursing note and vitals reviewed.      Significant Labs: All pertinent labs  within the past 24 hours have been reviewed.    Significant Imaging: I have reviewed all pertinent imaging results/findings within the past 24 hours.

## 2019-06-04 NOTE — TELEPHONE ENCOUNTER
----- Message from Gael Cobb sent at 6/4/2019  8:31 AM CDT -----  Contact: Kendra - Daughter  Type: Needs Medical Advice    Who Called: Kendra Rosales Call Back Number: [phone]  Additional Information: Caller would like to verify that Chemo treatment has been cancelled for the time being. Patient has recently fallen and broke hip and is currently in the hospital. Patient is being transported to [facility]. Please call to advise. Thanks!

## 2019-06-04 NOTE — PLAN OF CARE
Tyrone received the Authorization # [id number] and Case # [id number] form Bessy with N.  Tyrone received the 142 from Tamela ORONA With [facility].  Tyrone submitted all the information above and the scripts via [facility] to [facility].  Tyrone notified Mallory at [facility].       06/04/19 1120   Post-Acute Status   Post-Acute Authorization Placement   Post-Acute Placement Status Set-up Complete

## 2019-06-04 NOTE — PLAN OF CARE
Tyrone spoke with Mallory from [facility].  The nurse can call report to [facility] on [address].  Tyrone notified LADONNA Lowe.       06/04/19 8758   Final Note   Assessment Type Final Discharge Note   Anticipated Discharge Disposition SNF  ([facility])

## 2019-06-04 NOTE — NURSING
Discharge instructions given, pt verbalized understanding. PIVs removed, pressure applied and bandaged appropriately. Prescriptions given, education provided, verbalized understanding. Pt belongings at the bedside with the pt. Pt assisted off the floor safely by [facility] staff via wheelchair.

## 2019-06-04 NOTE — ASSESSMENT & PLAN NOTE
Nutrition consulted. Body mass index is 21.97 kg/m².. Encourage maximal PO intake. Diet supplementation ordered per nutrition approval. Will encourage PO and monitor closely for weight changes.

## 2019-06-04 NOTE — PROGRESS NOTES
[facility] Hospital Medicine  Progress Note    Patient Name: Pau Cook  MRN: [id number]  Patient Class: IP- Inpatient   Admission Date: 5/31/2019  Length of Stay: 3 days  Attending Physician: Mina Hills MD  Primary Care Provider: Rosi Ramires MD        Subjective:     Principal Problem:Closed intertrochanteric fracture of hip, left, initial encounter    HPI:  No notes on file    Hospital Course:  No notes on file    Interval History:  Patient seen and examined.  No acute events overnight.  Currently waiting for skilled nursing facility evaluation.    Review of Systems   Constitutional: Negative for fatigue.   Respiratory: Positive for cough. Negative for shortness of breath.    Cardiovascular: Negative for chest pain and leg swelling.   Gastrointestinal: Negative for abdominal pain and nausea.   Musculoskeletal: Positive for back pain, gait problem and joint swelling.   Neurological: Negative for weakness.   Psychiatric/Behavioral: Negative for confusion.   All other systems reviewed and are negative.    Objective:     Vital Signs (Most Recent):  Temp: 98.8 °F (37.1 °C) (06/03/19 2000)  Pulse: 94 (06/03/19 2000)  Resp: 18 (06/03/19 2000)  BP: (!) 119/56 (06/03/19 2000)  SpO2: 96 % (06/03/19 2000) Vital Signs (24h Range):  Temp:  [97.9 °F (36.6 °C)-98.9 °F (37.2 °C)] 98.8 °F (37.1 °C)  Pulse:  [74-98] 94  Resp:  [16-18] 18  SpO2:  [94 %-99 %] 96 %  BP: ()/(51-56) 119/56     Weight: 56.2 kg (124 lb)  Body mass index is 21.97 kg/m².    Intake/Output Summary (Last 24 hours) at 6/3/2019 2141  Last data filed at 6/3/2019 1700  Gross per 24 hour   Intake 780 ml   Output 575 ml   Net 205 ml      Physical Exam   Constitutional: She is oriented to person, place, and time.   Frail, elderly female in no acute distress   HENT:   Noted alopecia   Eyes: Pupils are equal, round, and reactive to light. EOM are normal.   Cardiovascular: Normal rate, regular rhythm and intact distal pulses.   No  murmur heard.  Pulmonary/Chest: Effort normal and breath sounds normal.   Abdominal: Soft. She exhibits no distension. There is no tenderness.   Musculoskeletal: She exhibits edema.   Fracture site appears intact with dressing in place.  Dressing was not taken down.   Neurological: She is alert and oriented to person, place, and time.   Skin: No rash noted. No pallor.   Nursing note and vitals reviewed.      Significant Labs: All pertinent labs within the past 24 hours have been reviewed.    Significant Imaging: I have reviewed all pertinent imaging results/findings within the past 24 hours.    Assessment/Plan:      * Closed intertrochanteric fracture of hip, left, initial encounter   Continue pain control with nonnarcotic meds.  Continue PT/ OT.  Defer wound management to Orthopedic surgery.  Patient not progressing well with physical therapy, will likely need skilled nursing facility placement.  Currently awaiting for insurance approval for such.      Malnutrition of moderate degree  Nutrition consulted. Body mass index is 21.97 kg/m².. Encourage maximal PO intake. Diet supplementation ordered per nutrition approval. Will encourage PO and monitor closely for weight changes.        Ovarian cancer   Patient currently on chemotherapy per Oncology.  I have discussed the case with the patient's oncologist, who would recommend holding her chemotherapy until she Finishes recovering from her hip fracture.  No acute inpatient needs noted at this point in time.      Gastroesophageal reflux disease with esophagitis    Continue PPI        VTE Risk Mitigation (From admission, onward)        Ordered     enoxaparin injection 40 mg  Daily      05/31/19 1808     IP VTE HIGH RISK PATIENT  Once      05/31/19 1808     Place STEPHANY hose  Until discontinued      05/31/19 0449              Mina Hills MD  Department of Hospital Medicine   [facility]

## 2019-06-04 NOTE — PT/OT/SLP PROGRESS
Physical Therapy Treatment    Patient Name:  Pau Cook   MRN:  [id number]    Recommendations:     Discharge Recommendations:  LTACH (long-term acute care hospital)   Discharge Equipment Recommendations: wheelchair, manual, commode, shower chair(TBD)   Barriers to discharge: None    Assessment:     Pau Cook is a 89 y.o. female admitted with a medical diagnosis of Closed intertrochanteric fracture of hip, left, initial encounter.  She presents with the following impairments/functional limitations:  weakness, impaired endurance, impaired self care skills, impaired functional mobilty, gait instability, decreased lower extremity function, pain, orthopedic precautions .Tolerated activity. Requires A to mobilize LLE due to weakness and pain.    Rehab Prognosis: Fair; patient would benefit from acute skilled PT services to address these deficits and reach maximum level of function.    Recent Surgery: Procedure(s) (LRB):  ORIF, FRACTURE, FEMUR, INTERTROCHANTERIC (Left) 4 Days Post-Op    Plan:     During this hospitalization, patient to be seen BID to address the identified rehab impairments via gait training, therapeutic activities, therapeutic exercises and progress toward the following goals:    · Plan of Care Expires:  06/15/19    Subjective     Chief Complaint: inability to move L leg by herself.  Patient/Family Comments/goals: to go to facility.  Pain/Comfort:  · Pain Rating 1: other (see comments)(did not rate)  · Location - Side 1: Left  · Location - Orientation 1: generalized  · Location 1: leg  · Pain Addressed 1: Reposition, Nurse notified      Objective:     Communicated with nurse Lowe prior to session.  Patient found supine with telemetry, bed alarm upon PT entry to room.     General Precautions: Standard, fall   Orthopedic Precautions:LLE toe touch weight bearing   Braces: N/A     Functional Mobility:  · Bed Mobility:     · Rolling Left:  maximal assistance  · Supine to Sit: maximal  assistance  · Transfers:     · Sit to Stand:  maximal assistance with rolling walker  · Gait: 10' with rw and Mod A      AM-PAC 6 CLICK MOBILITY          Therapeutic Activities and Exercises:   Transferred to sitting EOB with Max A.    Ambulated with rw and Mod A , TTWB LLE.    Up in chair due to fatigue and LLE discomfort.    Patient left up in chair with all lines intact, call button in reach and nurse Chrissy notified..    GOALS:   Multidisciplinary Problems     Physical Therapy Goals        Problem: Physical Therapy Goal    Goal Priority Disciplines Outcome Goal Variances Interventions   Physical Therapy Goal     PT, PT/OT Ongoing (interventions implemented as appropriate)     Description:  Goals to be met by: 6/15/2019     Patient will increase functional independence with mobility by performin. Supine to sit with Modified [place]  2. Sit to supine with Modified [place]  3. Sit to stand transfer with Supervision  4. Bed to chair transfer with Contact Guard Assistance using Rolling Walker, and TTWB on L LE.  5. Gait  x 50 feet with Contact Guard Assistance using Rolling Walker, and TTWB on L LE.   6. Lower extremity exercise program x10 reps per handout, with independence                      Time Tracking:     PT Received On: 19  PT Start Time: 1007     PT Stop Time: 1017  PT Total Time (min): 10 min     Billable Minutes: Gait Training 10min    Treatment Type: Treatment  PT/PTA: PTA     PTA Visit Number: 4     Jaclyn Perla PTA  2019

## 2019-06-05 ENCOUNTER — TELEPHONE (OUTPATIENT)
Dept: MEDSURG UNIT | Facility: HOSPITAL | Age: 84
End: 2019-06-05

## 2019-06-12 DIAGNOSIS — M79.641 RIGHT HAND PAIN: Primary | ICD-10-CM

## 2019-06-12 DIAGNOSIS — M25.552 LEFT HIP PAIN: ICD-10-CM

## 2019-06-14 ENCOUNTER — TELEPHONE (OUTPATIENT)
Dept: GYNECOLOGIC ONCOLOGY | Facility: CLINIC | Age: 84
End: 2019-06-14

## 2019-06-14 NOTE — TELEPHONE ENCOUNTER
Called and spoke with Kendra. Kendra states that she would like to move all of her mom's doctors to [place]. Her mother recently fell and broke her hip and has been in a rehab center. Informed Kendra that I would have to see from Dr Rose if there is a GYN/Oncologist in [place]. Kendra asked if her mother needed to be followed by a gyn/onc doctor or if she could be seen by Dr. Jannet Garcia the oncologist she sees in [place]. Informed Kendra I would pass this message on to Dr Rose for review. Verbalized understanding and denies any further questions.   ----- Message from Mirian Johnson sent at 6/14/2019 11:19 AM CDT -----  Contact: kendra  Name of Who is Calling: Kendra pt daughter       What is the request in detail: Patient daughter is requesting a call back she wants to see if there is a doctor her mother can be recommended for GYN ONCOLOGY in [place]       Can the clinic reply by [facility]: no      What Number to Call Back if not in [facility]: [phone] or [phone]

## 2019-06-14 NOTE — TELEPHONE ENCOUNTER
Spoke with Kendra pt daughter. Informed her that there is not a gyn/oncologist in the [place] area. Per Dr Rose it is okay for her mother to continue to see Dr Tejeda and Dr Rose can communicate with Dr Tejeda about her care. Kendra verbalized understanding and denies any further questions.

## 2019-06-18 ENCOUNTER — OFFICE VISIT (OUTPATIENT)
Dept: ORTHOPEDICS | Facility: CLINIC | Age: 84
End: 2019-06-18
Payer: MEDICARE

## 2019-06-18 ENCOUNTER — HOSPITAL ENCOUNTER (OUTPATIENT)
Dept: RADIOLOGY | Facility: HOSPITAL | Age: 84
Discharge: HOME | End: 2019-06-18
Attending: ORTHOPAEDIC SURGERY
Payer: MEDICARE

## 2019-06-18 VITALS
HEART RATE: 101 BPM | WEIGHT: 123.88 LBS | DIASTOLIC BLOOD PRESSURE: 62 MMHG | SYSTOLIC BLOOD PRESSURE: 131 MMHG | HEIGHT: 63 IN | BODY MASS INDEX: 21.95 KG/M2

## 2019-06-18 DIAGNOSIS — M25.552 LEFT HIP PAIN: ICD-10-CM

## 2019-06-18 DIAGNOSIS — S72.142D INTERTROCHANTERIC FRACTURE OF LEFT HIP, CLOSED, WITH ROUTINE HEALING, SUBSEQUENT ENCOUNTER: Primary | ICD-10-CM

## 2019-06-18 PROCEDURE — 73502 X-RAY EXAM HIP UNI 2-3 VIEWS: CPT | Mod: 26,LT,, | Performed by: RADIOLOGY

## 2019-06-18 PROCEDURE — 73502 XR HIP 2 VIEW LEFT: ICD-10-PCS | Mod: 26,LT,, | Performed by: RADIOLOGY

## 2019-06-18 PROCEDURE — 99024 PR POST-OP FOLLOW-UP VISIT: ICD-10-PCS | Mod: S$GLB,,, | Performed by: ORTHOPAEDIC SURGERY

## 2019-06-18 PROCEDURE — 99024 POSTOP FOLLOW-UP VISIT: CPT | Mod: S$GLB,,, | Performed by: ORTHOPAEDIC SURGERY

## 2019-06-18 PROCEDURE — 99999 PR PBB SHADOW E&M-EST. PATIENT-LVL III: ICD-10-PCS | Mod: PBBFAC,,, | Performed by: ORTHOPAEDIC SURGERY

## 2019-06-18 PROCEDURE — 99999 PR PBB SHADOW E&M-EST. PATIENT-LVL III: CPT | Mod: PBBFAC,,, | Performed by: ORTHOPAEDIC SURGERY

## 2019-06-18 PROCEDURE — 73502 X-RAY EXAM HIP UNI 2-3 VIEWS: CPT | Mod: TC,PN,LT

## 2019-06-18 NOTE — PROGRESS NOTES
CC:   89-year-old female follows up status post closed reduction with intramedullary nailing of a left intertrochanteric femur fracture. Overall she is doing well.  She has no complaints.  She has been very compliant with toe-touch weight-bearing.    LLE:  Incisions are clean, dry, and intact with no signs of infection.  Motor and sensory function are intact in the left lower extremity.  Plus two distal pulses.    X-rays were examined and personally reviewed by me.  Three views of the left hip dated 06/18/2019 were available for review.  There is intramedullary nail transfixing an intertrochanteric fracture of the left femur.  Hardware is well fixed with no evidence of loosening.  Alignment is acceptable    Dx:  Left intertrochanteric hip fracture, stable    Plan:  Continue toe-touch weight-bearing.  Follow-up in 4 weeks with an x-ray.

## 2019-06-20 ENCOUNTER — TELEPHONE (OUTPATIENT)
Dept: HEMATOLOGY/ONCOLOGY | Facility: CLINIC | Age: 84
End: 2019-06-20

## 2019-07-01 ENCOUNTER — TELEPHONE (OUTPATIENT)
Dept: FAMILY MEDICINE | Facility: CLINIC | Age: 84
End: 2019-07-01

## 2019-07-01 DIAGNOSIS — K21.9 GASTROESOPHAGEAL REFLUX DISEASE, ESOPHAGITIS PRESENCE NOT SPECIFIED: Primary | ICD-10-CM

## 2019-07-01 RX ORDER — OMEPRAZOLE 40 MG/1
40 CAPSULE, DELAYED RELEASE ORAL DAILY
Qty: 30 CAPSULE | Refills: 11 | Status: SHIPPED | OUTPATIENT
Start: 2019-07-01 | End: 2019-07-03

## 2019-07-01 NOTE — TELEPHONE ENCOUNTER
----- Message from Ellen Tuttle sent at 7/1/2019 10:32 AM CDT -----  Type: Needs Medical Advice    Who Called:  Kendra Mo - daughter  Symptoms (please be specific):  Acid refilux  How long has patient had these symptoms:    Pharmacy name and phone #:    [id number] - [place] - [address]  [address] [place] [address]  Phone: [phone] Fax: [phone]  Best Call Back Number: [phone] home, [phone] cell  Additional Information: patient was given Omeprazole instead of Nexium at her rehab stay in [place], caller is requesting to change patient medication due to the Omeprazole worked better for patient     Thank you

## 2019-07-03 RX ORDER — HYDROGEN PEROXIDE 3 %
20 SOLUTION, NON-ORAL MISCELLANEOUS
Qty: 30 CAPSULE | Refills: 11 | Status: SHIPPED | OUTPATIENT
Start: 2019-07-03 | End: 2020-01-31 | Stop reason: SDUPTHER

## 2019-07-03 NOTE — TELEPHONE ENCOUNTER
Spoke to patients daughter regarding rx for esomeprazole. States and rx was sent to [facility] for omeprazole states patient does not use [facility] and would like pharmacy removed; pharmacy list updated. Patient states the only pharmacy patient use is the [facility] states the medication is esomeprazole 20mg po once a day. [facility] patient was given this at the rehab and work better for her

## 2019-07-03 NOTE — TELEPHONE ENCOUNTER
----- Message from Maggie Miller sent at 7/3/2019  2:19 PM CDT -----  Contact: pt daughter June  Calling about RX Esomeprazole MAG 20 mg and want it filled and sent to [facility] on [address]  And left message already and this RX is working better for her and pt is completely out of medication and please advise [phone] and [phone]

## 2019-07-05 ENCOUNTER — TELEPHONE (OUTPATIENT)
Dept: ORTHOPEDICS | Facility: CLINIC | Age: 84
End: 2019-07-05

## 2019-07-05 NOTE — TELEPHONE ENCOUNTER
----- Message from Eduardo Brewster sent at 7/5/2019 10:14 AM CDT -----  Contact: pt  Pt has medication question regarding Lyrica 75 called in by both Doctor Lockwood and Momo. Please call  Ok to leave message

## 2019-07-17 ENCOUNTER — LAB VISIT (OUTPATIENT)
Dept: LAB | Facility: HOSPITAL | Age: 84
End: 2019-07-17
Attending: INTERNAL MEDICINE
Payer: MEDICARE

## 2019-07-17 DIAGNOSIS — C56.9 MALIGNANT NEOPLASM OF OVARY, UNSPECIFIED LATERALITY: ICD-10-CM

## 2019-07-17 DIAGNOSIS — Z86.2 HISTORY OF ANEMIA: ICD-10-CM

## 2019-07-17 LAB
ALBUMIN SERPL BCP-MCNC: 3.6 G/DL (ref 3.5–5.2)
ALBUMIN SERPL BCP-MCNC: 3.6 G/DL (ref 3.5–5.2)
ALP SERPL-CCNC: 139 U/L (ref 55–135)
ALP SERPL-CCNC: 139 U/L (ref 55–135)
ALT SERPL W/O P-5'-P-CCNC: 22 U/L (ref 10–44)
ALT SERPL W/O P-5'-P-CCNC: 22 U/L (ref 10–44)
ANION GAP SERPL CALC-SCNC: 6 MMOL/L (ref 8–16)
ANION GAP SERPL CALC-SCNC: 6 MMOL/L (ref 8–16)
AST SERPL-CCNC: 34 U/L (ref 10–40)
AST SERPL-CCNC: 34 U/L (ref 10–40)
BASOPHILS # BLD AUTO: 0.03 K/UL (ref 0–0.2)
BASOPHILS NFR BLD: 0.7 % (ref 0–1.9)
BILIRUB SERPL-MCNC: 0.4 MG/DL (ref 0.1–1)
BILIRUB SERPL-MCNC: 0.4 MG/DL (ref 0.1–1)
BUN SERPL-MCNC: 12 MG/DL (ref 8–23)
BUN SERPL-MCNC: 12 MG/DL (ref 8–23)
CALCIUM SERPL-MCNC: 9.5 MG/DL (ref 8.7–10.5)
CALCIUM SERPL-MCNC: 9.5 MG/DL (ref 8.7–10.5)
CANCER AG125 SERPL-ACNC: 13 U/ML (ref 0–30)
CHLORIDE SERPL-SCNC: 102 MMOL/L (ref 95–110)
CHLORIDE SERPL-SCNC: 102 MMOL/L (ref 95–110)
CO2 SERPL-SCNC: 31 MMOL/L (ref 23–29)
CO2 SERPL-SCNC: 31 MMOL/L (ref 23–29)
CREAT SERPL-MCNC: 0.8 MG/DL (ref 0.5–1.4)
CREAT SERPL-MCNC: 0.8 MG/DL (ref 0.5–1.4)
DIFFERENTIAL METHOD: ABNORMAL
EOSINOPHIL # BLD AUTO: 0.2 K/UL (ref 0–0.5)
EOSINOPHIL NFR BLD: 5.2 % (ref 0–8)
ERYTHROCYTE [DISTWIDTH] IN BLOOD BY AUTOMATED COUNT: 13.6 % (ref 11.5–14.5)
EST. GFR  (AFRICAN AMERICAN): >60 ML/MIN/1.73 M^2
EST. GFR  (AFRICAN AMERICAN): >60 ML/MIN/1.73 M^2
EST. GFR  (NON AFRICAN AMERICAN): >60 ML/MIN/1.73 M^2
EST. GFR  (NON AFRICAN AMERICAN): >60 ML/MIN/1.73 M^2
GLUCOSE SERPL-MCNC: 127 MG/DL (ref 70–110)
GLUCOSE SERPL-MCNC: 127 MG/DL (ref 70–110)
HCT VFR BLD AUTO: 38.7 % (ref 37–48.5)
HGB BLD-MCNC: 12 G/DL (ref 12–16)
IMM GRANULOCYTES # BLD AUTO: 0.01 K/UL (ref 0–0.04)
LYMPHOCYTES # BLD AUTO: 0.9 K/UL (ref 1–4.8)
LYMPHOCYTES NFR BLD: 21.2 % (ref 18–48)
MCH RBC QN AUTO: 29.2 PG (ref 27–31)
MCHC RBC AUTO-ENTMCNC: 31 G/DL (ref 32–36)
MCV RBC AUTO: 94 FL (ref 82–98)
MONOCYTES # BLD AUTO: 0.4 K/UL (ref 0.3–1)
MONOCYTES NFR BLD: 7.9 % (ref 4–15)
NEUTROPHILS # BLD AUTO: 2.9 K/UL (ref 1.8–7.7)
NEUTROPHILS NFR BLD: 64.8 % (ref 38–73)
NRBC BLD-RTO: 0 /100 WBC
PLATELET # BLD AUTO: 247 K/UL (ref 150–350)
PMV BLD AUTO: 8.9 FL (ref 9.2–12.9)
POTASSIUM SERPL-SCNC: 4.4 MMOL/L (ref 3.5–5.1)
POTASSIUM SERPL-SCNC: 4.4 MMOL/L (ref 3.5–5.1)
PROT SERPL-MCNC: 7 G/DL (ref 6–8.4)
PROT SERPL-MCNC: 7 G/DL (ref 6–8.4)
RBC # BLD AUTO: 4.11 M/UL (ref 4–5.4)
SODIUM SERPL-SCNC: 139 MMOL/L (ref 136–145)
SODIUM SERPL-SCNC: 139 MMOL/L (ref 136–145)
WBC # BLD AUTO: 4.43 K/UL (ref 3.9–12.7)

## 2019-07-17 PROCEDURE — 85025 COMPLETE CBC W/AUTO DIFF WBC: CPT

## 2019-07-17 PROCEDURE — 80053 COMPREHEN METABOLIC PANEL: CPT

## 2019-07-17 PROCEDURE — 36415 COLL VENOUS BLD VENIPUNCTURE: CPT

## 2019-07-17 PROCEDURE — 86304 IMMUNOASSAY TUMOR CA 125: CPT

## 2019-07-19 ENCOUNTER — OFFICE VISIT (OUTPATIENT)
Dept: HEMATOLOGY/ONCOLOGY | Facility: CLINIC | Age: 84
End: 2019-07-19
Payer: MEDICARE

## 2019-07-19 VITALS
DIASTOLIC BLOOD PRESSURE: 55 MMHG | HEIGHT: 63 IN | HEART RATE: 85 BPM | TEMPERATURE: 98 F | BODY MASS INDEX: 22.43 KG/M2 | RESPIRATION RATE: 20 BRPM | OXYGEN SATURATION: 97 % | WEIGHT: 126.56 LBS | SYSTOLIC BLOOD PRESSURE: 115 MMHG

## 2019-07-19 DIAGNOSIS — C78.00 MALIGNANT NEOPLASM METASTATIC TO LUNG, UNSPECIFIED LATERALITY: ICD-10-CM

## 2019-07-19 DIAGNOSIS — C78.7 METASTASES TO THE LIVER: ICD-10-CM

## 2019-07-19 DIAGNOSIS — S72.142A CLOSED INTERTROCHANTERIC FRACTURE OF HIP, LEFT, INITIAL ENCOUNTER: ICD-10-CM

## 2019-07-19 DIAGNOSIS — Z09 ONCOLOGY FOLLOW-UP ENCOUNTER: Primary | ICD-10-CM

## 2019-07-19 DIAGNOSIS — C56.9 MALIGNANT NEOPLASM OF OVARY, UNSPECIFIED LATERALITY: ICD-10-CM

## 2019-07-19 DIAGNOSIS — S72.142D INTERTROCHANTERIC FRACTURE OF LEFT HIP, CLOSED, WITH ROUTINE HEALING, SUBSEQUENT ENCOUNTER: Primary | ICD-10-CM

## 2019-07-19 DIAGNOSIS — I70.0 ATHEROSCLEROSIS OF AORTA: ICD-10-CM

## 2019-07-19 PROCEDURE — 99215 PR OFFICE/OUTPT VISIT, EST, LEVL V, 40-54 MIN: ICD-10-PCS | Mod: S$GLB,,, | Performed by: INTERNAL MEDICINE

## 2019-07-19 PROCEDURE — 1101F PR PT FALLS ASSESS DOC 0-1 FALLS W/OUT INJ PAST YR: ICD-10-PCS | Mod: CPTII,S$GLB,, | Performed by: INTERNAL MEDICINE

## 2019-07-19 PROCEDURE — 1101F PT FALLS ASSESS-DOCD LE1/YR: CPT | Mod: CPTII,S$GLB,, | Performed by: INTERNAL MEDICINE

## 2019-07-19 PROCEDURE — 99499 RISK ADDL DX/OHS AUDIT: ICD-10-PCS | Mod: S$GLB,,, | Performed by: INTERNAL MEDICINE

## 2019-07-19 PROCEDURE — 99999 PR PBB SHADOW E&M-EST. PATIENT-LVL III: CPT | Mod: PBBFAC,,, | Performed by: INTERNAL MEDICINE

## 2019-07-19 PROCEDURE — 99499 UNLISTED E&M SERVICE: CPT | Mod: S$GLB,,, | Performed by: INTERNAL MEDICINE

## 2019-07-19 PROCEDURE — 99999 PR PBB SHADOW E&M-EST. PATIENT-LVL III: ICD-10-PCS | Mod: PBBFAC,,, | Performed by: INTERNAL MEDICINE

## 2019-07-19 PROCEDURE — 99215 OFFICE O/P EST HI 40 MIN: CPT | Mod: S$GLB,,, | Performed by: INTERNAL MEDICINE

## 2019-07-19 PROCEDURE — 3288F FALL RISK ASSESSMENT DOCD: CPT | Mod: CPTII,S$GLB,, | Performed by: INTERNAL MEDICINE

## 2019-07-19 PROCEDURE — 3288F PR FALLS RISK ASSESSMENT DOCUMENTED: ICD-10-PCS | Mod: CPTII,S$GLB,, | Performed by: INTERNAL MEDICINE

## 2019-07-19 RX ORDER — SODIUM CHLORIDE 0.9 % (FLUSH) 0.9 %
10 SYRINGE (ML) INJECTION
Status: CANCELLED | OUTPATIENT
Start: 2019-07-23

## 2019-07-19 RX ORDER — HEPARIN 100 UNIT/ML
500 SYRINGE INTRAVENOUS
Status: CANCELLED | OUTPATIENT
Start: 2019-07-23

## 2019-07-19 RX ORDER — FAMOTIDINE 10 MG/ML
20 INJECTION INTRAVENOUS
Status: CANCELLED | OUTPATIENT
Start: 2019-07-23

## 2019-07-19 NOTE — PROGRESS NOTES
CC I have home care after breaking my hip     Pau Cook is a 89 y.o.    This is an 89 year-old female referred from  here for adjuvant chemotherapy. Cycle 5 due   Patient is here with her    Saw  Dr. Rose May 6, recommends further chemo   records     Ovarian cancer    1/15/2019 Initial Diagnosis     Ovarian cancer          Cancer Staged     Cancer Staging  Ovarian cancer  Staging form: Ovary, Fallopian Tube, and Primary Peritoneal Carcinoma, AJCC 8th Edition  - Clinical stage from 2/28/2019: FIGO Stage IC, calculated as Stage IV (cT1c2, cNX, cM1) - Signed by Kenyatta Tejeda MD on 2/28/2019          Chemotherapy     Treatment Summary   Plan Name: OP GYN DOCETAXEL CARBOPLATIN (AUC) Q3W  Treatment Goal: Control  Status: Active  Start Date: 2/8/2019  End Date: 5/24/2019 (Planned)  Provider: Kenyatta Tejeda MD  Chemotherapy: CARBOplatin (PARAPLATIN) 230 mg in sodium chloride 0.9% 250 mL chemo infusion, 230 mg (100 % of original dose 230 mg), Intravenous, Clinic/HOD 1 time, 1 of 6 cycles  Dose modification: 230 mg (original dose 230 mg, Cycle 1, Reason: MD Discretion)    DOCEtaxel (TAXOTERE) 60 mg/m2 = 95 mg in sodium chloride 0.9% 250 mL chemo infusion, 60 mg/m2 = 95 mg (original dose 96 mg), Intravenous, Clinic/HOD 1 time, 1 of 6 cycles  Dose modification: 96 mg (original dose 96 mg, Cycle 1), 90 mg (original dose 96 mg, Cycle 1), 60 mg/m2 (original dose 96 mg, Cycle 1)              Tumor Markers     Patient's tumor was tested for the following markers: Ca 125 : 91                                                   4/19/2019 -  Chemotherapy     Treatment Summary   Plan Name: OP GYN DOCETAXEL CARBOPLATIN   Treatment Goal: Control  Status: Active  Start Date: 4/26/2019  End Date: 6/12/2019 (Planned)  Provider: Kenyatta Tejeda MD  Chemotherapy: CARBOplatin (PARAPLATIN) 240 mg in sodium chloride 0.9% 250 mL chemo infusion, 240 mg (100 % of original dose 242 mg), Intravenous, Clinic/HOD 1 time, 2 of 3  cycles  Dose modification:   (original dose 242 mg, Cycle 1, Reason: Other (see comments))  DOCEtaxel (TAXOTERE) 96 mg in sodium chloride 0.9% 250 mL chemo infusion, 95 mg (100 % of original dose 96 mg), Intravenous, Clinic/HOD 1 time, 2 of 3 cycles  Dose modification: 96 mg (original dose 96 mg, Cycle 1)            She is having new skin lesions and losing her hair   Referred by :  Dr. 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  Pathology revealed high-grade undifferentiated carcinoma involving the left tube and ovary with extensive necrosis.  Malignancy was noted involving the ovarian surface as well as the fallopian tube surface.  The tumor was 7 cm in greatest dimension.  This was a grade 3 undifferentiated carcinoma.  No regional lymph nodes were submitted tumor cells were strongly positive for both CK7 and EMA.  Pathologically staged as a T1 see to an affects      SPECIMEN  1) Left tube and ovary  2) Uterus, cervix, right tube and ovary  FINAL PATHOLOGIC DIAGNOSIS  1. LEFT TUBE AND OVARY SHOWING HIGH-GRADE, UNDIFFERENTIATED CARCINOMA OF THE OVARY  INVOLVING MILAGROS TUBAL TISSUES WITH EXTENSIVE NECROSIS, SEE SYNOPTIC REPORT:  PROCEDURE: TOTAL HYSTERECTOMY AND BILATERAL SALPINGO-OOPHORECTOMY  SPECIMEN INTEGRITY: LEFT OVARY, CAPSULE RUPTURED  TUMOR SITE: LEFT OVARY  OVARIAN SURFACE INVOLVEMENT: PRESENT  FALLOPIAN TUBE  SURFACE INVOLVEMENT: PRESENT  TUMOR SIZE: 7 CM  HISTOLOGIC TYPE: UNDIFFERENTIATED CARCINOMA  HISTOLOGIC GRADE: GRADE 3  OTHER TISSUE/ORGAN INVOLVEMENT: NOT IDENTIFIED  PERITONEAL/ASCITIC FLUID: NOT SUBMITTED/UNKNOWN  REGIONAL LYMPH NODES: NONE SUBMITTED  SPECIAL STUDIES: TUMOR CELLS ARE STRONGLY POSITIVE FOR BOTH CK7 AND EMA IMMUNOSTAINS.  S-100, WILMS TUMOR ANTIGEN AND CD10 ARE FOCALLY POSITIVE. CALRETININ, CD56 AND INHIBIN ARE  ALL NEGATIVE. THE CONTROLS STAIN APPROPRIATELY. THESE FINDINGS SUPPORT THE DIAGNOSIS  OF CARCINOMA RATHER THAN GRANULOSA CELL TUMOR.  TUMOR STAGE: pT1c2 Nx  2. UTERUS, CERVIX AND RIGHT ADNEXA WEIGHING 66 G SHOWING:  INACTIVE ENDOMETRIUM WITH A BENIGN ENDOMETRIAL POLYP  CERVIX WITH NABOTHIAN CYSTS  ATROPHIC RIGHT OVARY AND UNREMARKABLE RIGHT FALLOPIAN TUBE  Diagnosed by: Antwon Uribe M.D.      Patient here to re-evaluate her labs since last visit she feels well no fatigue no bleeding  Past Medical History:   Diagnosis Date    Allergy     Arthritis     Breast cyst     Foot pain     GERD (gastroesophageal reflux disease)     Joint pain     Ovarian cancer     Peripheral vascular disease 1/15/2018    Squamous cell carcinoma 04/2016    Right Lower Leg    MUNIR (stress urinary incontinence, female) 4/17/2017     Past Surgical History:   Procedure Laterality Date    APPENDECTOMY      COLON SURGERY      removed about 10 inch    COLONOSCOPY N/A 12/24/2018    [facility]    EXCISION, MASS, FINGER Right 7/9/2018    [facility]    hemorhids      HYSTERECTOMY      HYSTERECTOMY, TOTAL, ABDOMINAL, WITH BILATERAL SALPINGO-OOPHORECTOMY Bilateral 12/28/2018    [facility] OR    LYSIS, ADHESIONS N/A 12/28/2018    [facility] at [facility] OR    ORIF, FRACTURE, FEMUR, INTERTROCHANTERIC Left 5/31/2019    Performed by Bacilio Lockwood II, MD at [facility] OR    XI ROBOTIC HYSTERECTOMY N/A  12/28/2018    Per[facility] by [facility] at [facility] OR    XI ROBOTIC SALPINGO-OOPHORECTOMY Bilateral 12/28/2018    Performed by [facility] at [facility] OR    She remains on Nexium to help with intermittent symptoms of gastritis and is on calcium with vitamin-D for overall bone health    Current Outpatient Medications:     acetaminophen (TYLENOL) 500 MG tablet, Take 2 tablets (1,000 mg total) by mouth every 8 (eight) hours., Disp: , Rfl: 0    calcium-vitamin D3 (CALCIUM 500 + D) 500 mg(1,250mg) -200 unit per tablet, Take 1 tablet by mouth once daily. , Disp: , Rfl:     carboxymethylcellulose (REFRESH PLUS) 0.5 % Dpet, 1 drop daily as needed., Disp: , Rfl:     cyanocobalamin (VITAMIN B-12) 1000 MCG tablet, Take 1,000 mcg by mouth once daily. , Disp: , Rfl:     esomeprazole (NEXIUM) 20 MG capsule, Take 1 capsule (20 mg total) by mouth before breakfast., Disp: 30 capsule, Rfl: 11    FLAXSEED ORAL, Take 650 mg by mouth once daily. , Disp: , Rfl:     HYDROcodone-acetaminophen (NORCO) 5-325 mg per tablet, Take 1 tablet by mouth every 8 (eight) hours as needed (severe pain unrelieved by tylenol)., Disp: 20 tablet, Rfl: 0    ketotifen (ZADITOR) 0.025 % (0.035 %) ophthalmic solution, Place 1 drop into both eyes 2 (two) times daily as needed (Pt reports use approx once/week). , Disp: , Rfl:     MULTIVITAMIN W-MINERALS/LUTEIN (CENTRUM SILVER ORAL), Take 1 tablet by mouth once daily. , Disp: , Rfl:     pregabalin (LYRICA) 75 MG capsule, Take 1 capsule (75 mg total) by mouth 2 (two) times daily., Disp: 60 capsule, Rfl: 0    prochlorperazine (COMPAZINE) 10 MG tablet, Take 1 tablet (10 mg total) by mouth every 6 (six) hours as needed., Disp: 30 tablet, Rfl: 1     She remains on intermittent Compazine with Nexium to help with gastritis and nausea.  She reports the Zofran is not helping her much.  For bone pain she has taken over-the-counter Tylenol Arthritis  Review of patient's allergies indicates:   Allergen  Reactions    Pcn [penicillins]      Can not remember reaction     Tetanus vaccines and toxoid Swelling     Localized swelling to injection site     Social History     Tobacco Use    Smoking status: Never Smoker    Smokeless tobacco: Never Used   Substance Use Topics    Alcohol use: No     Alcohol/week: 0.0 oz     Comment: rare    Drug use: No     Family History   Adopted: Yes   Problem Relation Age of Onset    Cancer Mother     Stroke Father     Breast cancer Sister     Cancer Sister     No Known Problems Daughter     No Known Problems Brother     No Known Problems Daughter     Diabetes Brother     Colon cancer Neg Hx     Ovarian cancer Neg Hx       Ref Range & Units 2d ago 1mo ago   WBC 3.90 - 12.70 K/uL 4.43  15.40High     RBC 4.00 - 5.40 M/uL 4.11  3.06Low     Hemoglobin 12.0 - 16.0 g/dL 12.0  9.0Low     Hematocrit 37.0 - 48.5 % 38.7  27.2Low     Mean Corpuscular Volume 82 - 98 fL 94  89    Mean Corpuscular Hemoglobin 27.0 - 31.0 pg 29.2  29.4    Mean Corpuscular Hemoglobin Conc 32.0 - 36.0 g/dL 31.0Low   33.1    RDW 11.5 - 14.5 % 13.6  18.4High     Platelets 150 - 350 K/uL 247  248    MPV 9.2 - 12.9 fL 8.9Low   6.7Low     Gran # (ANC) 1.8 - 7.7 K/uL 2.9  13.9High     Immature Grans (Abs) 0.00 - 0.04 K/uL 0.01     Comment: Mild elevation in immature granulocytes is non specific and   can be seen in a variety of conditions including stress response,   acute inflammation, trauma and pregnancy. Correlation with other   laboratory and clinical findings is essential.    Lymph # 1.0 - 4.8 K/uL 0.9Low   0.9Low     Mono # 0.3 - 1.0 K/uL 0.4  0.5    Eos # 0.0 - 0.5 K/uL 0.2  0.0    Baso # 0.00 - 0.20 K/uL 0.03  0.00    nRBC 0 /100 WBC 0     Gran% 38.0 - 73.0 % 64.8  90.4High     Lymph% 18.0 - 48.0 % 21.2  6.2Low     Mono% 4.0 - 15.0 % 7.9  3.1Low     Eosinophil% 0.0 - 8.0 % 5.2         CONSTITUTIONAL: No fevers, chills, night sweats, wt. loss,  SKIN:  Positive rash  ENT: No headaches, head trauma, vision  changes  LYMPH NODES: None noticed   CV: No chest pain, palpitations.   RESP: No dyspnea on exertion, cough, wheezing, no further rib pain  GI: No nausea, emesis,no heartburn and reflux unrelieved with medication worsening constipaton  : No dysuria, hematuria, urgency   HEME: No easy bruising, bleeding problems  PSYCHIATRIC: No depression, anxiety, psychosis  NEURO: No seizures, memory loss, no headaches  MSK:  Positive bone pain unrelieved with Tylenol over-the-counter  Pain with ambulation     Wt Readings from Last 3 Encounters:   07/19/19 57.4 kg (126 lb 8.7 oz)   06/18/19 56.2 kg (123 lb 14.4 oz)   06/03/19 56.2 kg (124 lb)     Temp Readings from Last 3 Encounters:   07/19/19 98.4 °F (36.9 °C)   06/04/19 97.8 °F (36.6 °C)   05/16/19 98.8 °F (37.1 °C)     BP Readings from Last 3 Encounters:   07/19/19 (!) 115/55   06/18/19 131/62   06/04/19 134/60     Pulse Readings from Last 3 Encounters:   07/19/19 85   06/18/19 101   06/04/19 104       Constitutional: oriented to person, place, and time.     HENT:   Head: Normocephalic and atraumatic.   Right Ear: External ear normal. Left Ear: External ear normal.   Nose: Nose normal.   Mouth/Throat: Oropharynx is clear and moist.   Eyes: Conjunctivae and EOM are normal. Pupils are equal,  Neck: Normal range of motion. Neck supple.   Cardiovascular: Normal rate, regular rhythm, normal heart sounds   No murmur heard.  Pulmonary/Chest: Effort normal and breath sounds normal.  no fremitus   Abdominal: Soft. Bowel sounds are normal.  no mass.   Musculoskeletal: Normal range of motion.  + edema of ankles    Lymphadenopathy:  no cervical adenopathy.   Neurological: alert and oriented to person, place   Decreased ROM   Psychiatric: pleasant  affect   Vitals reviewed.      Lab Results   Component Value Date    WBC 4.43 07/17/2019    RBC 4.11 07/17/2019    HGB 12.0 07/17/2019    HCT 38.7 07/17/2019    MCV 94 07/17/2019    MCH 29.2 07/17/2019    MCHC 31.0 (L) 07/17/2019    RDW 13.6  07/17/2019     07/17/2019    MPV 8.9 (L) 07/17/2019    GRAN 2.9 07/17/2019    GRAN 64.8 07/17/2019    LYMPH 0.9 (L) 07/17/2019    LYMPH 21.2 07/17/2019    MONO 0.4 07/17/2019    MONO 7.9 07/17/2019    EOS 0.2 07/17/2019    BASO 0.03 07/17/2019    EOSINOPHIL 5.2 07/17/2019    BASOPHIL 0.7 07/17/2019       CMP  Sodium   Date Value Ref Range Status   07/17/2019 139 136 - 145 mmol/L Final   07/17/2019 139 136 - 145 mmol/L Final     Potassium   Date Value Ref Range Status   07/17/2019 4.4 3.5 - 5.1 mmol/L Final   07/17/2019 4.4 3.5 - 5.1 mmol/L Final     Chloride   Date Value Ref Range Status   07/17/2019 102 95 - 110 mmol/L Final   07/17/2019 102 95 - 110 mmol/L Final     CO2   Date Value Ref Range Status   07/17/2019 31 (H) 23 - 29 mmol/L Final   07/17/2019 31 (H) 23 - 29 mmol/L Final     Glucose   Date Value Ref Range Status   07/17/2019 127 (H) 70 - 110 mg/dL Final   07/17/2019 127 (H) 70 - 110 mg/dL Final     BUN, Bld   Date Value Ref Range Status   07/17/2019 12 8 - 23 mg/dL Final   07/17/2019 12 8 - 23 mg/dL Final     Creatinine   Date Value Ref Range Status   07/17/2019 0.8 0.5 - 1.4 mg/dL Final   07/17/2019 0.8 0.5 - 1.4 mg/dL Final     Calcium   Date Value Ref Range Status   07/17/2019 9.5 8.7 - 10.5 mg/dL Final   07/17/2019 9.5 8.7 - 10.5 mg/dL Final     Total Protein   Date Value Ref Range Status   07/17/2019 7.0 6.0 - 8.4 g/dL Final   07/17/2019 7.0 6.0 - 8.4 g/dL Final     Albumin   Date Value Ref Range Status   07/17/2019 3.6 3.5 - 5.2 g/dL Final   07/17/2019 3.6 3.5 - 5.2 g/dL Final     Total Bilirubin   Date Value Ref Range Status   07/17/2019 0.4 0.1 - 1.0 mg/dL Final     Comment:     For infants and newborns, interpretation of results should be based  on gestational age, weight and in agreement with clinical  observations.  Premature Infant recommended reference ranges:  Up to 24 hours.............<8.0 mg/dL  Up to 48 hours............<12.0 mg/dL  3-5 days..................<15.0 mg/dL  6-29  days.................<15.0 mg/dL     07/17/2019 0.4 0.1 - 1.0 mg/dL Final     Comment:     For infants and newborns, interpretation of results should be based  on gestational age, weight and in agreement with clinical  observations.  Premature Infant recommended reference ranges:  Up to 24 hours.............<8.0 mg/dL  Up to 48 hours............<12.0 mg/dL  3-5 days..................<15.0 mg/dL  6-29 days.................<15.0 mg/dL       Alkaline Phosphatase   Date Value Ref Range Status   07/17/2019 139 (H) 55 - 135 U/L Final   07/17/2019 139 (H) 55 - 135 U/L Final     AST   Date Value Ref Range Status   07/17/2019 34 10 - 40 U/L Final   07/17/2019 34 10 - 40 U/L Final     ALT   Date Value Ref Range Status   07/17/2019 22 10 - 44 U/L Final   07/17/2019 22 10 - 44 U/L Final     Anion Gap   Date Value Ref Range Status   07/17/2019 6 (L) 8 - 16 mmol/L Final   07/17/2019 6 (L) 8 - 16 mmol/L Final     eGFR if    Date Value Ref Range Status   07/17/2019 >60 >60 mL/min/1.73 m^2 Final   07/17/2019 >60 >60 mL/min/1.73 m^2 Final     eGFR if non    Date Value Ref Range Status   07/17/2019 >60 >60 mL/min/1.73 m^2 Final     Comment:     Calculation used to obtain the estimated glomerular filtration  rate (eGFR) is the CKD-EPI equation.      07/17/2019 >60 >60 mL/min/1.73 m^2 Final     Comment:     Calculation used to obtain the estimated glomerular filtration  rate (eGFR) is the CKD-EPI equation.          Ref Range & Kcstw5w ago  CA 1250 - 30 U/mL91 Abnormally high   Ref Range & Hwbfu4b ago  CEA0.0 - 5.0 ng/mL7.6 Abnormally high      Oncology follow-up encounter    Malignant neoplasm of ovary, unspecified laterality  -     CBC auto differential; Standing  -     ; Future; Expected date: 07/19/2019  -     NM PET CT Routine Skull to Mid Thigh    Atherosclerosis of aorta    Closed intertrochanteric fracture of hip, left, initial encounter    Malignant neoplasm metastatic to lung, unspecified  laterality  -     CBC auto differential; Standing  -     ; Future; Expected date: 07/19/2019  -     NM PET CT Routine Skull to Mid Thigh    Metastases to the liver  -     CBC auto differential; Standing  -     ; Future; Expected date: 07/19/2019  -     NM PET CT Routine Skull to Mid Thigh    Other orders  -     pegfilgrastim injection 6 mg  -     palonosetron (ALOXI) 0.25 mg, dexamethasone (DECADRON) 20 mg in sodium chloride 0.9% 50 mL  -     diphenhydrAMINE (BENADRYL) 50 mg in sodium chloride 0.9% 50 mL IVPB  -     famotidine (PF) injection 20 mg  -     DOCEtaxel (TAXOTERE) 96 mg in sodium chloride 0.9% 250 mL chemo infusion  -     CARBOplatin (PARAPLATIN) 240 mg in sodium chloride 0.9% 250 mL chemo infusion  -     sodium chloride 0.9% bolus 500 mL  -     sodium chloride 0.9% flush 10 mL  -     heparin, porcine (PF) 100 unit/mL injection flush 500 Units  -     alteplase injection 2 mg  -     Cancel: pegfilgrastim injection 6 mg  -     pegfilgrastim (NEULASTA (ON BODY INJECTOR)) injection 6 mg         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 I went over the risks and benefits of the chemotherapy with regard to their particular cancer type, their cancer stage, their age, and their co-morbidities. I provided literature on the chemotherapy regimen and discussed the chemotherapy side-effect profiles of the drug(s). I discussed the importance of compliance with obtaining and monitoring weekly lab work, and went over the potential hematopathology issues and risks with anemia, leucopenia and thrombocytopenia that can occur with chemotherapy. I discussed the potential risks of liver and kidney damage, which could be permanent and could necessitate dialysis long-term if kidney failure developed. I discussed the emetic  and/or diarrheal potential of the regimen and the potential need for use of antiemetic and anti-diarrheal medications. I discussed the risk for development of anaphylactic shock, bronchospasm, dysrhythmia, and respiratory/cardiovascular failure. I discussed the potential risks for development of alopecia, cold sensory issues, ringing in ears, vertigo and neuropathy, all of which could end up being chronic and life-long. I discussed the risks of hand-foot syndrome and rashes, and development of other autoimmune mediated processes such as pneumonitis and colitis which could be life threatening. I discussed the risks of the potential development of leukemia and/or lymphoma from use of certain chemotherapy agents. I discussed the need for neutropenic precautions, basic hygiene/sanitation behaviors and dietary restrictions.     The patient's consent has been obtained to proceed with the chemotherapy.The patient will be referred to [facility] /[facility] for training and education on chemotherapy, use of antiemetics and/or anti-diarrheals, use of NSAID's, potential chemotherapy side-effects, and any specific recommendations and precautions with the particular chemotherapy agents.       I answered all of the patient's (and family's, if applicable) questions to the best of my ability and to their complete satisfaction. The patient acknowledged full understanding of the risks, recommendations and plan(s).      Sincerely,  Kenyatta Tejeda MD [facility]          Advance Care Planning     Living Will  During this visit, I engaged the patient in the advance care planning process.  The patient and I reviewed the role for advance directives and their purpose in directing future healthcare if the patient's unable to speak for him/herself.  At this point in time, the patient does have full decision-making capacity.  We discussed different extreme health states that she could experience, and reviewed what kind of medical care  she would want in those situations.  The patient communicated that if she were comatose and had little chance of a meaningful recovery, she would not want machines/life-sustaining treatments used.  The patient has completed a living will to reflect these preferences.  The patient  Has  already designated a healthcare power of  to make decisions on her behalf.   I spent a total of  10  minutes engaging the patient in this advance care planning discussion.

## 2019-07-22 ENCOUNTER — HOSPITAL ENCOUNTER (OUTPATIENT)
Dept: RADIOLOGY | Facility: HOSPITAL | Age: 84
Discharge: HOME | End: 2019-07-22
Attending: ORTHOPAEDIC SURGERY
Payer: MEDICARE

## 2019-07-22 ENCOUNTER — OFFICE VISIT (OUTPATIENT)
Dept: ORTHOPEDICS | Facility: CLINIC | Age: 84
End: 2019-07-22
Payer: MEDICARE

## 2019-07-22 VITALS
HEART RATE: 85 BPM | HEIGHT: 63 IN | DIASTOLIC BLOOD PRESSURE: 57 MMHG | WEIGHT: 126 LBS | SYSTOLIC BLOOD PRESSURE: 116 MMHG | BODY MASS INDEX: 22.32 KG/M2

## 2019-07-22 DIAGNOSIS — S72.142D INTERTROCHANTERIC FRACTURE OF LEFT HIP, CLOSED, WITH ROUTINE HEALING, SUBSEQUENT ENCOUNTER: ICD-10-CM

## 2019-07-22 DIAGNOSIS — S72.142D INTERTROCHANTERIC FRACTURE OF LEFT HIP, CLOSED, WITH ROUTINE HEALING, SUBSEQUENT ENCOUNTER: Primary | ICD-10-CM

## 2019-07-22 PROCEDURE — 99024 PR POST-OP FOLLOW-UP VISIT: ICD-10-PCS | Mod: S$GLB,,, | Performed by: ORTHOPAEDIC SURGERY

## 2019-07-22 PROCEDURE — 73502 X-RAY EXAM HIP UNI 2-3 VIEWS: CPT | Mod: TC,PN,LT

## 2019-07-22 PROCEDURE — 99999 PR PBB SHADOW E&M-EST. PATIENT-LVL III: ICD-10-PCS | Mod: PBBFAC,,, | Performed by: ORTHOPAEDIC SURGERY

## 2019-07-22 PROCEDURE — 99999 PR PBB SHADOW E&M-EST. PATIENT-LVL III: CPT | Mod: PBBFAC,,, | Performed by: ORTHOPAEDIC SURGERY

## 2019-07-22 PROCEDURE — 73502 X-RAY EXAM HIP UNI 2-3 VIEWS: CPT | Mod: 26,LT,, | Performed by: RADIOLOGY

## 2019-07-22 PROCEDURE — 99024 POSTOP FOLLOW-UP VISIT: CPT | Mod: S$GLB,,, | Performed by: ORTHOPAEDIC SURGERY

## 2019-07-22 PROCEDURE — 73502 XR HIP 2 VIEW LEFT: ICD-10-PCS | Mod: 26,LT,, | Performed by: RADIOLOGY

## 2019-07-22 NOTE — PROGRESS NOTES
CC:  89-year-old female follows up status post intramedullary nailing of a left intertrochanteric hip fracture. Date of surgery was 05/31/2019.    LLE:  Incision sites are all well healed.  Grossly normal motor and sensory function distally.  Plus two distal pulses.    X-rays were examined and personally reviewed by me.      Dx:  Left intertrochanteric hip fracture, stable and healing.    Plan:  Progress weight-bearing to partial weight bearing.  Follow-up in month.

## 2019-07-23 ENCOUNTER — EXTERNAL HOME HEALTH (OUTPATIENT)
Dept: HOME HEALTH SERVICES | Facility: HOSPITAL | Age: 84
End: 2019-07-23

## 2019-07-30 ENCOUNTER — TELEPHONE (OUTPATIENT)
Dept: HEMATOLOGY/ONCOLOGY | Facility: CLINIC | Age: 84
End: 2019-07-30

## 2019-07-30 NOTE — TELEPHONE ENCOUNTER
----- Message from John Kay sent at 7/30/2019  1:47 PM CDT -----  Contact: Daughter, June  Patient need to reschedule upcoming appointment please call back at [phone]

## 2019-07-31 ENCOUNTER — TELEPHONE (OUTPATIENT)
Dept: HOME HEALTH SERVICES | Facility: HOSPITAL | Age: 84
End: 2019-07-31
Payer: MEDICARE

## 2019-08-01 ENCOUNTER — TELEPHONE (OUTPATIENT)
Dept: HEMATOLOGY/ONCOLOGY | Facility: CLINIC | Age: 84
End: 2019-08-01

## 2019-08-01 NOTE — TELEPHONE ENCOUNTER
----- Message from Tammy Ayala sent at 8/1/2019  2:04 PM CDT -----  Contact: Kendra daughter  Type:  Sooner Apoointment Request    Caller is requesting a sooner appointment.  Caller declined first available appointment listed below.  Caller will not accept being placed on the waitlist and is requesting a message be sent to doctor.    Name of Caller:  June  When is the first available appointment?  08/29/19  Symptoms:  Pet scan follow up  Best Call Back Number:  [phone]  Additional Information:  Pls call June regarding rescheduling her mother's appt for 08/28/19. Pls call for further

## 2019-08-14 DIAGNOSIS — S72.142D INTERTROCHANTERIC FRACTURE OF LEFT HIP, CLOSED, WITH ROUTINE HEALING, SUBSEQUENT ENCOUNTER: Primary | ICD-10-CM

## 2019-08-16 ENCOUNTER — TELEPHONE (OUTPATIENT)
Dept: HEMATOLOGY/ONCOLOGY | Facility: CLINIC | Age: 84
End: 2019-08-16

## 2019-08-16 NOTE — TELEPHONE ENCOUNTER
----- Message from Leonor Smith sent at 8/16/2019 11:06 AM CDT -----  Contact: Kendra Mo (Daughter) [phone]  Kendra Chely (Daughter) [phone] requesting a call from the nurse concerning patient's scheduled pet scan.

## 2019-08-19 ENCOUNTER — TELEPHONE (OUTPATIENT)
Dept: HEMATOLOGY/ONCOLOGY | Facility: CLINIC | Age: 84
End: 2019-08-19

## 2019-08-19 ENCOUNTER — OFFICE VISIT (OUTPATIENT)
Dept: ORTHOPEDICS | Facility: CLINIC | Age: 84
End: 2019-08-19
Payer: MEDICARE

## 2019-08-19 ENCOUNTER — HOSPITAL ENCOUNTER (OUTPATIENT)
Dept: RADIOLOGY | Facility: HOSPITAL | Age: 84
Discharge: HOME | End: 2019-08-19
Attending: ORTHOPAEDIC SURGERY
Payer: MEDICARE

## 2019-08-19 VITALS
BODY MASS INDEX: 22.32 KG/M2 | HEART RATE: 94 BPM | RESPIRATION RATE: 13 BRPM | SYSTOLIC BLOOD PRESSURE: 110 MMHG | HEIGHT: 63 IN | DIASTOLIC BLOOD PRESSURE: 53 MMHG | WEIGHT: 126 LBS

## 2019-08-19 DIAGNOSIS — S72.142D INTERTROCHANTERIC FRACTURE OF LEFT HIP, CLOSED, WITH ROUTINE HEALING, SUBSEQUENT ENCOUNTER: Primary | ICD-10-CM

## 2019-08-19 DIAGNOSIS — S72.142D INTERTROCHANTERIC FRACTURE OF LEFT HIP, CLOSED, WITH ROUTINE HEALING, SUBSEQUENT ENCOUNTER: ICD-10-CM

## 2019-08-19 PROCEDURE — 99999 PR PBB SHADOW E&M-EST. PATIENT-LVL III: CPT | Mod: PBBFAC,,, | Performed by: ORTHOPAEDIC SURGERY

## 2019-08-19 PROCEDURE — 99024 POSTOP FOLLOW-UP VISIT: CPT | Mod: S$GLB,,, | Performed by: ORTHOPAEDIC SURGERY

## 2019-08-19 PROCEDURE — 73502 X-RAY EXAM HIP UNI 2-3 VIEWS: CPT | Mod: 26,LT,, | Performed by: RADIOLOGY

## 2019-08-19 PROCEDURE — 99024 PR POST-OP FOLLOW-UP VISIT: ICD-10-PCS | Mod: S$GLB,,, | Performed by: ORTHOPAEDIC SURGERY

## 2019-08-19 PROCEDURE — 99999 PR PBB SHADOW E&M-EST. PATIENT-LVL III: ICD-10-PCS | Mod: PBBFAC,,, | Performed by: ORTHOPAEDIC SURGERY

## 2019-08-19 PROCEDURE — 73502 XR HIP 2 VIEW LEFT: ICD-10-PCS | Mod: 26,LT,, | Performed by: RADIOLOGY

## 2019-08-19 PROCEDURE — 73502 X-RAY EXAM HIP UNI 2-3 VIEWS: CPT | Mod: TC,PN,LT

## 2019-08-19 NOTE — TELEPHONE ENCOUNTER
----- Message from Lynne Kramer sent at 8/19/2019 12:03 PM CDT -----  Contact: jaime Gardner   Type:  Patient Returning Call    Who Called: jaime Gardner  Who Left Message for Patient:  Nurse   Does the patient know what this is regarding?:    Best Call Back Number:  [phone]  Additional Information:

## 2019-08-19 NOTE — TELEPHONE ENCOUNTER
Spoke to pt daughter to clarify a phone call pt received. Pt daughter stated that [facility] called her mother in regards to pet scan.

## 2019-08-19 NOTE — PROGRESS NOTES
CC:  89-year-old female follows up status post intramedullary nailing of a left intertrochanteric femur fracture. Date of surgery was 05/31/2019.    LLE:  Grossly intact motor and sensory function distally.  Plus two distal pulses.  Incisions are all well healed.    X-rays were examined and personally reviewed by me.  There is an intramedullary olive and lag screw transfixing a healing intertrochanteric hip fracture of the left hip. Alignment is acceptable.  No evidence of loosening of the hardware.    Dx:  Status post intramedullary nailing of a left intertrochanteric femur fracture, stable    Plan:  Progress activity as tolerated.  Follow-up in 3 months with an x-ray.

## 2019-08-21 ENCOUNTER — HOSPITAL ENCOUNTER (OUTPATIENT)
Dept: RADIOLOGY | Facility: HOSPITAL | Age: 84
Discharge: HOME | End: 2019-08-21
Attending: INTERNAL MEDICINE
Payer: MEDICARE

## 2019-08-21 VITALS — WEIGHT: 123 LBS | BODY MASS INDEX: 21.79 KG/M2 | HEIGHT: 63 IN

## 2019-08-21 LAB — GLUCOSE SERPL-MCNC: 85 MG/DL (ref 70–110)

## 2019-08-21 PROCEDURE — A9552 F18 FDG: HCPCS | Mod: PO

## 2019-08-21 PROCEDURE — 78815 PET IMAGE W/CT SKULL-THIGH: CPT | Mod: TC

## 2019-08-22 ENCOUNTER — TELEPHONE (OUTPATIENT)
Dept: HOME HEALTH SERVICES | Facility: HOSPITAL | Age: 84
End: 2019-08-22

## 2019-08-29 ENCOUNTER — TELEPHONE (OUTPATIENT)
Dept: HEMATOLOGY/ONCOLOGY | Facility: CLINIC | Age: 84
End: 2019-08-29

## 2019-08-30 ENCOUNTER — TELEPHONE (OUTPATIENT)
Dept: HEMATOLOGY/ONCOLOGY | Facility: CLINIC | Age: 84
End: 2019-08-30

## 2019-08-30 NOTE — TELEPHONE ENCOUNTER
Spoke to pt daughter to inform her that per dr stoddard, she would prefer pt come in to discuss pet scan. Pt daughter confirmed apt date and time to see dr stoddard.

## 2019-09-06 ENCOUNTER — OFFICE VISIT (OUTPATIENT)
Dept: HEMATOLOGY/ONCOLOGY | Facility: CLINIC | Age: 84
End: 2019-09-06
Payer: MEDICARE

## 2019-09-06 VITALS
HEART RATE: 83 BPM | HEIGHT: 63 IN | BODY MASS INDEX: 23.09 KG/M2 | TEMPERATURE: 98 F | DIASTOLIC BLOOD PRESSURE: 57 MMHG | OXYGEN SATURATION: 97 % | RESPIRATION RATE: 20 BRPM | WEIGHT: 130.31 LBS | SYSTOLIC BLOOD PRESSURE: 116 MMHG

## 2019-09-06 DIAGNOSIS — C78.7 HEPATIC METASTASES: ICD-10-CM

## 2019-09-06 DIAGNOSIS — Z09 ONCOLOGY FOLLOW-UP ENCOUNTER: Primary | ICD-10-CM

## 2019-09-06 DIAGNOSIS — G60.9 IDIOPATHIC PERIPHERAL NEUROPATHY: ICD-10-CM

## 2019-09-06 DIAGNOSIS — K21.00 GASTROESOPHAGEAL REFLUX DISEASE WITH ESOPHAGITIS: ICD-10-CM

## 2019-09-06 DIAGNOSIS — C56.9 MALIGNANT NEOPLASM OF OVARY, UNSPECIFIED LATERALITY: ICD-10-CM

## 2019-09-06 DIAGNOSIS — S72.142A CLOSED INTERTROCHANTERIC FRACTURE OF HIP, LEFT, INITIAL ENCOUNTER: ICD-10-CM

## 2019-09-06 PROCEDURE — 99999 PR PBB SHADOW E&M-EST. PATIENT-LVL III: CPT | Mod: PBBFAC,,, | Performed by: INTERNAL MEDICINE

## 2019-09-06 PROCEDURE — 99499 RISK ADDL DX/OHS AUDIT: ICD-10-PCS | Mod: S$GLB,,, | Performed by: INTERNAL MEDICINE

## 2019-09-06 PROCEDURE — 99215 PR OFFICE/OUTPT VISIT, EST, LEVL V, 40-54 MIN: ICD-10-PCS | Mod: S$GLB,,, | Performed by: INTERNAL MEDICINE

## 2019-09-06 PROCEDURE — 1101F PR PT FALLS ASSESS DOC 0-1 FALLS W/OUT INJ PAST YR: ICD-10-PCS | Mod: CPTII,S$GLB,, | Performed by: INTERNAL MEDICINE

## 2019-09-06 PROCEDURE — 99215 OFFICE O/P EST HI 40 MIN: CPT | Mod: S$GLB,,, | Performed by: INTERNAL MEDICINE

## 2019-09-06 PROCEDURE — 1101F PT FALLS ASSESS-DOCD LE1/YR: CPT | Mod: CPTII,S$GLB,, | Performed by: INTERNAL MEDICINE

## 2019-09-06 PROCEDURE — 99999 PR PBB SHADOW E&M-EST. PATIENT-LVL III: ICD-10-PCS | Mod: PBBFAC,,, | Performed by: INTERNAL MEDICINE

## 2019-09-06 PROCEDURE — 99499 UNLISTED E&M SERVICE: CPT | Mod: S$GLB,,, | Performed by: INTERNAL MEDICINE

## 2019-09-06 NOTE — PROGRESS NOTES
CC I feel awful     Pau Cook is a 89 y.o.    This is an 89 year-old female referred from  here for adjuvant chemotherapy.    Patient is here with her    Saw  Dr. Rose May 6, recommends further chemo   records     Ovarian cancer    1/15/2019 Initial Diagnosis     Ovarian cancer          Cancer Staged     Cancer Staging  Ovarian cancer  Staging form: Ovary, Fallopian Tube, and Primary Peritoneal Carcinoma, AJCC 8th Edition  - Clinical stage from 2/28/2019: FIGO Stage IC, calculated as Stage IV (cT1c2, cNX, cM1) - Signed by Kenyatta Tejeda MD on 2/28/2019          Chemotherapy     Treatment Summary   Plan Name: OP GYN DOCETAXEL CARBOPLATIN (AUC) Q3W  Treatment Goal: Control  Status: Active  Start Date: 2/8/2019  End Date: 5/24/2019 (Planned)  Provider: Kenyatta Tejeda MD  Chemotherapy: CARBOplatin (PARAPLATIN) 230 mg in sodium chloride 0.9% 250 mL chemo infusion, 230 mg (100 % of original dose 230 mg), Intravenous, Clinic/HOD 1 time, 1 of 6 cycles  Dose modification: 230 mg (original dose 230 mg, Cycle 1, Reason: MD Discretion)    DOCEtaxel (TAXOTERE) 60 mg/m2 = 95 mg in sodium chloride 0.9% 250 mL chemo infusion, 60 mg/m2 = 95 mg (original dose 96 mg), Intravenous, Clinic/HOD 1 time, 1 of 6 cycles  Dose modification: 96 mg (original dose 96 mg, Cycle 1), 90 mg (original dose 96 mg, Cycle 1), 60 mg/m2 (original dose 96 mg, Cycle 1)              Tumor Markers     Patient's tumor was tested for the following markers: Ca 125 : 91                                                   4/19/2019 -  Chemotherapy     Treatment Summary   Plan Name: OP GYN DOCETAXEL CARBOPLATIN   Treatment Goal: Control  Status: Active  Start Date: 4/26/2019  End Date: 7/24/2019  Provider: Kenyatta Tejeda MD  Chemotherapy: CARBOplatin (PARAPLATIN) 240 mg in sodium chloride 0.9% 250 mL chemo infusion, 240 mg (100 % of original dose 242 mg), Intravenous, Clinic/HOD 1 time, 3 of 3 cycles  Dose modification:   (original dose 242 mg,  Cycle 1, Reason: Other (see comments))  DOCEtaxel (TAXOTERE) 96 mg in sodium chloride 0.9% 250 mL chemo infusion, 95 mg (100 % of original dose 96 mg), Intravenous, Clinic/HOD 1 time, 3 of 3 cycles  Dose modification: 96 mg (original dose 96 mg, Cycle 1)            She is having new skin lesions and losing her hair   Referred by :  Dr. 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  Pathology revealed high-grade undifferentiated carcinoma involving the left tube and ovary with extensive necrosis.  Malignancy was noted involving the ovarian surface as well as the fallopian tube surface.  The tumor was 7 cm in greatest dimension.  This was a grade 3 undifferentiated carcinoma.  No regional lymph nodes were submitted tumor cells were strongly positive for both CK7 and EMA.  Pathologically staged as a T1 see to an affects      SPECIMEN  1) Left tube and ovary  2) Uterus, cervix, right tube and ovary  FINAL PATHOLOGIC DIAGNOSIS  1. LEFT TUBE AND OVARY SHOWING HIGH-GRADE, UNDIFFERENTIATED CARCINOMA OF THE OVARY  INVOLVING MILAGROS TUBAL TISSUES WITH EXTENSIVE NECROSIS, SEE SYNOPTIC REPORT:  PROCEDURE: TOTAL HYSTERECTOMY AND BILATERAL SALPINGO-OOPHORECTOMY  SPECIMEN INTEGRITY: LEFT OVARY, CAPSULE RUPTURED  TUMOR SITE: LEFT OVARY  OVARIAN SURFACE INVOLVEMENT: PRESENT  FALLOPIAN TUBE SURFACE INVOLVEMENT: PRESENT  TUMOR SIZE: 7  CM  HISTOLOGIC TYPE: UNDIFFERENTIATED CARCINOMA  HISTOLOGIC GRADE: GRADE 3  OTHER TISSUE/ORGAN INVOLVEMENT: NOT IDENTIFIED  PERITONEAL/ASCITIC FLUID: NOT SUBMITTED/UNKNOWN  REGIONAL LYMPH NODES: NONE SUBMITTED  SPECIAL STUDIES: TUMOR CELLS ARE STRONGLY POSITIVE FOR BOTH CK7 AND EMA IMMUNOSTAINS.  S-100, WILMS TUMOR ANTIGEN AND CD10 ARE FOCALLY POSITIVE. CALRETININ, CD56 AND INHIBIN ARE  ALL NEGATIVE. THE CONTROLS STAIN APPROPRIATELY. THESE FINDINGS SUPPORT THE DIAGNOSIS  OF CARCINOMA RATHER THAN GRANULOSA CELL TUMOR.  TUMOR STAGE: pT1c2 Nx  2. UTERUS, CERVIX AND RIGHT ADNEXA WEIGHING 66 G SHOWING:  INACTIVE ENDOMETRIUM WITH A BENIGN ENDOMETRIAL POLYP  CERVIX WITH NABOTHIAN CYSTS  ATROPHIC RIGHT OVARY AND UNREMARKABLE RIGHT FALLOPIAN TUBE  Diagnosed by: Antwon Uribe M.D.      Patient here to re-evaluate her labs since last visit she feels well no fatigue no bleeding  Past Medical History:   Diagnosis Date    Allergy     Arthritis     Breast cyst     Foot pain     GERD (gastroesophageal reflux disease)     Joint pain     Ovarian cancer     Peripheral vascular disease 1/15/2018    Squamous cell carcinoma 04/2016    Right Lower Leg    MUNIR (stress urinary incontinence, female) 4/17/2017     Past Surgical History:   Procedure Laterality Date    APPENDECTOMY      COLON SURGERY      removed about 10 inch    COLONOSCOPY N/A 12/24/2018    [facility] ENDO    EXCISION, MASS, FINGER Right 7/9/2018    [facility]    hemorhids      HYSTERECTOMY      HYSTERECTOMY, TOTAL, ABDOMINAL, WITH BILATERAL SALPINGO-OOPHORECTOMY Bilateral 12/28/2018    [facility] at [facility]    LYSIS, ADHESIONS N/A 12/28/2018    [facility] at [facility] OR    ORIF, FRACTURE, FEMUR, INTERTROCHANTERIC Left 5/31/2019    Performed by Bacilio Lockwood II, MD at [facility] OR    XI ROBOTIC HYSTERECTOMY N/A 12/28/2018    Per[facility] by [facility]  at [facility] OR    XI ROBOTIC SALPINGO-OOPHORECTOMY Bilateral 12/28/2018    Performed by [facility] Rose MD at [facility] OR    She remains on Nexium to help with intermittent symptoms of gastritis and is on calcium with vitamin-D for overall bone health    Current Outpatient Medications:     acetaminophen (TYLENOL) 500 MG tablet, Take 2 tablets (1,000 mg total) by mouth every 8 (eight) hours., Disp: , Rfl: 0    calcium-vitamin D3 (CALCIUM 500 + D) 500 mg(1,250mg) -200 unit per tablet, Take 1 tablet by mouth once daily. , Disp: , Rfl:     carboxymethylcellulose (REFRESH PLUS) 0.5 % Dpet, 1 drop daily as needed., Disp: , Rfl:     cyanocobalamin (VITAMIN B-12) 1000 MCG tablet, Take 1,000 mcg by mouth once daily. , Disp: , Rfl:     esomeprazole (NEXIUM) 20 MG capsule, Take 1 capsule (20 mg total) by mouth before breakfast., Disp: 30 capsule, Rfl: 11    FLAXSEED ORAL, Take 650 mg by mouth once daily. , Disp: , Rfl:     ketotifen (ZADITOR) 0.025 % (0.035 %) ophthalmic solution, Place 1 drop into both eyes 2 (two) times daily as needed (Pt reports use approx once/week). , Disp: , Rfl:     MULTIVITAMIN W-MINERALS/LUTEIN (CENTRUM SILVER ORAL), Take 1 tablet by mouth once daily. , Disp: , Rfl:     pregabalin (LYRICA) 75 MG capsule, Take 1 capsule (75 mg total) by mouth 2 (two) times daily., Disp: 60 capsule, Rfl: 0    prochlorperazine (COMPAZINE) 10 MG tablet, Take 1 tablet (10 mg total) by mouth every 6 (six) hours as needed., Disp: 30 tablet, Rfl: 1     She remains on intermittent Compazine with Nexium to help with gastritis and nausea.  She reports the Zofran is not helping her much.  For bone pain she has taken over-the-counter Tylenol Arthritis  Review of patient's allergies indicates:   Allergen Reactions    Pcn [penicillins]      Can not remember reaction     Tetanus vaccines and toxoid Swelling     Localized swelling to injection site     Social History     Tobacco Use    Smoking status: Never Smoker     Smokeless tobacco: Never Used   Substance Use Topics    Alcohol use: No     Alcohol/week: 0.0 oz     Comment: rare    Drug use: No     Family History   Adopted: Yes   Problem Relation Age of Onset    Cancer Mother     Stroke Father     Breast cancer Sister     Cancer Sister     No Known Problems Daughter     No Known Problems Brother     No Known Problems Daughter     Diabetes Brother     Colon cancer Neg Hx     Ovarian cancer Neg Hx              CONSTITUTIONAL: No fevers, chills, night sweats, wt. Loss  Bladder cramping with urinary frequency   SKIN:  Positive rash  ENT: No headaches, head trauma, vision changes  LYMPH NODES: None noticed   CV: No chest pain, palpitations.   RESP: No dyspnea on exertion, cough, wheezing, no further rib pain  GI: No nausea, emesis,no heartburn and reflux unrelieved with medication worsening constipaton  : No dysuria, hematuria, urgency   HEME: No easy bruising, bleeding problems  PSYCHIATRIC: No depression, anxiety, psychosis  NEURO: No seizures, memory loss, no headaches  MSK:  Positive bone pain unrelieved with Tylenol over-the-counter  Pain where she broke her hip   Pain with ambulation     Wt Readings from Last 3 Encounters:   09/06/19 59.1 kg (130 lb 4.7 oz)   08/21/19 55.8 kg (123 lb)   08/19/19 57.2 kg (126 lb)     Temp Readings from Last 3 Encounters:   09/06/19 98 °F (36.7 °C)   07/19/19 98.4 °F (36.9 °C)   06/04/19 97.8 °F (36.6 °C)     BP Readings from Last 3 Encounters:   09/06/19 (!) 116/57   08/19/19 (!) 110/53   07/22/19 (!) 116/57     Pulse Readings from Last 3 Encounters:   09/06/19 83   08/19/19 94   07/22/19 85       Constitutional: oriented to person, place, and time.     HENT:   Head: Normocephalic and atraumatic.   Right Ear: External ear normal. Left Ear: External ear normal.   Nose: Nose normal.   Mouth/Throat: Oropharynx is clear and moist.   Eyes: Conjunctivae and EOM are normal. Pupils are equal,  Neck: Normal range of motion. Neck supple.    Cardiovascular: Normal rate, regular rhythm, normal heart sounds   No pmi  Pulmonary/Chest: Effort normal and breath sounds normal.  no fremitus   Abdominal: Soft. Bowel sounds are normal.  no mass.   Musculoskeletal: Normal range of motion.  + edema of ankles    Lymphadenopathy:  no cervical adenopathy.   Neurological: alert and oriented to person, place   Decreased ROM   Psychiatric: flat  affect   Vitals reviewed.      Lab Results   Component Value Date    WBC 4.43 07/17/2019    RBC 4.11 07/17/2019    HGB 12.0 07/17/2019    HCT 38.7 07/17/2019    MCV 94 07/17/2019    MCH 29.2 07/17/2019    MCHC 31.0 (L) 07/17/2019    RDW 13.6 07/17/2019     07/17/2019    MPV 8.9 (L) 07/17/2019    GRAN 2.9 07/17/2019    GRAN 64.8 07/17/2019    LYMPH 0.9 (L) 07/17/2019    LYMPH 21.2 07/17/2019    MONO 0.4 07/17/2019    MONO 7.9 07/17/2019    EOS 0.2 07/17/2019    BASO 0.03 07/17/2019    EOSINOPHIL 5.2 07/17/2019    BASOPHIL 0.7 07/17/2019       CMP  Sodium   Date Value Ref Range Status   07/17/2019 139 136 - 145 mmol/L Final   07/17/2019 139 136 - 145 mmol/L Final     Potassium   Date Value Ref Range Status   07/17/2019 4.4 3.5 - 5.1 mmol/L Final   07/17/2019 4.4 3.5 - 5.1 mmol/L Final     Chloride   Date Value Ref Range Status   07/17/2019 102 95 - 110 mmol/L Final   07/17/2019 102 95 - 110 mmol/L Final     CO2   Date Value Ref Range Status   07/17/2019 31 (H) 23 - 29 mmol/L Final   07/17/2019 31 (H) 23 - 29 mmol/L Final     Glucose   Date Value Ref Range Status   07/17/2019 127 (H) 70 - 110 mg/dL Final   07/17/2019 127 (H) 70 - 110 mg/dL Final     BUN, Bld   Date Value Ref Range Status   07/17/2019 12 8 - 23 mg/dL Final   07/17/2019 12 8 - 23 mg/dL Final     Creatinine   Date Value Ref Range Status   07/17/2019 0.8 0.5 - 1.4 mg/dL Final   07/17/2019 0.8 0.5 - 1.4 mg/dL Final     Calcium   Date Value Ref Range Status   07/17/2019 9.5 8.7 - 10.5 mg/dL Final   07/17/2019 9.5 8.7 - 10.5 mg/dL Final     Total Protein   Date  Value Ref Range Status   07/17/2019 7.0 6.0 - 8.4 g/dL Final   07/17/2019 7.0 6.0 - 8.4 g/dL Final     Albumin   Date Value Ref Range Status   07/17/2019 3.6 3.5 - 5.2 g/dL Final   07/17/2019 3.6 3.5 - 5.2 g/dL Final     Total Bilirubin   Date Value Ref Range Status   07/17/2019 0.4 0.1 - 1.0 mg/dL Final     Comment:     For infants and newborns, interpretation of results should be based  on gestational age, weight and in agreement with clinical  observations.  Premature Infant recommended reference ranges:  Up to 24 hours.............<8.0 mg/dL  Up to 48 hours............<12.0 mg/dL  3-5 days..................<15.0 mg/dL  6-29 days.................<15.0 mg/dL     07/17/2019 0.4 0.1 - 1.0 mg/dL Final     Comment:     For infants and newborns, interpretation of results should be based  on gestational age, weight and in agreement with clinical  observations.  Premature Infant recommended reference ranges:  Up to 24 hours.............<8.0 mg/dL  Up to 48 hours............<12.0 mg/dL  3-5 days..................<15.0 mg/dL  6-29 days.................<15.0 mg/dL       Alkaline Phosphatase   Date Value Ref Range Status   07/17/2019 139 (H) 55 - 135 U/L Final   07/17/2019 139 (H) 55 - 135 U/L Final     AST   Date Value Ref Range Status   07/17/2019 34 10 - 40 U/L Final   07/17/2019 34 10 - 40 U/L Final     ALT   Date Value Ref Range Status   07/17/2019 22 10 - 44 U/L Final   07/17/2019 22 10 - 44 U/L Final     Anion Gap   Date Value Ref Range Status   07/17/2019 6 (L) 8 - 16 mmol/L Final   07/17/2019 6 (L) 8 - 16 mmol/L Final     eGFR if    Date Value Ref Range Status   07/17/2019 >60 >60 mL/min/1.73 m^2 Final   07/17/2019 >60 >60 mL/min/1.73 m^2 Final     eGFR if non    Date Value Ref Range Status   07/17/2019 >60 >60 mL/min/1.73 m^2 Final     Comment:     Calculation used to obtain the estimated glomerular filtration  rate (eGFR) is the CKD-EPI equation.      07/17/2019 >60 >60 mL/min/1.73  m^2 Final     Comment:     Calculation used to obtain the estimated glomerular filtration  rate (eGFR) is the CKD-EPI equation.        Pet ct reviewed and discussed for over twenty minutes  Ref Range & Gasof6a ago  CA 1250 - 30 U/mL91 Abnormally high   Ref Range & Euusl4u ago  CEA0.0 - 5.0 ng/mL7.6 Abnormally high      Oncology follow-up encounter    Idiopathic peripheral neuropathy    Malignant neoplasm of ovary, unspecified laterality    Gastroesophageal reflux disease with esophagitis    Closed intertrochanteric fracture of hip, left, initial encounter    Hepatic metastases         Lung and liver metastatic disease from GYN malignancy   Cleared for chemo   Again reiterated stage and incurable disease   Pt also needs to cont therapy   Bone density should be done soon   Lengthy discussion over new chemo doxil for over 60  minutes with more than 50% counseling  I discussed the available treatment option(s) in accordance with the latest NCCN Guidelines, their overall age and their co-morbidities. I went over the risks and benefits of the chemotherapy with regard to their particular cancer type, their cancer stage, their age, and their co-morbidities. I provided literature on the chemotherapy regimen and discussed the chemotherapy side-effect profiles of the drug(s). I discussed the importance of compliance with obtaining and monitoring weekly lab work, and went over the potential hematopathology issues and risks with anemia, leucopenia and thrombocytopenia that can occur with chemotherapy. I discussed the potential risks of liver and kidney damage, which could be permanent and could necessitate dialysis long-term if kidney failure developed. I discussed the emetic and/or diarrheal potential of the regimen and the potential need for use of antiemetic and anti-diarrheal medications. I discussed the risk for development of anaphylactic shock, bronchospasm, dysrhythmia, and respiratory/cardiovascular failure. I discussed  the potential risks for development of alopecia, cold sensory issues, ringing in ears, vertigo and neuropathy, all of which could end up being chronic and life-long. I discussed the risks of hand-foot syndrome and rashes, and development of other autoimmune mediated processes such as pneumonitis and colitis which could be life threatening. I discussed the risks of the potential development of leukemia and/or lymphoma from use of certain chemotherapy agents. I discussed the need for neutropenic precautions, basic hygiene/sanitation behaviors and dietary restrictions.     The patient's consent has been obtained to proceed with the chemotherapy.The patient will be referred to Chemotherapy School /[facility] for training and education on chemotherapy, use of antiemetics and/or anti-diarrheals, use of NSAID's, potential chemotherapy side-effects, and any specific recommendations and precautions with the particular chemotherapy agents.       I answered all of the patient's (and family's, if applicable) questions to the best of my ability and to their complete satisfaction. The patient acknowledged full understanding of the risks, recommendations and plan(s).      Sincerely,  Kenyatta Tejeda MD [facility]          Advance Care Planning     Living Will  During this visit, I engaged the patient in the advance care planning process.  The patient and I reviewed the role for advance directives and their purpose in directing future healthcare if the patient's unable to speak for him/herself.  At this point in time, the patient does have full decision-making capacity.  We discussed different extreme health states that she could experience, and reviewed what kind of medical care she would want in those situations.  The patient communicated that if she were comatose and had little chance of a meaningful recovery, she would not want machines/life-sustaining treatments used.  The patient has completed a living will to reflect these  preferences.  The patient  Has  already designated a healthcare power of  to make decisions on her behalf.   I spent a total of  10  minutes engaging the patient in this advance care planning discussion.

## 2019-09-10 ENCOUNTER — TELEPHONE (OUTPATIENT)
Dept: FAMILY MEDICINE | Facility: CLINIC | Age: 84
End: 2019-09-10

## 2019-09-10 NOTE — TELEPHONE ENCOUNTER
----- Message from Francisco Smith sent at 9/10/2019  9:55 AM CDT -----  Contact: patient daughter june ph#[phone]  patient daughter june ph#[phone], requesting a prescription for econazole nitrate for patient toes.      Patient will be using   [id number] - [place] - [address]  [address] [place] [address]  Phone: [phone] Fax: [phone]    Thanks!

## 2019-09-12 ENCOUNTER — TELEPHONE (OUTPATIENT)
Dept: HEMATOLOGY/ONCOLOGY | Facility: CLINIC | Age: 84
End: 2019-09-12

## 2019-09-12 NOTE — TELEPHONE ENCOUNTER
----- Message from Wellington Whitehead sent at 9/12/2019  2:45 PM CDT -----  Contact: Kendra Mo (Daughter)  Type: Needs Medical Advice    Who Called:  Kendra Mo (Daughter)  Best Call Back Number: [phone]  Additional Information: Patient was instructed during appointment on 9/6/19 that two new medication would be sent for approval to [employer] due to cancer returning and insurance company has not received any documentation. Please advise of medication and approval status

## 2019-09-13 RX ORDER — EPINEPHRINE 0.3 MG/.3ML
0.3 INJECTION SUBCUTANEOUS ONCE AS NEEDED
Status: CANCELLED | OUTPATIENT
Start: 2019-09-13

## 2019-09-13 RX ORDER — SODIUM CHLORIDE 0.9 % (FLUSH) 0.9 %
10 SYRINGE (ML) INJECTION
Status: CANCELLED | OUTPATIENT
Start: 2019-09-13

## 2019-09-13 RX ORDER — HEPARIN 100 UNIT/ML
500 SYRINGE INTRAVENOUS
Status: CANCELLED | OUTPATIENT
Start: 2019-10-14

## 2019-09-13 RX ORDER — DIPHENHYDRAMINE HYDROCHLORIDE 50 MG/ML
50 INJECTION INTRAMUSCULAR; INTRAVENOUS ONCE AS NEEDED
Status: CANCELLED | OUTPATIENT
Start: 2019-09-13

## 2019-09-13 RX ORDER — SODIUM CHLORIDE 0.9 % (FLUSH) 0.9 %
10 SYRINGE (ML) INJECTION
Status: CANCELLED | OUTPATIENT
Start: 2019-10-14

## 2019-09-13 RX ORDER — HEPARIN 100 UNIT/ML
500 SYRINGE INTRAVENOUS
Status: CANCELLED | OUTPATIENT
Start: 2019-09-13

## 2019-09-13 RX ORDER — ONDANSETRON 2 MG/ML
8 INJECTION INTRAMUSCULAR; INTRAVENOUS
Status: CANCELLED | OUTPATIENT
Start: 2019-09-13

## 2019-09-13 NOTE — PLAN OF CARE
DISCONTINUE OFF PATHWAY REGIMEN - Ovarian            Docetaxel (Taxotere(R))       Carboplatin (Paraplatin(R))           Additional Orders: Premedicate with dexamethasone 8 mg PO bid for three   days beginning 1 day prior to therapy. Ref: Cecilio P, Chacorta G, Markus A, et al.    Phase III Randomized Trial of Docetaxel-Carboplatin Versus   Paclitaxel-Carboplatin as First-line Chemotherapy for Ovarian Carcinoma.  J Dariana   Cancer [place] 2004; 96(15): 6620-1410.    **Always confirm dose/schedule in your pharmacy ordering system**    REASON: Disease Progression  PRIOR TREATMENT: Off Pathway: Carboplatin + Docetaxel (5/75) every 21 days  TREATMENT RESPONSE: Progressive Disease (PD)    START ON PATHWAY REGIMEN - Ovarian    [id number]        Liposomal doxorubicin (Doxil(R))       Bevacizumab-xxxx     **Always confirm dose/schedule in your pharmacy ordering system**    Patient Characteristics:  Recurrent or Progressive Disease, Second Line Therapy, Platinum Resistant or < 6   Months Since Last Therapy  Therapeutic Status: Recurrent or Progressive Disease  BRCA Mutation Status: Did Not Order Test  Line of Therapy: Second Line  Intent of Therapy:  Non-Curative / Palliative Intent, Discussed with Patient

## 2019-09-16 ENCOUNTER — TELEPHONE (OUTPATIENT)
Dept: HEMATOLOGY/ONCOLOGY | Facility: CLINIC | Age: 84
End: 2019-09-16

## 2019-09-16 NOTE — TELEPHONE ENCOUNTER
Spoke with Kendra regarding the chemo authorization.  She has spoke with [employer] and the chemo is now approved.  Patient will be scheduled. She does not need port at this time.     ----- Message from Jeremie HEREDIA Frisard sent at 9/16/2019 10:56 AM CDT -----  Contact: Dtr/Kendra Gardner called in and stated she spoke to office on Thursday about patients new Chemo medication and the fact the [employer] stated they had not received anything on this?  Kendra would like a call back at [phone]

## 2019-09-17 ENCOUNTER — TELEPHONE (OUTPATIENT)
Dept: HEMATOLOGY/ONCOLOGY | Facility: CLINIC | Age: 84
End: 2019-09-17

## 2019-09-17 NOTE — TELEPHONE ENCOUNTER
----- Message from Lynne Kramer sent at 9/17/2019  1:15 PM CDT -----  Contact: jaime Gardner   Type:  Patient Returning Call    Who Called:  Jaime Gardner   Who Left Message for Patient:  Nurse  Does the patient know what this is regarding?:    Best Call Back Number:  [phone] (home)     Additional Information:

## 2019-09-19 ENCOUNTER — TELEPHONE (OUTPATIENT)
Dept: HEMATOLOGY/ONCOLOGY | Facility: CLINIC | Age: 84
End: 2019-09-19

## 2019-09-19 DIAGNOSIS — C56.9 MALIGNANT NEOPLASM OF OVARY, UNSPECIFIED LATERALITY: Primary | ICD-10-CM

## 2019-09-20 ENCOUNTER — CLINICAL SUPPORT (OUTPATIENT)
Dept: HEMATOLOGY/ONCOLOGY | Facility: CLINIC | Age: 84
End: 2019-09-20
Payer: MEDICARE

## 2019-09-20 DIAGNOSIS — C56.9 MALIGNANT NEOPLASM OF OVARY, UNSPECIFIED LATERALITY: Primary | ICD-10-CM

## 2019-09-20 NOTE — PROGRESS NOTES
Met with patient to update her NCCN distress screening; she indicated a rating of 0.  Patient denied needing any supportive services at this time.  She has my contact information in the event she needs assistance in the future.

## 2019-09-20 NOTE — PROGRESS NOTES
CHEMO SCHOOL- NUTRITION    Ms. Cook is a 89 yr old female with ovarian cancer. She will be receiving Avastin/Doxil. I met with pt & her daughter for chemo school. CW: 130#. Pt reported only concern recently has been constipation, but Dulcolax alleviates this issue. Pt reported no unintentional wt loss at this time.     Plan:   1. Reviewed chemo school packet & provided copy to pt.   2. Discussed importance of maintaining wt & staying hydrated.   3. Reviewed food safety guidelines recommended during treatment.   4. Provided RD contact info & encouraged pt to call with any questions/concerns.   5. Will follow-up prn.

## 2019-09-20 NOTE — PROGRESS NOTES
Pau Cook  [id number]    [facility]    TITLE: PLAN OF CARE FOR THE CHEMOTHERAPY PATIENT / TEACHING PROTOCOL    PURPOSE: To involve the patient / significant other in the plan of care and to provide teaching to the significant other & patient receiving chemotherapy.    LEVEL: Independent.    CONTENT: The Plan of Care for the chemotherapy patient is individualized and appropriate to the patients needs, strengths, limitations, & goals.  Education includes information regarding chemotherapy side effects, the treatment itself, and self-care  Activities.    GOAL / OUTCOME STANDARDS    PHYSIOLOGIC: The client will remain free or experience minimal side effects or toxicities throughout the chemotherapy treatment period.     PSYCHOLOGIC: The client/significant others will demonstrate positive coping mechanisms in relation to chemotherapy and its side effects.      COGINITIVE: The client/significant others will verbalize understanding of self-care measure to avoid/minimize side effects of the chemotherapy regime.    EVALUATION / COMMENT KEY:    V = Audiovisual/Video  S = Successfully meets outcome  N = Needs further instruction  NA = Not applicable to the patient  P = Previous knowledge  U = Unable to comprehend  * = See progress notes          PLAN OF CARE  INFORMATION TO BE DELIVERED / NURSING INTERVENTIONS DATE EVALUATION   1. Assessment of client/caregiver,         knowledge of cancer diagnosis,         and chemotherapy as a treatment. 1a. Evaluate patient/caregiver learning ability    b. Plan teaching sessions with patient/caregiver according to needs and present anxiety level/ability to learn.    c. Provide Chemotherapy Education Packet,        Mouth Care Protocol,         Specific Patient Education Sheets. 09/20/2019 S   2. Individual chemotherapy treatment         plan. 2a. Review of Chemotherapy Education handout from [employer]            09/20/2019   S   3. Knowledge Deficit  & Self-Management of general side effects common to all chemotherapy:  a. Nausea/Vomiting  b.   Diarrhea  c. Mouth Care  d. Dental care  e. Constipation  f. Hair Loss  g. Potential for infection  h. Fatigue   3a. Reinforce that the majority of side effects from chemotherapy are reversible and are  controlled both in the hospital and at home        (blood counts recover, hair grows back).   b.  Refer to the following for reinforcement of         information post-treatment:  1. Mouth Care Protocol.  2. Bowel Protocol for constipation or diarrhea.  3.  Drug Specific Chemotherapy Information Sheets for each medication patient receiving.    09/20/2019     S     PLAN OF CARE  INFORMATION TO BE DELIVERED / NURSING INTERVENTIONS DATE EVALUATION   h. Potential for bleeding         i. Potential anemia/fatigue         j. Potential sunburn         k. Birth control measures  l. Safety measures post treatment 4.  Chemotherapy Home Care Instruction  and Safety Information Sheet.  A. patient/caregivers to thoroughly cook shellfish (shrimp, crab, etc) to decrease the chance of infection.    B.  Use sunscreen and protective clothing while in the sun.   09/20/2019      4. Knowledge deficit & Self Management of Drug Specific  Side Effects.    a. BLADDER EFFECTS        (Hemorrhagic Cystitis)                  Preventable with adequate hydration; occurs 2-3 days or more post treatment.   1.  Instruct patient to:  a.   Void at least every 2 hours; increase intake.  b.   DO NOT hold urine; go when urge is felt.  c.    Empty bladder at bedtime and on         awakening.  d.   Observe for color changes (red to tea           colored), amount and frequency changes.  e.   Notify oncologist of any abnormalities           in urine or voiding or if you cannot               drink adequate fluids.   09/20/2019   S   b.   CHANGES IN URINE        COLOR:      1.   Instruct patient:  a.   Most evident in first 2-3 voidings after            administration.  b. Lasts less than 24 hours.  c. If urine is discolored 2 or more days post- treatment, notify oncologist.      09/20/2019 S   c.    KIDNEY EFFECTS           (Nephrotoxicity)   1.  Instruct patient to:  a.   Drink 8-16 glasses of fluid/day the day   pre-treatment and 3-4 days post-treatment to maintain hydration; the best way to minimize kidney problems.  b.   Notify oncologist immediately if unable to drink fluids or if changes are noted in urinary elimination.     09/20/2019   S   a. PULMONARY TOXICITY    1. Instruct patient to report symptoms such as shortness of breath, chest pain, shallow breathing, or chest wall discomfort to physician.  2. Reinforce preventative measures used by the health care team.  a. Baseline and periodic PFT and chest x-ray.   09/20/2019   S     PLAN OF CARE INFORMATION TO BE DELIVERED / NURSING INTERVENTIONS DATE EVALUATION   b. NERVE & MUSCLE EFFECTS (neurotoxocity; neuropathy, possible visual/hearing changes)        3. Instruct patient to:    a. Report numbness or tingling of the hands/feet, loss of fine motor movement (buttoning shirt, tying shoelaces), or gait changes to your oncologist.  b. If numbness/tingling are present:  1. protect feet with shoes at all times.  2. Use gloves for washing dishes/gardening & potholders in kitchen.       09/20/2019   S   c. CARDIOTOXICITY  Decreased effectiveness of             cardiac function. Effective are                  cumulative and irreversible.                                    CARDIAC ARRYTHMIAS              4   Instruct:  a. Heart function may be tested before treatment and perdiocally during treatment.  b. Notify oncologist of irregular pulse, palpitations, shortness of breath, or swelling in lower extremities/feet.          Taxol and Taxotere can cause arrhythmias on infusion that resolve once infusion discontinued. Instruct nurse if any irregularity felt.    09/20/2019   S   d. EXTRAVASTION  Occurs when vesicants  leak outside of vein and cause damage to the skin and underlying tissues.   1. Reinforce preventive measures used to avoid complications.  a. Fresh IV site or central line monitored continuously with vesicant IVP.  b. Continuous infusion via central line site and blood return monitored periodically around the clock.  2. Instruct to:  a. Notify nurse of any discomfort, burning, stinging, etc. at IV site during chemotherapy administration.  b. Notify oncologist of any redness, pain, or swelling at IV site after discharge from hospital.   09/20/2019   S   e. HYPERSENSITIVITY can happen with any medication.   1. Instruct patient:  a. Nurse is with them during the initial part of treatment and will be close by to monitor.  b. Pre-medication ordered by the oncologist must be taken on time. If doses are missed, treatment will need to be re-scheduled.  c. Skin redness, itching, or hives appearing after discharge should be reported to oncologist. 09/20/2019   S       PLAN OF CARE INFORMATION TO BE DELIVERED / NURSING INTERVENTIONS DATE EVALUATION   f. FLU-LIKE SYNDROME      1. Instruct patient symptoms are hard to prevent and may include fever, shaking chills, muscle and body aches.  a. Taking prescribed medications from physician if needed.  b. Adequate fluids are important.    2. Reinforce the need to call if temperature is         elevated to 100.4 or more  09/20/2019   S   g. HAND-FOOT SYNDROME  causes painful, symmetric swelling and redness of palms and soles                  5. Instruct patient to report any numbness or tingling in the hands or feet.  6. Explain prevention techniques, such as     a. Use heavy moisturizers to lessen skin dryness and itching, but to avoid if skin is cracked or broken  b. Bathe in tepid water, use non-perfumed soap, and wash gently. Baths with oatmeal or diluted baking soda may be soothing.  c. Avoid tight fitting shoes and repetitive actions, such as rubbing hands or applying pressure to  hands/feet.  7. Review measures to take should syndrome occur:  a. Cold compresses and elevation for          edema  b. Pain medications and other measures as ordered by oncologist.   4.   Syndrome resolves few weeks after therapy. 09/20/2019   S   5. DISCHARGE PLANNING /        EDUCATION 1.    Explain importance of compliance with follow- up  tests (CBC, CMP).  2.    Verify patient/caregiver know:  a.    Oncologists office phone number.  b.    Dates of follow-up appointments.  c.    Prescriptions given for nausea  3.   Review side effects to monitor and notify          oncologist about.  4.   Reinforce the need for patient and caregivers to:  a.    Review information given.  b.    Call oncologists office with questions          or symptoms  5.   Provide [facility] make referrals if needed for financial or .   09/20/2019   S     PROGRESS NOTES:       I met with the patient and daughter today for chemotherapy education. she will be starting treatment with Avastin, Doxil . We discussed the mechanism of action, potential side effects of this treatment as well as ways she can manage them at home. Some of these side effects include but or not limited to fever, nausea, vomiting, decreased appetite, fatigue, weakness, cytopenias, myalgia/arthralgia, constipation, diarrhea, bleeding, headache, shortness of breath, nail changes, taste change, hair thinning/loss, mood disturbances, or edema. We also discussed dietary modifications she should make although this will be discussed in more detail with the dietician. she was provided with  a copy of all of the information we discussed today as well as our contact information. she will be provided a schedule on his first day of treatment. We will obtain labs on a weekly basis and the patient will follow-up with the physician for toxicity monitoring throughout treatment. All questions were answered and an informed consent was obtained. she was  reminded to certainly contact us sooner if needed.  Attached to the patients folder and discussed with the patient the 24 hour/ 7 days a week after hours telephone number for the physician.  Patient notified to call anytime 24/7 because their is a physician on call for any problems that may arise.  Patient also notified to report to [facility] if they can not get in touch with a physician after hours.  Discussed the five wishes booklet with the patient and their family.

## 2019-09-23 ENCOUNTER — TELEPHONE (OUTPATIENT)
Dept: HEMATOLOGY/ONCOLOGY | Facility: CLINIC | Age: 84
End: 2019-09-23

## 2019-09-23 ENCOUNTER — LAB VISIT (OUTPATIENT)
Dept: LAB | Facility: HOSPITAL | Age: 84
End: 2019-09-23
Attending: INTERNAL MEDICINE
Payer: MEDICARE

## 2019-09-23 DIAGNOSIS — C56.9 MALIGNANT NEOPLASM OF OVARY: Primary | ICD-10-CM

## 2019-09-23 LAB
ALBUMIN SERPL BCP-MCNC: 4 G/DL (ref 3.5–5.2)
ALP SERPL-CCNC: 113 U/L (ref 55–135)
ALT SERPL W/O P-5'-P-CCNC: 18 U/L (ref 10–44)
ANION GAP SERPL CALC-SCNC: 8 MMOL/L (ref 8–16)
AST SERPL-CCNC: 28 U/L (ref 10–40)
BACTERIA #/AREA URNS HPF: ABNORMAL /HPF
BASOPHILS # BLD AUTO: 0.04 K/UL (ref 0–0.2)
BASOPHILS NFR BLD: 0.8 % (ref 0–1.9)
BILIRUB SERPL-MCNC: 0.5 MG/DL (ref 0.1–1)
BILIRUB UR QL STRIP: NEGATIVE
BUN SERPL-MCNC: 15 MG/DL (ref 8–23)
CALCIUM SERPL-MCNC: 9.4 MG/DL (ref 8.7–10.5)
CHLORIDE SERPL-SCNC: 102 MMOL/L (ref 95–110)
CLARITY UR: ABNORMAL
CO2 SERPL-SCNC: 29 MMOL/L (ref 23–29)
COLOR UR: YELLOW
CREAT SERPL-MCNC: 0.7 MG/DL (ref 0.5–1.4)
DIFFERENTIAL METHOD: ABNORMAL
EOSINOPHIL # BLD AUTO: 0.1 K/UL (ref 0–0.5)
EOSINOPHIL NFR BLD: 2.5 % (ref 0–8)
ERYTHROCYTE [DISTWIDTH] IN BLOOD BY AUTOMATED COUNT: 13.3 % (ref 11.5–14.5)
EST. GFR  (AFRICAN AMERICAN): >60 ML/MIN/1.73 M^2
EST. GFR  (NON AFRICAN AMERICAN): >60 ML/MIN/1.73 M^2
GLUCOSE SERPL-MCNC: 89 MG/DL (ref 70–110)
GLUCOSE UR QL STRIP: NEGATIVE
HCT VFR BLD AUTO: 39.3 % (ref 37–48.5)
HGB BLD-MCNC: 12.6 G/DL (ref 12–16)
HGB UR QL STRIP: ABNORMAL
IMM GRANULOCYTES # BLD AUTO: 0.01 K/UL (ref 0–0.04)
KETONES UR QL STRIP: NEGATIVE
LEUKOCYTE ESTERASE UR QL STRIP: ABNORMAL
LYMPHOCYTES # BLD AUTO: 1.4 K/UL (ref 1–4.8)
LYMPHOCYTES NFR BLD: 29.4 % (ref 18–48)
MCH RBC QN AUTO: 29.3 PG (ref 27–31)
MCHC RBC AUTO-ENTMCNC: 32.1 G/DL (ref 32–36)
MCV RBC AUTO: 91 FL (ref 82–98)
MICROSCOPIC COMMENT: ABNORMAL
MONOCYTES # BLD AUTO: 0.4 K/UL (ref 0.3–1)
MONOCYTES NFR BLD: 7.2 % (ref 4–15)
NEUTROPHILS # BLD AUTO: 2.9 K/UL (ref 1.8–7.7)
NEUTROPHILS NFR BLD: 59.9 % (ref 38–73)
NITRITE UR QL STRIP: POSITIVE
NRBC BLD-RTO: 0 /100 WBC
PH UR STRIP: 6 [PH] (ref 5–8)
PLATELET # BLD AUTO: 205 K/UL (ref 150–350)
PMV BLD AUTO: 8.8 FL (ref 9.2–12.9)
POTASSIUM SERPL-SCNC: 4.2 MMOL/L (ref 3.5–5.1)
PROT SERPL-MCNC: 7.2 G/DL (ref 6–8.4)
PROT UR QL STRIP: NEGATIVE
RBC # BLD AUTO: 4.3 M/UL (ref 4–5.4)
RBC #/AREA URNS HPF: 0 /HPF (ref 0–4)
SODIUM SERPL-SCNC: 139 MMOL/L (ref 136–145)
SP GR UR STRIP: 1.01 (ref 1–1.03)
SQUAMOUS #/AREA URNS HPF: 2 /HPF
URN SPEC COLLECT METH UR: ABNORMAL
UROBILINOGEN UR STRIP-ACNC: NEGATIVE EU/DL
WBC # BLD AUTO: 4.83 K/UL (ref 3.9–12.7)
WBC #/AREA URNS HPF: 14 /HPF (ref 0–5)

## 2019-09-23 PROCEDURE — 86304 IMMUNOASSAY TUMOR CA 125: CPT

## 2019-09-23 PROCEDURE — 81000 URINALYSIS NONAUTO W/SCOPE: CPT

## 2019-09-23 PROCEDURE — 80053 COMPREHEN METABOLIC PANEL: CPT

## 2019-09-23 PROCEDURE — 36415 COLL VENOUS BLD VENIPUNCTURE: CPT

## 2019-09-23 PROCEDURE — 85025 COMPLETE CBC W/AUTO DIFF WBC: CPT

## 2019-09-23 NOTE — TELEPHONE ENCOUNTER
Message left instructing patient to call [phone] to schedule appointment to get questions asked to get her Chemo therapy answered.

## 2019-09-23 NOTE — TELEPHONE ENCOUNTER
----- Message from Radha Oswald sent at 12/18/2017  3:41 PM CST -----  Contact: Self  [phone]  Doctor appointment and lab have been scheduled.  Please link lab orders to the lab appointment.  Date of doctor appointment:  4/19  Physical or EP:  Epp  Date of lab appointment:  4/12  Comments:       
Orders linked  
16

## 2019-09-24 LAB — CANCER AG125 SERPL-ACNC: 13 U/ML (ref 0–30)

## 2019-09-26 DIAGNOSIS — N39.0 URINARY TRACT INFECTION WITHOUT HEMATURIA, SITE UNSPECIFIED: Primary | ICD-10-CM

## 2019-09-26 RX ORDER — SULFAMETHOXAZOLE AND TRIMETHOPRIM 400; 80 MG/1; MG/1
1 TABLET ORAL 2 TIMES DAILY
Qty: 20 TABLET | Refills: 0 | Status: SHIPPED | OUTPATIENT
Start: 2019-09-26 | End: 2019-10-06

## 2019-09-27 ENCOUNTER — TELEPHONE (OUTPATIENT)
Dept: HEMATOLOGY/ONCOLOGY | Facility: CLINIC | Age: 84
End: 2019-09-27

## 2019-09-27 ENCOUNTER — OFFICE VISIT (OUTPATIENT)
Dept: HEMATOLOGY/ONCOLOGY | Facility: CLINIC | Age: 84
End: 2019-09-27
Payer: MEDICARE

## 2019-09-27 VITALS
DIASTOLIC BLOOD PRESSURE: 60 MMHG | BODY MASS INDEX: 23.25 KG/M2 | HEIGHT: 63 IN | WEIGHT: 131.19 LBS | RESPIRATION RATE: 12 BRPM | OXYGEN SATURATION: 98 % | HEART RATE: 87 BPM | TEMPERATURE: 98 F | SYSTOLIC BLOOD PRESSURE: 133 MMHG

## 2019-09-27 DIAGNOSIS — Z09 CHEMOTHERAPY FOLLOW-UP EXAMINATION: Primary | ICD-10-CM

## 2019-09-27 DIAGNOSIS — C78.00 MALIGNANT NEOPLASM METASTATIC TO LUNG, UNSPECIFIED LATERALITY: ICD-10-CM

## 2019-09-27 DIAGNOSIS — N39.0 URINARY TRACT INFECTION WITHOUT HEMATURIA, SITE UNSPECIFIED: ICD-10-CM

## 2019-09-27 DIAGNOSIS — G60.9 IDIOPATHIC PERIPHERAL NEUROPATHY: ICD-10-CM

## 2019-09-27 DIAGNOSIS — C78.7 HEPATIC METASTASES: ICD-10-CM

## 2019-09-27 PROCEDURE — 99214 OFFICE O/P EST MOD 30 MIN: CPT | Mod: S$GLB,,, | Performed by: INTERNAL MEDICINE

## 2019-09-27 PROCEDURE — 99214 PR OFFICE/OUTPT VISIT, EST, LEVL IV, 30-39 MIN: ICD-10-PCS | Mod: S$GLB,,, | Performed by: INTERNAL MEDICINE

## 2019-09-27 PROCEDURE — 99999 PR PBB SHADOW E&M-EST. PATIENT-LVL III: ICD-10-PCS | Mod: PBBFAC,,, | Performed by: INTERNAL MEDICINE

## 2019-09-27 PROCEDURE — 99499 UNLISTED E&M SERVICE: CPT | Mod: S$GLB,,, | Performed by: INTERNAL MEDICINE

## 2019-09-27 PROCEDURE — 99499 RISK ADDL DX/OHS AUDIT: ICD-10-PCS | Mod: S$GLB,,, | Performed by: INTERNAL MEDICINE

## 2019-09-27 PROCEDURE — 99999 PR PBB SHADOW E&M-EST. PATIENT-LVL III: CPT | Mod: PBBFAC,,, | Performed by: INTERNAL MEDICINE

## 2019-09-27 RX ORDER — DEXAMETHASONE 4 MG/1
TABLET ORAL
COMMUNITY
Start: 2019-09-24 | End: 2019-09-27

## 2019-09-27 RX ORDER — IBUPROFEN 600 MG/1
600 TABLET ORAL DAILY PRN
COMMUNITY
End: 2019-09-27

## 2019-09-27 NOTE — TELEPHONE ENCOUNTER
----- Message from Kenyatta Tejeda MD sent at 9/26/2019  4:00 PM CDT -----  Can we call her to start the antibiotics I called in for her uti

## 2019-09-27 NOTE — TELEPHONE ENCOUNTER
----- Message from Leonor Smith sent at 9/27/2019  8:28 AM CDT -----  Contact: [phone]  Patient is returning nurse's phone call.  Please call patient back at [phone].

## 2019-09-27 NOTE — PROGRESS NOTES
CC  My legs are swelling     Pau Cook is a 89 y.o.    This is an 89 year-old female referred from  here for adjuvant chemotherapy.    Patient is here with her    Saw  Dr. Rose May 6, recommends further chemo   records     Ovarian cancer    1/15/2019 Initial Diagnosis     Ovarian cancer       Cancer Staged     Cancer Staging  Ovarian cancer  Staging form: Ovary, Fallopian Tube, and Primary Peritoneal Carcinoma, AJCC 8th Edition  - Clinical stage from 2/28/2019: FIGO Stage IC, calculated as Stage IV (cT1c2, cNX, cM1) - Signed by Kenyatta Tejeda MD on 2/28/2019       Chemotherapy     Treatment Summary   Plan Name: OP GYN DOCETAXEL CARBOPLATIN (AUC) Q3W  Treatment Goal: Control  Status: Active  Start Date: 2/8/2019  End Date: 5/24/2019 (Planned)  Provider: Kenyatta Tejeda MD  Chemotherapy: CARBOplatin (PARAPLATIN) 230 mg in sodium chloride 0.9% 250 mL chemo infusion, 230 mg (100 % of original dose 230 mg), Intravenous, Clinic/HOD 1 time, 1 of 6 cycles  Dose modification: 230 mg (original dose 230 mg, Cycle 1, Reason: MD Discretion)    DOCEtaxel (TAXOTERE) 60 mg/m2 = 95 mg in sodium chloride 0.9% 250 mL chemo infusion, 60 mg/m2 = 95 mg (original dose 96 mg), Intravenous, Clinic/HOD 1 time, 1 of 6 cycles  Dose modification: 96 mg (original dose 96 mg, Cycle 1), 90 mg (original dose 96 mg, Cycle 1), 60 mg/m2 (original dose 96 mg, Cycle 1)           Tumor Markers     Patient's tumor was tested for the following markers: Ca 125 : 91                                                4/19/2019 - 8/13/2019 Chemotherapy     Treatment Summary   Plan Name: OP GYN DOCETAXEL CARBOPLATIN   Treatment Goal: Control  Status: Inactive  Start Date: 4/26/2019  End Date: 7/24/2019  Provider: Kenyatta Tejeda MD  Chemotherapy: CARBOplatin (PARAPLATIN) 240 mg in sodium chloride 0.9% 250 mL chemo infusion, 240 mg (100 % of original dose 242 mg), Intravenous, Clinic/HOD 1 time, 3 of 3 cycles  Dose modification:   (original dose  242 mg, Cycle 1, Reason: Other (see comments))  DOCEtaxel (TAXOTERE) 96 mg in sodium chloride 0.9% 250 mL chemo infusion, 95 mg (100 % of original dose 96 mg), Intravenous, Clinic/HOD 1 time, 3 of 3 cycles  Dose modification: 96 mg (original dose 96 mg, Cycle 1)      9/13/2019 -  Chemotherapy     Treatment Summary   Plan Name: OP GYN LIPOSOMAL DOXORUBICIN BEVACIZUMAB (ANNE) PLATINUM-RESISTANT OVARIAN CANCER  Treatment Goal: Control  Status: Active  Start Date: 9/13/2019 (Planned)  End Date: 2/14/2020 (Planned)  Provider: Kenyatta Tejeda MD  Chemotherapy: bevacizumab (AVASTIN) 10 mg/kg = 590 mg in sodium chloride 0.9% 100 mL chemo infusion, 10 mg/kg = 590 mg, Intravenous, Clinic/HOD 1 time, 0 of 6 cycles  DOXOrubicin liposome (DOXIL) 65 mg in dextrose 5 % 250 mL chemo infusion, 40 mg/m2 = 65 mg, Intravenous, Clinic/HOD 1 time, 0 of 6 cycles         She is having new skin lesions and losing her hair   Referred by :  Dr. 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  Pathology revealed high-grade undifferentiated carcinoma involving the left tube and ovary with extensive necrosis.  Malignancy was noted involving the ovarian surface as well as the fallopian tube surface.  The tumor was 7 cm in greatest dimension.  This was a grade 3 undifferentiated carcinoma.  No regional lymph nodes were submitted tumor cells were strongly  positive for both CK7 and EMA.  Pathologically staged as a T1 see to an affects      SPECIMEN  1) Left tube and ovary  2) Uterus, cervix, right tube and ovary  FINAL PATHOLOGIC DIAGNOSIS  1. LEFT TUBE AND OVARY SHOWING HIGH-GRADE, UNDIFFERENTIATED CARCINOMA OF THE OVARY  INVOLVING MILAGROS TUBAL TISSUES WITH EXTENSIVE NECROSIS, SEE SYNOPTIC REPORT:  PROCEDURE: TOTAL HYSTERECTOMY AND BILATERAL SALPINGO-OOPHORECTOMY  SPECIMEN INTEGRITY: LEFT OVARY, CAPSULE RUPTURED  TUMOR SITE: LEFT OVARY  OVARIAN SURFACE INVOLVEMENT: PRESENT  FALLOPIAN TUBE SURFACE INVOLVEMENT: PRESENT  TUMOR SIZE: 7 CM  HISTOLOGIC TYPE: UNDIFFERENTIATED CARCINOMA  HISTOLOGIC GRADE: GRADE 3  OTHER TISSUE/ORGAN INVOLVEMENT: NOT IDENTIFIED  PERITONEAL/ASCITIC FLUID: NOT SUBMITTED/UNKNOWN  REGIONAL LYMPH NODES: NONE SUBMITTED  SPECIAL STUDIES: TUMOR CELLS ARE STRONGLY POSITIVE FOR BOTH CK7 AND EMA IMMUNOSTAINS.  S-100, WILMS TUMOR ANTIGEN AND CD10 ARE FOCALLY POSITIVE. CALRETININ, CD56 AND INHIBIN ARE  ALL NEGATIVE. THE CONTROLS STAIN APPROPRIATELY. THESE FINDINGS SUPPORT THE DIAGNOSIS  OF CARCINOMA RATHER THAN GRANULOSA CELL TUMOR.  TUMOR STAGE: pT1c2 Nx  2. UTERUS, CERVIX AND RIGHT ADNEXA WEIGHING 66 G SHOWING:  INACTIVE ENDOMETRIUM WITH A BENIGN ENDOMETRIAL POLYP  CERVIX WITH NABOTHIAN CYSTS  ATROPHIC RIGHT OVARY AND UNREMARKABLE RIGHT FALLOPIAN TUBE  Diagnosed by: Antwon Uribe M.D.      Patient here to re-evaluate her labs since last visit she feels well no fatigue no bleeding  Past Medical History:   Diagnosis Date    Allergy     Arthritis     Breast cyst     Foot pain     GERD (gastroesophageal reflux disease)     Joint pain     Ovarian cancer     Peripheral vascular disease 1/15/2018    Squamous cell carcinoma 04/2016    Right Lower Leg    MUNIR (stress urinary incontinence, female) 4/17/2017     Past Surgical History:   Procedure Laterality Date    APPENDECTOMY      COLON SURGERY      removed about 10 inch    COLONOSCOPY N/A 12/24/2018     Procedure: COLONOSCOPY;  Surgeon: Nirmal Davila MD;  Location[facility];  Service: Endoscopy;  Laterality: N/A;    FINGER MASS EXCISION Right 7/9/2018    Procedure: EXCISION, MASS, FINGER;  Surgeon: Fabio Seay MD;  Location: [facility] OR;  Service: Orthopedics;  Laterality: Right;    hemorhids      HYSTERECTOMY      LYSIS OF ADHESIONS N/A 12/28/2018    Procedure: LYSIS, ADHESIONS;  Surgeon: Amee Rose MD;  Location: [facility] OR;  Service: OB/GYN;  Laterality: N/A;    OPEN REDUCTION AND INTERNAL FIXATION (ORIF) OF INTERTROCHANTERIC FRACTURE OF FEMUR Left 5/31/2019    Procedure: ORIF, FRACTURE, FEMUR, INTERTROCHANTERIC;  Surgeon: Bacilio Lockwood II, MD;  Location: [facility] OR;  Service: Orthopedics;  Laterality: Left;    ROBOT-ASSISTED LAPAROSCOPIC ABDOMINAL HYSTERECTOMY USING DA MARK XI N/A 12/28/2018    Procedure: XI ROBOTIC HYSTERECTOMY;  Surgeon: Amee Rose MD;  Location: [facility];  Service: OB/GYN;  Laterality: N/A;  attempted, opened    ROBOT-ASSISTED LAPAROSCOPIC SALPINGO-OOPHORECTOMY USING DA MARK XI Bilateral 12/28/2018    Procedure: XI ROBOTIC SALPINGO-OOPHORECTOMY;  Surgeon: Amee Rose MD;  Location: [facility];  Service: OB/GYN;  Laterality: Bilateral;  attmepted, opened    TOTAL ABDOMINAL HYSTERECTOMY W/ BILATERAL SALPINGOOPHORECTOMY Bilateral 12/28/2018    Procedure: HYSTERECTOMY, TOTAL, ABDOMINAL, WITH BILATERAL SALPINGO-OOPHORECTOMY;  Surgeon: Amee Rose MD;  Location: [facility];  Service: OB/GYN;  Laterality: Bilateral;    She remains on Nexium to help with intermittent symptoms of gastritis and is on calcium with vitamin-D for overall bone health    Current Outpatient Medications:     calcium-vitamin D3 (CALCIUM 500 + D) 500 mg(1,250mg) -200 unit per tablet, Take 1 tablet by mouth once daily. , Disp: , Rfl:     carboxymethylcellulose (REFRESH PLUS) 0.5 % Dpet, 1 drop daily as needed., Disp: , Rfl:     cyanocobalamin (VITAMIN B-12) 1000 MCG tablet, Take 1,000 mcg by  mouth once daily. , Disp: , Rfl:     esomeprazole (NEXIUM) 20 MG capsule, Take 1 capsule (20 mg total) by mouth before breakfast., Disp: 30 capsule, Rfl: 11    FLUAD [id number], 65 YR UP,,PF, 45 mcg (15 mcg x 3)/0.5 mL Syrg, ADM 0.5ML IM UTD, Disp: , Rfl: 0    ibuprofen (ADVIL,MOTRIN) 600 MG tablet, Take 600 mg by mouth daily as needed for Pain., Disp: , Rfl:     ketotifen (ZADITOR) 0.025 % (0.035 %) ophthalmic solution, Place 1 drop into both eyes 2 (two) times daily as needed (Pt reports use approx once/week). , Disp: , Rfl:     MULTIVITAMIN W-MINERALS/LUTEIN (CENTRUM SILVER ORAL), Take 1 tablet by mouth once daily. , Disp: , Rfl:     pregabalin (LYRICA) 75 MG capsule, Take 1 capsule (75 mg total) by mouth 2 (two) times daily., Disp: 60 capsule, Rfl: 0    prochlorperazine (COMPAZINE) 10 MG tablet, Take 1 tablet (10 mg total) by mouth every 6 (six) hours as needed., Disp: 30 tablet, Rfl: 1    acetaminophen (TYLENOL) 500 MG tablet, Take 2 tablets (1,000 mg total) by mouth every 8 (eight) hours. (Patient not taking: Reported on 9/27/2019), Disp: , Rfl: 0    dexAMETHasone (DECADRON) 4 MG Tab, , Disp: , Rfl:     FLAXSEED ORAL, Take 650 mg by mouth once daily. , Disp: , Rfl:     sulfamethoxazole-trimethoprim 400-80mg (BACTRIM,SEPTRA) 400-80 mg per tablet, Take 1 tablet by mouth 2 (two) times daily. for 10 days (Patient not taking: Reported on 9/27/2019), Disp: 20 tablet, Rfl: 0     She remains on intermittent Compazine with Nexium to help with gastritis and nausea.  She reports the Zofran is not helping her much.  For bone pain she has taken over-the-counter Tylenol Arthritis  Review of patient's allergies indicates:   Allergen Reactions    Pcn [penicillins]      Can not remember reaction     Tetanus vaccines and toxoid Swelling     Localized swelling to injection site     Social History     Tobacco Use    Smoking status: Never Smoker    Smokeless tobacco: Never Used   Substance Use Topics    Alcohol use:  No     Alcohol/week: 0.0 standard drinks     Comment: rare    Drug use: No     Family History   Adopted: Yes   Problem Relation Age of Onset    Cancer Mother     Stroke Father     Breast cancer Sister     Cancer Sister     No Known Problems Daughter     No Known Problems Brother     No Known Problems Daughter     Diabetes Brother     Colon cancer Neg Hx     Ovarian cancer Neg Hx              CONSTITUTIONAL: No fevers, chills, night sweats, wt. Loss  Bladder cramping with urinary frequency   SKIN:  Positive rash  ENT: No headaches, head trauma, vision changes  LYMPH NODES: None noticed   CV: No chest pain, palpitations.   RESP: No dyspnea on exertion, cough, wheezing, no further rib pain  GI: No nausea, emesis,no heartburn and reflux unrelieved with medication worsening constipaton  : No dysuria, hematuria, urgency   HEME: No easy bruising, bleeding problems  PSYCHIATRIC: No depression, anxiety, psychosis  NEURO: No seizures, memory loss, no headaches  MSK:  Positive bone pain unrelieved with Tylenol over-the-counter  Pain where she broke her hip   Pain with ambulation     Wt Readings from Last 3 Encounters:   09/27/19 59.5 kg (131 lb 2.8 oz)   09/06/19 59.1 kg (130 lb 4.7 oz)   08/21/19 55.8 kg (123 lb)     Temp Readings from Last 3 Encounters:   09/27/19 98.3 °F (36.8 °C)   09/06/19 98 °F (36.7 °C)   07/19/19 98.4 °F (36.9 °C)     BP Readings from Last 3 Encounters:   09/27/19 133/60   09/06/19 (!) 116/57   08/19/19 (!) 110/53     Pulse Readings from Last 3 Encounters:   09/27/19 87   09/06/19 83   08/19/19 94       Constitutional: oriented to person, place, and time.     HENT:   Head: Normocephalic and atraumatic.   Right Ear: External ear normal. Left Ear: External ear normal.   Nose: Nose normal.   Mouth/Throat: Oropharynx is clear and moist.   Eyes: Conjunctivae and EOM are normal. Pupils are equal,  Neck: Normal range of motion. Neck supple.   Cardiovascular: Normal rate, regular rhythm, normal  heart sounds   No pmi  Pulmonary/Chest: Effort normal and breath sounds normal.  no fremitus   Abdominal: Soft. Bowel sounds are normal.  no mass.   Musculoskeletal: Normal range of motion.  + edema of ankles    Lymphadenopathy:  no cervical adenopathy.   Neurological: alert and oriented to person, place   Decreased ROM   Psychiatric: flat  affect   Vitals reviewed.      Lab Results   Component Value Date    WBC 4.83 09/23/2019    RBC 4.30 09/23/2019    HGB 12.6 09/23/2019    HCT 39.3 09/23/2019    MCV 91 09/23/2019    MCH 29.3 09/23/2019    MCHC 32.1 09/23/2019    RDW 13.3 09/23/2019     09/23/2019    MPV 8.8 (L) 09/23/2019    GRAN 2.9 09/23/2019    GRAN 59.9 09/23/2019    LYMPH 1.4 09/23/2019    LYMPH 29.4 09/23/2019    MONO 0.4 09/23/2019    MONO 7.2 09/23/2019    EOS 0.1 09/23/2019    BASO 0.04 09/23/2019    EOSINOPHIL 2.5 09/23/2019    BASOPHIL 0.8 09/23/2019       CMP  Sodium   Date Value Ref Range Status   09/23/2019 139 136 - 145 mmol/L Final     Potassium   Date Value Ref Range Status   09/23/2019 4.2 3.5 - 5.1 mmol/L Final     Chloride   Date Value Ref Range Status   09/23/2019 102 95 - 110 mmol/L Final     CO2   Date Value Ref Range Status   09/23/2019 29 23 - 29 mmol/L Final     Glucose   Date Value Ref Range Status   09/23/2019 89 70 - 110 mg/dL Final     BUN, Bld   Date Value Ref Range Status   09/23/2019 15 8 - 23 mg/dL Final     Creatinine   Date Value Ref Range Status   09/23/2019 0.7 0.5 - 1.4 mg/dL Final     Calcium   Date Value Ref Range Status   09/23/2019 9.4 8.7 - 10.5 mg/dL Final     Total Protein   Date Value Ref Range Status   09/23/2019 7.2 6.0 - 8.4 g/dL Final     Albumin   Date Value Ref Range Status   09/23/2019 4.0 3.5 - 5.2 g/dL Final     Total Bilirubin   Date Value Ref Range Status   09/23/2019 0.5 0.1 - 1.0 mg/dL Final     Comment:     For infants and newborns, interpretation of results should be based  on gestational age, weight and in agreement with  clinical  observations.  Premature Infant recommended reference ranges:  Up to 24 hours.............<8.0 mg/dL  Up to 48 hours............<12.0 mg/dL  3-5 days..................<15.0 mg/dL  6-29 days.................<15.0 mg/dL       Alkaline Phosphatase   Date Value Ref Range Status   09/23/2019 113 55 - 135 U/L Final     AST   Date Value Ref Range Status   09/23/2019 28 10 - 40 U/L Final     ALT   Date Value Ref Range Status   09/23/2019 18 10 - 44 U/L Final     Anion Gap   Date Value Ref Range Status   09/23/2019 8 8 - 16 mmol/L Final     eGFR if    Date Value Ref Range Status   09/23/2019 >60 >60 mL/min/1.73 m^2 Final     eGFR if non    Date Value Ref Range Status   09/23/2019 >60 >60 mL/min/1.73 m^2 Final     Comment:     Calculation used to obtain the estimated glomerular filtration  rate (eGFR) is the CKD-EPI equation.        Pet ct reviewed and discussed for over twenty minutes  Ref Range & [id number] ago  CA 1250 - 30 U/mL91 Abnormally high   Ref Range & Nkrie1p ago  CEA0.0 - 5.0 ng/mL7.6 Abnormally high      UA reviewed    Chemotherapy follow-up examination    Idiopathic peripheral neuropathy  -     CBC auto differential; Future; Expected date: 09/27/2019  -     CMP; Future; Expected date: 09/27/2019    Urinary tract infection without hematuria, site unspecified    Hepatic metastases    Malignant neoplasm metastatic to lung, unspecified laterality  -     CBC auto differential; Future; Expected date: 09/27/2019  -     CMP; Future; Expected date: 09/27/2019         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 I went over the risks and benefits of the chemotherapy with regard to their particular cancer type, their cancer stage, their age, and their co-morbidities. I provided literature on the chemotherapy regimen and discussed the chemotherapy side-effect profiles of the drug(s). I discussed the importance of compliance with obtaining and monitoring weekly lab work, and went over the potential hematopathology issues and risks with anemia, leucopenia and thrombocytopenia that can occur with chemotherapy. I discussed the potential risks of liver and kidney damage, which could be permanent and could necessitate dialysis long-term if kidney failure developed. I discussed the emetic and/or diarrheal potential of the regimen and the potential need for use of antiemetic and anti-diarrheal medications. I discussed the risk for development of anaphylactic shock, bronchospasm, dysrhythmia, and respiratory/cardiovascular failure. I discussed the potential risks for development of alopecia, cold sensory issues, ringing in ears, vertigo and neuropathy, all of which could end up being chronic and life-long. I discussed the risks of hand-foot syndrome and rashes, and development of other autoimmune mediated processes such as pneumonitis and colitis which could be life threatening. I discussed the risks of the potential development of leukemia and/or lymphoma from use of certain chemotherapy agents. I discussed the need for neutropenic precautions, basic hygiene/sanitation behaviors and dietary restrictions.     The patient's consent has been obtained to proceed with the chemotherapy.The patient will be referred to Chemotherapy [facility] /[facility] for training and education on chemotherapy, use of antiemetics and/or anti-diarrheals, use of NSAID's, potential chemotherapy side-effects, and any specific recommendations and  precautions with the particular chemotherapy agents.       I answered all of the patient's (and family's, if applicable) questions to the best of my ability and to their complete satisfaction. The patient acknowledged full understanding of the risks, recommendations and plan(s).      Sincerely,  Kenyatta Tejeda MD [facility]          Advance Care Planning     Living Will  During this visit, I engaged the patient in the advance care planning process.  The patient and I reviewed the role for advance directives and their purpose in directing future healthcare if the patient's unable to speak for him/herself.  At this point in time, the patient does have full decision-making capacity.  We discussed different extreme health states that she could experience, and reviewed what kind of medical care she would want in those situations.  The patient communicated that if she were comatose and had little chance of a meaningful recovery, she would not want machines/life-sustaining treatments used.  The patient has completed a living will to reflect these preferences.  The patient  Has  already designated a healthcare power of  to make decisions on her behalf.   I spent a total of  10  minutes engaging the patient in this advance care planning discussion.

## 2019-09-30 ENCOUNTER — INFUSION (OUTPATIENT)
Dept: INFUSION THERAPY | Facility: HOSPITAL | Age: 84
End: 2019-09-30
Attending: INTERNAL MEDICINE
Payer: MEDICARE

## 2019-09-30 ENCOUNTER — DOCUMENTATION ONLY (OUTPATIENT)
Dept: HEMATOLOGY/ONCOLOGY | Facility: CLINIC | Age: 84
End: 2019-09-30

## 2019-09-30 VITALS
HEIGHT: 63 IN | RESPIRATION RATE: 18 BRPM | WEIGHT: 128 LBS | HEART RATE: 80 BPM | SYSTOLIC BLOOD PRESSURE: 104 MMHG | DIASTOLIC BLOOD PRESSURE: 60 MMHG | BODY MASS INDEX: 22.68 KG/M2 | TEMPERATURE: 98 F

## 2019-09-30 DIAGNOSIS — T45.1X5A CHEMOTHERAPY INDUCED NEUTROPENIA: Primary | ICD-10-CM

## 2019-09-30 DIAGNOSIS — D70.1 CHEMOTHERAPY INDUCED NEUTROPENIA: Primary | ICD-10-CM

## 2019-09-30 DIAGNOSIS — C56.9 MALIGNANT NEOPLASM OF OVARY, UNSPECIFIED LATERALITY: ICD-10-CM

## 2019-09-30 PROCEDURE — 96367 TX/PROPH/DG ADDL SEQ IV INF: CPT

## 2019-09-30 PROCEDURE — 96417 CHEMO IV INFUS EACH ADDL SEQ: CPT

## 2019-09-30 PROCEDURE — 96413 CHEMO IV INFUSION 1 HR: CPT

## 2019-09-30 PROCEDURE — 63600175 PHARM REV CODE 636 W HCPCS: Mod: JG | Performed by: INTERNAL MEDICINE

## 2019-09-30 PROCEDURE — 96415 CHEMO IV INFUSION ADDL HR: CPT

## 2019-09-30 RX ORDER — SODIUM CHLORIDE 0.9 % (FLUSH) 0.9 %
10 SYRINGE (ML) INJECTION
Status: DISCONTINUED | OUTPATIENT
Start: 2019-09-30 | End: 2019-09-30 | Stop reason: HOSPADM

## 2019-09-30 RX ORDER — DIPHENHYDRAMINE HYDROCHLORIDE 50 MG/ML
50 INJECTION INTRAMUSCULAR; INTRAVENOUS ONCE AS NEEDED
Status: DISCONTINUED | OUTPATIENT
Start: 2019-09-30 | End: 2019-09-30 | Stop reason: HOSPADM

## 2019-09-30 RX ORDER — EPINEPHRINE 0.3 MG/.3ML
0.3 INJECTION SUBCUTANEOUS ONCE AS NEEDED
Status: DISCONTINUED | OUTPATIENT
Start: 2019-09-30 | End: 2019-09-30 | Stop reason: HOSPADM

## 2019-09-30 RX ORDER — HEPARIN 100 UNIT/ML
500 SYRINGE INTRAVENOUS
Status: DISCONTINUED | OUTPATIENT
Start: 2019-09-30 | End: 2019-09-30 | Stop reason: HOSPADM

## 2019-09-30 RX ORDER — ONDANSETRON 2 MG/ML
8 INJECTION INTRAMUSCULAR; INTRAVENOUS
Status: DISCONTINUED | OUTPATIENT
Start: 2019-09-30 | End: 2019-09-30 | Stop reason: SDUPTHER

## 2019-09-30 RX ADMIN — DOXORUBICIN HYDROCHLORIDE 65 MG: 2 INJECTION, SUSPENSION, LIPOSOMAL INTRAVENOUS at 11:09

## 2019-09-30 RX ADMIN — PALONOSETRON HYDROCHLORIDE: 0.25 INJECTION, SOLUTION INTRAVENOUS at 09:09

## 2019-09-30 RX ADMIN — BEVACIZUMAB 590 MG: 400 INJECTION, SOLUTION INTRAVENOUS at 09:09

## 2019-09-30 NOTE — PROGRESS NOTES
NUTRITION NOTE    Spoke to Ms. Mai during her chemo infusion today. Reported that she is doing well so far. Inquired about specific diet recommended for cancer patients. Stated that she currently follows a mostly plant-based diet, but was unsure if she should be consuming any animal products while receiving treatment.     Informed pt that while plant-based diet is great to continue, it is also ok if she has meats & dairy as tolerated. Encouraged wt maintenance and hydration, regardless of the specific diet she chooses to follow. Provided RD contact info again & encouraged pt to call with any future questions/concerns.

## 2019-10-01 ENCOUNTER — INFUSION (OUTPATIENT)
Dept: INFUSION THERAPY | Facility: HOSPITAL | Age: 84
End: 2019-10-01
Attending: INTERNAL MEDICINE
Payer: MEDICARE

## 2019-10-01 VITALS
WEIGHT: 128 LBS | RESPIRATION RATE: 18 BRPM | SYSTOLIC BLOOD PRESSURE: 121 MMHG | BODY MASS INDEX: 22.68 KG/M2 | TEMPERATURE: 98 F | DIASTOLIC BLOOD PRESSURE: 70 MMHG | HEIGHT: 63 IN | HEART RATE: 80 BPM

## 2019-10-01 DIAGNOSIS — D70.1 CHEMOTHERAPY INDUCED NEUTROPENIA: Primary | ICD-10-CM

## 2019-10-01 DIAGNOSIS — C56.9 MALIGNANT NEOPLASM OF OVARY, UNSPECIFIED LATERALITY: ICD-10-CM

## 2019-10-01 DIAGNOSIS — T45.1X5A CHEMOTHERAPY INDUCED NEUTROPENIA: Primary | ICD-10-CM

## 2019-10-01 PROCEDURE — 63600175 PHARM REV CODE 636 W HCPCS: Mod: JG | Performed by: INTERNAL MEDICINE

## 2019-10-01 PROCEDURE — 96372 THER/PROPH/DIAG INJ SC/IM: CPT

## 2019-10-01 RX ADMIN — PEGFILGRASTIM 6 MG: 6 INJECTION SUBCUTANEOUS at 01:10

## 2019-10-02 ENCOUNTER — TELEPHONE (OUTPATIENT)
Dept: HEMATOLOGY/ONCOLOGY | Facility: CLINIC | Age: 84
End: 2019-10-02

## 2019-10-02 NOTE — TELEPHONE ENCOUNTER
----- Message from Tierra Valles sent at 10/2/2019 12:35 PM CDT -----  Contact: daughter  Type: Needs Medical Advice    Who Called:  daughter  Best Call Back Number: [phone]  Additional Information: laura a call back regarding newl[place] shot .she would like to understand   Where this price is coming from   Please Advise ---Thank you

## 2019-10-02 NOTE — TELEPHONE ENCOUNTER
----- Message from Kristie  sent at 10/2/2019 10:41 AM CDT -----  Contact: Yenny Gardner  Type: Needs Medical Advice    Who Called: Yenny Gardner    Best Call Back Number:   Additional Information: Requesting a call back from Nurse,regarding questions about pt's appt, she has a appt on 11/5 that needs to be changed

## 2019-10-02 NOTE — TELEPHONE ENCOUNTER
Daughter notified that we do not set the billing for any drugs in the [facility] center, and that she can call [phone] and ask for Billing with nay questions. She was also notified that she was not on the schedule with us on 11/5 and that it was Rosi Ramires.

## 2019-10-10 ENCOUNTER — LAB VISIT (OUTPATIENT)
Dept: LAB | Facility: HOSPITAL | Age: 84
End: 2019-10-10
Attending: INTERNAL MEDICINE
Payer: MEDICARE

## 2019-10-10 DIAGNOSIS — C56.9 MALIGNANT NEOPLASM OF OVARY, UNSPECIFIED LATERALITY: ICD-10-CM

## 2019-10-10 DIAGNOSIS — C78.7 METASTASES TO THE LIVER: ICD-10-CM

## 2019-10-10 DIAGNOSIS — C78.00 MALIGNANT NEOPLASM METASTATIC TO LUNG, UNSPECIFIED LATERALITY: ICD-10-CM

## 2019-10-10 LAB
ALBUMIN SERPL BCP-MCNC: 3.8 G/DL (ref 3.5–5.2)
ALP SERPL-CCNC: 186 U/L (ref 55–135)
ALT SERPL W/O P-5'-P-CCNC: 24 U/L (ref 10–44)
ANION GAP SERPL CALC-SCNC: 9 MMOL/L (ref 8–16)
AST SERPL-CCNC: 30 U/L (ref 10–40)
BASOPHILS # BLD AUTO: 0.06 K/UL (ref 0–0.2)
BASOPHILS NFR BLD: 0.7 % (ref 0–1.9)
BILIRUB SERPL-MCNC: 0.9 MG/DL (ref 0.1–1)
BUN SERPL-MCNC: 16 MG/DL (ref 8–23)
CALCIUM SERPL-MCNC: 9.5 MG/DL (ref 8.7–10.5)
CANCER AG125 SERPL-ACNC: 16 U/ML (ref 0–30)
CHLORIDE SERPL-SCNC: 99 MMOL/L (ref 95–110)
CO2 SERPL-SCNC: 28 MMOL/L (ref 23–29)
CREAT SERPL-MCNC: 0.8 MG/DL (ref 0.5–1.4)
DIFFERENTIAL METHOD: ABNORMAL
EOSINOPHIL # BLD AUTO: 0.1 K/UL (ref 0–0.5)
EOSINOPHIL NFR BLD: 1 % (ref 0–8)
ERYTHROCYTE [DISTWIDTH] IN BLOOD BY AUTOMATED COUNT: 13.6 % (ref 11.5–14.5)
EST. GFR  (AFRICAN AMERICAN): >60 ML/MIN/1.73 M^2
EST. GFR  (NON AFRICAN AMERICAN): >60 ML/MIN/1.73 M^2
GLUCOSE SERPL-MCNC: 117 MG/DL (ref 70–110)
HCT VFR BLD AUTO: 38.8 % (ref 37–48.5)
HGB BLD-MCNC: 12.5 G/DL (ref 12–16)
IMM GRANULOCYTES # BLD AUTO: 0.2 K/UL (ref 0–0.04)
LYMPHOCYTES # BLD AUTO: 1.2 K/UL (ref 1–4.8)
LYMPHOCYTES NFR BLD: 14.9 % (ref 18–48)
MCH RBC QN AUTO: 29.4 PG (ref 27–31)
MCHC RBC AUTO-ENTMCNC: 32.2 G/DL (ref 32–36)
MCV RBC AUTO: 91 FL (ref 82–98)
MONOCYTES # BLD AUTO: 0.3 K/UL (ref 0.3–1)
MONOCYTES NFR BLD: 3.2 % (ref 4–15)
NEUTROPHILS # BLD AUTO: 6.3 K/UL (ref 1.8–7.7)
NEUTROPHILS NFR BLD: 77.7 % (ref 38–73)
NRBC BLD-RTO: 0 /100 WBC
PLATELET # BLD AUTO: 181 K/UL (ref 150–350)
PMV BLD AUTO: 9.3 FL (ref 9.2–12.9)
POTASSIUM SERPL-SCNC: 4.6 MMOL/L (ref 3.5–5.1)
PROT SERPL-MCNC: 7.2 G/DL (ref 6–8.4)
RBC # BLD AUTO: 4.25 M/UL (ref 4–5.4)
SODIUM SERPL-SCNC: 136 MMOL/L (ref 136–145)
WBC # BLD AUTO: 8.05 K/UL (ref 3.9–12.7)

## 2019-10-10 PROCEDURE — 36415 COLL VENOUS BLD VENIPUNCTURE: CPT

## 2019-10-10 PROCEDURE — 85025 COMPLETE CBC W/AUTO DIFF WBC: CPT

## 2019-10-10 PROCEDURE — 86304 IMMUNOASSAY TUMOR CA 125: CPT

## 2019-10-10 PROCEDURE — 80053 COMPREHEN METABOLIC PANEL: CPT

## 2019-10-11 ENCOUNTER — OFFICE VISIT (OUTPATIENT)
Dept: HEMATOLOGY/ONCOLOGY | Facility: CLINIC | Age: 84
End: 2019-10-11
Payer: MEDICARE

## 2019-10-11 VITALS
HEIGHT: 63 IN | HEART RATE: 111 BPM | SYSTOLIC BLOOD PRESSURE: 103 MMHG | DIASTOLIC BLOOD PRESSURE: 54 MMHG | RESPIRATION RATE: 16 BRPM | WEIGHT: 123.88 LBS | BODY MASS INDEX: 21.95 KG/M2 | TEMPERATURE: 98 F | OXYGEN SATURATION: 98 %

## 2019-10-11 DIAGNOSIS — C78.00 MALIGNANT NEOPLASM METASTATIC TO LUNG, UNSPECIFIED LATERALITY: ICD-10-CM

## 2019-10-11 DIAGNOSIS — Z09 CHEMOTHERAPY FOLLOW-UP EXAMINATION: Primary | ICD-10-CM

## 2019-10-11 DIAGNOSIS — C78.7 HEPATIC METASTASES: ICD-10-CM

## 2019-10-11 DIAGNOSIS — R97.8 OTHER ABNORMAL TUMOR MARKERS: ICD-10-CM

## 2019-10-11 DIAGNOSIS — K29.70 GASTRITIS, PRESENCE OF BLEEDING UNSPECIFIED, UNSPECIFIED CHRONICITY, UNSPECIFIED GASTRITIS TYPE: ICD-10-CM

## 2019-10-11 PROCEDURE — 99999 PR PBB SHADOW E&M-EST. PATIENT-LVL IV: CPT | Mod: PBBFAC,,, | Performed by: INTERNAL MEDICINE

## 2019-10-11 PROCEDURE — 3288F FALL RISK ASSESSMENT DOCD: CPT | Mod: CPTII,S$GLB,, | Performed by: INTERNAL MEDICINE

## 2019-10-11 PROCEDURE — 99215 OFFICE O/P EST HI 40 MIN: CPT | Mod: S$GLB,,, | Performed by: INTERNAL MEDICINE

## 2019-10-11 PROCEDURE — 99999 PR PBB SHADOW E&M-EST. PATIENT-LVL IV: ICD-10-PCS | Mod: PBBFAC,,, | Performed by: INTERNAL MEDICINE

## 2019-10-11 PROCEDURE — 3288F PR FALLS RISK ASSESSMENT DOCUMENTED: ICD-10-PCS | Mod: CPTII,S$GLB,, | Performed by: INTERNAL MEDICINE

## 2019-10-11 PROCEDURE — 1101F PR PT FALLS ASSESS DOC 0-1 FALLS W/OUT INJ PAST YR: ICD-10-PCS | Mod: CPTII,S$GLB,, | Performed by: INTERNAL MEDICINE

## 2019-10-11 PROCEDURE — 99215 PR OFFICE/OUTPT VISIT, EST, LEVL V, 40-54 MIN: ICD-10-PCS | Mod: S$GLB,,, | Performed by: INTERNAL MEDICINE

## 2019-10-11 PROCEDURE — 1101F PT FALLS ASSESS-DOCD LE1/YR: CPT | Mod: CPTII,S$GLB,, | Performed by: INTERNAL MEDICINE

## 2019-10-11 RX ORDER — SUCRALFATE 1 G/1
1 TABLET ORAL 4 TIMES DAILY
Qty: 40 TABLET | Refills: 0 | Status: SHIPPED | OUTPATIENT
Start: 2019-10-11

## 2019-10-11 NOTE — PROGRESS NOTES
CC   I vomited yesterday   Pau Cook is a 89 y.o.    This is an 89 year-old female referred from  here for recurrent chemo      Saw  Dr. Rose May 6, recommends further chemo   records     Ovarian cancer    1/15/2019 Initial Diagnosis     Ovarian cancer       Cancer Staged     Cancer Staging  Ovarian cancer  Staging form: Ovary, Fallopian Tube, and Primary Peritoneal Carcinoma, AJCC 8th Edition  - Clinical stage from 2/28/2019: FIGO Stage IC, calculated as Stage IV (cT1c2, cNX, cM1) - Signed by Kenyatta Tejeda MD on 2/28/2019       Chemotherapy     Treatment Summary   Plan Name: OP GYN DOCETAXEL CARBOPLATIN (AUC) Q3W  Treatment Goal: Control  Status: Active  Start Date: 2/8/2019  End Date: 5/24/2019 (Planned)  Provider: Kenyatta Tejeda MD  Chemotherapy: CARBOplatin (PARAPLATIN) 230 mg in sodium chloride 0.9% 250 mL chemo infusion, 230 mg (100 % of original dose 230 mg), Intravenous, Clinic/HOD 1 time, 1 of 6 cycles  Dose modification: 230 mg (original dose 230 mg, Cycle 1, Reason: MD Discretion)    DOCEtaxel (TAXOTERE) 60 mg/m2 = 95 mg in sodium chloride 0.9% 250 mL chemo infusion, 60 mg/m2 = 95 mg (original dose 96 mg), Intravenous, Clinic/HOD 1 time, 1 of 6 cycles  Dose modification: 96 mg (original dose 96 mg, Cycle 1), 90 mg (original dose 96 mg, Cycle 1), 60 mg/m2 (original dose 96 mg, Cycle 1)           Tumor Markers     Patient's tumor was tested for the following markers: Ca 125 : 91                                                4/19/2019 - 8/13/2019 Chemotherapy     Treatment Summary   Plan Name: OP GYN DOCETAXEL CARBOPLATIN   Treatment Goal: Control  Status: Inactive  Start Date: 4/26/2019  End Date: 7/24/2019  Provider: Kenyatta Tejeda MD  Chemotherapy: CARBOplatin (PARAPLATIN) 240 mg in sodium chloride 0.9% 250 mL chemo infusion, 240 mg (100 % of original dose 242 mg), Intravenous, Clinic/HOD 1 time, 3 of 3 cycles  Dose modification:   (original dose 242 mg, Cycle 1, Reason: Other (see  comments))  DOCEtaxel (TAXOTERE) 96 mg in sodium chloride 0.9% 250 mL chemo infusion, 95 mg (100 % of original dose 96 mg), Intravenous, Clinic/HOD 1 time, 3 of 3 cycles  Dose modification: 96 mg (original dose 96 mg, Cycle 1)      9/13/2019 -  Chemotherapy     Treatment Summary   Plan Name: OP GYN LIPOSOMAL DOXORUBICIN BEVACIZUMAB (ANNE) PLATINUM-RESISTANT OVARIAN CANCER  Treatment Goal: Control  Status: Active  Start Date: 9/30/2019  End Date: 3/2/2020 (Planned)  Provider: Kenyatta Tejeda MD  Chemotherapy: bevacizumab (AVASTIN) 10 mg/kg = 590 mg in sodium chloride 0.9% 123.6 mL chemo infusion, 10 mg/kg = 590 mg, Intravenous, Clinic/HOD 1 time, 1 of 6 cycles  Administration: 590 mg (9/30/2019)  DOXOrubicin liposome (DOXIL) 65 mg in dextrose 5 % 250 mL chemo infusion, 40 mg/m2 = 65 mg, Intravenous, Clinic/HOD 1 time, 1 of 6 cycles  Administration: 65 mg (9/30/2019)         She is having new skin lesions and losing her hair   Referred by :  Dr. 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  Pathology revealed high-grade undifferentiated carcinoma involving the left tube and ovary with extensive necrosis.  Malignancy was noted involving the ovarian surface as well as the fallopian tube surface.  The tumor was 7 cm in greatest dimension.  This was a grade 3 undifferentiated carcinoma. 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 , Disp: , Rfl:     carboxymethylcellulose (REFRESH PLUS) 0.5 % Dpet, 1 drop daily as needed., Disp: , Rfl:     cyanocobalamin (VITAMIN B-12) 1000 MCG tablet, Take 1,000 mcg by mouth once daily. , Disp: , Rfl:     esomeprazole (NEXIUM) 20 MG capsule, Take 1 capsule (20 mg total) by mouth before breakfast., Disp: 30 capsule, Rfl: 11    FLAXSEED ORAL, Take 650 mg by mouth once daily. , Disp: , Rfl:     FLUAD [id number], 65 YR UP,,PF, 45 mcg (15 mcg x 3)/0.5 mL Syrg, ADM 0.5ML IM UTD, Disp: , Rfl: 0    ketotifen (ZADITOR) 0.025 % (0.035 %) ophthalmic solution, Place 1 drop into both eyes 2 (two) times daily as needed (Pt reports use approx once/week). , Disp: , Rfl:     MULTIVITAMIN W-MINERALS/LUTEIN (CENTRUM SILVER ORAL), Take 1 tablet by mouth once daily. , Disp: , Rfl:     pregabalin (LYRICA) 75 MG capsule, Take 1 capsule (75 mg total) by mouth 2 (two) times daily., Disp: 60 capsule, Rfl: 0    prochlorperazine (COMPAZINE) 10 MG tablet, Take 1 tablet (10 mg total) by mouth every 6 (six) hours as needed., Disp: 30 tablet, Rfl: 1     She remains on intermittent Compazine with Nexium to help with gastritis and nausea.  She reports the Zofran is not helping her much.  For bone pain she has taken over-the-counter Tylenol Arthritis            CONSTITUTIONAL: No fevers, chills, night sweats, wt. Loss  Bladder cramping with urinary frequency   SKIN:  Positive rash  ENT: No headaches, head trauma, vision  changes  LYMPH NODES: None noticed   CV: No chest pain, palpitations.   RESP: No dyspnea on exertion, cough, wheezing, no further rib pain  GI: No   emesis,  + nausea and vomiting   : No dysuria, hematuria, urgency   HEME: No easy bruising, bleeding problems  PSYCHIATRIC: No depression, anxiety, psychosis  NEURO: No seizures, memory loss, no headaches  MSK:  Positive bone pain  Intermittently         Wt Readings from Last 3 Encounters:   10/11/19 56.2 kg (123 lb 14.4 oz)   10/01/19 58.1 kg (128 lb)   09/30/19 58.1 kg (128 lb)     Temp Readings from Last 3 Encounters:   10/11/19 97.8 °F (36.6 °C)   10/01/19 97.7 °F (36.5 °C)   09/30/19 97.7 °F (36.5 °C)     BP Readings from Last 3 Encounters:   10/11/19 (!) 103/54   10/01/19 121/70   09/30/19 104/60     Pulse Readings from Last 3 Encounters:   10/11/19 (!) 111   10/01/19 80   09/30/19 80       Constitutional: oriented to person, place, and time.     HENT:   Head: Normocephalic and atraumatic.   Right Ear: External ear normal. Left Ear: External ear normal.   Nose: Nose normal.   Mouth/Throat: Oropharynx is clear and moist.   Eyes: Conjunctivae and EOM are normal. Pupils are equal,  Neck: Normal range of motion. Neck supple.   Cardiovascular: Normal rate, regular rhythm, normal heart sounds   No pmi  Pulmonary/Chest: Effort normal and breath sounds normal.  no fremitus   Abdominal: Soft. Bowel sounds are normal.  no mass.   Musculoskeletal: Normal range of motion.  + edema of ankles    Lymphadenopathy:  no cervical adenopathy.   Neurological: alert and oriented to person, place   Decreased ROM   Psychiatric: flat  affect   Vitals reviewed.      Lab Results   Component Value Date    WBC 8.05 10/10/2019    RBC 4.25 10/10/2019    HGB 12.5 10/10/2019    HCT 38.8 10/10/2019    MCV 91 10/10/2019    MCH 29.4 10/10/2019    MCHC 32.2 10/10/2019    RDW 13.6 10/10/2019     10/10/2019    MPV 9.3 10/10/2019    GRAN 6.3 10/10/2019    GRAN 77.7 (H) 10/10/2019    LYMPH 1.2  10/10/2019    LYMPH 14.9 (L) 10/10/2019    MONO 0.3 10/10/2019    MONO 3.2 (L) 10/10/2019    EOS 0.1 10/10/2019    BASO 0.06 10/10/2019    EOSINOPHIL 1.0 10/10/2019    BASOPHIL 0.7 10/10/2019       CMP  Sodium   Date Value Ref Range Status   10/10/2019 136 136 - 145 mmol/L Final     Potassium   Date Value Ref Range Status   10/10/2019 4.6 3.5 - 5.1 mmol/L Final     Chloride   Date Value Ref Range Status   10/10/2019 99 95 - 110 mmol/L Final     CO2   Date Value Ref Range Status   10/10/2019 28 23 - 29 mmol/L Final     Glucose   Date Value Ref Range Status   10/10/2019 117 (H) 70 - 110 mg/dL Final     BUN, Bld   Date Value Ref Range Status   10/10/2019 16 8 - 23 mg/dL Final     Creatinine   Date Value Ref Range Status   10/10/2019 0.8 0.5 - 1.4 mg/dL Final     Calcium   Date Value Ref Range Status   10/10/2019 9.5 8.7 - 10.5 mg/dL Final     Total Protein   Date Value Ref Range Status   10/10/2019 7.2 6.0 - 8.4 g/dL Final     Albumin   Date Value Ref Range Status   10/10/2019 3.8 3.5 - 5.2 g/dL Final     Total Bilirubin   Date Value Ref Range Status   10/10/2019 0.9 0.1 - 1.0 mg/dL Final     Comment:     For infants and newborns, interpretation of results should be based  on gestational age, weight and in agreement with clinical  observations.  Premature Infant recommended reference ranges:  Up to 24 hours.............<8.0 mg/dL  Up to 48 hours............<12.0 mg/dL  3-5 days..................<15.0 mg/dL  6-29 days.................<15.0 mg/dL       Alkaline Phosphatase   Date Value Ref Range Status   10/10/2019 186 (H) 55 - 135 U/L Final     AST   Date Value Ref Range Status   10/10/2019 30 10 - 40 U/L Final     ALT   Date Value Ref Range Status   10/10/2019 24 10 - 44 U/L Final     Anion Gap   Date Value Ref Range Status   10/10/2019 9 8 - 16 mmol/L Final     eGFR if    Date Value Ref Range Status   10/10/2019 >60 >60 mL/min/1.73 m^2 Final     eGFR if non    Date Value Ref Range  Status   10/10/2019 >60 >60 mL/min/1.73 m^2 Final     Comment:     Calculation used to obtain the estimated glomerular filtration  rate (eGFR) is the CKD-EPI equation.        Pet ct reviewed and discussed for over twenty minutes  Ref Range & Tottc0s ago  CA 1250 - 30 U/mL91 Abnormally high   Ref Range & Kzbgy0s ago  CEA0.0 - 5.0 ng/mL7.6 Abnormally high      UA reviewed    Chemotherapy follow-up examination  -     CBC auto differential; Future; Expected date: 10/11/2019  -     CMP; Future; Expected date: 10/11/2019  -     ; Future; Expected date: 10/11/2019    Hepatic metastases  -     CBC auto differential; Future; Expected date: 10/11/2019  -     CMP; Future; Expected date: 10/11/2019  -     ; Future; Expected date: 10/11/2019    Malignant neoplasm metastatic to lung, unspecified laterality  -     ; Future; Expected date: 10/11/2019    Other abnormal tumor markers   -     ; Future; Expected date: 10/11/2019    Gastritis, presence of bleeding unspecified, unspecified chronicity, unspecified gastritis type  -     sucralfate (CARAFATE) 1 gram tablet; Take 1 tablet (1 g total) by mouth 4 (four) times daily.  Dispense: 40 tablet; Refill: 0    Other orders  -     bevacizumab (AVASTIN) 10 mg/kg = 590 mg in sodium chloride 0.9% 100 mL chemo infusion       Chemo follow up   Add carafate   Lung and liver metastatic disease from GYN malignancy   Cleared for avastin day 15  Explained the difference between doxil and avastin  proceed with pneumo vaccine   Emesis : I am concerned about a gastric ulcer   Again reiterated stage and incurable disease   Again discussed meat and protein   Edema stable   no pain   No recent falls    next visit MUST decrease doxil dose   CLEARED for avastin   Bone density should be done soon     The patient's consent has been obtained to proceed with the chemotherapy.The patient will be referred to [facility] /[facility] for training and education on  chemotherapy, use of antiemetics and/or anti-diarrheals, use of NSAID's, potential chemotherapy side-effects, and any specific recommendations and precautions with the particular chemotherapy agents.       I answered all of the patient's (and family's, if applicable) questions to the best of my ability and to their complete satisfaction. The patient acknowledged full understanding of the risks, recommendations and plan(s).      Sincerely,  Kenyatta Tejeda MD [facility]          Advance Care Planning     Living Will  During this visit, I engaged the patient in the advance care planning process.  The patient and I reviewed the role for advance directives and their purpose in directing future healthcare if the patient's unable to speak for him/herself.  At this point in time, the patient does have full decision-making capacity.  We discussed different extreme health states that she could experience, and reviewed what kind of medical care she would want in those situations.  The patient communicated that if she were comatose and had little chance of a meaningful recovery, she would not want machines/life-sustaining treatments used.  The patient has completed a living will to reflect these preferences.  The patient  Has  already designated a healthcare power of  to make decisions on her behalf.   I spent a total of  10  minutes engaging the patient in this advance care planning discussion.

## 2019-10-14 ENCOUNTER — INFUSION (OUTPATIENT)
Dept: INFUSION THERAPY | Facility: HOSPITAL | Age: 84
End: 2019-10-14
Attending: INTERNAL MEDICINE
Payer: MEDICARE

## 2019-10-14 VITALS
HEIGHT: 63 IN | WEIGHT: 126.88 LBS | RESPIRATION RATE: 18 BRPM | BODY MASS INDEX: 22.48 KG/M2 | SYSTOLIC BLOOD PRESSURE: 113 MMHG | TEMPERATURE: 98 F | HEART RATE: 68 BPM | DIASTOLIC BLOOD PRESSURE: 54 MMHG

## 2019-10-14 DIAGNOSIS — C56.9 MALIGNANT NEOPLASM OF OVARY, UNSPECIFIED LATERALITY: ICD-10-CM

## 2019-10-14 DIAGNOSIS — T45.1X5A CHEMOTHERAPY INDUCED NEUTROPENIA: Primary | ICD-10-CM

## 2019-10-14 DIAGNOSIS — D70.1 CHEMOTHERAPY INDUCED NEUTROPENIA: Primary | ICD-10-CM

## 2019-10-14 PROCEDURE — A4216 STERILE WATER/SALINE, 10 ML: HCPCS | Performed by: INTERNAL MEDICINE

## 2019-10-14 PROCEDURE — 25000003 PHARM REV CODE 250: Performed by: INTERNAL MEDICINE

## 2019-10-14 PROCEDURE — 96413 CHEMO IV INFUSION 1 HR: CPT

## 2019-10-14 PROCEDURE — 63600175 PHARM REV CODE 636 W HCPCS: Performed by: INTERNAL MEDICINE

## 2019-10-14 RX ORDER — HEPARIN 100 UNIT/ML
500 SYRINGE INTRAVENOUS
Status: DISCONTINUED | OUTPATIENT
Start: 2019-10-14 | End: 2019-10-14 | Stop reason: HOSPADM

## 2019-10-14 RX ORDER — SODIUM CHLORIDE 0.9 % (FLUSH) 0.9 %
10 SYRINGE (ML) INJECTION
Status: DISCONTINUED | OUTPATIENT
Start: 2019-10-14 | End: 2019-10-14 | Stop reason: HOSPADM

## 2019-10-14 RX ADMIN — SODIUM CHLORIDE, PRESERVATIVE FREE 10 ML: 5 INJECTION INTRAVENOUS at 09:10

## 2019-10-14 RX ADMIN — BEVACIZUMAB 590 MG: 400 INJECTION, SOLUTION INTRAVENOUS at 08:10

## 2019-10-14 RX ADMIN — SODIUM CHLORIDE: 9 INJECTION, SOLUTION INTRAVENOUS at 08:10

## 2019-10-21 ENCOUNTER — TELEPHONE (OUTPATIENT)
Dept: ORTHOPEDICS | Facility: CLINIC | Age: 84
End: 2019-10-21

## 2019-10-21 ENCOUNTER — TELEPHONE (OUTPATIENT)
Dept: HEMATOLOGY/ONCOLOGY | Facility: CLINIC | Age: 84
End: 2019-10-21
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
## 2019-10-21 DIAGNOSIS — S72.142D INTERTROCHANTERIC FRACTURE OF LEFT HIP, CLOSED, WITH ROUTINE HEALING, SUBSEQUENT ENCOUNTER: ICD-10-CM

## 2019-10-21 NOTE — PROGRESS NOTES
CC:  89-year-old female follows up status post IM nailing of an intertrochanteric femur fracture of the left proximal femur. Date of surgery was 05/31/2019.  She denies any pain in the left hip.    The patient did have a sort of fall a couple days ago.  The patient states she went down slowly while holding on to her rolling walker and ended up on both of her knees.  She has had some pain in the left knee since that time. She rates the pain as a 3/10.  It is a dull aching pain.  She is concerned that her left knee buckled and that was the reason she went down slowly and ended up on her knees.    She also is currently taking Lyrica but has been weaning off of that.  She is down to once a day now he would like to completely wean off of it. She has also taking a half a narcotic pain pill a day that she gets from a physician that is treating her cancer.  She is currently undergoing chemotherapy.    ROS:    Constitution: Denies chills, fever, and sweats.  HENT: Denies headaches or blurry vision.  Cardiovascular: Denies chest pain or irregular heart beat.  Respiratory: Denies cough or shortness of breath.  Gastrointestinal: Denies abdominal pain, nausea, or vomiting.  Genitourinary:  Denies urinary incontinence, bladder and kidney issues  Musculoskeletal:  Denies muscle cramps.  Positive for left knee pain  Neurological: Denies dizziness or focal weakness.  Psychiatric/Behavioral: Normal mental status.  Hematologic/Lymphatic: Denies bleeding problem or easy bruising/bleeding.  Skin: Denies rash or suspicious lesions.    Physical examination     Gen - No acute distress   Eyes - Extraoccular motions intact, pupils equally round and reactive to light and accommodation   ENT - normocephalic, atruamtic, oropharynx clear   Neck - Supple, no abnormal masses   Cardiovascular - regular rate and rhythm   Pulmonary - clear to auscultation bilaterally   Abdomen - soft, non-tender, non-distended, positive bowel sounds   Psych - The  patient is alert and oriented x3 with normal mood and affect    Examination of the Left Lower Extremity    Skin is intact throughout  Motor in intact EHL,FHL,TA,juju  +2 DP/PT  Sensation LT intact D/P/1st    Examination of the Left Hip    C-Sign negative  Logroll negative  Stenchfield negative    Pain with ROM negative    ROM:    Flexion   120  Extension   10  Abduction   50  Adduction   10  External Rotation 60  Internal Rotation 10    Flexion contracture negative    FADIR negative  FADER negative     Examination of the Left knee:    ROM 0 - 150   Effusion negative  Tenderness to palpation at the joint line positive  Pain during range of motion positive  Crepitation during range of motion negative     positive increased pain noted with flexion past 90   positive antalgic gait noted   negative Lachman's Test   negative Anterior Drawer Test   negative Posterior Drawer Test   negative McMurrays Test   negative Disco Test   negative Varus/Valgus instability      X-rays were examined and personally reviewed by me.  Three views the left hip dated 10/22/2019 show an intertrochanteric femur fracture that is transfixed by an intramedullary nail.  Alignment is good.  The fracture is healing.  The olive appears well fixed with no evidence of loosening and no evidence of cutout.  Three views of the left knee dated 10/22/2019 show some slight narrowing of the joint space but otherwise no advanced arthritic changes. No acute fractures    Dx:  Status post intramedullary nailing for a left intertrochanteric hip fracture, stable. Pain in the left knee after a recent fall.    Plan:  I did offer to inject the left knee for the patient. She refused the injection. Reviewed all of her x-rays.  We discussed treatment options for the knee.  I do think she would benefit from at least a knee sleeve if not a brace.  The patient stated she would start with an over-the-counter sleeve and will call us if she wants to progress to a brace.  We  discussed exercises that she can do for her quads and to strengthen her knee. She can follow up in 6 months with an x-ray of her left hip.

## 2019-10-21 NOTE — TELEPHONE ENCOUNTER
----- Message from Eduardo Brewster sent at 10/21/2019  9:48 AM CDT -----  Contact: June  // daughter  Chirag and medical questions, [phone]  /// [phone] leave  Message if necessary

## 2019-10-21 NOTE — TELEPHONE ENCOUNTER
Daughter contacted. Stated mother was weaning herself off the Lyrica; has gone from BID to taking once daily for the past week.     Also, patient had a slight fall, not complete, but would like xray of knee. Appointment rescheduled. Jessica Allison LPN

## 2019-10-21 NOTE — TELEPHONE ENCOUNTER
----- Message from Sita Huerta MA sent at 10/21/2019  2:26 PM CDT -----      ----- Message -----  From: Meenakshi Loya  Sent: 10/21/2019  12:16 PM CDT  To: Nikhil HEREDIA Staff    Type:  Patient Returning Call    Who Called:  Daughter, Kendra Delgadobeedelmi  Who Left Message for Patient:  Jessica [facility]  Does the patient know what this is regarding?:  Jessica left msg for Kendra Mo  Best Call Back Number:  [phone]    Thank you,  Meenakshi Loya  Patient Access Rep, Supervisor   [phone]

## 2019-10-21 NOTE — TELEPHONE ENCOUNTER
----- Message from Meenakshi Loya sent at 10/21/2019 12:16 PM CDT -----  Type:  Patient Returning Call    Who Called:  Daughter, Kendra Mo  Who Left Message for Patient:  Jessica [place]  Does the patient know what this is regarding?:  Jessica, left msg for Kendra Chely  Best Call Back Number:  [phone]    Thank you,  Meenakshi Loya  Patient Access Rep, Supervisor   [phone]

## 2019-10-22 ENCOUNTER — OFFICE VISIT (OUTPATIENT)
Dept: ORTHOPEDICS | Facility: CLINIC | Age: 84
End: 2019-10-22
Payer: MEDICARE

## 2019-10-22 ENCOUNTER — PATIENT OUTREACH (OUTPATIENT)
Dept: ADMINISTRATIVE | Facility: HOSPITAL | Age: 84
End: 2019-10-22

## 2019-10-22 ENCOUNTER — HOSPITAL ENCOUNTER (OUTPATIENT)
Dept: RADIOLOGY | Facility: HOSPITAL | Age: 84
Discharge: HOME | End: 2019-10-22
Attending: ORTHOPAEDIC SURGERY
Payer: MEDICARE

## 2019-10-22 VITALS
WEIGHT: 126 LBS | HEIGHT: 63 IN | DIASTOLIC BLOOD PRESSURE: 74 MMHG | SYSTOLIC BLOOD PRESSURE: 169 MMHG | BODY MASS INDEX: 22.32 KG/M2 | HEART RATE: 83 BPM

## 2019-10-22 DIAGNOSIS — S83.92XA SPRAIN OF LEFT KNEE, UNSPECIFIED LIGAMENT, INITIAL ENCOUNTER: Primary | ICD-10-CM

## 2019-10-22 DIAGNOSIS — S72.142D INTERTROCHANTERIC FRACTURE OF LEFT HIP, CLOSED, WITH ROUTINE HEALING, SUBSEQUENT ENCOUNTER: ICD-10-CM
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
## 2019-10-22 PROCEDURE — 1100F PR PT FALLS ASSESS DOC 2+ FALLS/FALL W/INJURY/YR: ICD-10-PCS | Mod: CPTII,S$GLB,, | Performed by: ORTHOPAEDIC SURGERY

## 2019-10-22 PROCEDURE — 99499 UNLISTED E&M SERVICE: CPT | Mod: S$GLB,,, | Performed by: ORTHOPAEDIC SURGERY

## 2019-10-22 PROCEDURE — 99213 OFFICE O/P EST LOW 20 MIN: CPT | Mod: S$GLB,,, | Performed by: ORTHOPAEDIC SURGERY

## 2019-10-22 PROCEDURE — 99999 PR PBB SHADOW E&M-EST. PATIENT-LVL III: ICD-10-PCS | Mod: PBBFAC,,, | Performed by: ORTHOPAEDIC SURGERY

## 2019-10-22 PROCEDURE — 99499 RISK ADDL DX/OHS AUDIT: ICD-10-PCS | Mod: S$GLB,,, | Performed by: ORTHOPAEDIC SURGERY

## 2019-10-22 PROCEDURE — 73564 XR KNEE ORTHO BILAT WITH FLEXION: ICD-10-PCS | Mod: 26,50,, | Performed by: RADIOLOGY

## 2019-10-22 PROCEDURE — 73564 X-RAY EXAM KNEE 4 OR MORE: CPT | Mod: TC,50,PN

## 2019-10-22 PROCEDURE — 99999 PR PBB SHADOW E&M-EST. PATIENT-LVL III: CPT | Mod: PBBFAC,,, | Performed by: ORTHOPAEDIC SURGERY

## 2019-10-22 PROCEDURE — 3288F PR FALLS RISK ASSESSMENT DOCUMENTED: ICD-10-PCS | Mod: CPTII,S$GLB,, | Performed by: ORTHOPAEDIC SURGERY

## 2019-10-22 PROCEDURE — 73502 X-RAY EXAM HIP UNI 2-3 VIEWS: CPT | Mod: 26,LT,, | Performed by: RADIOLOGY

## 2019-10-22 PROCEDURE — 73564 X-RAY EXAM KNEE 4 OR MORE: CPT | Mod: 26,50,, | Performed by: RADIOLOGY

## 2019-10-22 PROCEDURE — 73502 X-RAY EXAM HIP UNI 2-3 VIEWS: CPT | Mod: TC,PN,LT

## 2019-10-22 PROCEDURE — 1100F PTFALLS ASSESS-DOCD GE2>/YR: CPT | Mod: CPTII,S$GLB,, | Performed by: ORTHOPAEDIC SURGERY

## 2019-10-22 PROCEDURE — 73502 XR HIP 2 VIEW LEFT: ICD-10-PCS | Mod: 26,LT,, | Performed by: RADIOLOGY

## 2019-10-22 PROCEDURE — 99213 PR OFFICE/OUTPT VISIT, EST, LEVL III, 20-29 MIN: ICD-10-PCS | Mod: S$GLB,,, | Performed by: ORTHOPAEDIC SURGERY

## 2019-10-22 PROCEDURE — 3288F FALL RISK ASSESSMENT DOCD: CPT | Mod: CPTII,S$GLB,, | Performed by: ORTHOPAEDIC SURGERY

## 2019-10-23 ENCOUNTER — LAB VISIT (OUTPATIENT)
Dept: LAB | Facility: HOSPITAL | Age: 84
End: 2019-10-23
Attending: INTERNAL MEDICINE
Payer: MEDICARE

## 2019-10-23 DIAGNOSIS — C78.00 MALIGNANT NEOPLASM METASTATIC TO LUNG, UNSPECIFIED LATERALITY: ICD-10-CM

## 2019-10-23 DIAGNOSIS — R97.8 OTHER ABNORMAL TUMOR MARKERS: ICD-10-CM

## 2019-10-23 DIAGNOSIS — Z09 CHEMOTHERAPY FOLLOW-UP EXAMINATION: ICD-10-CM

## 2019-10-23 DIAGNOSIS — C78.7 HEPATIC METASTASES: ICD-10-CM

## 2019-10-23 LAB
ALBUMIN SERPL BCP-MCNC: 4 G/DL (ref 3.5–5.2)
ALP SERPL-CCNC: 126 U/L (ref 55–135)
ALT SERPL W/O P-5'-P-CCNC: 17 U/L (ref 10–44)
ANION GAP SERPL CALC-SCNC: 9 MMOL/L (ref 8–16)
AST SERPL-CCNC: 29 U/L (ref 10–40)
BASOPHILS # BLD AUTO: 0.06 K/UL (ref 0–0.2)
BASOPHILS NFR BLD: 0.6 % (ref 0–1.9)
BILIRUB SERPL-MCNC: 0.4 MG/DL (ref 0.1–1)
BUN SERPL-MCNC: 14 MG/DL (ref 8–23)
CALCIUM SERPL-MCNC: 9.8 MG/DL (ref 8.7–10.5)
CHLORIDE SERPL-SCNC: 101 MMOL/L (ref 95–110)
CO2 SERPL-SCNC: 30 MMOL/L (ref 23–29)
CREAT SERPL-MCNC: 0.8 MG/DL (ref 0.5–1.4)
DIFFERENTIAL METHOD: ABNORMAL
EOSINOPHIL # BLD AUTO: 0 K/UL (ref 0–0.5)
EOSINOPHIL NFR BLD: 0.2 % (ref 0–8)
ERYTHROCYTE [DISTWIDTH] IN BLOOD BY AUTOMATED COUNT: 14.8 % (ref 11.5–14.5)
EST. GFR  (AFRICAN AMERICAN): >60 ML/MIN/1.73 M^2
EST. GFR  (NON AFRICAN AMERICAN): >60 ML/MIN/1.73 M^2
GLUCOSE SERPL-MCNC: 93 MG/DL (ref 70–110)
HCT VFR BLD AUTO: 42.8 % (ref 37–48.5)
HGB BLD-MCNC: 13.4 G/DL (ref 12–16)
IMM GRANULOCYTES # BLD AUTO: 0.03 K/UL (ref 0–0.04)
LYMPHOCYTES # BLD AUTO: 1.5 K/UL (ref 1–4.8)
LYMPHOCYTES NFR BLD: 16.3 % (ref 18–48)
MCH RBC QN AUTO: 28.6 PG (ref 27–31)
MCHC RBC AUTO-ENTMCNC: 31.3 G/DL (ref 32–36)
MCV RBC AUTO: 91 FL (ref 82–98)
MONOCYTES # BLD AUTO: 1 K/UL (ref 0.3–1)
MONOCYTES NFR BLD: 10.2 % (ref 4–15)
NEUTROPHILS # BLD AUTO: 6.8 K/UL (ref 1.8–7.7)
NEUTROPHILS NFR BLD: 72.4 % (ref 38–73)
NRBC BLD-RTO: 0 /100 WBC
PLATELET # BLD AUTO: 288 K/UL (ref 150–350)
PMV BLD AUTO: 9.2 FL (ref 9.2–12.9)
POTASSIUM SERPL-SCNC: 4.4 MMOL/L (ref 3.5–5.1)
PROT SERPL-MCNC: 7.5 G/DL (ref 6–8.4)
RBC # BLD AUTO: 4.69 M/UL (ref 4–5.4)
SODIUM SERPL-SCNC: 140 MMOL/L (ref 136–145)
WBC # BLD AUTO: 9.35 K/UL (ref 3.9–12.7)

## 2019-10-23 PROCEDURE — 80053 COMPREHEN METABOLIC PANEL: CPT

## 2019-10-23 PROCEDURE — 85025 COMPLETE CBC W/AUTO DIFF WBC: CPT

## 2019-10-23 PROCEDURE — 36415 COLL VENOUS BLD VENIPUNCTURE: CPT

## 2019-10-23 PROCEDURE — 86304 IMMUNOASSAY TUMOR CA 125: CPT

## 2019-10-24 ENCOUNTER — OFFICE VISIT (OUTPATIENT)
Dept: HEMATOLOGY/ONCOLOGY | Facility: CLINIC | Age: 84
End: 2019-10-24
Payer: MEDICARE

## 2019-10-24 VITALS
RESPIRATION RATE: 20 BRPM | WEIGHT: 131.63 LBS | SYSTOLIC BLOOD PRESSURE: 140 MMHG | TEMPERATURE: 98 F | HEIGHT: 63 IN | DIASTOLIC BLOOD PRESSURE: 60 MMHG | BODY MASS INDEX: 23.32 KG/M2 | HEART RATE: 91 BPM | OXYGEN SATURATION: 98 %

## 2019-10-24 DIAGNOSIS — Z51.11 ENCOUNTER FOR CHEMOTHERAPY MANAGEMENT: ICD-10-CM

## 2019-10-24 DIAGNOSIS — Z09 ONCOLOGY FOLLOW-UP ENCOUNTER: Primary | ICD-10-CM

## 2019-10-24 DIAGNOSIS — C56.9 MALIGNANT NEOPLASM OF OVARY, UNSPECIFIED LATERALITY: ICD-10-CM

## 2019-10-24 DIAGNOSIS — K21.00 GASTROESOPHAGEAL REFLUX DISEASE WITH ESOPHAGITIS: ICD-10-CM

## 2019-10-24 DIAGNOSIS — M85.80 OSTEOPENIA, UNSPECIFIED LOCATION: ICD-10-CM

## 2019-10-24 LAB — CANCER AG125 SERPL-ACNC: 17 U/ML (ref 0–30)

## 2019-10-24 PROCEDURE — 99215 PR OFFICE/OUTPT VISIT, EST, LEVL V, 40-54 MIN: ICD-10-PCS | Mod: S$GLB,,, | Performed by: INTERNAL MEDICINE

## 2019-10-24 PROCEDURE — 3288F FALL RISK ASSESSMENT DOCD: CPT | Mod: CPTII,S$GLB,, | Performed by: INTERNAL MEDICINE

## 2019-10-24 PROCEDURE — 3288F PR FALLS RISK ASSESSMENT DOCUMENTED: ICD-10-PCS | Mod: CPTII,S$GLB,, | Performed by: INTERNAL MEDICINE

## 2019-10-24 PROCEDURE — 1100F PTFALLS ASSESS-DOCD GE2>/YR: CPT | Mod: CPTII,S$GLB,, | Performed by: INTERNAL MEDICINE

## 2019-10-24 PROCEDURE — 99215 OFFICE O/P EST HI 40 MIN: CPT | Mod: S$GLB,,, | Performed by: INTERNAL MEDICINE

## 2019-10-24 PROCEDURE — 99999 PR PBB SHADOW E&M-EST. PATIENT-LVL III: ICD-10-PCS | Mod: PBBFAC,,, | Performed by: INTERNAL MEDICINE

## 2019-10-24 PROCEDURE — 99999 PR PBB SHADOW E&M-EST. PATIENT-LVL III: CPT | Mod: PBBFAC,,, | Performed by: INTERNAL MEDICINE

## 2019-10-24 PROCEDURE — 1100F PR PT FALLS ASSESS DOC 2+ FALLS/FALL W/INJURY/YR: ICD-10-PCS | Mod: CPTII,S$GLB,, | Performed by: INTERNAL MEDICINE

## 2019-10-24 PROCEDURE — 99499 UNLISTED E&M SERVICE: CPT | Mod: S$GLB,,, | Performed by: INTERNAL MEDICINE

## 2019-10-24 PROCEDURE — 99499 RISK ADDL DX/OHS AUDIT: ICD-10-PCS | Mod: S$GLB,,, | Performed by: INTERNAL MEDICINE

## 2019-10-24 RX ORDER — DIPHENHYDRAMINE HYDROCHLORIDE 50 MG/ML
50 INJECTION INTRAMUSCULAR; INTRAVENOUS ONCE AS NEEDED
Status: CANCELLED | OUTPATIENT
Start: 2019-10-28

## 2019-10-24 RX ORDER — HEPARIN 100 UNIT/ML
500 SYRINGE INTRAVENOUS
Status: CANCELLED | OUTPATIENT
Start: 2019-10-28

## 2019-10-24 RX ORDER — EPINEPHRINE 0.3 MG/.3ML
0.3 INJECTION SUBCUTANEOUS ONCE AS NEEDED
Status: CANCELLED | OUTPATIENT
Start: 2019-10-28

## 2019-10-24 RX ORDER — SODIUM CHLORIDE 0.9 % (FLUSH) 0.9 %
10 SYRINGE (ML) INJECTION
Status: CANCELLED | OUTPATIENT
Start: 2019-10-28

## 2019-10-24 NOTE — PROGRESS NOTES
CC   I need my pain meds    Pau Cook is a 89 y.o.    This is an 89 year-old female referred from  here for recurrent chemo      Saw  Dr. Rose May 6, recommends further chemo   records     Ovarian cancer    1/15/2019 Initial Diagnosis     Ovarian cancer       Cancer Staged     Cancer Staging  Ovarian cancer  Staging form: Ovary, Fallopian Tube, and Primary Peritoneal Carcinoma, AJCC 8th Edition  - Clinical stage from 2/28/2019: FIGO Stage IC, calculated as Stage IV (cT1c2, cNX, cM1) - Signed by Kenyatta Tejeda MD on 2/28/2019       Chemotherapy     Treatment Summary   Plan Name: OP GYN DOCETAXEL CARBOPLATIN (AUC) Q3W  Treatment Goal: Control  Status: Active  Start Date: 2/8/2019  End Date: 5/24/2019 (Planned)  Provider: Kenyatta Tejeda MD  Chemotherapy: CARBOplatin (PARAPLATIN) 230 mg in sodium chloride 0.9% 250 mL chemo infusion, 230 mg (100 % of original dose 230 mg), Intravenous, Clinic/HOD 1 time, 1 of 6 cycles  Dose modification: 230 mg (original dose 230 mg, Cycle 1, Reason: MD Discretion)    DOCEtaxel (TAXOTERE) 60 mg/m2 = 95 mg in sodium chloride 0.9% 250 mL chemo infusion, 60 mg/m2 = 95 mg (original dose 96 mg), Intravenous, Clinic/HOD 1 time, 1 of 6 cycles  Dose modification: 96 mg (original dose 96 mg, Cycle 1), 90 mg (original dose 96 mg, Cycle 1), 60 mg/m2 (original dose 96 mg, Cycle 1)           Tumor Markers     Patient's tumor was tested for the following markers: Ca 125 : 91                                                4/19/2019 - 8/13/2019 Chemotherapy     Treatment Summary   Plan Name: OP GYN DOCETAXEL CARBOPLATIN   Treatment Goal: Control  Status: Inactive  Start Date: 4/26/2019  End Date: 7/24/2019  Provider: Kenyatta Tejeda MD  Chemotherapy: CARBOplatin (PARAPLATIN) 240 mg in sodium chloride 0.9% 250 mL chemo infusion, 240 mg (100 % of original dose 242 mg), Intravenous, Clinic/HOD 1 time, 3 of 3 cycles  Dose modification:   (original dose 242 mg, Cycle 1, Reason: Other (see  comments))  DOCEtaxel (TAXOTERE) 96 mg in sodium chloride 0.9% 250 mL chemo infusion, 95 mg (100 % of original dose 96 mg), Intravenous, Clinic/HOD 1 time, 3 of 3 cycles  Dose modification: 96 mg (original dose 96 mg, Cycle 1)      9/13/2019 -  Chemotherapy     Treatment Summary   Plan Name: OP GYN LIPOSOMAL DOXORUBICIN BEVACIZUMAB (ANNE) PLATINUM-RESISTANT OVARIAN CANCER  Treatment Goal: Control  Status: Active  Start Date: 9/30/2019  End Date: 3/2/2020 (Planned)  Provider: Kenyatta Tejeda MD  Chemotherapy: bevacizumab (AVASTIN) 10 mg/kg = 590 mg in sodium chloride 0.9% 123.6 mL chemo infusion, 10 mg/kg = 590 mg, Intravenous, Clinic/HOD 1 time, 1 of 6 cycles  Administration: 590 mg (9/30/2019), 590 mg (10/14/2019)  DOXOrubicin liposome (DOXIL) 65 mg in dextrose 5 % 250 mL chemo infusion, 40 mg/m2 = 65 mg, Intravenous, Clinic/HOD 1 time, 1 of 6 cycles  Administration: 65 mg (9/30/2019)            Referred by :  Dr. 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  Pathology revealed high-grade undifferentiated carcinoma involving the left tube and ovary with extensive necrosis.  Malignancy was noted involving the ovarian surface as well as the fallopian tube surface.  The tumor was 7 cm in greatest dimension.  This was a grade 3 undifferentiated carcinoma. 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 , Disp: , Rfl:     carboxymethylcellulose (REFRESH PLUS) 0.5 % Dpet, 1 drop daily as needed., Disp: , Rfl:     cyanocobalamin (VITAMIN B-12) 1000 MCG tablet, Take 1,000 mcg by mouth once daily. , Disp: , Rfl:     esomeprazole (NEXIUM) 20 MG capsule, Take 1 capsule (20 mg total) by mouth before breakfast., Disp: 30 capsule, Rfl: 11    FLAXSEED ORAL, Take 650 mg by mouth once daily. , Disp: , Rfl:     FLUAD [id number], 65 YR UP,,PF, 45 mcg (15 mcg x 3)/0.5 mL Syrg, ADM 0.5ML IM UTD, Disp: , Rfl: 0    ketotifen (ZADITOR) 0.025 % (0.035 %) ophthalmic solution, Place 1 drop into both eyes 2 (two) times daily as needed (Pt reports use approx once/week). , Disp: , Rfl:     MULTIVITAMIN W-MINERALS/LUTEIN (CENTRUM SILVER ORAL), Take 1 tablet by mouth once daily. , Disp: , Rfl:     pregabalin (LYRICA) 75 MG capsule, Take 1 capsule (75 mg total) by mouth 2 (two) times daily., Disp: 60 capsule, Rfl: 0    prochlorperazine (COMPAZINE) 10 MG tablet, Take 1 tablet (10 mg total) by mouth every 6 (six) hours as needed., Disp: 30 tablet, Rfl: 1    sucralfate (CARAFATE) 1 gram tablet, Take 1 tablet (1 g total) by mouth 4 (four) times daily., Disp: 40 tablet, Rfl: 0     She remains on intermittent Compazine with Nexium to help with gastritis and nausea.  She reports the Zofran is not helping her much.  For bone pain she has taken over-the-counter Tylenol Arthritis    CONSTITUTIONAL: No fevers, chills, night sweats, wt. Loss  Bladder cramping with urinary frequency   SKIN:  Positive  rash  ENT: No headaches, head trauma, vision changes  LYMPH NODES: None noticed   CV: No chest pain, palpitations.   RESP: No dyspnea on exertion, cough, wheezing, no further rib pain  GI: No   emesis,  + nausea and vomiting   : No dysuria, hematuria, urgency   HEME: No easy bruising, bleeding problems  PSYCHIATRIC: No depression, anxiety, psychosis  NEURO: No seizures, memory loss, no headaches  MSK:  Positive bone pain  Intermittently         Wt Readings from Last 3 Encounters:   10/22/19 57.2 kg (126 lb)   10/14/19 57.6 kg (126 lb 14 oz)   10/11/19 56.2 kg (123 lb 14.4 oz)     Temp Readings from Last 3 Encounters:   10/14/19 97.5 °F (36.4 °C)   10/11/19 97.8 °F (36.6 °C)   10/01/19 97.7 °F (36.5 °C)     BP Readings from Last 3 Encounters:   10/22/19 (!) 169/74   10/14/19 (!) 113/54   10/11/19 (!) 103/54     Pulse Readings from Last 3 Encounters:   10/22/19 83   10/14/19 68   10/11/19 (!) 111       Constitutional: oriented to person, place, and time.     HENT:   Head: Normocephalic and atraumatic.   Right Ear: External ear normal. Left Ear: External ear normal.   Nose: Nose normal.   Mouth/Throat: Oropharynx is clear and moist.   Eyes: Conjunctivae and EOM are normal. Pupils are equal,  Neck: Normal range of motion. Neck supple.   Cardiovascular: Normal rate, regular rhythm, normal heart sounds   No pmi  Pulmonary/Chest: Effort normal and breath sounds normal.  no fremitus   Abdominal: Soft. Bowel sounds are normal.  no mass.   Musculoskeletal: Normal range of motion.  + edema of ankles    Lymphadenopathy:  no cervical adenopathy.   Neurological: alert and oriented to person, place   Decreased ROM   Psychiatric: flat  affect   Vitals reviewed.      Lab Results   Component Value Date    WBC 9.35 10/23/2019    RBC 4.69 10/23/2019    HGB 13.4 10/23/2019    HCT 42.8 10/23/2019    MCV 91 10/23/2019    MCH 28.6 10/23/2019    MCHC 31.3 (L) 10/23/2019    RDW 14.8 (H) 10/23/2019     10/23/2019    MPV 9.2  10/23/2019    GRAN 6.8 10/23/2019    GRAN 72.4 10/23/2019    LYMPH 1.5 10/23/2019    LYMPH 16.3 (L) 10/23/2019    MONO 1.0 10/23/2019    MONO 10.2 10/23/2019    EOS 0.0 10/23/2019    BASO 0.06 10/23/2019    EOSINOPHIL 0.2 10/23/2019    BASOPHIL 0.6 10/23/2019       CMP  Sodium   Date Value Ref Range Status   10/23/2019 140 136 - 145 mmol/L Final     Potassium   Date Value Ref Range Status   10/23/2019 4.4 3.5 - 5.1 mmol/L Final     Chloride   Date Value Ref Range Status   10/23/2019 101 95 - 110 mmol/L Final     CO2   Date Value Ref Range Status   10/23/2019 30 (H) 23 - 29 mmol/L Final     Glucose   Date Value Ref Range Status   10/23/2019 93 70 - 110 mg/dL Final     BUN, Bld   Date Value Ref Range Status   10/23/2019 14 8 - 23 mg/dL Final     Creatinine   Date Value Ref Range Status   10/23/2019 0.8 0.5 - 1.4 mg/dL Final     Calcium   Date Value Ref Range Status   10/23/2019 9.8 8.7 - 10.5 mg/dL Final     Total Protein   Date Value Ref Range Status   10/23/2019 7.5 6.0 - 8.4 g/dL Final     Albumin   Date Value Ref Range Status   10/23/2019 4.0 3.5 - 5.2 g/dL Final     Total Bilirubin   Date Value Ref Range Status   10/23/2019 0.4 0.1 - 1.0 mg/dL Final     Comment:     For infants and newborns, interpretation of results should be based  on gestational age, weight and in agreement with clinical  observations.  Premature Infant recommended reference ranges:  Up to 24 hours.............<8.0 mg/dL  Up to 48 hours............<12.0 mg/dL  3-5 days..................<15.0 mg/dL  6-29 days.................<15.0 mg/dL       Alkaline Phosphatase   Date Value Ref Range Status   10/23/2019 126 55 - 135 U/L Final     AST   Date Value Ref Range Status   10/23/2019 29 10 - 40 U/L Final     ALT   Date Value Ref Range Status   10/23/2019 17 10 - 44 U/L Final     Anion Gap   Date Value Ref Range Status   10/23/2019 9 8 - 16 mmol/L Final     eGFR if    Date Value Ref Range Status   10/23/2019 >60 >60 mL/min/1.73 m^2  Final     eGFR if non    Date Value Ref Range Status   10/23/2019 >60 >60 mL/min/1.73 m^2 Final     Comment:     Calculation used to obtain the estimated glomerular filtration  rate (eGFR) is the CKD-EPI equation.        Pet ct reviewed and discussed for over twenty minutes  Ref Range & [id number] ago  CA 1250 - 30 U/mL91 Abnormally high   Ref Range & [id number] ago  CEA0.0 - 5.0 ng/mL7.6 Abnormally high        Oncology follow-up encounter  -     Urinalysis; Future; Expected date: 10/24/2019    Gastroesophageal reflux disease with esophagitis    Osteopenia, unspecified location    Malignant neoplasm of ovary, unspecified laterality  -     CBC auto differential; Standing  -     CMP; Future; Expected date: 10/24/2019    Encounter for chemotherapy management  -     CBC auto differential; Standing  -     CMP; Future; Expected date: 10/24/2019    Other orders  -     Cancel: pegfilgrastim (NEULASTA (ON BODY INJECTOR)) injection 6 mg  -     palonosetron 0.25 mg, dexamethasone 10 mg in sodium chloride 0.9% 50 mL  -     EPINEPHrine (EPIPEN) 0.3 mg/0.3 mL pen injection 0.3 mg  -     diphenhydrAMINE injection 50 mg  -     hydrocortisone sodium succinate injection 100 mg  -     bevacizumab (AVASTIN) 10 mg/kg = 590 mg in sodium chloride 0.9% 100 mL chemo infusion  -     DOXOrubicin liposome (DOXIL) 65 mg in dextrose 5 % 250 mL chemo infusion  -     sodium chloride 0.9% 100 mL flush bag  -     dextrose 5 % 100 mL flush bag  -     sodium chloride 0.9% flush 10 mL  -     heparin, porcine (PF) 100 unit/mL injection flush 500 Units  -     alteplase injection 2 mg  -     pegfilgrastim injection 6 mg       Chemo follow up   Lung and liver metastatic disease from GYN malignancy   Explained the difference between doxil and avastin  Edema stable  no pain   No recent falls   Bone density should be done soon     They  asked a doctor as he passed through the chemo room   On pro is 600$ co pay for th patient   She wants to change to  neulasta   Discussed her changing insurance and her chemo will be covered   She should  abstain from opioids and start back on lyrica     STAFF  RTC 2 weeks with labs for avastin only   Needs urinalysis before next avastin   CLEARED for chemo         Current Outpatient Medications:     acetaminophen (TYLENOL) 500 MG tablet, Take 2 tablets (1,000 mg total) by mouth every 8 (eight) hours., Disp: , Rfl: 0    calcium-vitamin D3 (CALCIUM 500 + D) 500 mg(1,250mg) -200 unit per tablet, Take 1 tablet by mouth once daily. , Disp: , Rfl:     carboxymethylcellulose (REFRESH PLUS) 0.5 % Dpet, 1 drop daily as needed., Disp: , Rfl:     cyanocobalamin (VITAMIN B-12) 1000 MCG tablet, Take 1,000 mcg by mouth once daily. , Disp: , Rfl:     esomeprazole (NEXIUM) 20 MG capsule, Take 1 capsule (20 mg total) by mouth before breakfast., Disp: 30 capsule, Rfl: 11    FLAXSEED ORAL, Take 650 mg by mouth once daily. , Disp: , Rfl:     FLUAD [id number], 65 YR UP,,PF, 45 mcg (15 mcg x 3)/0.5 mL Syrg, ADM 0.5ML IM UTD, Disp: , Rfl: 0    ketotifen (ZADITOR) 0.025 % (0.035 %) ophthalmic solution, Place 1 drop into both eyes 2 (two) times daily as needed (Pt reports use approx once/week). , Disp: , Rfl:     MULTIVITAMIN W-MINERALS/LUTEIN (CENTRUM SILVER ORAL), Take 1 tablet by mouth once daily. , Disp: , Rfl:     pregabalin (LYRICA) 75 MG capsule, Take 1 capsule (75 mg total) by mouth 2 (two) times daily., Disp: 60 capsule, Rfl: 0    prochlorperazine (COMPAZINE) 10 MG tablet, Take 1 tablet (10 mg total) by mouth every 6 (six) hours as needed., Disp: 30 tablet, Rfl: 1    sucralfate (CARAFATE) 1 gram tablet, Take 1 tablet (1 g total) by mouth 4 (four) times daily., Disp: 40 tablet, Rfl: 0      Advance Care Planning     Living Will  During this visit, I engaged the patient in the advance care planning process.  The patient and I reviewed the role for advance directives and their purpose in directing future healthcare if the patient's unable  to speak for him/herself.  At this point in time, the patient does have full decision-making capacity.  We discussed different extreme health states that she could experience, and reviewed what kind of medical care she would want in those situations.  The patient communicated that if she were comatose and had little chance of a meaningful recovery, she would not want machines/life-sustaining treatments used.  The patient has completed a living will to reflect these preferences.  The patient  Has  already designated a healthcare power of  to make decisions on her behalf.   I spent a total of  10  minutes engaging the patient in this advance care planning discussion.

## 2019-10-28 ENCOUNTER — INFUSION (OUTPATIENT)
Dept: INFUSION THERAPY | Facility: HOSPITAL | Age: 84
End: 2019-10-28
Attending: INTERNAL MEDICINE
Payer: MEDICARE

## 2019-10-28 VITALS
HEART RATE: 77 BPM | HEIGHT: 63 IN | RESPIRATION RATE: 18 BRPM | BODY MASS INDEX: 22.3 KG/M2 | WEIGHT: 125.88 LBS | TEMPERATURE: 98 F | SYSTOLIC BLOOD PRESSURE: 117 MMHG | DIASTOLIC BLOOD PRESSURE: 60 MMHG

## 2019-10-28 DIAGNOSIS — T45.1X5A CHEMOTHERAPY INDUCED NEUTROPENIA: Primary | ICD-10-CM

## 2019-10-28 DIAGNOSIS — C56.9 MALIGNANT NEOPLASM OF OVARY, UNSPECIFIED LATERALITY: ICD-10-CM

## 2019-10-28 DIAGNOSIS — D70.1 CHEMOTHERAPY INDUCED NEUTROPENIA: Primary | ICD-10-CM

## 2019-10-28 PROCEDURE — 25000003 PHARM REV CODE 250: Performed by: INTERNAL MEDICINE

## 2019-10-28 PROCEDURE — 96417 CHEMO IV INFUS EACH ADDL SEQ: CPT

## 2019-10-28 PROCEDURE — 63600175 PHARM REV CODE 636 W HCPCS: Mod: JW,JG | Performed by: INTERNAL MEDICINE

## 2019-10-28 PROCEDURE — 96413 CHEMO IV INFUSION 1 HR: CPT

## 2019-10-28 PROCEDURE — A4216 STERILE WATER/SALINE, 10 ML: HCPCS | Performed by: INTERNAL MEDICINE

## 2019-10-28 PROCEDURE — 96367 TX/PROPH/DG ADDL SEQ IV INF: CPT

## 2019-10-28 RX ORDER — SODIUM CHLORIDE 0.9 % (FLUSH) 0.9 %
10 SYRINGE (ML) INJECTION
Status: DISCONTINUED | OUTPATIENT
Start: 2019-10-28 | End: 2019-10-28 | Stop reason: HOSPADM

## 2019-10-28 RX ADMIN — PALONOSETRON HYDROCHLORIDE: 0.25 INJECTION, SOLUTION INTRAVENOUS at 08:10

## 2019-10-28 RX ADMIN — BEVACIZUMAB 590 MG: 400 INJECTION, SOLUTION INTRAVENOUS at 09:10

## 2019-10-28 RX ADMIN — DOXORUBICIN HYDROCHLORIDE 65 MG: 2 INJECTABLE, LIPOSOMAL INTRAVENOUS at 10:10

## 2019-10-28 RX ADMIN — SODIUM CHLORIDE, PRESERVATIVE FREE 10 ML: 5 INJECTION INTRAVENOUS at 11:10

## 2019-10-29 ENCOUNTER — INFUSION (OUTPATIENT)
Dept: INFUSION THERAPY | Facility: HOSPITAL | Age: 84
End: 2019-10-29
Attending: INTERNAL MEDICINE
Payer: MEDICARE

## 2019-10-29 VITALS
SYSTOLIC BLOOD PRESSURE: 115 MMHG | WEIGHT: 127.81 LBS | DIASTOLIC BLOOD PRESSURE: 68 MMHG | TEMPERATURE: 99 F | RESPIRATION RATE: 18 BRPM | HEART RATE: 96 BPM | BODY MASS INDEX: 22.64 KG/M2

## 2019-10-29 DIAGNOSIS — D70.1 CHEMOTHERAPY INDUCED NEUTROPENIA: Primary | ICD-10-CM

## 2019-10-29 DIAGNOSIS — C56.9 MALIGNANT NEOPLASM OF OVARY, UNSPECIFIED LATERALITY: ICD-10-CM

## 2019-10-29 DIAGNOSIS — T45.1X5A CHEMOTHERAPY INDUCED NEUTROPENIA: Primary | ICD-10-CM

## 2019-10-29 PROCEDURE — 96372 THER/PROPH/DIAG INJ SC/IM: CPT

## 2019-10-29 PROCEDURE — 63600175 PHARM REV CODE 636 W HCPCS: Mod: JG | Performed by: INTERNAL MEDICINE

## 2019-10-29 RX ADMIN — PEGFILGRASTIM 6 MG: 6 INJECTION SUBCUTANEOUS at 10:10

## 2019-10-29 NOTE — PLAN OF CARE
Problem: Neutropenia  Goal: Absence of Infection  Outcome: Ongoing, Progressing  Intervention: Prevent Infection and Maximize Resistance  Flowsheets (Taken 10/29/2019 1016)  Infection Prevention: personal protective equipment utilized;equipment surfaces disinfected

## 2019-10-30 ENCOUNTER — TELEPHONE (OUTPATIENT)
Dept: HEMATOLOGY/ONCOLOGY | Facility: CLINIC | Age: 84
End: 2019-10-30

## 2019-10-30 ENCOUNTER — TELEPHONE (OUTPATIENT)
Dept: ORTHOPEDICS | Facility: CLINIC | Age: 84
End: 2019-10-30

## 2019-10-30 NOTE — TELEPHONE ENCOUNTER
----- Message from Boone Snider MA sent at 10/30/2019  9:00 AM CDT -----  Contact: daughter  Patient is needing pain medication refilled, was prescribed when she was at [facility] but she is no longer there.  Call back  or  [phone]

## 2019-10-30 NOTE — TELEPHONE ENCOUNTER
Daughter notified it will be ok for patient to have tylenol for pain. Daughter notified me that she had the neulasta and that is when she gets the bone pain and she just doesn't feel right and could not get her thoughts right. She was real flushed in cheeks. But she is now ok.

## 2019-10-30 NOTE — TELEPHONE ENCOUNTER
----- Message from Meeta Prado sent at 10/30/2019  4:12 PM CDT -----  Contact: Daughter--[phone]  Type: Needs Medical Advice    Who Called:  Kendra Rosales Call Back Number: [phone] (home)   Additional Information: the patient said the patient is having hip pain and she needs the pain medication, the daughter wants to know if patient may take two extra strength tylenol she used to take hydrocodone please call her to advise. Was told by her pcp to contact her cancer Dr.

## 2019-10-30 NOTE — TELEPHONE ENCOUNTER
Patient requested refill of hydrocodone at office visit; was denied at that time, as she is past the post operative global period. Spoke to daughter. Offered Pain Management referral, which was declined. Daughter felt that this round of Chemo has played a part in increased pain. Will discuss with PCP at next appointment. Patient will continue current regimen with plain acetaminophen.   Last fill per  06/04/2019.  DOS 05/31/19.     Jessica Allison LPN

## 2019-11-04 ENCOUNTER — TELEPHONE (OUTPATIENT)
Dept: FAMILY MEDICINE | Facility: CLINIC | Age: 84
End: 2019-11-04

## 2019-11-06 ENCOUNTER — TELEPHONE (OUTPATIENT)
Dept: FAMILY MEDICINE | Facility: CLINIC | Age: 84
End: 2019-11-06

## 2019-11-06 ENCOUNTER — LAB VISIT (OUTPATIENT)
Dept: LAB | Facility: HOSPITAL | Age: 84
End: 2019-11-06
Attending: INTERNAL MEDICINE
Payer: MEDICARE

## 2019-11-06 DIAGNOSIS — Z09 ONCOLOGY FOLLOW-UP ENCOUNTER: ICD-10-CM

## 2019-11-06 LAB
BACTERIA #/AREA URNS HPF: ABNORMAL /HPF
BILIRUB UR QL STRIP: NEGATIVE
CLARITY UR: ABNORMAL
COLOR UR: YELLOW
GLUCOSE UR QL STRIP: NEGATIVE
HGB UR QL STRIP: ABNORMAL
KETONES UR QL STRIP: NEGATIVE
LEUKOCYTE ESTERASE UR QL STRIP: ABNORMAL
MICROSCOPIC COMMENT: ABNORMAL
NITRITE UR QL STRIP: POSITIVE
PH UR STRIP: 7 [PH] (ref 5–8)
PROT UR QL STRIP: NEGATIVE
RBC #/AREA URNS HPF: 10 /HPF (ref 0–4)
SP GR UR STRIP: 1.01 (ref 1–1.03)
SQUAMOUS #/AREA URNS HPF: 5 /HPF
URN SPEC COLLECT METH UR: ABNORMAL
UROBILINOGEN UR STRIP-ACNC: NEGATIVE EU/DL
WBC #/AREA URNS HPF: 40 /HPF (ref 0–5)

## 2019-11-06 PROCEDURE — 81000 URINALYSIS NONAUTO W/SCOPE: CPT

## 2019-11-08 ENCOUNTER — OFFICE VISIT (OUTPATIENT)
Dept: HEMATOLOGY/ONCOLOGY | Facility: CLINIC | Age: 84
End: 2019-11-08
Payer: MEDICARE

## 2019-11-08 ENCOUNTER — HOSPITAL ENCOUNTER (OUTPATIENT)
Facility: HOSPITAL | Age: 84
Discharge: HOME | End: 2019-11-09
Attending: EMERGENCY MEDICINE | Admitting: INTERNAL MEDICINE
Payer: MEDICARE

## 2019-11-08 VITALS
SYSTOLIC BLOOD PRESSURE: 117 MMHG | WEIGHT: 126.31 LBS | TEMPERATURE: 98 F | BODY MASS INDEX: 22.38 KG/M2 | RESPIRATION RATE: 20 BRPM | HEART RATE: 446 BPM | DIASTOLIC BLOOD PRESSURE: 61 MMHG | OXYGEN SATURATION: 96 % | HEIGHT: 63 IN

## 2019-11-08 DIAGNOSIS — R00.0 TACHYCARDIA: ICD-10-CM

## 2019-11-08 DIAGNOSIS — N39.0 URINARY TRACT INFECTION WITH HEMATURIA, SITE UNSPECIFIED: Primary | ICD-10-CM

## 2019-11-08 DIAGNOSIS — D72.829 LEUKOCYTOSIS, UNSPECIFIED TYPE: ICD-10-CM

## 2019-11-08 DIAGNOSIS — K21.00 GASTROESOPHAGEAL REFLUX DISEASE WITH ESOPHAGITIS: ICD-10-CM

## 2019-11-08 DIAGNOSIS — C56.9 MALIGNANT NEOPLASM OF OVARY, UNSPECIFIED LATERALITY: ICD-10-CM

## 2019-11-08 DIAGNOSIS — R53.83 TIRED: ICD-10-CM

## 2019-11-08 DIAGNOSIS — E86.0 DEHYDRATION: ICD-10-CM

## 2019-11-08 DIAGNOSIS — Z09 ONCOLOGY FOLLOW-UP ENCOUNTER: ICD-10-CM

## 2019-11-08 DIAGNOSIS — R31.9 URINARY TRACT INFECTION WITH HEMATURIA, SITE UNSPECIFIED: Primary | ICD-10-CM

## 2019-11-08 DIAGNOSIS — I49.9 CARDIAC ARRHYTHMIA, UNSPECIFIED CARDIAC ARRHYTHMIA TYPE: ICD-10-CM

## 2019-11-08 DIAGNOSIS — R00.2 PALPITATIONS: Primary | ICD-10-CM

## 2019-11-08 LAB
ALBUMIN SERPL BCP-MCNC: 3.5 G/DL (ref 3.5–5.2)
ALP SERPL-CCNC: 165 U/L (ref 55–135)
ALT SERPL W/O P-5'-P-CCNC: 22 U/L (ref 10–44)
ANION GAP SERPL CALC-SCNC: 11 MMOL/L (ref 8–16)
AST SERPL-CCNC: 25 U/L (ref 10–40)
BASOPHILS # BLD AUTO: 0.06 K/UL (ref 0–0.2)
BASOPHILS NFR BLD: 0.5 % (ref 0–1.9)
BILIRUB SERPL-MCNC: 0.8 MG/DL (ref 0.1–1)
BUN SERPL-MCNC: 20 MG/DL (ref 8–23)
CALCIUM SERPL-MCNC: 8.8 MG/DL (ref 8.7–10.5)
CHLORIDE SERPL-SCNC: 95 MMOL/L (ref 95–110)
CO2 SERPL-SCNC: 28 MMOL/L (ref 23–29)
CREAT SERPL-MCNC: 0.8 MG/DL (ref 0.5–1.4)
DIFFERENTIAL METHOD: ABNORMAL
EOSINOPHIL # BLD AUTO: 0.1 K/UL (ref 0–0.5)
EOSINOPHIL NFR BLD: 0.4 % (ref 0–8)
ERYTHROCYTE [DISTWIDTH] IN BLOOD BY AUTOMATED COUNT: 15.5 % (ref 11.5–14.5)
EST. GFR  (AFRICAN AMERICAN): >60 ML/MIN/1.73 M^2
EST. GFR  (NON AFRICAN AMERICAN): >60 ML/MIN/1.73 M^2
GLUCOSE SERPL-MCNC: 100 MG/DL (ref 70–110)
HCT VFR BLD AUTO: 36.6 % (ref 37–48.5)
HGB BLD-MCNC: 12 G/DL (ref 12–16)
IMM GRANULOCYTES # BLD AUTO: 0.36 K/UL (ref 0–0.04)
IMM GRANULOCYTES NFR BLD AUTO: 2.7 % (ref 0–0.5)
INR PPP: 0.9
LYMPHOCYTES # BLD AUTO: 1.2 K/UL (ref 1–4.8)
LYMPHOCYTES NFR BLD: 8.9 % (ref 18–48)
MAGNESIUM SERPL-MCNC: 1.9 MG/DL (ref 1.6–2.6)
MCH RBC QN AUTO: 29.9 PG (ref 27–31)
MCHC RBC AUTO-ENTMCNC: 32.8 G/DL (ref 32–36)
MCV RBC AUTO: 91 FL (ref 82–98)
MONOCYTES # BLD AUTO: 0.6 K/UL (ref 0.3–1)
MONOCYTES NFR BLD: 4.2 % (ref 4–15)
NEUTROPHILS # BLD AUTO: 11 K/UL (ref 1.8–7.7)
NEUTROPHILS NFR BLD: 83.3 % (ref 38–73)
NRBC BLD-RTO: 0 /100 WBC
PLATELET # BLD AUTO: 190 K/UL (ref 150–350)
PMV BLD AUTO: 9.6 FL (ref 9.2–12.9)
POTASSIUM SERPL-SCNC: 4.1 MMOL/L (ref 3.5–5.1)
PROT SERPL-MCNC: 7.1 G/DL (ref 6–8.4)
PROTHROMBIN TIME: 12 SEC (ref 10.6–14.8)
RBC # BLD AUTO: 4.02 M/UL (ref 4–5.4)
SODIUM SERPL-SCNC: 134 MMOL/L (ref 136–145)
TROPONIN I SERPL DL<=0.01 NG/ML-MCNC: 0.04 NG/ML (ref 0.02–0.04)
WBC # BLD AUTO: 13.17 K/UL (ref 3.9–12.7)

## 2019-11-08 PROCEDURE — 1101F PR PT FALLS ASSESS DOC 0-1 FALLS W/OUT INJ PAST YR: ICD-10-PCS | Mod: CPTII,S$GLB,, | Performed by: INTERNAL MEDICINE

## 2019-11-08 PROCEDURE — 96365 THER/PROPH/DIAG IV INF INIT: CPT

## 2019-11-08 PROCEDURE — 1101F PT FALLS ASSESS-DOCD LE1/YR: CPT | Mod: CPTII,S$GLB,, | Performed by: INTERNAL MEDICINE

## 2019-11-08 PROCEDURE — 96361 HYDRATE IV INFUSION ADD-ON: CPT

## 2019-11-08 PROCEDURE — 99285 EMERGENCY DEPT VISIT HI MDM: CPT | Mod: 25

## 2019-11-08 PROCEDURE — 85025 COMPLETE CBC W/AUTO DIFF WBC: CPT

## 2019-11-08 PROCEDURE — 83735 ASSAY OF MAGNESIUM: CPT

## 2019-11-08 PROCEDURE — 85610 PROTHROMBIN TIME: CPT

## 2019-11-08 PROCEDURE — G0378 HOSPITAL OBSERVATION PER HR: HCPCS

## 2019-11-08 PROCEDURE — 36415 COLL VENOUS BLD VENIPUNCTURE: CPT

## 2019-11-08 PROCEDURE — 99999 PR PBB SHADOW E&M-EST. PATIENT-LVL III: ICD-10-PCS | Mod: PBBFAC,,, | Performed by: INTERNAL MEDICINE

## 2019-11-08 PROCEDURE — 93005 ELECTROCARDIOGRAM TRACING: CPT

## 2019-11-08 PROCEDURE — 96372 THER/PROPH/DIAG INJ SC/IM: CPT | Mod: 59

## 2019-11-08 PROCEDURE — 99999 PR PBB SHADOW E&M-EST. PATIENT-LVL III: CPT | Mod: PBBFAC,,, | Performed by: INTERNAL MEDICINE

## 2019-11-08 PROCEDURE — 80053 COMPREHEN METABOLIC PANEL: CPT

## 2019-11-08 PROCEDURE — 84484 ASSAY OF TROPONIN QUANT: CPT

## 2019-11-08 PROCEDURE — 99215 OFFICE O/P EST HI 40 MIN: CPT | Mod: S$GLB,,, | Performed by: INTERNAL MEDICINE

## 2019-11-08 PROCEDURE — 63600175 PHARM REV CODE 636 W HCPCS: Performed by: NURSE PRACTITIONER

## 2019-11-08 PROCEDURE — 99215 PR OFFICE/OUTPT VISIT, EST, LEVL V, 40-54 MIN: ICD-10-PCS | Mod: S$GLB,,, | Performed by: INTERNAL MEDICINE

## 2019-11-08 PROCEDURE — 25000003 PHARM REV CODE 250: Performed by: NURSE PRACTITIONER

## 2019-11-08 PROCEDURE — 63600175 PHARM REV CODE 636 W HCPCS: Performed by: EMERGENCY MEDICINE

## 2019-11-08 RX ORDER — ACETAMINOPHEN 325 MG/1
650 TABLET ORAL EVERY 4 HOURS PRN
Status: DISCONTINUED | OUTPATIENT
Start: 2019-11-08 | End: 2019-11-09 | Stop reason: HOSPADM

## 2019-11-08 RX ORDER — SODIUM CHLORIDE 9 MG/ML
1000 INJECTION, SOLUTION INTRAVENOUS
Status: COMPLETED | OUTPATIENT
Start: 2019-11-08 | End: 2019-11-08

## 2019-11-08 RX ORDER — BISACODYL 5 MG
5 TABLET, DELAYED RELEASE (ENTERIC COATED) ORAL DAILY PRN
COMMUNITY

## 2019-11-08 RX ORDER — BISACODYL 5 MG
5 TABLET, DELAYED RELEASE (ENTERIC COATED) ORAL DAILY PRN
Status: DISCONTINUED | OUTPATIENT
Start: 2019-11-08 | End: 2019-11-09 | Stop reason: HOSPADM

## 2019-11-08 RX ORDER — PANTOPRAZOLE SODIUM 40 MG/1
40 TABLET, DELAYED RELEASE ORAL DAILY
Status: DISCONTINUED | OUTPATIENT
Start: 2019-11-09 | End: 2019-11-09 | Stop reason: HOSPADM

## 2019-11-08 RX ORDER — ENOXAPARIN SODIUM 100 MG/ML
40 INJECTION SUBCUTANEOUS EVERY 24 HOURS
Status: DISCONTINUED | OUTPATIENT
Start: 2019-11-08 | End: 2019-11-09 | Stop reason: HOSPADM

## 2019-11-08 RX ORDER — ONDANSETRON 4 MG/1
8 TABLET, ORALLY DISINTEGRATING ORAL EVERY 8 HOURS PRN
COMMUNITY

## 2019-11-08 RX ORDER — DOCUSATE SODIUM 100 MG/1
100 CAPSULE, LIQUID FILLED ORAL DAILY PRN
Status: DISCONTINUED | OUTPATIENT
Start: 2019-11-08 | End: 2019-11-09 | Stop reason: HOSPADM

## 2019-11-08 RX ORDER — CIPROFLOXACIN 500 MG/1
500 TABLET ORAL 2 TIMES DAILY
Qty: 14 TABLET | Refills: 0 | Status: SHIPPED | OUTPATIENT
Start: 2019-11-08 | End: 2019-11-15

## 2019-11-08 RX ORDER — ONDANSETRON 2 MG/ML
4 INJECTION INTRAMUSCULAR; INTRAVENOUS EVERY 8 HOURS PRN
Status: DISCONTINUED | OUTPATIENT
Start: 2019-11-08 | End: 2019-11-09 | Stop reason: HOSPADM

## 2019-11-08 RX ORDER — PREGABALIN 75 MG/1
75 CAPSULE ORAL 2 TIMES DAILY
Status: DISCONTINUED | OUTPATIENT
Start: 2019-11-08 | End: 2019-11-09 | Stop reason: HOSPADM

## 2019-11-08 RX ORDER — SUCRALFATE 1 G/1
1 TABLET ORAL 4 TIMES DAILY
Status: DISCONTINUED | OUTPATIENT
Start: 2019-11-08 | End: 2019-11-09 | Stop reason: HOSPADM

## 2019-11-08 RX ORDER — DOCUSATE SODIUM 100 MG/1
100 CAPSULE, LIQUID FILLED ORAL DAILY PRN
COMMUNITY

## 2019-11-08 RX ORDER — SODIUM CHLORIDE, SODIUM LACTATE, POTASSIUM CHLORIDE, CALCIUM CHLORIDE 600; 310; 30; 20 MG/100ML; MG/100ML; MG/100ML; MG/100ML
INJECTION, SOLUTION INTRAVENOUS CONTINUOUS
Status: DISCONTINUED | OUTPATIENT
Start: 2019-11-08 | End: 2019-11-09 | Stop reason: HOSPADM

## 2019-11-08 RX ORDER — LANOLIN ALCOHOL/MO/W.PET/CERES
500 CREAM (GRAM) TOPICAL DAILY
Status: DISCONTINUED | OUTPATIENT
Start: 2019-11-09 | End: 2019-11-08

## 2019-11-08 RX ORDER — SODIUM CHLORIDE 0.9 % (FLUSH) 0.9 %
10 SYRINGE (ML) INJECTION
Status: DISCONTINUED | OUTPATIENT
Start: 2019-11-08 | End: 2019-11-09 | Stop reason: HOSPADM

## 2019-11-08 RX ORDER — PROCHLORPERAZINE MALEATE 5 MG
10 TABLET ORAL EVERY 6 HOURS PRN
Status: DISCONTINUED | OUTPATIENT
Start: 2019-11-08 | End: 2019-11-09 | Stop reason: HOSPADM

## 2019-11-08 RX ADMIN — CEFTRIAXONE 1 G: 1 INJECTION, SOLUTION INTRAVENOUS at 10:11

## 2019-11-08 RX ADMIN — ACETAMINOPHEN 650 MG: 325 TABLET ORAL at 10:11

## 2019-11-08 RX ADMIN — ENOXAPARIN SODIUM 40 MG: 100 INJECTION SUBCUTANEOUS at 10:11

## 2019-11-08 RX ADMIN — PREGABALIN 75 MG: 75 CAPSULE ORAL at 10:11

## 2019-11-08 RX ADMIN — SODIUM CHLORIDE, SODIUM LACTATE, POTASSIUM CHLORIDE, AND CALCIUM CHLORIDE: .6; .31; .03; .02 INJECTION, SOLUTION INTRAVENOUS at 10:11

## 2019-11-08 RX ADMIN — SODIUM CHLORIDE 1000 ML: 0.9 INJECTION, SOLUTION INTRAVENOUS at 07:11

## 2019-11-08 NOTE — PROGRESS NOTES
CC I feel awful    Pau Cook is a 89 y.o.  Pt with recurrent ovarian cancer here for chemo clearance  She is hoarse , she is drinking 3 16 oz bottles of water, She has been fatigued for 3 days and dizzy   Looks ill     Past Medical History:   Diagnosis Date    Allergy     Arthritis     Breast cyst     Foot pain     GERD (gastroesophageal reflux disease)     Joint pain     Ovarian cancer     Peripheral vascular disease 1/15/2018    Squamous cell carcinoma 04/2016    Right Lower Leg    MUNIR (stress urinary incontinence, female) 4/17/2017     Past Surgical History:   Procedure Laterality Date    APPENDECTOMY      COLON SURGERY      removed about 10 inch    COLONOSCOPY N/A 12/24/2018    Procedure: COLONOSCOPY;  Surgeon: Nirmal Davila MD;  Location: [facility] ENDO;  Service: Endoscopy;  Laterality: N/A;    FINGER MASS EXCISION Right 7/9/2018    Procedure: EXCISION, MASS, FINGER;  Surgeon: Fabio Seay MD;  Location: [facility] OR;  Service: Orthopedics;  Laterality: Right;    hemorhids      HYSTERECTOMY      LYSIS OF ADHESIONS N/A 12/28/2018    Procedure: LYSIS, ADHESIONS;  Surgeon: Amee Rose MD;  Location: [facility];  Service: OB/GYN;  Laterality: N/A;    OPEN REDUCTION AND INTERNAL FIXATION (ORIF) OF INTERTROCHANTERIC FRACTURE OF FEMUR Left 5/31/2019    Procedure: ORIF, FRACTURE, FEMUR, INTERTROCHANTERIC;  Surgeon: Bacilio Lockwood II, MD;  Location: [facility] OR;  Service: Orthopedics;  Laterality: Left;    ROBOT-ASSISTED LAPAROSCOPIC ABDOMINAL HYSTERECTOMY USING DA MARK XI N/A 12/28/2018    Procedure: XI ROBOTIC HYSTERECTOMY;  Surgeon: Amee Rose MD;  Location: [facility];  Service: OB/GYN;  Laterality: N/A;  attempted, opened    ROBOT-ASSISTED LAPAROSCOPIC SALPINGO-OOPHORECTOMY USING DA MARK XI Bilateral 12/28/2018    Procedure: XI ROBOTIC SALPINGO-OOPHORECTOMY;  Surgeon: Amee Rose MD;  Location: [facility];  Service: OB/GYN;  Laterality: Bilateral;  attmepted, opened    TOTAL  ABDOMINAL HYSTERECTOMY W/ BILATERAL SALPINGOOPHORECTOMY Bilateral 12/28/2018    Procedure: HYSTERECTOMY, TOTAL, ABDOMINAL, WITH BILATERAL SALPINGO-OOPHORECTOMY;  Surgeon: Amee Rose MD;  Location: [facility];  Service: OB/GYN;  Laterality: Bilateral;       Current Outpatient Medications:     acetaminophen (TYLENOL) 500 MG tablet, Take 2 tablets (1,000 mg total) by mouth every 8 (eight) hours., Disp: , Rfl: 0    calcium-vitamin D3 (CALCIUM 500 + D) 500 mg(1,250mg) -200 unit per tablet, Take 1 tablet by mouth once daily. , Disp: , Rfl:     carboxymethylcellulose (REFRESH PLUS) 0.5 % Dpet, 1 drop daily as needed., Disp: , Rfl:     cyanocobalamin (VITAMIN B-12) 1000 MCG tablet, Take 1,000 mcg by mouth once daily. , Disp: , Rfl:     esomeprazole (NEXIUM) 20 MG capsule, Take 1 capsule (20 mg total) by mouth before breakfast., Disp: 30 capsule, Rfl: 11    FLAXSEED ORAL, Take 650 mg by mouth once daily. , Disp: , Rfl:     FLUAD 3097-5615, 65 YR UP,,PF, 45 mcg (15 mcg x 3)/0.5 mL Syrg, ADM 0.5ML IM UTD, Disp: , Rfl: 0    ketotifen (ZADITOR) 0.025 % (0.035 %) ophthalmic solution, Place 1 drop into both eyes 2 (two) times daily as needed (Pt reports use approx once/week). , Disp: , Rfl:     MULTIVITAMIN W-MINERALS/LUTEIN (CENTRUM SILVER ORAL), Take 1 tablet by mouth once daily. , Disp: , Rfl:     pregabalin (LYRICA) 75 MG capsule, Take 1 capsule (75 mg total) by mouth 2 (two) times daily., Disp: 60 capsule, Rfl: 0    prochlorperazine (COMPAZINE) 10 MG tablet, Take 1 tablet (10 mg total) by mouth every 6 (six) hours as needed., Disp: 30 tablet, Rfl: 1    sucralfate (CARAFATE) 1 gram tablet, Take 1 tablet (1 g total) by mouth 4 (four) times daily., Disp: 40 tablet, Rfl: 0  Review of patient's allergies indicates:   Allergen Reactions    Pcn [penicillins]      Can not remember reaction     Tetanus vaccines and toxoid Swelling     Localized swelling to injection site     Social History     Tobacco Use    Smoking  "status: Never Smoker    Smokeless tobacco: Never Used   Substance Use Topics    Alcohol use: No     Alcohol/week: 0.0 standard drinks     Comment: rare    Drug use: No         CONSTITUTIONAL: No fevers, chills, night sweats, wt. loss, appetite changes  SKIN: no rashes or itching  ENT: No headaches, head trauma, vision changes, or eye pain  LYMPH NODES: None noticed   CV: No chest pain, palpitations.   RESP: No dyspnea on exertion, cough, wheezing, or hemoptysis  GI: No nausea, emesis, diarrhea, constipation, melena, hematochezia, pain.   : No dysuria, hematuria, urgency, or frequency   HEME: No easy bruising, bleeding problems  PSYCHIATRIC: No depression, anxiety, psychosis, hallucinations.  NEURO: No seizures, memory loss, dizziness or difficulty sleeping  MSK: No arthralgias or joint swelling         /61   Pulse (!) 446   Temp 98.1 °F (36.7 °C)   Resp 20   Ht 5' 3" (1.6 m)   Wt 57.3 kg (126 lb 5.2 oz)   SpO2 96%   BMI 22.38 kg/m²   Gen: NAD, A and O x3,   Psych: pleasant affect, normal thought process  Eyes: Pupils round and non dilated, EOM intact  Nose: Nares patent  OP clear, mucosa patent  Neck: suppple, no JVD, trachea midline, no palpable mass, no adenopathy  Lungs: CTAB, no wheezes, no use of accessory muscles  CV: IRREGULAR RATE AND RHYTHM   Abd: soft, NTND, + BS, No HSM, no ascites  Extr: No CCE, ARGELIA, strength normal, good capillary refill  Neuro: steady gait, CNs grossly intact  Skin: intact, no lesions noted  Rheum: No joint swelling    Lab Results   Component Value Date    WBC 20.83 (H) 11/06/2019    HGB 12.8 11/06/2019    HCT 39.8 11/06/2019    MCV 90 11/06/2019     11/06/2019         CMP  Sodium   Date Value Ref Range Status   11/06/2019 139 136 - 145 mmol/L Final     Potassium   Date Value Ref Range Status   11/06/2019 4.0 3.5 - 5.1 mmol/L Final     Chloride   Date Value Ref Range Status   11/06/2019 100 95 - 110 mmol/L Final     CO2   Date Value Ref Range Status "   11/06/2019 29 23 - 29 mmol/L Final     Glucose   Date Value Ref Range Status   11/06/2019 135 (H) 70 - 110 mg/dL Final     BUN, Bld   Date Value Ref Range Status   11/06/2019 13 8 - 23 mg/dL Final     Creatinine   Date Value Ref Range Status   11/06/2019 0.8 0.5 - 1.4 mg/dL Final     Calcium   Date Value Ref Range Status   11/06/2019 9.3 8.7 - 10.5 mg/dL Final     Total Protein   Date Value Ref Range Status   11/06/2019 7.2 6.0 - 8.4 g/dL Final     Albumin   Date Value Ref Range Status   11/06/2019 3.8 3.5 - 5.2 g/dL Final     Total Bilirubin   Date Value Ref Range Status   11/06/2019 0.8 0.1 - 1.0 mg/dL Final     Comment:     For infants and newborns, interpretation of results should be based  on gestational age, weight and in agreement with clinical  observations.  Premature Infant recommended reference ranges:  Up to 24 hours.............<8.0 mg/dL  Up to 48 hours............<12.0 mg/dL  3-5 days..................<15.0 mg/dL  6-29 days.................<15.0 mg/dL       Alkaline Phosphatase   Date Value Ref Range Status   11/06/2019 213 (H) 55 - 135 U/L Final     AST   Date Value Ref Range Status   11/06/2019 24 10 - 40 U/L Final     ALT   Date Value Ref Range Status   11/06/2019 19 10 - 44 U/L Final     Anion Gap   Date Value Ref Range Status   11/06/2019 10 8 - 16 mmol/L Final     eGFR if    Date Value Ref Range Status   11/06/2019 >60 >60 mL/min/1.73 m^2 Final     eGFR if non    Date Value Ref Range Status   11/06/2019 >60 >60 mL/min/1.73 m^2 Final     Comment:     Calculation used to obtain the estimated glomerular filtration  rate (eGFR) is the CKD-EPI equation.          Urinary tract infection with hematuria, site unspecified  -     ciprofloxacin HCl (CIPRO) 500 MG tablet; Take 1 tablet (500 mg total) by mouth 2 (two) times daily. for 7 days  Dispense: 14 tablet; Refill: 0    Cardiac arrhythmia, unspecified cardiac arrhythmia type    Leukocytosis, unspecified  type    Gastroesophageal reflux disease with esophagitis    Malignant neoplasm of ovary, unspecified laterality    Oncology follow-up encounter    to ER [facility] for cardiac evaluation   Not cleared for chemo  Having PVCs vs a fib     Thank you for allowing me to evaluate and participate in the care of this pleasant patient. Please fell free to call me with any questions or concerns.    Kenyatta Maloney MD    This note was dictated with Dragon and briefly proofread. Please excuse any sentences that may be unclear or words misspelled

## 2019-11-09 ENCOUNTER — CLINICAL SUPPORT (OUTPATIENT)
Dept: CARDIOLOGY | Facility: HOSPITAL | Age: 84
End: 2019-11-09
Payer: MEDICARE

## 2019-11-09 VITALS
WEIGHT: 127.19 LBS | BODY MASS INDEX: 21.71 KG/M2 | TEMPERATURE: 98 F | SYSTOLIC BLOOD PRESSURE: 134 MMHG | HEART RATE: 97 BPM | OXYGEN SATURATION: 95 % | DIASTOLIC BLOOD PRESSURE: 79 MMHG | RESPIRATION RATE: 18 BRPM | HEIGHT: 64 IN

## 2019-11-09 LAB
ANION GAP SERPL CALC-SCNC: 9 MMOL/L (ref 8–16)
AORTIC ROOT ANNULUS: 3.2 CM
AORTIC VALVE CUSP SEPERATION: 1.53 CM
AV INDEX (PROSTH): 0.89
AV MEAN GRADIENT: 5 MMHG
AV PEAK GRADIENT: 7 MMHG
AV VALVE AREA: 3.1 CM2
AV VELOCITY RATIO: 90.66
BACTERIA #/AREA URNS HPF: ABNORMAL /HPF
BILIRUB UR QL STRIP: NEGATIVE
BSA FOR ECHO PROCEDURE: 1.61 M2
BUN SERPL-MCNC: 14 MG/DL (ref 8–23)
CALCIUM SERPL-MCNC: 8.5 MG/DL (ref 8.7–10.5)
CHLORIDE SERPL-SCNC: 104 MMOL/L (ref 95–110)
CLARITY UR: CLEAR
CO2 SERPL-SCNC: 27 MMOL/L (ref 23–29)
COLOR UR: YELLOW
CREAT SERPL-MCNC: 0.6 MG/DL (ref 0.5–1.4)
CV ECHO LV RWT: 0.7 CM
DOP CALC AO PEAK VEL: 1.33 M/S
DOP CALC AO VTI: 28.02 CM
DOP CALC LVOT AREA: 3.5 CM2
DOP CALC LVOT DIAMETER: 2.1 CM
DOP CALC LVOT PEAK VEL: 120.58 M/S
DOP CALC LVOT STROKE VOLUME: 86.75 CM3
DOP CALCLVOT PEAK VEL VTI: 25.06 CM
E WAVE DECELERATION TIME: 259.71 MSEC
E/A RATIO: 0.72
E/E' RATIO: 13.06 M/S
ECHO LV POSTERIOR WALL: 1.21 CM (ref 0.6–1.1)
EST. GFR  (AFRICAN AMERICAN): >60 ML/MIN/1.73 M^2
EST. GFR  (NON AFRICAN AMERICAN): >60 ML/MIN/1.73 M^2
FRACTIONAL SHORTENING: 34 % (ref 28–44)
GLUCOSE SERPL-MCNC: 94 MG/DL (ref 70–110)
GLUCOSE UR QL STRIP: NEGATIVE
HGB UR QL STRIP: NEGATIVE
HYALINE CASTS #/AREA URNS LPF: 1 /LPF
INTERVENTRICULAR SEPTUM: 0.81 CM (ref 0.6–1.1)
IVRT: 88.4 MSEC
KETONES UR QL STRIP: NEGATIVE
LEFT ATRIUM SIZE: 4.02 CM
LEFT INTERNAL DIMENSION IN SYSTOLE: 2.3 CM (ref 2.1–4)
LEFT VENTRICLE DIASTOLIC VOLUME INDEX: 43.93 ML/M2
LEFT VENTRICLE DIASTOLIC VOLUME: 70.9 ML
LEFT VENTRICLE MASS INDEX: 64 G/M2
LEFT VENTRICLE SYSTOLIC VOLUME INDEX: 16.4 ML/M2
LEFT VENTRICLE SYSTOLIC VOLUME: 26.5 ML
LEFT VENTRICULAR INTERNAL DIMENSION IN DIASTOLE: 3.48 CM (ref 3.5–6)
LEFT VENTRICULAR MASS: 103.96 G
LEUKOCYTE ESTERASE UR QL STRIP: NEGATIVE
LV LATERAL E/E' RATIO: 12.33 M/S
LV SEPTAL E/E' RATIO: 13.88 M/S
MAGNESIUM SERPL-MCNC: 1.8 MG/DL (ref 1.6–2.6)
MICROSCOPIC COMMENT: ABNORMAL
MV PEAK A VEL: 1.55 M/S
MV PEAK E VEL: 1.11 M/S
NITRITE UR QL STRIP: POSITIVE
PH UR STRIP: 6 [PH] (ref 5–8)
PISA TR MAX VEL: 2.65 M/S
POTASSIUM SERPL-SCNC: 3.6 MMOL/L (ref 3.5–5.1)
PROT UR QL STRIP: NEGATIVE
PULM VEIN S/D RATIO: 0.66
PV PEAK D VEL: 59.64 M/S
PV PEAK S VEL: 39.6 M/S
PV PEAK VELOCITY: 85.56 CM/S
RA PRESSURE: 8 MMHG
RBC #/AREA URNS HPF: 2 /HPF (ref 0–4)
RIGHT VENTRICULAR END-DIASTOLIC DIMENSION: 370 CM
SODIUM SERPL-SCNC: 140 MMOL/L (ref 136–145)
SP GR UR STRIP: 1 (ref 1–1.03)
SQUAMOUS #/AREA URNS HPF: 2 /HPF
TDI LATERAL: 0.09 M/S
TDI SEPTAL: 0.08 M/S
TDI: 0.09 M/S
TR MAX PG: 28 MMHG
TV REST PULMONARY ARTERY PRESSURE: 36 MMHG
URN SPEC COLLECT METH UR: ABNORMAL
UROBILINOGEN UR STRIP-ACNC: NEGATIVE EU/DL
WBC #/AREA URNS HPF: 7 /HPF (ref 0–5)

## 2019-11-09 PROCEDURE — 83735 ASSAY OF MAGNESIUM: CPT

## 2019-11-09 PROCEDURE — 63600175 PHARM REV CODE 636 W HCPCS: Performed by: NURSE PRACTITIONER

## 2019-11-09 PROCEDURE — 36415 COLL VENOUS BLD VENIPUNCTURE: CPT

## 2019-11-09 PROCEDURE — 63600175 PHARM REV CODE 636 W HCPCS: Performed by: INTERNAL MEDICINE

## 2019-11-09 PROCEDURE — 96367 TX/PROPH/DG ADDL SEQ IV INF: CPT

## 2019-11-09 PROCEDURE — 93306 TTE W/DOPPLER COMPLETE: CPT

## 2019-11-09 PROCEDURE — 25000003 PHARM REV CODE 250: Performed by: NURSE PRACTITIONER

## 2019-11-09 PROCEDURE — 80048 BASIC METABOLIC PNL TOTAL CA: CPT

## 2019-11-09 PROCEDURE — G0378 HOSPITAL OBSERVATION PER HR: HCPCS

## 2019-11-09 PROCEDURE — 81001 URINALYSIS AUTO W/SCOPE: CPT

## 2019-11-09 PROCEDURE — 96361 HYDRATE IV INFUSION ADD-ON: CPT

## 2019-11-09 PROCEDURE — 87086 URINE CULTURE/COLONY COUNT: CPT

## 2019-11-09 RX ORDER — MAGNESIUM SULFATE 1 G/100ML
1 INJECTION INTRAVENOUS ONCE
Status: COMPLETED | OUTPATIENT
Start: 2019-11-09 | End: 2019-11-09

## 2019-11-09 RX ADMIN — SODIUM CHLORIDE, SODIUM LACTATE, POTASSIUM CHLORIDE, AND CALCIUM CHLORIDE: .6; .31; .03; .02 INJECTION, SOLUTION INTRAVENOUS at 10:11

## 2019-11-09 RX ADMIN — SUCRALFATE 1 G: 1 TABLET ORAL at 02:11

## 2019-11-09 RX ADMIN — MAGNESIUM SULFATE 1 G: 1 INJECTION INTRAVENOUS at 05:11

## 2019-11-09 RX ADMIN — SUCRALFATE 1 G: 1 TABLET ORAL at 11:11

## 2019-11-09 RX ADMIN — PANTOPRAZOLE SODIUM 40 MG: 40 TABLET, DELAYED RELEASE ORAL at 05:11

## 2019-11-09 RX ADMIN — PREGABALIN 75 MG: 75 CAPSULE ORAL at 09:11

## 2019-11-09 RX ADMIN — THERA TABS 1 TABLET: TAB at 09:11

## 2019-11-09 NOTE — HPI
Ms. Cook presents today with complaints of weakness. It is moderate. It is associated with increased sleeping, SOB when laying flat, and decreased PO intake. She denies fever, chills, N/V/D, chest pain, dizziness, or LOC. She states over the last few days she's been extremely tired and sleeping more than she should. She hasn't felt hungry or thirsty and hasn't been eating or drinking much either. She is currently undergoing chemo for stage IV ovarian cancer. She has currently received 6 doses and this is her second round. She was going in today to Dr. Tejeda's office for clearance to start chemo on Monday and was found to have a rapid irregular heartbeat. She was then sent to the ED per Dr. Tejeda for cardiac evaluation. She was recently treated for a UTI, but a follow up UA by Dr. Tejeda was concerning for continued UTI and she was prescribed another antibiotic she hasn't started taking yet. She does endorse frequency and urgency, but attributed that to her chronic incontinence. She also has a history of recent hip arthroplasty and is currently ambulating with walker. She voices her wishes to be a DNR with her daughter present.

## 2019-11-09 NOTE — PROGRESS NOTES
[facility] Medicine  Progress Note    Patient Name: Pau Cook  MRN: [id number]  Patient Class: OP- Observation   Admission Date: 11/8/2019  Length of Stay: 0 days  Attending Physician: Tk Ray MD  Primary Care Provider: Rosi Ramires MD    Subjective:     Principal Problem:Dehydration    HPI:  Ms. Cook presents today with complaints of weakness. It is moderate. It is associated with increased sleeping, SOB when laying flat, and decreased PO intake. She denies fever, chills, N/V/D, chest pain, dizziness, or LOC. She states over the last few days she's been extremely tired and sleeping more than she should. She hasn't felt hungry or thirsty and hasn't been eating or drinking much either. She is currently undergoing chemo for stage IV ovarian cancer. She has currently received 6 doses and this is her second round. She was going in today to Dr. Tejeda's office for clearance to start chemo on Monday and was found to have a rapid irregular heartbeat. She was then sent to the ED per Dr. Tejeda for cardiac evaluation. She was recently treated for a UTI, but a follow up UA by Dr. Tejeda was concerning for continued UTI and she was prescribed another antibiotic she hasn't started taking yet. She does endorse frequency and urgency, but attributed that to her chronic incontinence. She also has a history of recent hip arthroplasty and is currently ambulating with walker. She voices her wishes to be a DNR with her daughter present.    Interval History: AFVSS, Asx 6 beat run of VT on tele overnight.  HR 80-90's.   Seen and examined at bedside this morning.  States she feels good.  Reports improvement to fatigue.  Denies shortness of breath, cough, chest pain or palpitations, n/v, f/c, dysuria.   States that she is able to sleep with 1-2 pillows, occasional leg swelling if sedentary for long times but not presently.     Review of Systems   Per interval history, all other systems reviewed and  negative.     Objective:     Vital Signs (Most Recent):  Temp: 98 °F (36.7 °C) (11/09/19 0913)  Pulse: 80 (11/09/19 0913)  Resp: 19 (11/09/19 0913)  BP: 133/76 (11/09/19 0913)  SpO2: (!) 94 % (11/09/19 0913) Vital Signs (24h Range):  Temp:  [97.7 °F (36.5 °C)-98.5 °F (36.9 °C)] 98 °F (36.7 °C)  Pulse:  [] 80  Resp:  [18-32] 19  SpO2:  [94 %-99 %] 94 %  BP: (117-169)/() 133/76     Weight: 57.7 kg (127 lb 3.3 oz)  Body mass index is 21.83 kg/m².    Intake/Output Summary (Last 24 hours) at 11/9/2019 1212  Last data filed at 11/9/2019 0900  Gross per 24 hour   Intake 2003.33 ml   Output 550 ml   Net 1453.33 ml      Physical Exam   Constitutional: She is oriented to person, place, and time. She appears well-developed and well-nourished.   HENT:   Head: Normocephalic and atraumatic.   Eyes: Pupils are equal, round, and reactive to light. EOM are normal.   Neck: Normal range of motion. Neck supple.   Cardiovascular: Normal rate, regular rhythm, normal heart sounds and intact distal pulses.   No murmur heard.  Pulmonary/Chest: Effort normal and breath sounds normal. No stridor. No respiratory distress. She has no wheezes.   Abdominal: Soft. Bowel sounds are normal. She exhibits no distension. There is no tenderness.   Musculoskeletal: Normal range of motion.   Neurological: She is alert and oriented to person, place, and time.   Skin: Skin is warm and dry. Capillary refill takes less than 2 seconds.   Psychiatric: She has a normal mood and affect.   Nursing note and vitals reviewed.        Significant Labs:   CBC:   Recent Labs   Lab 11/08/19  1632   WBC 13.17*   HGB 12.0   HCT 36.6*        CMP:   Recent Labs   Lab 11/08/19  1632 11/09/19  0514   * 140   K 4.1 3.6   CL 95 104   CO2 28 27    94   BUN 20 14   CREATININE 0.8 0.6   CALCIUM 8.8 8.5*   PROT 7.1  --    ALBUMIN 3.5  --    BILITOT 0.8  --    ALKPHOS 165*  --    AST 25  --    ALT 22  --    ANIONGAP 11 9   EGFRNONAA >60.0 >60.0      Cardiac Markers:   Recent Labs   Lab 11/08/19  1632   TROPONINI 0.036     Urine Culture: pending    Urine Studies:   Recent Labs   Lab 11/09/19  0412   COLORU Yellow   APPEARANCEUA Clear   PHUR 6.0   SPECGRAV 1.005   PROTEINUA Negative   GLUCUA Negative   KETONESU Negative   BILIRUBINUA Negative   OCCULTUA Negative   NITRITE Positive*   UROBILINOGEN Negative   LEUKOCYTESUR Negative   RBCUA 2   WBCUA 7*   BACTERIA Rare   SQUAMEPITHEL 2   HYALINECASTS 1     All pertinent labs within the past 24 hours have been reviewed.    Significant Imaging: I have reviewed all pertinent imaging results/findings within the past 24 hours.   Imaging Results          X-Ray Chest AP Portable (Final result)  Result time 11/08/19 16:50:23    Final result by Omar Ireland MD (11/08/19 16:50:23)                 Impression:      Focal ill-defined hazy opacity in the right upper lung may reflect a pulmonary nodule.  Consider further evaluation with CT chest.      Electronically signed by: Omar Ireland MD  Date:    11/08/2019  Time:    16:50             Narrative:    EXAMINATION:  XR CHEST AP PORTABLE    CLINICAL HISTORY:  Other fatigue    FINDINGS:  Portable chest with comparison dated 05/31/2019.  Normal cardiomediastinal silhouette.There is an focal ill-defined hazy opacity in the right upper lung.  The left lung is clear.  Pulmonary vasculature is normal. No acute osseous abnormality.                              ECHO  · Concentric left ventricular remodeling.  · Normal left ventricular systolic function. The estimated ejection fraction is 60%  · Grade II (moderate) left ventricular diastolic dysfunction consistent with pseudonormalization.  · Mild right ventricular enlargement.  · Mild left atrial enlargement.  · Mild right atrial enlargement.  · Mild mitral regurgitation.  · Moderate tricuspid regurgitation.  · Intermediate central venous pressure (8 mm Hg).  · The estimated PA systolic pressure is 36 mm Hg      Assessment/Plan:       * Dehydration  IVF : LR @ 100ml/hr  Electrolyte replacement per protocol  Encourage oral fluids  Improved today    Palpitations  Tele: 6 be run of asymptomatic V-tach otherwise normal sinus with heart rate 80s to 90  ECHO : EF 60, Grade II DD, mild MR, Mod TR, PA systolic 36  Trop wnl  No chest pain or palpations.   Cardiology consulted, follow up recs  Left message for Dr. Tejeda's office to discuss.       UTI (urinary tract infection)  IV rocephin while in the hospital   UA + nitrites, 7 WBC, rare bacteria   Ucx pending  Recent outpatient UA concerning for UTI with bactrim  Plan to tx with PO cipro on discharge       VTE Risk Mitigation (From admission, onward)         Ordered     enoxaparin injection 40 mg  Daily      11/08/19 1954     IP VTE HIGH RISK PATIENT  Once      11/08/19 1954              Kadie Ireland DO  Department of Hospital Medicine   [facility]

## 2019-11-09 NOTE — DISCHARGE SUMMARY
[facility] Medicine  Discharge Summary      Patient Name: Pau Cook  MRN: [id number]  Admission Date: 11/8/2019  Hospital Length of Stay: 0 days  Discharge Date and Time:  11/09/2019 5:00 PM  Attending Physician: No att. providers found   Discharging Provider: Kadie Ireland DO  Primary Care Provider: Rosi Ramires MD      HPI:   Ms. Cook presents today with complaints of weakness. It is moderate. It is associated with increased sleeping, SOB when laying flat, and decreased PO intake. She denies fever, chills, N/V/D, chest pain, dizziness, or LOC. She states over the last few days she's been extremely tired and sleeping more than she should. She hasn't felt hungry or thirsty and hasn't been eating or drinking much either. She is currently undergoing chemo for stage IV ovarian cancer. She has currently received 6 doses and this is her second round. She was going in today to Dr. Tejeda's office for clearance to start chemo on Monday and was found to have a rapid irregular heartbeat. She was then sent to the ED per Dr. Tejeda for cardiac evaluation. She was recently treated for a UTI, but a follow up UA by Dr. Tejeda was concerning for continued UTI and she was prescribed another antibiotic she hasn't started taking yet. She does endorse frequency and urgency, but attributed that to her chronic incontinence. She also has a history of recent hip arthroplasty and is currently ambulating with walker. She voices her wishes to be a DNR with her daughter present.    * No surgery found *      Hospital Course:   Admitted for palpations and tachycardia likely 2/2 dehydration in the setting of decreased oral intake. Also noted to have recent UTI treated with bactrim and most recent UA suggestive of continued infection.       * Dehydration  IVF : LR @ 100ml/hr   Encourage oral fluids and increase food intake.   Improved today    Palpitations  Tele: 6 beat run of asymptomatic V-tach otherwise normal  sinus with heart rate 80s to 90  ECHO : EF 60, Grade II DD, mild MR, Mod TR, PA systolic 36  No signs of acute decompensated HF at present.   Trop wnl   No chest pain or palpations.   Cardiology consulted, however patient stated that she would like to be discharged and follow up with Cardiology as an outpatient.        UTI (urinary tract infection)  Recent outpatient UA concerning for UTI with bactrim  IV rocephin while in the hospital   UA + nitrites, 7 WBC, rare bacteria   Ucx pending and patient recommended to stay to find out results of cultures. She states that she would like to be discharged on the oral medication that was prescribed for her prior to admission.   Plan to tx with PO cipro on discharge and will notify patient if ucx so organism that is resistant.       Final Active Diagnoses:    Diagnosis Date Noted POA    PRINCIPAL PROBLEM:  Dehydration [E86.0] 11/08/2019 Yes    UTI (urinary tract infection) [N39.0] 11/08/2019 Yes    Palpitations [R00.2] 11/08/2019 Yes      Problems Resolved During this Admission:       Discharged Condition: stable    Disposition: Home or Self Care    Follow Up:  Follow-up Information     Call Rosi Ramires MD.    Specialty:  Family Medicine  Why:  hospital follow up  Contact information:  [address]  [place] [address]  [phone]             Call Kenyatta Tejeda MD.    Specialty:  Hematology and Oncology  Contact information:  [address]  [address]  [place] [address]  [phone]             Schedule an appointment as soon as possible for a visit with Trell Dumont MD.    Specialty:  Cardiology  Contact information:  [address]  [address]  [place] [id number] [phone]             Trell Dumont MD .    Specialty:  Cardiology  Contact information:  [address]  [address]  [place] [address] [phone]                 Patient Instructions:      Ambulatory referral to Cardiology   Referral Priority: Urgent Referral Type: Consultation    Referral Reason: Specialty Services Required   Referred to Provider: MELA SPENCE Requested Specialty: Cardiology   Number of Visits Requested: 1     Diet Adult Regular     Notify your health care provider if you experience any of the following:  persistent nausea and vomiting or diarrhea     Notify your health care provider if you experience any of the following:  severe uncontrolled pain     Notify your health care provider if you experience any of the following:  redness, tenderness, or signs of infection (pain, swelling, redness, odor or green/yellow discharge around incision site)     Notify your health care provider if you experience any of the following:  persistent dizziness, light-headedness, or visual disturbances     Notify your health care provider if you experience any of the following:  increased confusion or weakness     Activity as tolerated       Significant Diagnostic Studies: Labs: All labs within the past 24 hours have been reviewed    Pending Diagnostic Studies:     None         Medications:  Reconciled Home Medications:      Medication List      CONTINUE taking these medications    acetaminophen 500 MG tablet  Commonly known as:  TYLENOL  Take 2 tablets (1,000 mg total) by mouth every 8 (eight) hours.     bisacodyl 5 mg EC tablet  Commonly known as:  DULCOLAX  Take 5 mg by mouth daily as needed for Constipation.     CALCIUM 500 + D 500 mg(1,250mg) -200 unit per tablet  Generic drug:  calcium-vitamin D3  Take 1 tablet by mouth once daily.     carboxymethylcellulose 0.5 % Dpet  Commonly known as:  REFRESH PLUS  1 drop daily as needed.     CENTRUM SILVER ORAL  Take 1 tablet by mouth once daily.     ciprofloxacin HCl 500 MG tablet  Commonly known as:  CIPRO  Take 1 tablet (500 mg total) by mouth 2 (two) times daily. for 7 days     CITRUCEL 500 mg Tab  Generic drug:  methylcellulose (laxative)  Take 500 mg by mouth daily as needed.     cyanocobalamin 1000 MCG tablet  Commonly known as:   VITAMIN B-12  Take 500 mcg by mouth once daily.     docusate sodium 100 MG capsule  Commonly known as:  COLACE  Take 100 mg by mouth daily as needed for Constipation.     esomeprazole 20 MG capsule  Commonly known as:  NEXIUM  Take 1 capsule (20 mg total) by mouth before breakfast.     FLAXSEED ORAL  Take 650 mg by mouth once daily.     FLUAD 7667-2634 (65 YR UP)(PF) 45 mcg (15 mcg x 3)/0.5 mL Syrg  Generic drug:  flu vac 2019 [id number](PF)  ADM 0.5ML IM UTD     ketotifen 0.025 % (0.035 %) ophthalmic solution  Commonly known as:  ZADITOR  Place 1 drop into both eyes 2 (two) times daily as needed (Pt reports use approx once/week).     ondansetron 4 MG Tbdl  Commonly known as:  ZOFRAN-ODT  Take 8 mg by mouth every 8 (eight) hours as needed.     pregabalin 75 MG capsule  Commonly known as:  LYRICA  Take 1 capsule (75 mg total) by mouth 2 (two) times daily.     prochlorperazine 10 MG tablet  Commonly known as:  COMPAZINE  Take 1 tablet (10 mg total) by mouth every 6 (six) hours as needed.     sucralfate 1 gram tablet  Commonly known as:  CARAFATE  Take 1 tablet (1 g total) by mouth 4 (four) times daily.            Indwelling Lines/Drains at time of discharge:   Lines/Drains/Airways     None                 Time spent on the discharge of patient: 34 minutes  Patient was seen and examined on the date of discharge and determined to be suitable for discharge.         Kadie Ireland DO  Department of Hospital Medicine  [facility]

## 2019-11-09 NOTE — SUBJECTIVE & OBJECTIVE
Interval History: AFVSS, Asx 6 beat run of VT on tele overnight.  HR 80-90's.   Seen and examined at bedside this morning.  States she feels good.  Reports improvement to fatigue.  Denies shortness of breath, cough, chest pain or palpitations, n/v, f/c, dysuria.   States that she is able to sleep with 1-2 pillows, occasional leg swelling if sedentary for long times but not presently.     Review of Systems   Per interval history, all other systems reviewed and negative.     Objective:     Vital Signs (Most Recent):  Temp: 98 °F (36.7 °C) (11/09/19 0913)  Pulse: 80 (11/09/19 0913)  Resp: 19 (11/09/19 0913)  BP: 133/76 (11/09/19 0913)  SpO2: (!) 94 % (11/09/19 0913) Vital Signs (24h Range):  Temp:  [97.7 °F (36.5 °C)-98.5 °F (36.9 °C)] 98 °F (36.7 °C)  Pulse:  [] 80  Resp:  [18-32] 19  SpO2:  [94 %-99 %] 94 %  BP: (117-169)/() 133/76     Weight: 57.7 kg (127 lb 3.3 oz)  Body mass index is 21.83 kg/m².    Intake/Output Summary (Last 24 hours) at 11/9/2019 1212  Last data filed at 11/9/2019 0900  Gross per 24 hour   Intake 2003.33 ml   Output 550 ml   Net 1453.33 ml      Physical Exam   Constitutional: She is oriented to person, place, and time. She appears well-developed and well-nourished.   HENT:   Head: Normocephalic and atraumatic.   Eyes: Pupils are equal, round, and reactive to light. EOM are normal.   Neck: Normal range of motion. Neck supple.   Cardiovascular: Normal rate, regular rhythm, normal heart sounds and intact distal pulses.   No murmur heard.  Pulmonary/Chest: Effort normal and breath sounds normal. No stridor. No respiratory distress. She has no wheezes.   Abdominal: Soft. Bowel sounds are normal. She exhibits no distension. There is no tenderness.   Musculoskeletal: Normal range of motion.   Neurological: She is alert and oriented to person, place, and time.   Skin: Skin is warm and dry. Capillary refill takes less than 2 seconds.   Psychiatric: She has a normal mood and affect.   Nursing  note and vitals reviewed.        Significant Labs:   CBC:   Recent Labs   Lab 11/08/19  1632   WBC 13.17*   HGB 12.0   HCT 36.6*        CMP:   Recent Labs   Lab 11/08/19  1632 11/09/19  0514   * 140   K 4.1 3.6   CL 95 104   CO2 28 27    94   BUN 20 14   CREATININE 0.8 0.6   CALCIUM 8.8 8.5*   PROT 7.1  --    ALBUMIN 3.5  --    BILITOT 0.8  --    ALKPHOS 165*  --    AST 25  --    ALT 22  --    ANIONGAP 11 9   EGFRNONAA >60.0 >60.0     Cardiac Markers:   Recent Labs   Lab 11/08/19  1632   TROPONINI 0.036     Urine Culture: pending    Urine Studies:   Recent Labs   Lab 11/09/19  0412   COLORU Yellow   APPEARANCEUA Clear   PHUR 6.0   SPECGRAV 1.005   PROTEINUA Negative   GLUCUA Negative   KETONESU Negative   BILIRUBINUA Negative   OCCULTUA Negative   NITRITE Positive*   UROBILINOGEN Negative   LEUKOCYTESUR Negative   RBCUA 2   WBCUA 7*   BACTERIA Rare   SQUAMEPITHEL 2   HYALINECASTS 1     All pertinent labs within the past 24 hours have been reviewed.    Significant Imaging: I have reviewed all pertinent imaging results/findings within the past 24 hours.   Imaging Results          X-Ray Chest AP Portable (Final result)  Result time 11/08/19 16:50:23    Final result by Omar Ireland MD (11/08/19 16:50:23)                 Impression:      Focal ill-defined hazy opacity in the right upper lung may reflect a pulmonary nodule.  Consider further evaluation with CT chest.      Electronically signed by: Omar Ireland MD  Date:    11/08/2019  Time:    16:50             Narrative:    EXAMINATION:  XR CHEST AP PORTABLE    CLINICAL HISTORY:  Other fatigue    FINDINGS:  Portable chest with comparison dated 05/31/2019.  Normal cardiomediastinal silhouette.There is an focal ill-defined hazy opacity in the right upper lung.  The left lung is clear.  Pulmonary vasculature is normal. No acute osseous abnormality.                              ECHO  · Concentric left ventricular remodeling.  · Normal left  ventricular systolic function. The estimated ejection fraction is 60%  · Grade II (moderate) left ventricular diastolic dysfunction consistent with pseudonormalization.  · Mild right ventricular enlargement.  · Mild left atrial enlargement.  · Mild right atrial enlargement.  · Mild mitral regurgitation.  · Moderate tricuspid regurgitation.  · Intermediate central venous pressure (8 mm Hg).  · The estimated PA systolic pressure is 36 mm Hg

## 2019-11-09 NOTE — ASSESSMENT & PLAN NOTE
IV rocephin while in the hospital   UA + nitrites, 7 WBC, rare bacteria   Ucx pending  Recent outpatient UA concerning for UTI with bactrim  Plan to tx with PO cipro on discharge

## 2019-11-09 NOTE — ED PROVIDER NOTES
Encounter Date: 11/8/2019       History     Chief Complaint   Patient presents with    Palpitations     Patient here with reported weakness, increased sleepiness for the last 3 days today evaluated by her oncologist who found her have elevated heart rate and irregular beats who recommended she come to the emergency department for further evaluation she denies any fever chills she denies any significant cough he was told that she had a urinary tract infection she denies any chest pain no nausea vomiting or diarrhea she does report decreased fluid intake    The history is provided by the patient.     Review of patient's allergies indicates:   Allergen Reactions    Pcn [penicillins]      Can not remember reaction     Tetanus vaccines and toxoid Swelling     Localized swelling to injection site     Past Medical History:   Diagnosis Date    Allergy     Arthritis     Breast cyst     Foot pain     GERD (gastroesophageal reflux disease)     Joint pain     Ovarian cancer     Peripheral vascular disease 1/15/2018    Squamous cell carcinoma 04/2016    Right Lower Leg    MUNIR (stress urinary incontinence, female) 4/17/2017     Past Surgical History:   Procedure Laterality Date    APPENDECTOMY      COLON SURGERY      removed about 10 inch    COLONOSCOPY N/A 12/24/2018    Procedure: COLONOSCOPY;  Surgeon: Nirmal Davila MD;  Location: [facility];  Service: Endoscopy;  Laterality: N/A;    FINGER MASS EXCISION Right 7/9/2018    Procedure: EXCISION, MASS, FINGER;  Surgeon: Fabio Seay MD;  Location: [facility] OR;  Service: Orthopedics;  Laterality: Right;    hemorhids      HYSTERECTOMY      LYSIS OF ADHESIONS N/A 12/28/2018    Procedure: LYSIS, ADHESIONS;  Surgeon: Amee Rose MD;  Location: [facility] [place] OR;  Service: OB/GYN;  Laterality: N/A;    OPEN REDUCTION AND INTERNAL FIXATION (ORIF) OF INTERTROCHANTERIC FRACTURE OF FEMUR Left 5/31/2019    Procedure: ORIF, FRACTURE, FEMUR, INTERTROCHANTERIC;  Surgeon:  Bacilio Lockwood II, MD;  Location: [facility];  Service: Orthopedics;  Laterality: Left;    ROBOT-ASSISTED LAPAROSCOPIC ABDOMINAL HYSTERECTOMY USING DA MARK XI N/A 12/28/2018    Procedure: XI ROBOTIC HYSTERECTOMY;  Surgeon: Amee Rose MD;  Location: [facility];  Service: OB/GYN;  Laterality: N/A;  attempted, opened    ROBOT-ASSISTED LAPAROSCOPIC SALPINGO-OOPHORECTOMY USING DA MARK XI Bilateral 12/28/2018    Procedure: XI ROBOTIC SALPINGO-OOPHORECTOMY;  Surgeon: Amee Rose MD;  Location: [facility];  Service: OB/GYN;  Laterality: Bilateral;  attmepted, opened    TOTAL ABDOMINAL HYSTERECTOMY W/ BILATERAL SALPINGOOPHORECTOMY Bilateral 12/28/2018    Procedure: HYSTERECTOMY, TOTAL, ABDOMINAL, WITH BILATERAL SALPINGO-OOPHORECTOMY;  Surgeon: Amee Rose MD;  Location: [facility];  Service: OB/GYN;  Laterality: Bilateral;     Family History   Adopted: Yes   Problem Relation Age of Onset    Cancer Mother     Stroke Father     Breast cancer Sister     Cancer Sister     No Known Problems Daughter     No Known Problems Brother     No Known Problems Daughter     Diabetes Brother     Colon cancer Neg Hx     Ovarian cancer Neg Hx      Social History     Tobacco Use    Smoking status: Never Smoker    Smokeless tobacco: Never Used   Substance Use Topics    Alcohol use: No     Alcohol/week: 0.0 standard drinks     Comment: rare    Drug use: No     Review of Systems   Constitutional: Positive for activity change, appetite change and fatigue. Negative for chills and fever.   HENT: Negative.    Eyes: Negative.    Respiratory: Negative for cough, shortness of breath and wheezing.    Cardiovascular: Negative for chest pain, palpitations and leg swelling.   Gastrointestinal: Negative for abdominal pain, diarrhea, nausea and vomiting.   Endocrine: Negative.    Genitourinary: Negative for dysuria and enuresis.   Allergic/Immunologic: Positive for immunocompromised state.   Neurological: Negative.    Hematological:  Negative.    Psychiatric/Behavioral: Negative.        Physical Exam     Initial Vitals [11/08/19 1522]   BP Pulse Resp Temp SpO2   (!) 140/60 107 18 98.5 °F (36.9 °C) 97 %      MAP       --         Physical Exam    Constitutional: She appears well-developed and well-nourished. No distress.   HENT:   Head: Normocephalic and atraumatic.   Right Ear: External ear normal.   Left Ear: External ear normal.   Nose: Nose normal.   Mouth/Throat: Oropharynx is clear and moist.   Eyes: Conjunctivae and EOM are normal. Pupils are equal, round, and reactive to light.   Neck: Normal range of motion. Neck supple.   Cardiovascular: Normal heart sounds and intact distal pulses.   Tachycardic, with frequent PVCs   Pulmonary/Chest: Breath sounds normal. She has no wheezes. She has no rales.   Abdominal: Soft. Bowel sounds are normal. There is no tenderness.   Musculoskeletal: Normal range of motion.   Neurological: She is alert and oriented to person, place, and time. She has normal strength. GCS score is 15. GCS eye subscore is 4. GCS verbal subscore is 5. GCS motor subscore is 6.   Skin: Skin is warm and dry. Capillary refill takes less than 2 seconds.   Psychiatric: She has a normal mood and affect. Her behavior is normal.         ED Course   Procedures  Labs Reviewed   CBC W/ AUTO DIFFERENTIAL - Abnormal; Notable for the following components:       Result Value    WBC 13.17 (*)     Hematocrit 36.6 (*)     RDW 15.5 (*)     Immature Granulocytes 2.7 (*)     Gran # (ANC) 11.0 (*)     Immature Grans (Abs) 0.36 (*)     Gran% 83.3 (*)     Lymph% 8.9 (*)     All other components within normal limits   COMPREHENSIVE METABOLIC PANEL - Abnormal; Notable for the following components:    Sodium 134 (*)     Alkaline Phosphatase 165 (*)     All other components within normal limits   TROPONIN I   PROTIME-INR   MAGNESIUM   URINALYSIS, REFLEX TO URINE CULTURE        ECG Results          EKG 12-lead (In process)  Result time 11/08/19 15:39:30     In process by Interface, Lab In [facility] (11/08/19 15:39:30)                 Narrative:    Test Reason : R07.9,    Vent. Rate : 106 BPM     Atrial Rate : 106 BPM     P-R Int : 156 ms          QRS Dur : 090 ms      QT Int : 342 ms       P-R-T Axes : 060 -54 048 degrees     QTc Int : 454 ms    Sinus tachycardia with occasional Premature ventricular complexes  Left anterior fascicular block  Minimal voltage criteria for LVH, may be normal variant  Abnormal ECG  When compared with ECG of 31-MAY-2019 08:51,  Premature ventricular complexes are now Present  Premature atrial complexes are no longer Present    Referred By:  NEFTALI           Confirmed By:                             Imaging Results          X-Ray Chest AP Portable (Final result)  Result time 11/08/19 16:50:23    Final result by Omar Ireland MD (11/08/19 16:50:23)                 Impression:      Focal ill-defined hazy opacity in the right upper lung may reflect a pulmonary nodule.  Consider further evaluation with CT chest.      Electronically signed by: Omar Ireland MD  Date:    11/08/2019  Time:    16:50             Narrative:    EXAMINATION:  XR CHEST AP PORTABLE    CLINICAL HISTORY:  Other fatigue    FINDINGS:  Portable chest with comparison dated 05/31/2019.  Normal cardiomediastinal silhouette.There is an focal ill-defined hazy opacity in the right upper lung.  The left lung is clear.  Pulmonary vasculature is normal. No acute osseous abnormality.                                 Medical Decision Making:   ED Management:  Patient is having frequent PVCs with resting tachycardia I have begun hydration the emergency department repeat urinalysis has been ordered have added magnesium level discussed case with hospitalist will admit for rehydration                                 Clinical Impression:       ICD-10-CM ICD-9-CM   1. Palpitations R00.2 785.1   2. Tired R53.83 780.79   3. Tachycardia R00.0 785.0   4. Dehydration E86.0 276.51                              Rico Connolly MD  11/08/19 1920

## 2019-11-09 NOTE — ASSESSMENT & PLAN NOTE
Tele: 6 be run of asymptomatic V-tach otherwise normal sinus with heart rate 80s to 90  ECHO : EF 60, Grade II DD, mild MR, Mod TR, PA systolic 36  Trop wnl  No chest pain or palpations.   Cardiology consulted, follow up recs  Left message for Dr. Tejeda's office to discuss.

## 2019-11-09 NOTE — ED NOTES
[facility] being [facility] for CA, last done 2 weeks ago, 2 different meds at different times, next due on Monday. Sent here for Elevated Heart Rate in 120's, denies CP or SOB

## 2019-11-09 NOTE — ASSESSMENT & PLAN NOTE
Start rocephin while in the hospital   UA pending currently   Recent outpatient UA concerning for UTI and given patient is on chemo, will treat

## 2019-11-09 NOTE — SUBJECTIVE & OBJECTIVE
Past Medical History:   Diagnosis Date    Allergy     Arthritis     Breast cyst     Foot pain     GERD (gastroesophageal reflux disease)     Joint pain     Ovarian cancer     Peripheral vascular disease 1/15/2018    Squamous cell carcinoma 04/2016    Right Lower Leg    MUNIR (stress urinary incontinence, female) 4/17/2017       Past Surgical History:   Procedure Laterality Date    APPENDECTOMY      COLON SURGERY      removed about 10 inch    COLONOSCOPY N/A 12/24/2018    Procedure: COLONOSCOPY;  Surgeon: Nirmal Davila MD;  Location: [facility] ENDO;  Service: Endoscopy;  Laterality: N/A;    FINGER MASS EXCISION Right 7/9/2018    Procedure: EXCISION, MASS, FINGER;  Surgeon: Fabio Seay MD;  Location: [facility] OR;  Service: Orthopedics;  Laterality: Right;    hemorhids      HYSTERECTOMY      LYSIS OF ADHESIONS N/A 12/28/2018    Procedure: LYSIS, ADHESIONS;  Surgeon: Amee Rose MD;  Location: [facility];  Service: OB/GYN;  Laterality: N/A;    OPEN REDUCTION AND INTERNAL FIXATION (ORIF) OF INTERTROCHANTERIC FRACTURE OF FEMUR Left 5/31/2019    Procedure: ORIF, FRACTURE, FEMUR, INTERTROCHANTERIC;  Surgeon: Bacilio Lockwood II, MD;  Location: [facility] OR;  Service: Orthopedics;  Laterality: Left;    ROBOT-ASSISTED LAPAROSCOPIC ABDOMINAL HYSTERECTOMY USING DA MARK XI N/A 12/28/2018    Procedure: XI ROBOTIC HYSTERECTOMY;  Surgeon: Amee Rose MD;  Location: [facility];  Service: OB/GYN;  Laterality: N/A;  attempted, opened    ROBOT-ASSISTED LAPAROSCOPIC SALPINGO-OOPHORECTOMY USING DA MARK XI Bilateral 12/28/2018    Procedure: XI ROBOTIC SALPINGO-OOPHORECTOMY;  Surgeon: Amee Rose MD;  Location: [facility];  Service: OB/GYN;  Laterality: Bilateral;  attmepted, opened    TOTAL ABDOMINAL HYSTERECTOMY W/ BILATERAL SALPINGOOPHORECTOMY Bilateral 12/28/2018    Procedure: HYSTERECTOMY, TOTAL, ABDOMINAL, WITH BILATERAL SALPINGO-OOPHORECTOMY;  Surgeon: Amee Rose MD;  Location: [facility];  Service: OB/GYN;   Laterality: Bilateral;       Review of patient's allergies indicates:   Allergen Reactions    Pcn [penicillins]      Can not remember reaction     Tetanus vaccines and toxoid Swelling     Localized swelling to injection site       No current facility-administered medications on file prior to encounter.      Current Outpatient Medications on File Prior to Encounter   Medication Sig    calcium-vitamin D3 (CALCIUM 500 + D) 500 mg(1,250mg) -200 unit per tablet Take 1 tablet by mouth once daily.     ciprofloxacin HCl (CIPRO) 500 MG tablet Take 1 tablet (500 mg total) by mouth 2 (two) times daily. for 7 days    cyanocobalamin (VITAMIN B-12) 1000 MCG tablet Take 500 mcg by mouth once daily.     esomeprazole (NEXIUM) 20 MG capsule Take 1 capsule (20 mg total) by mouth before breakfast.    MULTIVITAMIN W-MINERALS/LUTEIN (CENTRUM SILVER ORAL) Take 1 tablet by mouth once daily.     ondansetron (ZOFRAN-ODT) 4 MG TbDL Take 8 mg by mouth every 8 (eight) hours as needed.    pregabalin (LYRICA) 75 MG capsule Take 1 capsule (75 mg total) by mouth 2 (two) times daily.    sucralfate (CARAFATE) 1 gram tablet Take 1 tablet (1 g total) by mouth 4 (four) times daily.    acetaminophen (TYLENOL) 500 MG tablet Take 2 tablets (1,000 mg total) by mouth every 8 (eight) hours.    bisacodyl (DULCOLAX) 5 mg EC tablet Take 5 mg by mouth daily as needed for Constipation.    carboxymethylcellulose (REFRESH PLUS) 0.5 % Dpet 1 drop daily as needed.    docusate sodium (COLACE) 100 MG capsule Take 100 mg by mouth daily as needed for Constipation.    FLAXSEED ORAL Take 650 mg by mouth once daily.     FLUAD 6716-1901, 65 YR UP,,PF, 45 mcg (15 mcg x 3)/0.5 mL Syrg ADM 0.5ML IM UTD    ketotifen (ZADITOR) 0.025 % (0.035 %) ophthalmic solution Place 1 drop into both eyes 2 (two) times daily as needed (Pt reports use approx once/week).     methylcellulose, laxative, (CITRUCEL) 500 mg Tab Take 500 mg by mouth daily as needed.     prochlorperazine (COMPAZINE) 10 MG tablet Take 1 tablet (10 mg total) by mouth every 6 (six) hours as needed.     Family History     Problem Relation (Age of Onset)    Breast cancer Sister    Cancer Mother, Sister    Diabetes Brother    No Known Problems Daughter, Brother, Daughter    Stroke Father        Tobacco Use    Smoking status: Never Smoker    Smokeless tobacco: Never Used   Substance and Sexual Activity    Alcohol use: No     Alcohol/week: 0.0 standard drinks     Comment: rare    Drug use: No    Sexual activity: Not Currently     Review of Systems   Constitutional: Positive for fatigue. Negative for activity change, appetite change, chills, diaphoresis, fever and unexpected weight change.   HENT: Negative for congestion, ear pain, facial swelling, hearing loss, sore throat and trouble swallowing.    Eyes: Negative for pain and discharge.   Respiratory: Negative for cough, chest tightness, shortness of breath and wheezing.    Cardiovascular: Negative for chest pain, palpitations and leg swelling.   Gastrointestinal: Negative for abdominal pain, blood in stool, diarrhea, nausea and vomiting.   Endocrine: Negative for polydipsia, polyphagia and polyuria.   Genitourinary: Positive for frequency and urgency. Negative for difficulty urinating, dysuria and flank pain.   Musculoskeletal: Positive for back pain and gait problem. Negative for arthralgias, joint swelling, neck pain and neck stiffness.   Skin: Negative for rash and wound.   Allergic/Immunologic: Negative for environmental allergies and immunocompromised state.   Neurological: Positive for weakness. Negative for dizziness, seizures, syncope, speech difficulty, light-headedness, numbness and headaches.   Hematological: Negative for adenopathy.   Psychiatric/Behavioral: Negative for sleep disturbance and suicidal ideas. The patient is not nervous/anxious.    All other systems reviewed and are negative.    Objective:     Vital Signs (Most  Recent):  Temp: 98.5 °F (36.9 °C) (11/08/19 1522)  Pulse: 86 (11/08/19 2000)  Resp: 20 (11/08/19 2000)  BP: 133/62 (11/08/19 2000)  SpO2: 97 % (11/08/19 2000) Vital Signs (24h Range):  Temp:  [98.1 °F (36.7 °C)-98.5 °F (36.9 °C)] 98.5 °F (36.9 °C)  Pulse:  [] 86  Resp:  [18-32] 20  SpO2:  [96 %-99 %] 97 %  BP: (117-169)/() 133/62     Weight: 56.7 kg (125 lb)  Body mass index is 22.14 kg/m².    Physical Exam   Constitutional: She is oriented to person, place, and time. She appears well-developed and well-nourished.   HENT:   Head: Normocephalic and atraumatic.   Eyes: Pupils are equal, round, and reactive to light. EOM are normal.   Neck: Normal range of motion. Neck supple.   Cardiovascular: Normal rate, regular rhythm, normal heart sounds and intact distal pulses.   No murmur heard.  Pulmonary/Chest: Effort normal and breath sounds normal. No stridor. No respiratory distress. She has no wheezes.   Abdominal: Soft. Bowel sounds are normal. She exhibits no distension. There is no tenderness.   Musculoskeletal: Normal range of motion.   Neurological: She is alert and oriented to person, place, and time.   Skin: Skin is warm and dry. Capillary refill takes less than 2 seconds.   Psychiatric: She has a normal mood and affect.   Nursing note and vitals reviewed.        CRANIAL NERVES     CN III, IV, VI   Pupils are equal, round, and reactive to light.  Extraocular motions are normal.        Significant Labs:   CBC:   Recent Labs   Lab 11/08/19  1632   WBC 13.17*   HGB 12.0   HCT 36.6*        CMP:   Recent Labs   Lab 11/08/19  1632   *   K 4.1   CL 95   CO2 28      BUN 20   CREATININE 0.8   CALCIUM 8.8   PROT 7.1   ALBUMIN 3.5   BILITOT 0.8   ALKPHOS 165*   AST 25   ALT 22   ANIONGAP 11   EGFRNONAA >60.0     Cardiac Markers: No results for input(s): CKMB, MYOGLOBIN, BNP, TROPISTAT in the last 48 hours.  Troponin:   Recent Labs   Lab 11/08/19  1632   TROPONINI 0.036       Significant  Imaging: EKG: I have reviewed all pertinent results/findings within the past 24 hours and my personal findings are: Sinus Tachycardia with PVCs and Left anterior fascicular block  I have reviewed all pertinent imaging results/findings within the past 24 hours.       X-ray Chest Ap Portable    Result Date: 11/8/2019  EXAMINATION: XR CHEST AP PORTABLE CLINICAL HISTORY: Other fatigue FINDINGS: Portable chest with comparison dated 05/31/2019.  Normal cardiomediastinal silhouette.There is an focal ill-defined hazy opacity in the right upper lung.  The left lung is clear.  Pulmonary vasculature is normal. No acute osseous abnormality.     Focal ill-defined hazy opacity in the right upper lung may reflect a pulmonary nodule.  Consider further evaluation with CT chest. Electronically signed by: Omar Ireland MD Date:    11/08/2019 Time:    16:50

## 2019-11-09 NOTE — PLAN OF CARE
11/09/19 1412   NAVARRO Message   Medicare Outpatient and Observation Notification regarding financial responsibility Given to patient/caregiver;Explained to patient/caregiver;Signed/date by patient/caregiver   Date NAVARRO was signed 11/09/19   Time NAVARRO was signed 1352

## 2019-11-09 NOTE — H&P
[facility]  History & Physical    DOS: 11/08/2019  7:32 PM      Patient Name: Pau Cook  MRN: [id number]  Admission Date: 11/8/2019  Attending Physician: Dr. Ray  Primary Care Provider: Rosi Ramires MD         Patient information was obtained from patient, relative(s) and ER records.    Case personally discussed with Dr. Connolly, ER MD     Subjective:     Principal Problem:Dehydration    Chief Complaint:   Chief Complaint   Patient presents with    Fatigue        HPI: Ms. Cook presents today with complaints of weakness. It is moderate. It is associated with increased sleeping, SOB when laying flat, and decreased PO intake. She denies fever, chills, N/V/D, chest pain, dizziness, or LOC. She states over the last few days she's been extremely tired and sleeping more than she should. She hasn't felt hungry or thirsty and hasn't been eating or drinking much either. She is currently undergoing chemo for stage IV ovarian cancer. She has currently received 6 doses and this is her second round. She was going in today to Dr. Tejeda's office for clearance to start chemo on Monday and was found to have a rapid irregular heartbeat. She was then sent to the ED per Dr. Tejeda for cardiac evaluation. She was recently treated for a UTI, but a follow up UA by Dr. Tejeda was concerning for continued UTI and she was prescribed another antibiotic she hasn't started taking yet. She does endorse frequency and urgency, but attributed that to her chronic incontinence. She also has a history of recent hip arthroplasty and is currently ambulating with walker. She voices her wishes to be a DNR with her daughter present.    Past Medical History:   Diagnosis Date    Allergy     Arthritis     Breast cyst     Foot pain     GERD (gastroesophageal reflux disease)     Joint pain     Ovarian cancer     Peripheral vascular disease 1/15/2018    Squamous cell carcinoma 04/2016    Right Lower Leg    MUNIR  (stress urinary incontinence, female) 4/17/2017       Past Surgical History:   Procedure Laterality Date    APPENDECTOMY      COLON SURGERY      removed about 10 inch    COLONOSCOPY N/A 12/24/2018    Procedure: COLONOSCOPY;  Surgeon: Nirmal Davila MD;  Location: [facility];  Service: Endoscopy;  Laterality: N/A;    FINGER MASS EXCISION Right 7/9/2018    Procedure: EXCISION, MASS, FINGER;  Surgeon: Fabio Seay MD;  Location: [facility] OR;  Service: Orthopedics;  Laterality: Right;    hemorhids      HYSTERECTOMY      LYSIS OF ADHESIONS N/A 12/28/2018    Procedure: LYSIS, ADHESIONS;  Surgeon: Amee Rose MD;  Location: [facility] OR;  Service: OB/GYN;  Laterality: N/A;    OPEN REDUCTION AND INTERNAL FIXATION (ORIF) OF INTERTROCHANTERIC FRACTURE OF FEMUR Left 5/31/2019    Procedure: ORIF, FRACTURE, FEMUR, INTERTROCHANTERIC;  Surgeon: Bacilio Lockwood II, MD;  Location: [facility] OR;  Service: Orthopedics;  Laterality: Left;    ROBOT-ASSISTED LAPAROSCOPIC ABDOMINAL HYSTERECTOMY USING DA MARK XI N/A 12/28/2018    Procedure: XI ROBOTIC HYSTERECTOMY;  Surgeon: Amee Rose MD;  Location: [facility];  Service: OB/GYN;  Laterality: N/A;  attempted, opened    ROBOT-ASSISTED LAPAROSCOPIC SALPINGO-OOPHORECTOMY USING DA MARK XI Bilateral 12/28/2018    Procedure: XI ROBOTIC SALPINGO-OOPHORECTOMY;  Surgeon: Amee Rose MD;  Location: [facility] OR;  Service: OB/GYN;  Laterality: Bilateral;  attmepted, opened    TOTAL ABDOMINAL HYSTERECTOMY W/ BILATERAL SALPINGOOPHORECTOMY Bilateral 12/28/2018    Procedure: HYSTERECTOMY, TOTAL, ABDOMINAL, WITH BILATERAL SALPINGO-OOPHORECTOMY;  Surgeon: Amee Rose MD;  Location: [facility] OR;  Service: OB/GYN;  Laterality: Bilateral;       Review of patient's allergies indicates:   Allergen Reactions    Pcn [penicillins]      Can not remember reaction     Tetanus vaccines and toxoid Swelling     Localized swelling to injection site       No current facility-administered medications on  file prior to encounter.      Current Outpatient Medications on File Prior to Encounter   Medication Sig    calcium-vitamin D3 (CALCIUM 500 + D) 500 mg(1,250mg) -200 unit per tablet Take 1 tablet by mouth once daily.     ciprofloxacin HCl (CIPRO) 500 MG tablet Take 1 tablet (500 mg total) by mouth 2 (two) times daily. for 7 days    cyanocobalamin (VITAMIN B-12) 1000 MCG tablet Take 500 mcg by mouth once daily.     esomeprazole (NEXIUM) 20 MG capsule Take 1 capsule (20 mg total) by mouth before breakfast.    MULTIVITAMIN W-MINERALS/LUTEIN (CENTRUM SILVER ORAL) Take 1 tablet by mouth once daily.     ondansetron (ZOFRAN-ODT) 4 MG TbDL Take 8 mg by mouth every 8 (eight) hours as needed.    pregabalin (LYRICA) 75 MG capsule Take 1 capsule (75 mg total) by mouth 2 (two) times daily.    sucralfate (CARAFATE) 1 gram tablet Take 1 tablet (1 g total) by mouth 4 (four) times daily.    acetaminophen (TYLENOL) 500 MG tablet Take 2 tablets (1,000 mg total) by mouth every 8 (eight) hours.    bisacodyl (DULCOLAX) 5 mg EC tablet Take 5 mg by mouth daily as needed for Constipation.    carboxymethylcellulose (REFRESH PLUS) 0.5 % Dpet 1 drop daily as needed.    docusate sodium (COLACE) 100 MG capsule Take 100 mg by mouth daily as needed for Constipation.    FLAXSEED ORAL Take 650 mg by mouth once daily.     FLUAD 8161-1408, 65 YR UP,,PF, 45 mcg (15 mcg x 3)/0.5 mL Syrg ADM 0.5ML IM UTD    ketotifen (ZADITOR) 0.025 % (0.035 %) ophthalmic solution Place 1 drop into both eyes 2 (two) times daily as needed (Pt reports use approx once/week).     methylcellulose, laxative, (CITRUCEL) 500 mg Tab Take 500 mg by mouth daily as needed.    prochlorperazine (COMPAZINE) 10 MG tablet Take 1 tablet (10 mg total) by mouth every 6 (six) hours as needed.     Family History     Problem Relation (Age of Onset)    Breast cancer Sister    Cancer Mother, Sister    Diabetes Brother    No Known Problems Daughter, Brother, Daughter    Stroke  Father        Tobacco Use    Smoking status: Never Smoker    Smokeless tobacco: Never Used   Substance and Sexual Activity    Alcohol use: No     Alcohol/week: 0.0 standard drinks     Comment: rare    Drug use: No    Sexual activity: Not Currently     Review of Systems   Constitutional: Positive for fatigue. Negative for activity change, appetite change, chills, diaphoresis, fever and unexpected weight change.   HENT: Negative for congestion, ear pain, facial swelling, hearing loss, sore throat and trouble swallowing.    Eyes: Negative for pain and discharge.   Respiratory: Negative for cough, chest tightness, shortness of breath and wheezing.    Cardiovascular: Negative for chest pain, palpitations and leg swelling.   Gastrointestinal: Negative for abdominal pain, blood in stool, diarrhea, nausea and vomiting.   Endocrine: Negative for polydipsia, polyphagia and polyuria.   Genitourinary: Positive for frequency and urgency. Negative for difficulty urinating, dysuria and flank pain.   Musculoskeletal: Positive for back pain and gait problem. Negative for arthralgias, joint swelling, neck pain and neck stiffness.   Skin: Negative for rash and wound.   Allergic/Immunologic: Negative for environmental allergies and immunocompromised state.   Neurological: Positive for weakness. Negative for dizziness, seizures, syncope, speech difficulty, light-headedness, numbness and headaches.   Hematological: Negative for adenopathy.   Psychiatric/Behavioral: Negative for sleep disturbance and suicidal ideas. The patient is not nervous/anxious.    All other systems reviewed and are negative.    Objective:     Vital Signs (Most Recent):  Temp: 98.5 °F (36.9 °C) (11/08/19 1522)  Pulse: 86 (11/08/19 2000)  Resp: 20 (11/08/19 2000)  BP: 133/62 (11/08/19 2000)  SpO2: 97 % (11/08/19 2000) Vital Signs (24h Range):  Temp:  [98.1 °F (36.7 °C)-98.5 °F (36.9 °C)] 98.5 °F (36.9 °C)  Pulse:  [] 86  Resp:  [18-32] 20  SpO2:  [96 %-99  %] 97 %  BP: (117-169)/() 133/62     Weight: 56.7 kg (125 lb)  Body mass index is 22.14 kg/m².    Physical Exam   Constitutional: She is oriented to person, place, and time. She appears well-developed and well-nourished.   HENT:   Head: Normocephalic and atraumatic.   Eyes: Pupils are equal, round, and reactive to light. EOM are normal.   Neck: Normal range of motion. Neck supple.   Cardiovascular: Normal rate, regular rhythm, normal heart sounds and intact distal pulses.   No murmur heard.  Pulmonary/Chest: Effort normal and breath sounds normal. No stridor. No respiratory distress. She has no wheezes.   Abdominal: Soft. Bowel sounds are normal. She exhibits no distension. There is no tenderness.   Musculoskeletal: Normal range of motion.   Neurological: She is alert and oriented to person, place, and time.   Skin: Skin is warm and dry. Capillary refill takes less than 2 seconds.   Psychiatric: She has a normal mood and affect.   Nursing note and vitals reviewed.        CRANIAL NERVES     CN III, IV, VI   Pupils are equal, round, and reactive to light.  Extraocular motions are normal.        Significant Labs:   CBC:   Recent Labs   Lab 11/08/19  1632   WBC 13.17*   HGB 12.0   HCT 36.6*        CMP:   Recent Labs   Lab 11/08/19  1632   *   K 4.1   CL 95   CO2 28      BUN 20   CREATININE 0.8   CALCIUM 8.8   PROT 7.1   ALBUMIN 3.5   BILITOT 0.8   ALKPHOS 165*   AST 25   ALT 22   ANIONGAP 11   EGFRNONAA >60.0     Cardiac Markers: No results for input(s): CKMB, MYOGLOBIN, BNP, TROPISTAT in the last 48 hours.  Troponin:   Recent Labs   Lab 11/08/19  1632   TROPONINI 0.036       Significant Imaging: EKG: I have reviewed all pertinent results/findings within the past 24 hours and my personal findings are: Sinus Tachycardia with PVCs and Left anterior fascicular block  I have reviewed all pertinent imaging results/findings within the past 24 hours.       X-ray Chest Ap Portable    Result Date:  11/8/2019  EXAMINATION: XR CHEST AP PORTABLE CLINICAL HISTORY: Other fatigue FINDINGS: Portable chest with comparison dated 05/31/2019.  Normal cardiomediastinal silhouette.There is an focal ill-defined hazy opacity in the right upper lung.  The left lung is clear.  Pulmonary vasculature is normal. No acute osseous abnormality.     Focal ill-defined hazy opacity in the right upper lung may reflect a pulmonary nodule.  Consider further evaluation with CT chest. Electronically signed by: Omar Ireland MD Date:    11/08/2019 Time:    16:50        Assessment/Plan:     * Dehydration  Admit to med/surg tele   IV hydration   Electrolyte replacement per protocol  Push oral fluids    Palpitations  Pt feels SOB when laying flat   Sometimes feel heart is racing   Will get Echo      UTI (urinary tract infection)  Start rocephin while in the hospital   UA pending currently   Recent outpatient UA concerning for UTI and given patient is on chemo, will treat        VTE Risk Mitigation (From admission, onward)         Ordered     enoxaparin injection 40 mg  Daily      11/08/19 1954     IP VTE HIGH RISK PATIENT  Once      11/08/19 1954                   Layla Greene NP  Department of Hospital Medicine   [facility]

## 2019-11-11 ENCOUNTER — TELEPHONE (OUTPATIENT)
Dept: HEMATOLOGY/ONCOLOGY | Facility: CLINIC | Age: 84
End: 2019-11-11

## 2019-11-11 LAB — BACTERIA UR CULT: NO GROWTH

## 2019-11-11 NOTE — TELEPHONE ENCOUNTER
Spoke with caller: see other note.     ----- Message from An Paul sent at 11/11/2019 10:24 AM CST -----  Contact: Daughter Kendra   Daughter Kendra called, she need to speak with a nurse regarding patient chemo, please call back at [phone].

## 2019-11-11 NOTE — TELEPHONE ENCOUNTER
Returned call and spoke with Kendra. Advised that patient needs to see cardiology for work up. Once she has the appointment , let us know so I can get patient in with Dr Tejeda.         ----- Message from John Kay sent at 11/11/2019  8:59 AM CST -----  Contact: Daughter, June June want to speak with a nurse regarding patient getting her chemo today please call back at [phone] or [phone]

## 2019-11-11 NOTE — TELEPHONE ENCOUNTER
Spoke jack patient's daughter, June. She is requesting that we call Dr Zelaya's office and ask if he will clear her for chemo based on the echo done while she was in hospital over the weekend.   Left message at Dr Zelaya's office to return call.               ----- Message from Leonor Smith sent at 11/11/2019  2:40 PM CST -----  Contact: patient daughter june # [phone]  patient daughter june # [phone]  Requesting Daiana to call her, she stated she know what she talking about concerning Carlos Zelaay read cardiac release.

## 2019-11-11 NOTE — TELEPHONE ENCOUNTER
Spoke with Aggie at [employer]. Advised that we assisting patient in getting a cardiology appointment.           ----- Message from Ellen Tuttle sent at 11/11/2019  2:38 PM CST -----  Type: Needs Medical Advice    Who Called:  Aggie IZQUIERDO/[employer]  Best Call Back Number: [phone] ext [phone]  Additional Information: needs cardic clearance to resume chemo , had FELIPE preformed in hospital on 11/09/19 at [facility], caller is requesting Dr. Barbosa office contact Dr. Ray Zelaya to obtain cardiac clearance at [phone]      June [phone]( patient daughter contact with any questions)

## 2019-11-11 NOTE — TELEPHONE ENCOUNTER
Left VM Message for June regarding Chemo appointment. Dr Tejeda wants chemo held until patient can see cardiology for work up. Advised June to let us know when cardiology appointment is so we can then get her back in to see Dr. Tejeda              ----- Message from Prachi Deras sent at 11/11/2019  7:35 AM CST -----  Type: Needs Medical Advice    Who Called:  Daughter - June Chely  Symptoms (please be specific):  na  How long has patient had these symptoms:  na  Pharmacy name and phone #:  na  Best Call Back Number: [phone] (cell)  Additional Information: patient is scheduled for chemo at 8am this morning/Friday at the hospital everything checked out with patient's heart/can she take chemo today?/I tried to reach someone at the office and infusion but could not - to ask/the appt is at 8 am this morning/please advise

## 2019-11-14 ENCOUNTER — TELEPHONE (OUTPATIENT)
Dept: HEMATOLOGY/ONCOLOGY | Facility: CLINIC | Age: 84
End: 2019-11-14

## 2019-11-14 NOTE — TELEPHONE ENCOUNTER
Spoke with Dr Tejeda. She wants patient to continue Cipro and to increase fluid intake. LM for Daughter Kendra.         ----- Message from Reyna Siddiqi sent at 11/14/2019  8:34 AM CST -----  Contact: pt [phone]  or [phone]  Pt's daughter Kendra called about the Cipro floxin the pt is taking for her Urine infection the medication is making her have  A burning feeling when she urinates. This is the first time this has happened. Her daughter Kendra would like a call back from the nurse please. Her daughter is asking if she needs to stop taking the medication. Call back at [phone]

## 2019-11-22 ENCOUNTER — TELEPHONE (OUTPATIENT)
Dept: HEMATOLOGY/ONCOLOGY | Facility: CLINIC | Age: 84
End: 2019-11-22

## 2019-11-22 NOTE — TELEPHONE ENCOUNTER
Spoke with June, will check with Dr Tejeda on Monday as to what the next steps are.         ----- Message from Alvarez Fair sent at 11/22/2019  2:30 PM CST -----  Contact: Daughter  June [phone]  Type: Needs Medical Advice    Who Called:  Daughter  June [phone]    Additional Information: Calling to confirm that Dr Tejeda has received the medical release from Dr Danish López Cardiologist hand on 11-20-19 in person.  Please call to confirm.

## 2019-11-25 ENCOUNTER — PATIENT MESSAGE (OUTPATIENT)
Dept: HEMATOLOGY/ONCOLOGY | Facility: CLINIC | Age: 84
End: 2019-11-25

## 2019-11-25 ENCOUNTER — TELEPHONE (OUTPATIENT)
Dept: HEMATOLOGY/ONCOLOGY | Facility: CLINIC | Age: 84
End: 2019-11-25

## 2019-11-25 DIAGNOSIS — C56.9 MALIGNANT NEOPLASM OF OVARY, UNSPECIFIED LATERALITY: Primary | ICD-10-CM

## 2019-11-25 NOTE — TELEPHONE ENCOUNTER
Pt seen in ER [facility]  EKG verified PVC s   Pt saw cardiology Dr Cottrell   Echo noted   Pt needs F/U in ofice for physical exam with cbc and cmp as well as ca 125 to discuss future chemo plans

## 2019-11-26 ENCOUNTER — TELEPHONE (OUTPATIENT)
Dept: FAMILY MEDICINE | Facility: CLINIC | Age: 84
End: 2019-11-26

## 2019-11-26 ENCOUNTER — TELEPHONE (OUTPATIENT)
Dept: HEMATOLOGY/ONCOLOGY | Facility: CLINIC | Age: 84
End: 2019-11-26

## 2019-11-26 NOTE — TELEPHONE ENCOUNTER
Spoke to patient's daughter requesting to be seen sooner than scheduled appointment with Dr Ramires on day of appointment due to patient having another appointment on 12/3 notified no early times that day. Appointment rescheduled to 12/6 patient daughter confirmed appointment

## 2019-11-26 NOTE — TELEPHONE ENCOUNTER
----- Message from Kristiejuana Smith sent at 11/26/2019 12:43 PM CST -----  Contact: Daughter  Type: Needs Medical Advice    Who Called: Kendra Daughter    Best Call Back Number:   Additional Information: Requesting a call back from Nurse, regarding can pt come in early on day of appt she has two appt for that day ,please call

## 2019-11-26 NOTE — TELEPHONE ENCOUNTER
Spoke with Kendra, moved appointment back to 12/3/19 at 10:40 AM as requested.         ----- Message from Tim Kwon sent at 11/26/2019 11:20 AM CST -----  Contact: pt Kendra  Type: Needs Medical Advice    Who Called:  bryan    Best Call Back Number: [phone]  Additional Information: trying to schedule appointment online but keeps getting overlapping. Please call to advise.

## 2019-12-02 ENCOUNTER — LAB VISIT (OUTPATIENT)
Dept: LAB | Facility: HOSPITAL | Age: 84
End: 2019-12-02
Attending: INTERNAL MEDICINE
Payer: MEDICARE

## 2019-12-02 DIAGNOSIS — C56.9 MALIGNANT NEOPLASM OF OVARY, UNSPECIFIED LATERALITY: ICD-10-CM

## 2019-12-02 LAB
ALBUMIN SERPL BCP-MCNC: 3.9 G/DL (ref 3.5–5.2)
ALP SERPL-CCNC: 99 U/L (ref 55–135)
ALT SERPL W/O P-5'-P-CCNC: 19 U/L (ref 10–44)
ANION GAP SERPL CALC-SCNC: 7 MMOL/L (ref 8–16)
AST SERPL-CCNC: 32 U/L (ref 10–40)
BASOPHILS # BLD AUTO: 0.04 K/UL (ref 0–0.2)
BASOPHILS NFR BLD: 0.8 % (ref 0–1.9)
BILIRUB SERPL-MCNC: 0.5 MG/DL (ref 0.1–1)
BUN SERPL-MCNC: 11 MG/DL (ref 8–23)
CALCIUM SERPL-MCNC: 9.6 MG/DL (ref 8.7–10.5)
CANCER AG125 SERPL-ACNC: 19 U/ML (ref 0–30)
CHLORIDE SERPL-SCNC: 100 MMOL/L (ref 95–110)
CO2 SERPL-SCNC: 32 MMOL/L (ref 23–29)
CREAT SERPL-MCNC: 0.8 MG/DL (ref 0.5–1.4)
DIFFERENTIAL METHOD: ABNORMAL
EOSINOPHIL # BLD AUTO: 0.1 K/UL (ref 0–0.5)
EOSINOPHIL NFR BLD: 1 % (ref 0–8)
ERYTHROCYTE [DISTWIDTH] IN BLOOD BY AUTOMATED COUNT: 15.6 % (ref 11.5–14.5)
EST. GFR  (AFRICAN AMERICAN): >60 ML/MIN/1.73 M^2
EST. GFR  (NON AFRICAN AMERICAN): >60 ML/MIN/1.73 M^2
GLUCOSE SERPL-MCNC: 87 MG/DL (ref 70–110)
HCT VFR BLD AUTO: 40.2 % (ref 37–48.5)
HGB BLD-MCNC: 12.9 G/DL (ref 12–16)
IMM GRANULOCYTES # BLD AUTO: 0.01 K/UL (ref 0–0.04)
LYMPHOCYTES # BLD AUTO: 1.2 K/UL (ref 1–4.8)
LYMPHOCYTES NFR BLD: 23.9 % (ref 18–48)
MCH RBC QN AUTO: 29.9 PG (ref 27–31)
MCHC RBC AUTO-ENTMCNC: 32.1 G/DL (ref 32–36)
MCV RBC AUTO: 93 FL (ref 82–98)
MONOCYTES # BLD AUTO: 0.5 K/UL (ref 0.3–1)
MONOCYTES NFR BLD: 9.8 % (ref 4–15)
NEUTROPHILS # BLD AUTO: 3.2 K/UL (ref 1.8–7.7)
NEUTROPHILS NFR BLD: 64.3 % (ref 38–73)
NRBC BLD-RTO: 0 /100 WBC
PLATELET # BLD AUTO: 226 K/UL (ref 150–350)
PMV BLD AUTO: 9.8 FL (ref 9.2–12.9)
POTASSIUM SERPL-SCNC: 3.9 MMOL/L (ref 3.5–5.1)
PROT SERPL-MCNC: 7.3 G/DL (ref 6–8.4)
RBC # BLD AUTO: 4.32 M/UL (ref 4–5.4)
SODIUM SERPL-SCNC: 139 MMOL/L (ref 136–145)
WBC # BLD AUTO: 4.9 K/UL (ref 3.9–12.7)

## 2019-12-02 PROCEDURE — 85025 COMPLETE CBC W/AUTO DIFF WBC: CPT

## 2019-12-02 PROCEDURE — 80053 COMPREHEN METABOLIC PANEL: CPT

## 2019-12-02 PROCEDURE — 86304 IMMUNOASSAY TUMOR CA 125: CPT

## 2019-12-02 PROCEDURE — 36415 COLL VENOUS BLD VENIPUNCTURE: CPT

## 2019-12-03 ENCOUNTER — DOCUMENTATION ONLY (OUTPATIENT)
Dept: HEMATOLOGY/ONCOLOGY | Facility: CLINIC | Age: 84
End: 2019-12-03

## 2019-12-03 ENCOUNTER — OFFICE VISIT (OUTPATIENT)
Dept: HEMATOLOGY/ONCOLOGY | Facility: CLINIC | Age: 84
End: 2019-12-03
Payer: MEDICARE

## 2019-12-03 VITALS
OXYGEN SATURATION: 99 % | BODY MASS INDEX: 21.79 KG/M2 | HEART RATE: 87 BPM | TEMPERATURE: 98 F | RESPIRATION RATE: 16 BRPM | DIASTOLIC BLOOD PRESSURE: 66 MMHG | WEIGHT: 127.63 LBS | SYSTOLIC BLOOD PRESSURE: 139 MMHG | HEIGHT: 64 IN

## 2019-12-03 DIAGNOSIS — C56.9 MALIGNANT NEOPLASM OF OVARY, UNSPECIFIED LATERALITY: Primary | ICD-10-CM

## 2019-12-03 DIAGNOSIS — Z09 CHEMOTHERAPY FOLLOW-UP EXAMINATION: ICD-10-CM

## 2019-12-03 DIAGNOSIS — Z09 ONCOLOGY FOLLOW-UP ENCOUNTER: ICD-10-CM

## 2019-12-03 PROCEDURE — 99999 PR PBB SHADOW E&M-EST. PATIENT-LVL III: ICD-10-PCS | Mod: PBBFAC,,, | Performed by: INTERNAL MEDICINE

## 2019-12-03 PROCEDURE — 1159F PR MEDICATION LIST DOCUMENTED IN MEDICAL RECORD: ICD-10-PCS | Mod: S$GLB,,, | Performed by: INTERNAL MEDICINE

## 2019-12-03 PROCEDURE — 1159F MED LIST DOCD IN RCRD: CPT | Mod: S$GLB,,, | Performed by: INTERNAL MEDICINE

## 2019-12-03 PROCEDURE — 1125F PR PAIN SEVERITY QUANTIFIED, PAIN PRESENT: ICD-10-PCS | Mod: S$GLB,,, | Performed by: INTERNAL MEDICINE

## 2019-12-03 PROCEDURE — 99215 PR OFFICE/OUTPT VISIT, EST, LEVL V, 40-54 MIN: ICD-10-PCS | Mod: S$GLB,,, | Performed by: INTERNAL MEDICINE

## 2019-12-03 PROCEDURE — 1101F PT FALLS ASSESS-DOCD LE1/YR: CPT | Mod: CPTII,S$GLB,, | Performed by: INTERNAL MEDICINE

## 2019-12-03 PROCEDURE — 99215 OFFICE O/P EST HI 40 MIN: CPT | Mod: S$GLB,,, | Performed by: INTERNAL MEDICINE

## 2019-12-03 PROCEDURE — 1125F AMNT PAIN NOTED PAIN PRSNT: CPT | Mod: S$GLB,,, | Performed by: INTERNAL MEDICINE

## 2019-12-03 PROCEDURE — 99999 PR PBB SHADOW E&M-EST. PATIENT-LVL III: CPT | Mod: PBBFAC,,, | Performed by: INTERNAL MEDICINE

## 2019-12-03 PROCEDURE — 1101F PR PT FALLS ASSESS DOC 0-1 FALLS W/OUT INJ PAST YR: ICD-10-PCS | Mod: CPTII,S$GLB,, | Performed by: INTERNAL MEDICINE

## 2019-12-03 RX ORDER — EPINEPHRINE 0.3 MG/.3ML
0.3 INJECTION SUBCUTANEOUS ONCE AS NEEDED
Status: CANCELLED | OUTPATIENT
Start: 2019-12-12

## 2019-12-03 RX ORDER — SODIUM CHLORIDE 0.9 % (FLUSH) 0.9 %
10 SYRINGE (ML) INJECTION
Status: CANCELLED | OUTPATIENT
Start: 2019-12-12

## 2019-12-03 RX ORDER — HEPARIN 100 UNIT/ML
500 SYRINGE INTRAVENOUS
Status: CANCELLED | OUTPATIENT
Start: 2019-12-12

## 2019-12-03 RX ORDER — SODIUM CHLORIDE 0.9 % (FLUSH) 0.9 %
10 SYRINGE (ML) INJECTION
Status: CANCELLED | OUTPATIENT
Start: 2019-12-23

## 2019-12-03 RX ORDER — HEPARIN 100 UNIT/ML
500 SYRINGE INTRAVENOUS
Status: CANCELLED | OUTPATIENT
Start: 2019-12-23

## 2019-12-03 RX ORDER — ONDANSETRON HYDROCHLORIDE 8 MG/1
8 TABLET, FILM COATED ORAL EVERY 6 HOURS PRN
Refills: 3 | COMMUNITY
Start: 2019-11-08 | End: 2020-01-07 | Stop reason: SDUPTHER

## 2019-12-03 RX ORDER — DIPHENHYDRAMINE HYDROCHLORIDE 50 MG/ML
50 INJECTION INTRAMUSCULAR; INTRAVENOUS ONCE AS NEEDED
Status: CANCELLED | OUTPATIENT
Start: 2019-12-12

## 2019-12-03 NOTE — PROGRESS NOTES
CC I feel ok    Pau Cook is a 89 y.o.  Pt with recurrent ovarian cancer here for chemo clearance for second-line therapy.  She is accompanied by her daughter today.  Last office visit she was sent to the emergency room and [facility] for irregular heart rhythm and rate.  She ended daughter are unaware of the etiology for this event.  Hospital notes have been reviewed and patient was dehydrated.  Patient recovered after she received boluses of normal saline.  Patient was educated on proper hydration in the hospital as well as multiple office visits prior to today.  Unfortunately her daughter does not remember conversation.  The patient was able to recall our conversations about water and chemotherapy causing hypotension and that she did need to try and increase her electrolytes to help with such.  Today patient is pleasant oriented and very conversant.  She is answering questions appropriately.  Her daughter seems frustrated at this office visit.  And confused as to why the patient's chemo was delayed.  Patient is being seen with nurse Antonietta I tried explained that I could not give this patient her chemo when the heart rate monitor was reading well over 100 our last visit and patient appeared ill.  It has been substantiated even in the hospital she was having runs of V-tach she has been cleared by Cardiology with an echo    Past Medical History:   Diagnosis Date    Allergy     Arthritis     Breast cyst     Foot pain     GERD (gastroesophageal reflux disease)     Joint pain     Ovarian cancer     Peripheral vascular disease 1/15/2018    Squamous cell carcinoma 04/2016    Right Lower Leg    MUNIR (stress urinary incontinence, female) 4/17/2017     Past Surgical History:   Procedure Laterality Date    APPENDECTOMY      COLON SURGERY      removed about 10 inch    COLONOSCOPY N/A 12/24/2018    Procedure: COLONOSCOPY;  Surgeon: Nirmal Davila MD;  Location: [facility];  Service: Endoscopy;  Laterality:  N/A;    FINGER MASS EXCISION Right 7/9/2018    Procedure: EXCISION, MASS, FINGER;  Surgeon: Fabio Seay MD;  Location: [facility] OR;  Service: Orthopedics;  Laterality: Right;    hemorhids      HYSTERECTOMY      LYSIS OF ADHESIONS N/A 12/28/2018    Procedure: LYSIS, ADHESIONS;  Surgeon: Amee Rose MD;  Location: [facility] OR;  Service: OB/GYN;  Laterality: N/A;    OPEN REDUCTION AND INTERNAL FIXATION (ORIF) OF INTERTROCHANTERIC FRACTURE OF FEMUR Left 5/31/2019    Procedure: ORIF, FRACTURE, FEMUR, INTERTROCHANTERIC;  Surgeon: Bacilio Lockwood II, MD;  Location: [facility] OR;  Service: Orthopedics;  Laterality: Left;    ROBOT-ASSISTED LAPAROSCOPIC ABDOMINAL HYSTERECTOMY USING DA MARK XI N/A 12/28/2018    Procedure: XI ROBOTIC HYSTERECTOMY;  Surgeon: Amee Rose MD;  Location: [facility] OR;  Service: OB/GYN;  Laterality: N/A;  attempted, opened    ROBOT-ASSISTED LAPAROSCOPIC SALPINGO-OOPHORECTOMY USING DA MARK XI Bilateral 12/28/2018    Procedure: XI ROBOTIC SALPINGO-OOPHORECTOMY;  Surgeon: Amee Rose MD;  Location: [facility] OR;  Service: OB/GYN;  Laterality: Bilateral;  attmepted, opened    TOTAL ABDOMINAL HYSTERECTOMY W/ BILATERAL SALPINGOOPHORECTOMY Bilateral 12/28/2018    Procedure: HYSTERECTOMY, TOTAL, ABDOMINAL, WITH BILATERAL SALPINGO-OOPHORECTOMY;  Surgeon: Amee Rose MD;  Location: [facility];  Service: OB/GYN;  Laterality: Bilateral;       Current Outpatient Medications:     acetaminophen (TYLENOL) 500 MG tablet, Take 2 tablets (1,000 mg total) by mouth every 8 (eight) hours., Disp: , Rfl: 0    bisacodyl (DULCOLAX) 5 mg EC tablet, Take 5 mg by mouth daily as needed for Constipation., Disp: , Rfl:     calcium-vitamin D3 (CALCIUM 500 + D) 500 mg(1,250mg) -200 unit per tablet, Take 1 tablet by mouth once daily. , Disp: , Rfl:     carboxymethylcellulose (REFRESH PLUS) 0.5 % Dpet, 1 drop daily as needed., Disp: , Rfl:     cyanocobalamin (VITAMIN B-12) 1000 MCG tablet, Take 500 mcg by mouth once  daily. , Disp: , Rfl:     docusate sodium (COLACE) 100 MG capsule, Take 100 mg by mouth daily as needed for Constipation., Disp: , Rfl:     esomeprazole (NEXIUM) 20 MG capsule, Take 1 capsule (20 mg total) by mouth before breakfast., Disp: 30 capsule, Rfl: 11    FLAXSEED ORAL, Take 650 mg by mouth once daily. , Disp: , Rfl:     FLUAD 6529-3824, 65 YR UP,,PF, 45 mcg (15 mcg x 3)/0.5 mL Syrg, ADM 0.5ML IM UTD, Disp: , Rfl: 0    methylcellulose, laxative, (CITRUCEL) 500 mg Tab, Take 500 mg by mouth daily as needed., Disp: , Rfl:     MULTIVITAMIN W-MINERALS/LUTEIN (CENTRUM SILVER ORAL), Take 1 tablet by mouth once daily. , Disp: , Rfl:     ondansetron (ZOFRAN) 8 MG tablet, Take 8 mg by mouth every 6 (six) hours as needed., Disp: , Rfl: 3    ondansetron (ZOFRAN-ODT) 4 MG TbDL, Take 8 mg by mouth every 8 (eight) hours as needed., Disp: , Rfl:     pregabalin (LYRICA) 75 MG capsule, Take 1 capsule (75 mg total) by mouth 2 (two) times daily., Disp: 60 capsule, Rfl: 0    prochlorperazine (COMPAZINE) 10 MG tablet, Take 1 tablet (10 mg total) by mouth every 6 (six) hours as needed., Disp: 30 tablet, Rfl: 1    sucralfate (CARAFATE) 1 gram tablet, Take 1 tablet (1 g total) by mouth 4 (four) times daily., Disp: 40 tablet, Rfl: 0    ketotifen (ZADITOR) 0.025 % (0.035 %) ophthalmic solution, Place 1 drop into both eyes 2 (two) times daily as needed (Pt reports use approx once/week). , Disp: , Rfl:   Review of patient's allergies indicates:   Allergen Reactions    Pcn [penicillins]      Can not remember reaction     Tetanus vaccines and toxoid Swelling     Localized swelling to injection site     Social History     Tobacco Use    Smoking status: Never Smoker    Smokeless tobacco: Never Used   Substance Use Topics    Alcohol use: No     Alcohol/week: 0.0 standard drinks     Comment: rare    Drug use: No         CONSTITUTIONAL: No fevers, chills, night sweats, wt. loss, appetite changes  SKIN: no rashes or  "itching  ENT: No headaches, head trauma, vision changes, or eye pain  LYMPH NODES: None noticed   CV: No chest pain, palpitations.   RESP: No dyspnea on exertion, cough, wheezing, or hemoptysis  GI: No nausea, emesis, diarrhea, constipation, melena, hematochezia, pain.   : No dysuria, hematuria, urgency, or frequency   HEME: No easy bruising, bleeding problems  PSYCHIATRIC: No depression, anxiety, psychosis, hallucinations.  NEURO: No seizures, memory loss, dizziness or difficulty sleeping  MSK: No arthralgias or joint swelling         /66   Pulse 87   Temp 97.7 °F (36.5 °C) (Oral)   Resp 16   Ht 5' 4" (1.626 m)   Wt 57.9 kg (127 lb 10.3 oz)   SpO2 99%   BMI 21.91 kg/m²   Gen: NAD, A and O x3,   Psych: pleasant affect, normal thought process  Eyes: Pupils round and non dilated, EOM intact  Nose: Nares patent  OP clear, mucosa patent  Neck: suppple, no JVD, trachea midline, no palpable mass, no adenopathy  Lungs: CTAB, no wheezes, no use of accessory muscles  CV: IRREGULAR RATE AND RHYTHM   Abd: soft, NTND, + BS, No HSM, no ascites  Extr: No CCE, ARGELIA, strength normal, good capillary refill  Neuro: steady gait, CNs grossly intact  Skin: intact, no lesions noted  Rheum: No joint swelling    Lab Results   Component Value Date    WBC 20.83 (H) 11/06/2019    HGB 12.8 11/06/2019    HCT 39.8 11/06/2019    MCV 90 11/06/2019     11/06/2019         CMP  Sodium   Date Value Ref Range Status   12/02/2019 139 136 - 145 mmol/L Final     Potassium   Date Value Ref Range Status   12/02/2019 3.9 3.5 - 5.1 mmol/L Final     Chloride   Date Value Ref Range Status   12/02/2019 100 95 - 110 mmol/L Final     CO2   Date Value Ref Range Status   12/02/2019 32 (H) 23 - 29 mmol/L Final     Glucose   Date Value Ref Range Status   12/02/2019 87 70 - 110 mg/dL Final     BUN, Bld   Date Value Ref Range Status   12/02/2019 11 8 - 23 mg/dL Final     Creatinine   Date Value Ref Range Status   12/02/2019 0.8 0.5 - 1.4 mg/dL " Final     Calcium   Date Value Ref Range Status   12/02/2019 9.6 8.7 - 10.5 mg/dL Final     Total Protein   Date Value Ref Range Status   12/02/2019 7.3 6.0 - 8.4 g/dL Final     Albumin   Date Value Ref Range Status   12/02/2019 3.9 3.5 - 5.2 g/dL Final     Total Bilirubin   Date Value Ref Range Status   12/02/2019 0.5 0.1 - 1.0 mg/dL Final     Comment:     For infants and newborns, interpretation of results should be based  on gestational age, weight and in agreement with clinical  observations.  Premature Infant recommended reference ranges:  Up to 24 hours.............<8.0 mg/dL  Up to 48 hours............<12.0 mg/dL  3-5 days..................<15.0 mg/dL  6-29 days.................<15.0 mg/dL       Alkaline Phosphatase   Date Value Ref Range Status   12/02/2019 99 55 - 135 U/L Final     AST   Date Value Ref Range Status   12/02/2019 32 10 - 40 U/L Final     ALT   Date Value Ref Range Status   12/02/2019 19 10 - 44 U/L Final     Anion Gap   Date Value Ref Range Status   12/02/2019 7 (L) 8 - 16 mmol/L Final     eGFR if    Date Value Ref Range Status   12/02/2019 >60 >60 mL/min/1.73 m^2 Final     eGFR if non    Date Value Ref Range Status   12/02/2019 >60 >60 mL/min/1.73 m^2 Final     Comment:     Calculation used to obtain the estimated glomerular filtration  rate (eGFR) is the CKD-EPI equation.          Malignant neoplasm of ovary, unspecified laterality    Oncology follow-up encounter    Chemotherapy follow-up examination           Orders signed and noted   Cleared for cycle 3 day 1 and 2   RTC 4 weeks   I reiterated the importance of proper hydration which will also help control her heart rate and hopefully these abnormal rhythms.  No need to recheck for recurrent UTI.  Her symptoms have dissipated and patient completed 2 rounds of antibiotic therapy.  Her labs are stable and she is cleared to resume chemotherapy.  To dietitian for proper hydration instructions the dietitian has  been so kind as to come see this patient during this office visit in help provider handouts on proper hydration  SHe is overloading on water and does not understand this total visit spent over 40 min with the patient in counseling over 50% of the visit  Thank you for allowing me to evaluate and participate in the care of this pleasant patient. Please fell free to call me with any questions or concerns.    Warmly,  Kenyatta Tejeda MD    This note was dictated with Dragon and briefly proofread. Please excuse any sentences that may be unclear or words misspelled

## 2019-12-03 NOTE — PROGRESS NOTES
NUTRITION NOTE    Consult from Dr. Tejeda re: proper hydration for Mrs. Mai. I spoke to pt & her daughter about proper hydration throughout her chemo treatment. Recommend increasing electrolyte-rich fluids to prevent reccurent dehydration. Pt's daughter seemed to be confused about this topic initially, stating that her mother has been drinking plenty of water and iced tea throughout the day. Educated both pt & her daughter on importance of including electrolyte-rich fluids instead of plain water & iced tea. Answered all pt & daughter's questions to the best of my ability. Both expressed understanding at the conclusion of this visit.

## 2019-12-05 ENCOUNTER — TELEPHONE (OUTPATIENT)
Dept: INFUSION THERAPY | Facility: HOSPITAL | Age: 84
End: 2019-12-05

## 2019-12-05 DIAGNOSIS — N39.0 URINARY TRACT INFECTION WITH HEMATURIA, SITE UNSPECIFIED: ICD-10-CM

## 2019-12-05 DIAGNOSIS — C56.9 MALIGNANT NEOPLASM OF OVARY, UNSPECIFIED LATERALITY: Primary | ICD-10-CM

## 2019-12-05 DIAGNOSIS — R31.9 URINARY TRACT INFECTION WITH HEMATURIA, SITE UNSPECIFIED: ICD-10-CM

## 2019-12-06 ENCOUNTER — OFFICE VISIT (OUTPATIENT)
Dept: FAMILY MEDICINE | Facility: CLINIC | Age: 84
End: 2019-12-06
Payer: MEDICARE

## 2019-12-06 VITALS
BODY MASS INDEX: 21.56 KG/M2 | HEART RATE: 88 BPM | TEMPERATURE: 98 F | WEIGHT: 126.31 LBS | HEIGHT: 64 IN | SYSTOLIC BLOOD PRESSURE: 124 MMHG | DIASTOLIC BLOOD PRESSURE: 68 MMHG | OXYGEN SATURATION: 97 %

## 2019-12-06 DIAGNOSIS — Z02.2 ENCOUNTER FOR EXAMINATION FOR ADMISSION TO ASSISTED LIVING FACILITY: Primary | ICD-10-CM

## 2019-12-06 PROCEDURE — 99213 PR OFFICE/OUTPT VISIT, EST, LEVL III, 20-29 MIN: ICD-10-PCS | Mod: S$GLB,,, | Performed by: FAMILY MEDICINE

## 2019-12-06 PROCEDURE — 99213 OFFICE O/P EST LOW 20 MIN: CPT | Mod: S$GLB,,, | Performed by: FAMILY MEDICINE

## 2019-12-06 PROCEDURE — 99999 PR PBB SHADOW E&M-EST. PATIENT-LVL IV: ICD-10-PCS | Mod: PBBFAC,,, | Performed by: FAMILY MEDICINE

## 2019-12-06 PROCEDURE — 1159F MED LIST DOCD IN RCRD: CPT | Mod: S$GLB,,, | Performed by: FAMILY MEDICINE

## 2019-12-06 PROCEDURE — 99999 PR PBB SHADOW E&M-EST. PATIENT-LVL IV: CPT | Mod: PBBFAC,,, | Performed by: FAMILY MEDICINE

## 2019-12-06 PROCEDURE — 1159F PR MEDICATION LIST DOCUMENTED IN MEDICAL RECORD: ICD-10-PCS | Mod: S$GLB,,, | Performed by: FAMILY MEDICINE

## 2019-12-06 PROCEDURE — 1101F PT FALLS ASSESS-DOCD LE1/YR: CPT | Mod: CPTII,S$GLB,, | Performed by: FAMILY MEDICINE

## 2019-12-06 PROCEDURE — 1101F PR PT FALLS ASSESS DOC 0-1 FALLS W/OUT INJ PAST YR: ICD-10-PCS | Mod: CPTII,S$GLB,, | Performed by: FAMILY MEDICINE

## 2019-12-08 NOTE — PROGRESS NOTES
Subjective:       Patient ID: Pau Cook is a 89 y.o. female.    Chief Complaint: Follow-up    HPI     Mrs. Cook presents to clinic for paperwork to be filled out for her assisted living facility. Daughter states that they are trying to keep her hydrated with electrolytes      Past Medical History:   Diagnosis Date    Allergy     Arthritis     Breast cyst     Foot pain     GERD (gastroesophageal reflux disease)     Joint pain     Ovarian cancer     Peripheral vascular disease 1/15/2018    Squamous cell carcinoma 04/2016    Right Lower Leg    MUNIR (stress urinary incontinence, female) 4/17/2017       Past Surgical History:   Procedure Laterality Date    APPENDECTOMY      COLON SURGERY      removed about 10 inch    COLONOSCOPY N/A 12/24/2018    Procedure: COLONOSCOPY;  Surgeon: Nirmal Davila MD;  Location: [facility];  Service: Endoscopy;  Laterality: N/A;    FINGER MASS EXCISION Right 7/9/2018    Procedure: EXCISION, MASS, FINGER;  Surgeon: Fabio Seay MD;  [facility] OR;  Service: Orthopedics;  Laterality: Right;    hemorhids      HYSTERECTOMY      LYSIS OF ADHESIONS N/A 12/28/2018    Procedure: LYSIS, ADHESIONS;  Surgeon: Amee Rose MD;  Location: [facility];  Service: OB/GYN;  Laterality: N/A;    OPEN REDUCTION AND INTERNAL FIXATION (ORIF) OF INTERTROCHANTERIC FRACTURE OF FEMUR Left 5/31/2019    Procedure: ORIF, FRACTURE, FEMUR, INTERTROCHANTERIC;  Surgeon: Bacilio Lockwood II, MD;  Location: [facility] OR;  Service: Orthopedics;  Laterality: Left;    ROBOT-ASSISTED LAPAROSCOPIC ABDOMINAL HYSTERECTOMY USING DA MARK XI N/A 12/28/2018    Procedure: XI ROBOTIC HYSTERECTOMY;  Surgeon: Amee Rose MD;  Location: [facility];  Service: OB/GYN;  Laterality: N/A;  attempted, opened    ROBOT-ASSISTED LAPAROSCOPIC SALPINGO-OOPHORECTOMY USING DA MARK XI Bilateral 12/28/2018    Procedure: XI ROBOTIC SALPINGO-OOPHORECTOMY;  Surgeon: Amee Rose MD;  Location: [facility];  Service:  OB/GYN;  Laterality: Bilateral;  attmepted, opened    TOTAL ABDOMINAL HYSTERECTOMY W/ BILATERAL SALPINGOOPHORECTOMY Bilateral 12/28/2018    Procedure: HYSTERECTOMY, TOTAL, ABDOMINAL, WITH BILATERAL SALPINGO-OOPHORECTOMY;  Surgeon: Amee Rose MD;  Location: [facility];  Service: OB/GYN;  Laterality: Bilateral;       Family History   Adopted: Yes   Problem Relation Age of Onset    Cancer Mother     Stroke Father     Breast cancer Sister     Cancer Sister     No Known Problems Daughter     No Known Problems Brother     No Known Problems Daughter     Diabetes Brother     Colon cancer Neg Hx     Ovarian cancer Neg Hx        Social History     Tobacco Use    Smoking status: Never Smoker    Smokeless tobacco: Never Used   Substance Use Topics    Alcohol use: No     Alcohol/week: 0.0 standard drinks     Comment: rare    Drug use: No       Social History     Substance and Sexual Activity   Sexual Activity Not Currently          Current Outpatient Medications:     acetaminophen (TYLENOL) 500 MG tablet, Take 2 tablets (1,000 mg total) by mouth every 8 (eight) hours., Disp: , Rfl: 0    bisacodyl (DULCOLAX) 5 mg EC tablet, Take 5 mg by mouth daily as needed for Constipation., Disp: , Rfl:     calcium-vitamin D3 (CALCIUM 500 + D) 500 mg(1,250mg) -200 unit per tablet, Take 1 tablet by mouth once daily. , Disp: , Rfl:     carboxymethylcellulose (REFRESH PLUS) 0.5 % Dpet, 1 drop daily as needed., Disp: , Rfl:     cyanocobalamin (VITAMIN B-12) 1000 MCG tablet, Take 500 mcg by mouth once daily. , Disp: , Rfl:     docusate sodium (COLACE) 100 MG capsule, Take 100 mg by mouth daily as needed for Constipation., Disp: , Rfl:     esomeprazole (NEXIUM) 20 MG capsule, Take 1 capsule (20 mg total) by mouth before breakfast., Disp: 30 capsule, Rfl: 11    FLAXSEED ORAL, Take 650 mg by mouth once daily. , Disp: , Rfl:     FLUAD 9152-7430, 65 YR UP,,PF, 45 mcg (15 mcg x 3)/0.5 mL Syrg, ADM 0.5ML IM UTD, Disp: , Rfl:  0    ketotifen (ZADITOR) 0.025 % (0.035 %) ophthalmic solution, Place 1 drop into both eyes 2 (two) times daily as needed (Pt reports use approx once/week). , Disp: , Rfl:     methylcellulose, laxative, (CITRUCEL) 500 mg Tab, Take 500 mg by mouth daily as needed., Disp: , Rfl:     MULTIVITAMIN W-MINERALS/LUTEIN (CENTRUM SILVER ORAL), Take 1 tablet by mouth once daily. , Disp: , Rfl:     ondansetron (ZOFRAN) 8 MG tablet, Take 8 mg by mouth every 6 (six) hours as needed., Disp: , Rfl: 3    ondansetron (ZOFRAN-ODT) 4 MG TbDL, Take 8 mg by mouth every 8 (eight) hours as needed., Disp: , Rfl:     prochlorperazine (COMPAZINE) 10 MG tablet, Take 1 tablet (10 mg total) by mouth every 6 (six) hours as needed., Disp: 30 tablet, Rfl: 1    sucralfate (CARAFATE) 1 gram tablet, Take 1 tablet (1 g total) by mouth 4 (four) times daily., Disp: 40 tablet, Rfl: 0    pregabalin (LYRICA) 75 MG capsule, Take 1 capsule (75 mg total) by mouth 2 (two) times daily., Disp: 60 capsule, Rfl: 0     Review of patient's allergies indicates:   Allergen Reactions    Pcn [penicillins]      Can not remember reaction     Tetanus vaccines and toxoid Swelling     Localized swelling to injection site            Review of Systems   Constitutional: Negative for chills and fever.   HENT: Negative for congestion and sore throat.    Eyes: Negative for visual disturbance.   Respiratory: Negative for cough and shortness of breath.    Cardiovascular: Negative for chest pain.   Gastrointestinal: Negative for abdominal pain, constipation, diarrhea, nausea and vomiting.   Genitourinary: Negative for dysuria.   Musculoskeletal: Negative for joint swelling.   Skin: Negative for rash and wound.   Neurological: Negative for dizziness and headaches.   Hematological: Does not bruise/bleed easily.           Objective:          Vitals:    12/06/19 1444   BP: 124/68   Pulse: 88   Temp: 97.8 °F (36.6 °C)   SpO2: 97%   Weight: 57.3 kg (126 lb 5.2 oz)   Height:  "5' 4" (1.626 m)       Physical Exam   Constitutional: She appears well-developed and well-nourished.   HENT:   Head: Normocephalic and atraumatic.   Cardiovascular: Normal rate and regular rhythm.   Pulmonary/Chest: Effort normal and breath sounds normal.   Abdominal: Soft. Bowel sounds are normal.   Neurological: She is alert.   Skin: Skin is warm.   Psychiatric: She has a normal mood and affect. Her behavior is normal.   Vitals reviewed.              Assessment/Plan     Pau NELSON was seen today for follow-up.    Diagnoses and all orders for this visit:    Encounter for examination for admission to assisted living facility        - Paperwork filled out and copied into system.       Follow up in about 6 months (around 6/6/2020) for check up.    Future Appointments   Date Time Provider Department Center   12/12/2019  8:00 AM CHAIR 02 [facility] CHEMO [facility]   12/13/2019  9:20 AM INJECTION CHAIR, [facility] CHEMO [facility]   1/6/2020  8:40 AM LAB, [facility] [place] [facility]   1/6/2020  9:10 AM LAB, [facility] [place] [facility]   1/7/2020 10:00 AM Kenyatta Tejeda MD [facility] HEM ON [facility] [place]   1/9/2020  8:00 AM CHAIR 07 [facility] CC [facility] CHEMO [facility]   1/10/2020  1:00 PM INJECTION CHAIR, [facility] CHEMO [facility]   4/20/2020 10:00 AM Bacilio Lockwood II, MD [facility] ORTHO [place] [facility]   6/12/2020 10:00 AM Rosi Ramires MD [facility] [place]         Rosi Ramires MD  [facility]     "

## 2019-12-09 ENCOUNTER — TELEPHONE (OUTPATIENT)
Dept: HEMATOLOGY/ONCOLOGY | Facility: CLINIC | Age: 84
End: 2019-12-09

## 2019-12-09 RX ORDER — PREGABALIN 75 MG/1
75 CAPSULE ORAL 2 TIMES DAILY
Qty: 60 CAPSULE | Refills: 11 | Status: SHIPPED | OUTPATIENT
Start: 2019-12-09 | End: 2020-06-12

## 2019-12-09 NOTE — TELEPHONE ENCOUNTER
Spoke with Kendra. Re confirmed dates of chemo, lab and office visit.           ----- Message from John Kay sent at 12/9/2019 10:28 AM CST -----  Contact: daughter, june June want to speak with a nurse regarding confirmation on patient upcoming appointments please call back at [phone]     Case number [id number]

## 2019-12-09 NOTE — TELEPHONE ENCOUNTER
----- Message from Boone Snider MA sent at 12/9/2019  1:41 PM CST -----  Contact: moises-June  Patient needs refill on medication,[facility] [address] [facility] [place]  Call back

## 2019-12-12 ENCOUNTER — INFUSION (OUTPATIENT)
Dept: INFUSION THERAPY | Facility: HOSPITAL | Age: 84
End: 2019-12-12
Attending: INTERNAL MEDICINE
Payer: MEDICARE

## 2019-12-12 VITALS
SYSTOLIC BLOOD PRESSURE: 141 MMHG | HEART RATE: 91 BPM | DIASTOLIC BLOOD PRESSURE: 80 MMHG | WEIGHT: 124 LBS | TEMPERATURE: 98 F | RESPIRATION RATE: 16 BRPM | HEIGHT: 64 IN | BODY MASS INDEX: 21.17 KG/M2

## 2019-12-12 DIAGNOSIS — T45.1X5A CHEMOTHERAPY INDUCED NEUTROPENIA: Primary | ICD-10-CM

## 2019-12-12 DIAGNOSIS — C56.9 MALIGNANT NEOPLASM OF OVARY, UNSPECIFIED LATERALITY: ICD-10-CM

## 2019-12-12 DIAGNOSIS — D70.1 CHEMOTHERAPY INDUCED NEUTROPENIA: Primary | ICD-10-CM

## 2019-12-12 PROCEDURE — 96367 TX/PROPH/DG ADDL SEQ IV INF: CPT

## 2019-12-12 PROCEDURE — 96417 CHEMO IV INFUS EACH ADDL SEQ: CPT

## 2019-12-12 PROCEDURE — 63600175 PHARM REV CODE 636 W HCPCS: Mod: JG | Performed by: INTERNAL MEDICINE

## 2019-12-12 PROCEDURE — 96413 CHEMO IV INFUSION 1 HR: CPT

## 2019-12-12 RX ORDER — DIPHENHYDRAMINE HYDROCHLORIDE 50 MG/ML
50 INJECTION INTRAMUSCULAR; INTRAVENOUS ONCE AS NEEDED
Status: DISCONTINUED | OUTPATIENT
Start: 2019-12-12 | End: 2019-12-12 | Stop reason: HOSPADM

## 2019-12-12 RX ORDER — HEPARIN 100 UNIT/ML
500 SYRINGE INTRAVENOUS
Status: DISCONTINUED | OUTPATIENT
Start: 2019-12-12 | End: 2019-12-12 | Stop reason: HOSPADM

## 2019-12-12 RX ORDER — SODIUM CHLORIDE 0.9 % (FLUSH) 0.9 %
10 SYRINGE (ML) INJECTION
Status: DISCONTINUED | OUTPATIENT
Start: 2019-12-12 | End: 2019-12-12 | Stop reason: HOSPADM

## 2019-12-12 RX ORDER — EPINEPHRINE 0.3 MG/.3ML
0.3 INJECTION SUBCUTANEOUS ONCE AS NEEDED
Status: DISCONTINUED | OUTPATIENT
Start: 2019-12-12 | End: 2019-12-12 | Stop reason: HOSPADM

## 2019-12-12 RX ADMIN — PALONOSETRON HYDROCHLORIDE: 0.25 INJECTION, SOLUTION INTRAVENOUS at 09:12

## 2019-12-12 RX ADMIN — BEVACIZUMAB 590 MG: 400 INJECTION, SOLUTION INTRAVENOUS at 09:12

## 2019-12-12 RX ADMIN — DOXORUBICIN HYDROCHLORIDE 65 MG: 2 INJECTABLE, LIPOSOMAL INTRAVENOUS at 10:12

## 2019-12-12 NOTE — PLAN OF CARE
Problem: Fatigue  Goal: Improved Activity Tolerance  Outcome: Ongoing, Progressing  Intervention: Promote Energy Conservation  Flowsheets (Taken 12/12/2019 8257)  Fatigue Management: fatigue-related activity identified; frequent rest breaks encouraged  Sleep/Rest Enhancement: relaxation techniques promoted; regular sleep/rest pattern promoted; family presence promoted  Activity Management: activity encouraged

## 2019-12-13 ENCOUNTER — INFUSION (OUTPATIENT)
Dept: INFUSION THERAPY | Facility: HOSPITAL | Age: 84
End: 2019-12-13
Attending: INTERNAL MEDICINE
Payer: MEDICARE

## 2019-12-13 VITALS
SYSTOLIC BLOOD PRESSURE: 133 MMHG | HEART RATE: 86 BPM | DIASTOLIC BLOOD PRESSURE: 84 MMHG | TEMPERATURE: 98 F | RESPIRATION RATE: 18 BRPM

## 2019-12-13 DIAGNOSIS — C56.9 MALIGNANT NEOPLASM OF OVARY, UNSPECIFIED LATERALITY: ICD-10-CM

## 2019-12-13 DIAGNOSIS — T45.1X5A CHEMOTHERAPY INDUCED NEUTROPENIA: Primary | ICD-10-CM

## 2019-12-13 DIAGNOSIS — D70.1 CHEMOTHERAPY INDUCED NEUTROPENIA: Primary | ICD-10-CM

## 2019-12-13 PROCEDURE — 96372 THER/PROPH/DIAG INJ SC/IM: CPT

## 2019-12-13 PROCEDURE — 63600175 PHARM REV CODE 636 W HCPCS: Mod: JG | Performed by: INTERNAL MEDICINE

## 2019-12-13 RX ADMIN — PEGFILGRASTIM 6 MG: 6 INJECTION SUBCUTANEOUS at 09:12

## 2019-12-13 NOTE — PLAN OF CARE
Problem: Infection  Goal: Infection Symptom Resolution  Outcome: Ongoing, Progressing  Intervention: Prevent or Manage Infection  Flowsheets (Taken 12/13/2019 2330)  Infection Management: aseptic technique maintained

## 2019-12-27 NOTE — TELEPHONE ENCOUNTER
----- Message from Meeta Prado sent at 11/6/2019  2:27 PM CST -----  Contact: Daughter  Type: Needs Medical Advice    Who Called:  Kendra Rosales Call Back Number: [phone] (home)   Additional Information: The patient's daughter called in said the office called and canceled her mom's julia and she needs to reschedule but don't want to take the next avail in 02/2020 said her mom needs to come in on Nov 20th or the 22nd she said since yall cancelled the appt at no fault of theirs please accommodate these dates. Please call to advise.    
Spoke to patient daughter notified will contact patient after speaking with Dr Ramires regarding appointment  
Spoke to patient's daughter offered appointment for tomorrow patient daughter states patient has another appointment tomorrow. Appointment scheduled on 12/3 at 10am  
lvm for patient to return call  
Neuro eval  CT head reviewed  serial neuro exam  prognosis guarded  unknown down time

## 2019-12-30 ENCOUNTER — TELEPHONE (OUTPATIENT)
Dept: HEMATOLOGY/ONCOLOGY | Facility: CLINIC | Age: 84
End: 2019-12-30

## 2019-12-30 NOTE — TELEPHONE ENCOUNTER
----- Message from Leonor Smith sent at 12/30/2019  3:08 PM CST -----  Contact: patient daughter rola triana ph# [phone]   patient daughter rola triana ph# [phone]   Stated [facility] rep told her patient need prior approval for lab services   Call [facility] to get approval ph# [phone]  Patient policy # [id number] effective 1/1/2020.

## 2020-01-06 ENCOUNTER — LAB VISIT (OUTPATIENT)
Dept: LAB | Facility: HOSPITAL | Age: 85
End: 2020-01-06
Attending: INTERNAL MEDICINE
Payer: MEDICARE

## 2020-01-06 DIAGNOSIS — C56.9 MALIGNANT NEOPLASM OF OVARY, UNSPECIFIED LATERALITY: ICD-10-CM

## 2020-01-06 LAB
ALBUMIN SERPL BCP-MCNC: 3.9 G/DL (ref 3.5–5.2)
ALP SERPL-CCNC: 102 U/L (ref 55–135)
ALT SERPL W/O P-5'-P-CCNC: 16 U/L (ref 10–44)
ANION GAP SERPL CALC-SCNC: 9 MMOL/L (ref 8–16)
AST SERPL-CCNC: 27 U/L (ref 10–40)
BASOPHILS # BLD AUTO: 0.06 K/UL (ref 0–0.2)
BASOPHILS NFR BLD: 0.9 % (ref 0–1.9)
BILIRUB SERPL-MCNC: 0.3 MG/DL (ref 0.1–1)
BUN SERPL-MCNC: 13 MG/DL (ref 8–23)
CALCIUM SERPL-MCNC: 9.7 MG/DL (ref 8.7–10.5)
CHLORIDE SERPL-SCNC: 99 MMOL/L (ref 95–110)
CO2 SERPL-SCNC: 32 MMOL/L (ref 23–29)
CREAT SERPL-MCNC: 0.9 MG/DL (ref 0.5–1.4)
DIFFERENTIAL METHOD: ABNORMAL
EOSINOPHIL # BLD AUTO: 0 K/UL (ref 0–0.5)
EOSINOPHIL NFR BLD: 0.5 % (ref 0–8)
ERYTHROCYTE [DISTWIDTH] IN BLOOD BY AUTOMATED COUNT: 14.9 % (ref 11.5–14.5)
EST. GFR  (AFRICAN AMERICAN): >60 ML/MIN/1.73 M^2
EST. GFR  (NON AFRICAN AMERICAN): 56 ML/MIN/1.73 M^2
GLUCOSE SERPL-MCNC: 111 MG/DL (ref 70–110)
HCT VFR BLD AUTO: 41.9 % (ref 37–48.5)
HGB BLD-MCNC: 13.2 G/DL (ref 12–16)
IMM GRANULOCYTES # BLD AUTO: 0.02 K/UL (ref 0–0.04)
LYMPHOCYTES # BLD AUTO: 1.4 K/UL (ref 1–4.8)
LYMPHOCYTES NFR BLD: 21.3 % (ref 18–48)
MCH RBC QN AUTO: 30.1 PG (ref 27–31)
MCHC RBC AUTO-ENTMCNC: 31.5 G/DL (ref 32–36)
MCV RBC AUTO: 95 FL (ref 82–98)
MONOCYTES # BLD AUTO: 0.7 K/UL (ref 0.3–1)
MONOCYTES NFR BLD: 10.7 % (ref 4–15)
NEUTROPHILS # BLD AUTO: 4.2 K/UL (ref 1.8–7.7)
NEUTROPHILS NFR BLD: 66.3 % (ref 38–73)
NRBC BLD-RTO: 0 /100 WBC
PLATELET # BLD AUTO: 295 K/UL (ref 150–350)
PMV BLD AUTO: 8.6 FL (ref 9.2–12.9)
POTASSIUM SERPL-SCNC: 3.9 MMOL/L (ref 3.5–5.1)
PROT SERPL-MCNC: 7.2 G/DL (ref 6–8.4)
RBC # BLD AUTO: 4.39 M/UL (ref 4–5.4)
SODIUM SERPL-SCNC: 140 MMOL/L (ref 136–145)
WBC # BLD AUTO: 6.33 K/UL (ref 3.9–12.7)

## 2020-01-06 PROCEDURE — 80053 COMPREHEN METABOLIC PANEL: CPT

## 2020-01-06 PROCEDURE — 85025 COMPLETE CBC W/AUTO DIFF WBC: CPT

## 2020-01-06 PROCEDURE — 36415 COLL VENOUS BLD VENIPUNCTURE: CPT

## 2020-01-07 ENCOUNTER — OFFICE VISIT (OUTPATIENT)
Dept: HEMATOLOGY/ONCOLOGY | Facility: CLINIC | Age: 85
End: 2020-01-07
Payer: MEDICARE

## 2020-01-07 VITALS
SYSTOLIC BLOOD PRESSURE: 145 MMHG | DIASTOLIC BLOOD PRESSURE: 68 MMHG | TEMPERATURE: 98 F | HEIGHT: 64 IN | WEIGHT: 123.69 LBS | HEART RATE: 96 BPM | BODY MASS INDEX: 21.11 KG/M2 | RESPIRATION RATE: 16 BRPM | OXYGEN SATURATION: 97 %

## 2020-01-07 DIAGNOSIS — K29.70 GASTRITIS, PRESENCE OF BLEEDING UNSPECIFIED, UNSPECIFIED CHRONICITY, UNSPECIFIED GASTRITIS TYPE: ICD-10-CM

## 2020-01-07 DIAGNOSIS — T45.1X5A CHEMOTHERAPY INDUCED NAUSEA AND VOMITING: ICD-10-CM

## 2020-01-07 DIAGNOSIS — Z51.81 MEDICATION MONITORING ENCOUNTER: ICD-10-CM

## 2020-01-07 DIAGNOSIS — R11.2 CHEMOTHERAPY INDUCED NAUSEA AND VOMITING: ICD-10-CM

## 2020-01-07 DIAGNOSIS — R21 RASH: ICD-10-CM

## 2020-01-07 DIAGNOSIS — Z76.89 ENCOUNTER FOR MEDICAL CARE: ICD-10-CM

## 2020-01-07 DIAGNOSIS — C78.00 MALIGNANT NEOPLASM METASTATIC TO LUNG, UNSPECIFIED LATERALITY: ICD-10-CM

## 2020-01-07 DIAGNOSIS — C56.9 MALIGNANT NEOPLASM OF OVARY, UNSPECIFIED LATERALITY: ICD-10-CM

## 2020-01-07 DIAGNOSIS — C78.7 LIVER METASTASES: ICD-10-CM

## 2020-01-07 DIAGNOSIS — C56.9 MALIGNANT NEOPLASM OF OVARY, UNSPECIFIED LATERALITY: Primary | ICD-10-CM

## 2020-01-07 PROCEDURE — 1159F PR MEDICATION LIST DOCUMENTED IN MEDICAL RECORD: ICD-10-PCS | Mod: S$GLB,,, | Performed by: INTERNAL MEDICINE

## 2020-01-07 PROCEDURE — 1125F AMNT PAIN NOTED PAIN PRSNT: CPT | Mod: S$GLB,,, | Performed by: INTERNAL MEDICINE

## 2020-01-07 PROCEDURE — 1125F PR PAIN SEVERITY QUANTIFIED, PAIN PRESENT: ICD-10-PCS | Mod: S$GLB,,, | Performed by: INTERNAL MEDICINE

## 2020-01-07 PROCEDURE — 99999 PR PBB SHADOW E&M-EST. PATIENT-LVL V: ICD-10-PCS | Mod: PBBFAC,,, | Performed by: INTERNAL MEDICINE

## 2020-01-07 PROCEDURE — 99999 PR PBB SHADOW E&M-EST. PATIENT-LVL V: CPT | Mod: PBBFAC,,, | Performed by: INTERNAL MEDICINE

## 2020-01-07 PROCEDURE — 1101F PT FALLS ASSESS-DOCD LE1/YR: CPT | Mod: S$GLB,,, | Performed by: INTERNAL MEDICINE

## 2020-01-07 PROCEDURE — 1159F MED LIST DOCD IN RCRD: CPT | Mod: S$GLB,,, | Performed by: INTERNAL MEDICINE

## 2020-01-07 PROCEDURE — 99215 PR OFFICE/OUTPT VISIT, EST, LEVL V, 40-54 MIN: ICD-10-PCS | Mod: S$GLB,,, | Performed by: INTERNAL MEDICINE

## 2020-01-07 PROCEDURE — 99215 OFFICE O/P EST HI 40 MIN: CPT | Mod: S$GLB,,, | Performed by: INTERNAL MEDICINE

## 2020-01-07 PROCEDURE — 1101F PR PT FALLS ASSESS DOC 0-1 FALLS W/OUT INJ PAST YR: ICD-10-PCS | Mod: S$GLB,,, | Performed by: INTERNAL MEDICINE

## 2020-01-07 RX ORDER — DEXAMETHASONE 4 MG/1
TABLET ORAL
Qty: 12 TABLET | Refills: 0 | Status: SHIPPED | OUTPATIENT
Start: 2020-01-07 | End: 2020-01-07 | Stop reason: SDUPTHER

## 2020-01-07 RX ORDER — SODIUM CHLORIDE 0.9 % (FLUSH) 0.9 %
10 SYRINGE (ML) INJECTION
Status: CANCELLED | OUTPATIENT
Start: 2020-01-09

## 2020-01-07 RX ORDER — PROCHLORPERAZINE MALEATE 10 MG
10 TABLET ORAL EVERY 6 HOURS PRN
Qty: 30 TABLET | Refills: 1 | Status: SHIPPED | OUTPATIENT
Start: 2020-01-07 | End: 2021-01-06

## 2020-01-07 RX ORDER — PROCHLORPERAZINE MALEATE 10 MG
10 TABLET ORAL EVERY 6 HOURS PRN
Qty: 30 TABLET | Refills: 1 | Status: SHIPPED | OUTPATIENT
Start: 2020-01-07 | End: 2020-01-07 | Stop reason: SDUPTHER

## 2020-01-07 RX ORDER — HEPARIN 100 UNIT/ML
500 SYRINGE INTRAVENOUS
Status: CANCELLED | OUTPATIENT
Start: 2020-01-09

## 2020-01-07 RX ORDER — EPINEPHRINE 0.3 MG/.3ML
0.3 INJECTION SUBCUTANEOUS ONCE AS NEEDED
Status: CANCELLED | OUTPATIENT
Start: 2020-01-09

## 2020-01-07 RX ORDER — DIPHENHYDRAMINE HYDROCHLORIDE 50 MG/ML
50 INJECTION INTRAMUSCULAR; INTRAVENOUS ONCE AS NEEDED
Status: CANCELLED | OUTPATIENT
Start: 2020-01-09

## 2020-01-07 RX ORDER — DEXAMETHASONE 4 MG/1
TABLET ORAL
Qty: 12 TABLET | Refills: 0 | Status: SHIPPED | OUTPATIENT
Start: 2020-01-07

## 2020-01-07 RX ORDER — ONDANSETRON HYDROCHLORIDE 8 MG/1
8 TABLET, FILM COATED ORAL EVERY 6 HOURS PRN
Qty: 30 TABLET | Refills: 3 | Status: SHIPPED | OUTPATIENT
Start: 2020-01-07

## 2020-01-07 NOTE — PROGRESS NOTES
CC I have a rash and blisters on my legs no fever no pruritus    Pau Cook is a 89 y.o.  Pt with recurrent ovarian cancer here for chemo clearance for second-line therapy.       Past Medical History:   Diagnosis Date    Allergy     Arthritis     Breast cyst     Foot pain     GERD (gastroesophageal reflux disease)     Joint pain     Ovarian cancer     Peripheral vascular disease 1/15/2018    Squamous cell carcinoma 04/2016    Right Lower Leg    MUNIR (stress urinary incontinence, female) 4/17/2017     Past Surgical History:   Procedure Laterality Date    APPENDECTOMY      COLON SURGERY      removed about 10 inch    COLONOSCOPY N/A 12/24/2018    Procedure: COLONOSCOPY;  Surgeon: Nirmal Davila MD;  Location: [facility];  Service: Endoscopy;  Laterality: N/A;    FINGER MASS EXCISION Right 7/9/2018    Procedure: EXCISION, MASS, FINGER;  Surgeon: Fabio Seay MD;  Location: [facility] OR;  Service: Orthopedics;  Laterality: Right;    hemorhids      HYSTERECTOMY      LYSIS OF ADHESIONS N/A 12/28/2018    Procedure: LYSIS, ADHESIONS;  Surgeon: Amee Rose MD;  Location: [facility] OR;  Service: OB/GYN;  Laterality: N/A;    OPEN REDUCTION AND INTERNAL FIXATION (ORIF) OF INTERTROCHANTERIC FRACTURE OF FEMUR Left 5/31/2019    Procedure: ORIF, FRACTURE, FEMUR, INTERTROCHANTERIC;  Surgeon: Bacilio Lockwood II, MD;  Location: [facility] OR;  Service: Orthopedics;  Laterality: Left;    ROBOT-ASSISTED LAPAROSCOPIC ABDOMINAL HYSTERECTOMY USING DA MARK XI N/A 12/28/2018    Procedure: XI ROBOTIC HYSTERECTOMY;  Surgeon: Amee Rose MD;  Location: [facility];  Service: OB/GYN;  Laterality: N/A;  attempted, opened    ROBOT-ASSISTED LAPAROSCOPIC SALPINGO-OOPHORECTOMY USING DA MARK XI Bilateral 12/28/2018    Procedure: XI ROBOTIC SALPINGO-OOPHORECTOMY;  Surgeon: Amee Rose MD;  Location: [facility];  Service: OB/GYN;  Laterality: Bilateral;  attmepted, opened    TOTAL ABDOMINAL HYSTERECTOMY W/ BILATERAL  SALPINGOOPHORECTOMY Bilateral 12/28/2018    Procedure: HYSTERECTOMY, TOTAL, ABDOMINAL, WITH BILATERAL SALPINGO-OOPHORECTOMY;  Surgeon: Amee Rose MD;  Location: [facility];  Service: OB/GYN;  Laterality: Bilateral;   She is tolerating only one bottle water daily with dulcolax for constipation and antinausea meds which she takes only occasionally     Current Outpatient Medications:     acetaminophen (TYLENOL) 500 MG tablet, Take 2 tablets (1,000 mg total) by mouth every 8 (eight) hours., Disp: , Rfl: 0    bisacodyl (DULCOLAX) 5 mg EC tablet, Take 5 mg by mouth daily as needed for Constipation., Disp: , Rfl:     calcium-vitamin D3 (CALCIUM 500 + D) 500 mg(1,250mg) -200 unit per tablet, Take 1 tablet by mouth once daily. , Disp: , Rfl:     carboxymethylcellulose (REFRESH PLUS) 0.5 % Dpet, 1 drop daily as needed., Disp: , Rfl:     cyanocobalamin (VITAMIN B-12) 1000 MCG tablet, Take 500 mcg by mouth once daily. , Disp: , Rfl:     docusate sodium (COLACE) 100 MG capsule, Take 100 mg by mouth daily as needed for Constipation., Disp: , Rfl:     esomeprazole (NEXIUM) 20 MG capsule, Take 1 capsule (20 mg total) by mouth before breakfast., Disp: 30 capsule, Rfl: 11    FLAXSEED ORAL, Take 650 mg by mouth once daily. , Disp: , Rfl:     FLUAD 4759-4248, 65 YR UP,,PF, 45 mcg (15 mcg x 3)/0.5 mL Syrg, ADM 0.5ML IM UTD, Disp: , Rfl: 0    ketotifen (ZADITOR) 0.025 % (0.035 %) ophthalmic solution, Place 1 drop into both eyes 2 (two) times daily as needed (Pt reports use approx once/week). , Disp: , Rfl:     methylcellulose, laxative, (CITRUCEL) 500 mg Tab, Take 500 mg by mouth daily as needed., Disp: , Rfl:     MULTIVITAMIN W-MINERALS/LUTEIN (CENTRUM SILVER ORAL), Take 1 tablet by mouth once daily. , Disp: , Rfl:     ondansetron (ZOFRAN) 8 MG tablet, Take 8 mg by mouth every 6 (six) hours as needed., Disp: , Rfl: 3    ondansetron (ZOFRAN-ODT) 4 MG TbDL, Take 8 mg by mouth every 8 (eight) hours as needed., Disp: , Rfl:  "    pregabalin (LYRICA) 75 MG capsule, Take 1 capsule (75 mg total) by mouth 2 (two) times daily., Disp: 60 capsule, Rfl: 11    prochlorperazine (COMPAZINE) 10 MG tablet, Take 1 tablet (10 mg total) by mouth every 6 (six) hours as needed., Disp: 30 tablet, Rfl: 1    sucralfate (CARAFATE) 1 gram tablet, Take 1 tablet (1 g total) by mouth 4 (four) times daily., Disp: 40 tablet, Rfl: 0  Review of patient's allergies indicates:   Allergen Reactions    Pcn [penicillins]      Can not remember reaction     Tetanus vaccines and toxoid Swelling     Localized swelling to injection site     Social History     Tobacco Use    Smoking status: Never Smoker    Smokeless tobacco: Never Used   Substance Use Topics    Alcohol use: No     Alcohol/week: 0.0 standard drinks     Comment: rare    Drug use: No         CONSTITUTIONAL: No fevers, chills, night sweats, wt. loss, appetite changes  SKIN: + rash   ENT: No headaches, head trauma, vision changes, or eye pain  LYMPH NODES: None noticed   CV: No chest pain, palpitations.   RESP: No dyspnea on exertion, cough, wheezing, or hemoptysis  GI: +nausea, emesis, diarrhea, constipation, no melena, hematochezia, pain.   : No dysuria, hematuria, urgency, or frequency   HEME: No easy bruising, bleeding problems  PSYCHIATRIC: No depression, anxiety, psychosis, hallucinations.  NEURO: No seizures, memory loss, dizziness or difficulty sleeping  MSK: No arthralgias or joint swelling         BP (!) 145/68   Pulse 96   Temp 97.5 °F (36.4 °C) (Oral)   Resp 16   Ht 5' 4" (1.626 m)   Wt 56.1 kg (123 lb 10.9 oz)   SpO2 97%   BMI 21.23 kg/m²   Gen: NAD, A and O x3,   Psych: pleasant affect, normal thought process  Eyes: Pupils round and non dilated, EOM intact  Nose: Nares patent  OP clear, mucosa patent  Neck: suppple, no JVD, trachea midline, no palpable mass, no adenopathy  Lungs: CTAB, no wheezes, no use of accessory muscles  CV: REGULAR RATE AND RHYTHM   Abd: soft, NTND, + BS, No " HSM, no ascites  Extr: No CCE, ARGELIA, strength normal, good capillary refill  Neuro: steady gait, CNs grossly intact  Skin:can see where there were bullae and dry skin with flakes and some excoriations on her legs and periumbilical region   Rheum: No joint swelling    Lab Results   Component Value Date    WBC 20.83 (H) 11/06/2019    HGB 12.8 11/06/2019    HCT 39.8 11/06/2019    MCV 90 11/06/2019     11/06/2019     Lab Results   Component Value Date    WBC 6.33 01/06/2020    HGB 13.2 01/06/2020    HCT 41.9 01/06/2020    MCV 95 01/06/2020     01/06/2020           CMP  Sodium   Date Value Ref Range Status   01/06/2020 140 136 - 145 mmol/L Final     Potassium   Date Value Ref Range Status   01/06/2020 3.9 3.5 - 5.1 mmol/L Final     Chloride   Date Value Ref Range Status   01/06/2020 99 95 - 110 mmol/L Final     CO2   Date Value Ref Range Status   01/06/2020 32 (H) 23 - 29 mmol/L Final     Glucose   Date Value Ref Range Status   01/06/2020 111 (H) 70 - 110 mg/dL Final     BUN, Bld   Date Value Ref Range Status   01/06/2020 13 8 - 23 mg/dL Final     Creatinine   Date Value Ref Range Status   01/06/2020 0.9 0.5 - 1.4 mg/dL Final     Calcium   Date Value Ref Range Status   01/06/2020 9.7 8.7 - 10.5 mg/dL Final     Total Protein   Date Value Ref Range Status   01/06/2020 7.2 6.0 - 8.4 g/dL Final     Albumin   Date Value Ref Range Status   01/06/2020 3.9 3.5 - 5.2 g/dL Final     Total Bilirubin   Date Value Ref Range Status   01/06/2020 0.3 0.1 - 1.0 mg/dL Final     Comment:     For infants and newborns, interpretation of results should be based  on gestational age, weight and in agreement with clinical  observations.  Premature Infant recommended reference ranges:  Up to 24 hours.............<8.0 mg/dL  Up to 48 hours............<12.0 mg/dL  3-5 days..................<15.0 mg/dL  6-29 days.................<15.0 mg/dL       Alkaline Phosphatase   Date Value Ref Range Status   01/06/2020 102 55 - 135 U/L Final      AST   Date Value Ref Range Status   01/06/2020 27 10 - 40 U/L Final     ALT   Date Value Ref Range Status   01/06/2020 16 10 - 44 U/L Final     Anion Gap   Date Value Ref Range Status   01/06/2020 9 8 - 16 mmol/L Final     eGFR if    Date Value Ref Range Status   01/06/2020 >60 >60 mL/min/1.73 m^2 Final     eGFR if non    Date Value Ref Range Status   01/06/2020 56 (A) >60 mL/min/1.73 m^2 Final     Comment:     Calculation used to obtain the estimated glomerular filtration  rate (eGFR) is the CKD-EPI equation.          Malignant neoplasm of ovary, unspecified laterality  -     Discontinue: dexAMETHasone (DECADRON) 4 MG Tab; Take one tab po BID the day before chemo and then for two days after chemo  Dispense: 12 tablet; Refill: 0  -     NM PET CT Routine Skull to Mid Thigh; Future; Expected date: 01/28/2020  -     CBC auto differential; Future; Expected date: 01/07/2020  -     CMP; Future; Expected date: 01/07/2020    Rash  -     Discontinue: dexAMETHasone (DECADRON) 4 MG Tab; Take one tab po BID the day before chemo and then for two days after chemo  Dispense: 12 tablet; Refill: 0    Chemotherapy induced nausea and vomiting    Gastritis, presence of bleeding unspecified, unspecified chronicity, unspecified gastritis type    Encounter for medical care    Medication monitoring encounter     encounter for chemo   Orders signed and noted   Cleared for cycle 4 day 1 and 2   RTC 4 weeks   PET CT in 3-4 weeks   Start decadron the day before and the day after chemo and the second day after chemo   Cont antinausea meds  Decadron should help with nausea  Lengthy discussion ( pt seen with Nurse Chung) that she should try the nausea meds twenty minutes before every meal and at bedtimne to diaz off nausea reflex and maybe she could eat and drink berttter  Cont dulcolax which should help constipation and in turn decrease stomach acid and help with gastritis    Current Outpatient Medications:      acetaminophen (TYLENOL) 500 MG tablet, Take 2 tablets (1,000 mg total) by mouth every 8 (eight) hours., Disp: , Rfl: 0    bisacodyl (DULCOLAX) 5 mg EC tablet, Take 5 mg by mouth daily as needed for Constipation., Disp: , Rfl:     calcium-vitamin D3 (CALCIUM 500 + D) 500 mg(1,250mg) -200 unit per tablet, Take 1 tablet by mouth once daily. , Disp: , Rfl:     carboxymethylcellulose (REFRESH PLUS) 0.5 % Dpet, 1 drop daily as needed., Disp: , Rfl:     cyanocobalamin (VITAMIN B-12) 1000 MCG tablet, Take 500 mcg by mouth once daily. , Disp: , Rfl:     docusate sodium (COLACE) 100 MG capsule, Take 100 mg by mouth daily as needed for Constipation., Disp: , Rfl:     esomeprazole (NEXIUM) 20 MG capsule, Take 1 capsule (20 mg total) by mouth before breakfast., Disp: 30 capsule, Rfl: 11    FLAXSEED ORAL, Take 650 mg by mouth once daily. , Disp: , Rfl:     FLUAD 9991-6020, 65 YR UP,,PF, 45 mcg (15 mcg x 3)/0.5 mL Syrg, ADM 0.5ML IM UTD, Disp: , Rfl: 0    ketotifen (ZADITOR) 0.025 % (0.035 %) ophthalmic solution, Place 1 drop into both eyes 2 (two) times daily as needed (Pt reports use approx once/week). , Disp: , Rfl:     methylcellulose, laxative, (CITRUCEL) 500 mg Tab, Take 500 mg by mouth daily as needed., Disp: , Rfl:     MULTIVITAMIN W-MINERALS/LUTEIN (CENTRUM SILVER ORAL), Take 1 tablet by mouth once daily. , Disp: , Rfl:     ondansetron (ZOFRAN-ODT) 4 MG TbDL, Take 8 mg by mouth every 8 (eight) hours as needed., Disp: , Rfl:     pregabalin (LYRICA) 75 MG capsule, Take 1 capsule (75 mg total) by mouth 2 (two) times daily., Disp: 60 capsule, Rfl: 11    sucralfate (CARAFATE) 1 gram tablet, Take 1 tablet (1 g total) by mouth 4 (four) times daily., Disp: 40 tablet, Rfl: 0    dexAMETHasone (DECADRON) 4 MG Tab, Take one tab po BID the day before chemo and then for two days after chemo, Disp: 12 tablet, Rfl: 0    ondansetron (ZOFRAN) 8 MG tablet, Take 1 tablet (8 mg total) by mouth every 6 (six) hours as  needed., Disp: 30 tablet, Rfl: 3    prochlorperazine (COMPAZINE) 10 MG tablet, Take 1 tablet (10 mg total) by mouth every 6 (six) hours as needed., Disp: 30 tablet, Rfl: 1    Thank you for allowing me to evaluate and participate in the care of this pleasant patient. Please fell free to call me with any questions or concerns.    Warmly,  Kenyatta Tejeda MD    This note was dictated with Dragon and briefly proofread. Please excuse any sentences that may be unclear or words misspelled

## 2020-01-08 PROBLEM — C78.7 LIVER METASTASES: Status: ACTIVE | Noted: 2020-01-08

## 2020-01-08 PROBLEM — C78.00 LUNG METASTASES: Status: ACTIVE | Noted: 2020-01-08

## 2020-01-09 ENCOUNTER — INFUSION (OUTPATIENT)
Dept: INFUSION THERAPY | Facility: HOSPITAL | Age: 85
End: 2020-01-09
Attending: INTERNAL MEDICINE
Payer: MEDICARE

## 2020-01-09 ENCOUNTER — DOCUMENTATION ONLY (OUTPATIENT)
Dept: HEMATOLOGY/ONCOLOGY | Facility: CLINIC | Age: 85
End: 2020-01-09

## 2020-01-09 VITALS
HEIGHT: 63 IN | BODY MASS INDEX: 21.64 KG/M2 | RESPIRATION RATE: 18 BRPM | TEMPERATURE: 99 F | HEART RATE: 80 BPM | SYSTOLIC BLOOD PRESSURE: 136 MMHG | WEIGHT: 122.13 LBS | DIASTOLIC BLOOD PRESSURE: 81 MMHG

## 2020-01-09 DIAGNOSIS — T45.1X5A CHEMOTHERAPY INDUCED NEUTROPENIA: Primary | ICD-10-CM

## 2020-01-09 DIAGNOSIS — C56.9 MALIGNANT NEOPLASM OF OVARY, UNSPECIFIED LATERALITY: ICD-10-CM

## 2020-01-09 DIAGNOSIS — D70.1 CHEMOTHERAPY INDUCED NEUTROPENIA: Primary | ICD-10-CM

## 2020-01-09 PROCEDURE — 96417 CHEMO IV INFUS EACH ADDL SEQ: CPT

## 2020-01-09 PROCEDURE — 96413 CHEMO IV INFUSION 1 HR: CPT

## 2020-01-09 PROCEDURE — 63600175 PHARM REV CODE 636 W HCPCS: Performed by: INTERNAL MEDICINE

## 2020-01-09 PROCEDURE — 96367 TX/PROPH/DG ADDL SEQ IV INF: CPT

## 2020-01-09 RX ORDER — HEPARIN 100 UNIT/ML
500 SYRINGE INTRAVENOUS
Status: DISCONTINUED | OUTPATIENT
Start: 2020-01-09 | End: 2020-01-09 | Stop reason: HOSPADM

## 2020-01-09 RX ORDER — DIPHENHYDRAMINE HYDROCHLORIDE 50 MG/ML
50 INJECTION INTRAMUSCULAR; INTRAVENOUS ONCE AS NEEDED
Status: DISCONTINUED | OUTPATIENT
Start: 2020-01-09 | End: 2020-01-09 | Stop reason: HOSPADM

## 2020-01-09 RX ORDER — EPINEPHRINE 0.3 MG/.3ML
0.3 INJECTION SUBCUTANEOUS ONCE AS NEEDED
Status: DISCONTINUED | OUTPATIENT
Start: 2020-01-09 | End: 2020-01-09 | Stop reason: HOSPADM

## 2020-01-09 RX ORDER — SODIUM CHLORIDE 0.9 % (FLUSH) 0.9 %
10 SYRINGE (ML) INJECTION
Status: DISCONTINUED | OUTPATIENT
Start: 2020-01-09 | End: 2020-01-09 | Stop reason: HOSPADM

## 2020-01-09 RX ADMIN — BEVACIZUMAB 590 MG: 400 INJECTION, SOLUTION INTRAVENOUS at 09:01

## 2020-01-09 RX ADMIN — SODIUM CHLORIDE: 0.9 INJECTION, SOLUTION INTRAVENOUS at 08:01

## 2020-01-09 RX ADMIN — DOXORUBICIN HYDROCHLORIDE 65 MG: 2 INJECTABLE, LIPOSOMAL INTRAVENOUS at 10:01

## 2020-01-09 RX ADMIN — PALONOSETRON HYDROCHLORIDE: 0.25 INJECTION, SOLUTION INTRAVENOUS at 08:01

## 2020-01-09 NOTE — PROGRESS NOTES
NUTRITION NOTE    I spoke to Ms. Cook in infusion this morning to f/u re: fluid/electrolyte balance. Her daughter stated that she has increased her intake of electrolyte-rich fluids & her most recent labs have shown some improvement. Current complaint of frequent nausea despite taking prescribed antiemetics. Currently tolerating small meals throughout the day. Pt reported 1# wt loss within past week. CW: 122#    Plan:   1. Encouraged continuing intake of electrolyte-rich fluids.   2. Continue antiemetics prn. Recommend ginger ale or tea to assist with nausea.   3. Continue small, frequent meals to ensure adequate calorie & protein intake.   4. Will f/u at next infusion.

## 2020-01-09 NOTE — PLAN OF CARE
Problem: Fall Injury Risk  Goal: Absence of Fall and Fall-Related Injury  1/9/2020 1220 by Ellen Mahoney RN  Outcome: Ongoing, Progressing  1/9/2020 1220 by Ellen Mahoney RN  Outcome: Ongoing, Progressing

## 2020-01-10 ENCOUNTER — INFUSION (OUTPATIENT)
Dept: INFUSION THERAPY | Facility: HOSPITAL | Age: 85
End: 2020-01-10
Attending: INTERNAL MEDICINE
Payer: MEDICARE

## 2020-01-10 VITALS
RESPIRATION RATE: 16 BRPM | BODY MASS INDEX: 21.6 KG/M2 | TEMPERATURE: 98 F | WEIGHT: 121.94 LBS | SYSTOLIC BLOOD PRESSURE: 125 MMHG | DIASTOLIC BLOOD PRESSURE: 65 MMHG | HEART RATE: 82 BPM

## 2020-01-10 DIAGNOSIS — C56.9 MALIGNANT NEOPLASM OF OVARY, UNSPECIFIED LATERALITY: ICD-10-CM

## 2020-01-10 DIAGNOSIS — D70.1 CHEMOTHERAPY INDUCED NEUTROPENIA: Primary | ICD-10-CM

## 2020-01-10 DIAGNOSIS — T45.1X5A CHEMOTHERAPY INDUCED NEUTROPENIA: Primary | ICD-10-CM

## 2020-01-10 PROCEDURE — 96372 THER/PROPH/DIAG INJ SC/IM: CPT

## 2020-01-10 PROCEDURE — 63600175 PHARM REV CODE 636 W HCPCS: Mod: JG | Performed by: INTERNAL MEDICINE

## 2020-01-10 RX ADMIN — PEGFILGRASTIM 6 MG: 6 INJECTION SUBCUTANEOUS at 01:01

## 2020-01-14 ENCOUNTER — TELEPHONE (OUTPATIENT)
Dept: HEMATOLOGY/ONCOLOGY | Facility: CLINIC | Age: 85
End: 2020-01-14

## 2020-01-14 PROBLEM — J84.10 CALCIFIED GRANULOMA OF LUNG: Status: ACTIVE | Noted: 2020-01-14

## 2020-01-14 NOTE — TELEPHONE ENCOUNTER
"Spoke to pt and she reported she had a small BM that was "hard as a rock". States she has been drinking some water and eats fruits in the morning. Pt states she is going to try a suppository if she does not have a BM on her own today. Encouraged call back with any other issues, and if no BM by tomorrow. Pt verbalized understanding.     ----- Message from Jeremie [email or web address] sent at 1/14/2020 12:17 PM CST -----  Contact: Dtr/Kendra Gardner called in and asked for office to please call patient directly.  Kendra stated patient took the medication for her constipation last night and has not yet had bowel movement.  Kendra stated patient really needs to speak to nurse because she is upset because she has not gone to the bathroom.    Kendra asked to be very clear when speaking to patient.    Patient's call back number is [phone]    "

## 2020-01-24 ENCOUNTER — OFFICE VISIT (OUTPATIENT)
Dept: HOME HEALTH SERVICES | Facility: CLINIC | Age: 85
End: 2020-01-24
Payer: MEDICARE

## 2020-01-24 VITALS
BODY MASS INDEX: 20.49 KG/M2 | HEART RATE: 86 BPM | WEIGHT: 120 LBS | HEIGHT: 64 IN | DIASTOLIC BLOOD PRESSURE: 77 MMHG | SYSTOLIC BLOOD PRESSURE: 140 MMHG

## 2020-01-24 DIAGNOSIS — R26.9 ABNORMALITY OF GAIT AND MOBILITY: ICD-10-CM

## 2020-01-24 DIAGNOSIS — Z99.89 DEPENDENCE ON OTHER ENABLING MACHINES AND DEVICES: ICD-10-CM

## 2020-01-24 DIAGNOSIS — C78.7 LIVER METASTASES: ICD-10-CM

## 2020-01-24 DIAGNOSIS — J84.10 CALCIFIED GRANULOMA OF LUNG: ICD-10-CM

## 2020-01-24 DIAGNOSIS — D70.1 CHEMOTHERAPY INDUCED NEUTROPENIA: ICD-10-CM

## 2020-01-24 DIAGNOSIS — Z00.00 ENCOUNTER FOR PREVENTIVE HEALTH EXAMINATION: Primary | ICD-10-CM

## 2020-01-24 DIAGNOSIS — E44.0 MALNUTRITION OF MODERATE DEGREE: ICD-10-CM

## 2020-01-24 DIAGNOSIS — C56.9 MALIGNANT NEOPLASM OF OVARY, UNSPECIFIED LATERALITY: ICD-10-CM

## 2020-01-24 DIAGNOSIS — M85.80 OSTEOPENIA, UNSPECIFIED LOCATION: ICD-10-CM

## 2020-01-24 DIAGNOSIS — I70.0 ATHEROSCLEROSIS OF AORTA: ICD-10-CM

## 2020-01-24 DIAGNOSIS — T45.1X5A CHEMOTHERAPY INDUCED NEUTROPENIA: ICD-10-CM

## 2020-01-24 DIAGNOSIS — C78.00 MALIGNANT NEOPLASM METASTATIC TO LUNG, UNSPECIFIED LATERALITY: ICD-10-CM

## 2020-01-24 DIAGNOSIS — K21.00 GASTROESOPHAGEAL REFLUX DISEASE WITH ESOPHAGITIS: ICD-10-CM

## 2020-01-24 DIAGNOSIS — Z74.09 OTHER REDUCED MOBILITY: ICD-10-CM

## 2020-01-24 DIAGNOSIS — I73.9 PERIPHERAL VASCULAR DISEASE: ICD-10-CM

## 2020-01-24 PROCEDURE — G0439 PPPS, SUBSEQ VISIT: HCPCS | Mod: S$GLB,,, | Performed by: NURSE PRACTITIONER

## 2020-01-24 PROCEDURE — G0439 PR MEDICARE ANNUAL WELLNESS SUBSEQUENT VISIT: ICD-10-PCS | Mod: S$GLB,,, | Performed by: NURSE PRACTITIONER

## 2020-01-24 NOTE — PATIENT INSTRUCTIONS
Counseling and Referral of Other Preventative  (Italic type indicates deductible and co-insurance are waived)    Patient Name: Pau Cook  Today's Date: 1/24/2020    Health Maintenance       Date Due Completion Date    Shingles Vaccine (1 of 2) 02/28/2020 (Originally 7/13/1979) ---    Lipid Panel 04/12/2023 4/12/2018    TETANUS VACCINE 04/12/2027 4/12/2017 (ClinicallyNA)    Override on 4/12/2017: Not Clinically Appropriate (TETANUS ALLERGY)        No orders of the defined types were placed in this encounter.    The following information is provided to all patients.  This information is to help you find resources for any of the problems found today that may be affecting your health:                Living healthy guide: www.[facility].[place].gov      Understanding Diabetes: www.diabetes.org      Eating healthy: www.cdc.gov/healthyweight      [facility] home safety checklist: www.cdc.gov/steadi/patient.html      Agency on Aging: www.goea.[place].gov      Alcoholics anonymous (AA): www.aa.org      Physical Activity: www.gavino.nih.gov/gv0icaa      Tobacco use: www.quitwithusla.org

## 2020-01-24 NOTE — PROGRESS NOTES
"Pau Cook presented for a  Medicare AWV and comprehensive Health Risk Assessment today. The following components were reviewed and updated:    · Medical history  · Family History  · Social history  · Allergies and Current Medications  · Health Risk Assessment  · Health Maintenance  · Care Team     ** See Completed Assessments for Annual Wellness Visit within the encounter summary.**       The following assessments were completed:  · Living Situation  · CAGE  · Depression Screening  · Timed Get Up and Go  · Whisper Test  · Cognitive Function Screening  ·   · Nutrition Screening  · ADL Screening  · PAQ Screening    Vitals:    01/24/20 1446   BP: (!) 140/77   Pulse: 86   Weight: 54.4 kg (120 lb)   Height: 5' 4" (1.626 m)     Body mass index is 20.6 kg/m².  Physical Exam   Constitutional: She is oriented to person, place, and time.   Underweight   HENT:   Head: Normocephalic.   Eyes: Pupils are equal, round, and reactive to light.   Neck: Normal range of motion.   Cardiovascular: Normal rate and regular rhythm.   Pulmonary/Chest: Effort normal and breath sounds normal.   Abdominal: Soft. Bowel sounds are normal.   Musculoskeletal: She exhibits no edema.   Unsteady gait  Walker present   Neurological: She is alert and oriented to person, place, and time.   Skin: Skin is warm and dry. Rash noted.   Psychiatric: She has a normal mood and affect. Her behavior is normal.   Vitals reviewed.        Diagnoses and health risks identified today and associated recommendations/orders:    1. Encounter for preventive health examination  AWV Completed    2. Dependence on other enabling machines and devices  Discussion on fall risk and prevention including home safety.  Patient currently attends exercise classes 3x week for strengthening.    3. Abnormality of gait and mobility  Discussion on fall risk and prevention including home safety.  Patient currently attends exercise classes 3x week for strengthening.    4. Other " reduced mobility  Discussion on fall risk and prevention including home safety.  Patient currently attends exercise classes 3x week for strengthening.    5. Calcified granuloma of lung  Lung mets, followed by Hem/Onc.    6. Atherosclerosis of aorta  Stable, followed by PCP.    7. Peripheral vascular disease  Stable, followed by PCP.    8. Chemotherapy induced neutropenia  Active, followed by Hem/Onc.    9. Liver metastases  Active, followed by Hem/Onc.    10. Malignant neoplasm metastatic to lung, unspecified laterality  Active, followed by Hem/Onc.    11. Malignant neoplasm of ovary, unspecified laterality  Active, followed by Hem/Onc.    12. Malnutrition of moderate degree  BMI is 20.60 kg/m². Encouraged maximal PO intake and supplementation to increase weight.  Patient reports she is trying to eat more despite nausea secondary to chemotherapy.    Provided Pau NELSON with a 5-10 year written screening schedule and personal prevention plan. Recommendations were developed using the USPSTF age appropriate recommendations. Education, counseling, and referrals were provided as needed. After Visit Summary printed and given to patient which includes a list of additional screenings\tests needed.    Follow up in about 1 year (around 1/24/2021) for your next annual wellness visit.    Maggie Harrison NP

## 2020-01-27 ENCOUNTER — HOSPITAL ENCOUNTER (OUTPATIENT)
Dept: RADIOLOGY | Facility: HOSPITAL | Age: 85
Discharge: HOME | End: 2020-01-27
Attending: INTERNAL MEDICINE
Payer: MEDICARE

## 2020-01-27 VITALS — WEIGHT: 123 LBS | BODY MASS INDEX: 21.79 KG/M2 | HEIGHT: 63 IN

## 2020-01-27 DIAGNOSIS — C56.9 MALIGNANT NEOPLASM OF OVARY, UNSPECIFIED LATERALITY: ICD-10-CM

## 2020-01-27 LAB — GLUCOSE SERPL-MCNC: 99 MG/DL (ref 70–110)

## 2020-01-27 PROCEDURE — A9552 F18 FDG: HCPCS | Mod: PO

## 2020-01-27 PROCEDURE — 78815 PET IMAGE W/CT SKULL-THIGH: CPT | Mod: TC,PO

## 2020-01-31 RX ORDER — HYDROGEN PEROXIDE 3 %
20 SOLUTION, NON-ORAL MISCELLANEOUS
Qty: 30 CAPSULE | Refills: 11 | Status: SHIPPED | OUTPATIENT
Start: 2020-01-31 | End: 2020-02-27 | Stop reason: SDUPTHER

## 2020-02-03 ENCOUNTER — LAB VISIT (OUTPATIENT)
Dept: LAB | Facility: HOSPITAL | Age: 85
End: 2020-02-03
Attending: INTERNAL MEDICINE
Payer: MEDICARE

## 2020-02-03 DIAGNOSIS — G60.9 IDIOPATHIC PERIPHERAL NEUROPATHY: ICD-10-CM

## 2020-02-03 DIAGNOSIS — C78.00 MALIGNANT NEOPLASM METASTATIC TO LUNG, UNSPECIFIED LATERALITY: ICD-10-CM

## 2020-02-03 LAB
ALBUMIN SERPL BCP-MCNC: 4 G/DL (ref 3.5–5.2)
ALP SERPL-CCNC: 135 U/L (ref 55–135)
ALT SERPL W/O P-5'-P-CCNC: 15 U/L (ref 10–44)
ANION GAP SERPL CALC-SCNC: 10 MMOL/L (ref 8–16)
AST SERPL-CCNC: 23 U/L (ref 10–40)
BASOPHILS # BLD AUTO: 0.05 K/UL (ref 0–0.2)
BASOPHILS NFR BLD: 0.6 % (ref 0–1.9)
BILIRUB SERPL-MCNC: 0.4 MG/DL (ref 0.1–1)
BUN SERPL-MCNC: 12 MG/DL (ref 8–23)
CALCIUM SERPL-MCNC: 10.1 MG/DL (ref 8.7–10.5)
CHLORIDE SERPL-SCNC: 99 MMOL/L (ref 95–110)
CO2 SERPL-SCNC: 30 MMOL/L (ref 23–29)
CREAT SERPL-MCNC: 0.8 MG/DL (ref 0.5–1.4)
DIFFERENTIAL METHOD: ABNORMAL
EOSINOPHIL # BLD AUTO: 0 K/UL (ref 0–0.5)
EOSINOPHIL NFR BLD: 0.5 % (ref 0–8)
ERYTHROCYTE [DISTWIDTH] IN BLOOD BY AUTOMATED COUNT: 14.7 % (ref 11.5–14.5)
EST. GFR  (AFRICAN AMERICAN): >60 ML/MIN/1.73 M^2
EST. GFR  (NON AFRICAN AMERICAN): >60 ML/MIN/1.73 M^2
GLUCOSE SERPL-MCNC: 134 MG/DL (ref 70–110)
HCT VFR BLD AUTO: 43.2 % (ref 37–48.5)
HGB BLD-MCNC: 13.8 G/DL (ref 12–16)
IMM GRANULOCYTES # BLD AUTO: 0.03 K/UL (ref 0–0.04)
LYMPHOCYTES # BLD AUTO: 1.3 K/UL (ref 1–4.8)
LYMPHOCYTES NFR BLD: 16.2 % (ref 18–48)
MCH RBC QN AUTO: 30.3 PG (ref 27–31)
MCHC RBC AUTO-ENTMCNC: 31.9 G/DL (ref 32–36)
MCV RBC AUTO: 95 FL (ref 82–98)
MONOCYTES # BLD AUTO: 0.7 K/UL (ref 0.3–1)
MONOCYTES NFR BLD: 8.6 % (ref 4–15)
NEUTROPHILS # BLD AUTO: 6.1 K/UL (ref 1.8–7.7)
NEUTROPHILS NFR BLD: 73.7 % (ref 38–73)
NRBC BLD-RTO: 0 /100 WBC
PLATELET # BLD AUTO: 320 K/UL (ref 150–350)
PMV BLD AUTO: 8.6 FL (ref 9.2–12.9)
POTASSIUM SERPL-SCNC: 4.2 MMOL/L (ref 3.5–5.1)
PROT SERPL-MCNC: 7.5 G/DL (ref 6–8.4)
RBC # BLD AUTO: 4.56 M/UL (ref 4–5.4)
SODIUM SERPL-SCNC: 139 MMOL/L (ref 136–145)
WBC # BLD AUTO: 8.23 K/UL (ref 3.9–12.7)

## 2020-02-03 PROCEDURE — 36415 COLL VENOUS BLD VENIPUNCTURE: CPT

## 2020-02-03 PROCEDURE — 85025 COMPLETE CBC W/AUTO DIFF WBC: CPT

## 2020-02-03 PROCEDURE — 80053 COMPREHEN METABOLIC PANEL: CPT

## 2020-02-04 ENCOUNTER — OFFICE VISIT (OUTPATIENT)
Dept: HEMATOLOGY/ONCOLOGY | Facility: CLINIC | Age: 85
End: 2020-02-04
Payer: MEDICARE

## 2020-02-04 VITALS
HEART RATE: 93 BPM | RESPIRATION RATE: 16 BRPM | HEIGHT: 63 IN | TEMPERATURE: 98 F | OXYGEN SATURATION: 98 % | SYSTOLIC BLOOD PRESSURE: 131 MMHG | BODY MASS INDEX: 21.09 KG/M2 | DIASTOLIC BLOOD PRESSURE: 62 MMHG | WEIGHT: 119.06 LBS

## 2020-02-04 DIAGNOSIS — C56.9 MALIGNANT NEOPLASM OF OVARY, UNSPECIFIED LATERALITY: Primary | ICD-10-CM

## 2020-02-04 PROCEDURE — 1159F PR MEDICATION LIST DOCUMENTED IN MEDICAL RECORD: ICD-10-PCS | Mod: S$GLB,,, | Performed by: INTERNAL MEDICINE

## 2020-02-04 PROCEDURE — 1126F AMNT PAIN NOTED NONE PRSNT: CPT | Mod: S$GLB,,, | Performed by: INTERNAL MEDICINE

## 2020-02-04 PROCEDURE — 1126F PR PAIN SEVERITY QUANTIFIED, NO PAIN PRESENT: ICD-10-PCS | Mod: S$GLB,,, | Performed by: INTERNAL MEDICINE

## 2020-02-04 PROCEDURE — 99215 OFFICE O/P EST HI 40 MIN: CPT | Mod: S$GLB,,, | Performed by: INTERNAL MEDICINE

## 2020-02-04 PROCEDURE — 3288F FALL RISK ASSESSMENT DOCD: CPT | Mod: S$GLB,,, | Performed by: INTERNAL MEDICINE

## 2020-02-04 PROCEDURE — 1101F PR PT FALLS ASSESS DOC 0-1 FALLS W/OUT INJ PAST YR: ICD-10-PCS | Mod: S$GLB,,, | Performed by: INTERNAL MEDICINE

## 2020-02-04 PROCEDURE — 99215 PR OFFICE/OUTPT VISIT, EST, LEVL V, 40-54 MIN: ICD-10-PCS | Mod: S$GLB,,, | Performed by: INTERNAL MEDICINE

## 2020-02-04 PROCEDURE — 1159F MED LIST DOCD IN RCRD: CPT | Mod: S$GLB,,, | Performed by: INTERNAL MEDICINE

## 2020-02-04 PROCEDURE — 1101F PT FALLS ASSESS-DOCD LE1/YR: CPT | Mod: S$GLB,,, | Performed by: INTERNAL MEDICINE

## 2020-02-04 PROCEDURE — 3288F PR FALLS RISK ASSESSMENT DOCUMENTED: ICD-10-PCS | Mod: S$GLB,,, | Performed by: INTERNAL MEDICINE

## 2020-02-04 PROCEDURE — 99999 PR PBB SHADOW E&M-EST. PATIENT-LVL III: ICD-10-PCS | Mod: PBBFAC,,, | Performed by: INTERNAL MEDICINE

## 2020-02-04 PROCEDURE — 99999 PR PBB SHADOW E&M-EST. PATIENT-LVL III: CPT | Mod: PBBFAC,,, | Performed by: INTERNAL MEDICINE

## 2020-02-04 NOTE — PROGRESS NOTES
CC I am weak     Pau Cook is a 89 y.o.  Pt with recurrent ovarian cancer here for chemo clearance for second-line therapy.   And to discuss scan results     Past Medical History:   Diagnosis Date    Allergy     Arthritis     Breast cyst     Closed intertrochanteric fracture of hip, left, initial encounter     Foot pain     GERD (gastroesophageal reflux disease)     Joint pain     Ovarian cancer     Peripheral vascular disease 1/15/2018    Squamous cell carcinoma 04/2016    Right Lower Leg    MUNIR (stress urinary incontinence, female) 4/17/2017     Past Surgical History:   Procedure Laterality Date    APPENDECTOMY      COLON SURGERY      removed about 10 inch    COLONOSCOPY N/A 12/24/2018    Procedure: COLONOSCOPY;  Surgeon: Nirmal Davila MD;  Location: [facility];  Service: Endoscopy;  Laterality: N/A;    FINGER MASS EXCISION Right 7/9/2018    Procedure: EXCISION, MASS, FINGER;  Surgeon: Fabio Seay MD;  Location: [facility] OR;  Service: Orthopedics;  Laterality: Right;    hemorhids      HYSTERECTOMY      LYSIS OF ADHESIONS N/A 12/28/2018    Procedure: LYSIS, ADHESIONS;  Surgeon: Amee Rose MD;  Location: [facility] OR;  Service: OB/GYN;  Laterality: N/A;    OPEN REDUCTION AND INTERNAL FIXATION (ORIF) OF INTERTROCHANTERIC FRACTURE OF FEMUR Left 5/31/2019    Procedure: ORIF, FRACTURE, FEMUR, INTERTROCHANTERIC;  Surgeon: Bacilio Lockwood II, MD;  Location: [facility] OR;  Service: Orthopedics;  Laterality: Left;    ROBOT-ASSISTED LAPAROSCOPIC ABDOMINAL HYSTERECTOMY USING DA MARK XI N/A 12/28/2018    Procedure: XI ROBOTIC HYSTERECTOMY;  Surgeon: Amee Rose MD;  Location: [facility] OR;  Service: OB/GYN;  Laterality: N/A;  attempted, opened    ROBOT-ASSISTED LAPAROSCOPIC SALPINGO-OOPHORECTOMY USING DA MARK XI Bilateral 12/28/2018    Procedure: XI ROBOTIC SALPINGO-OOPHORECTOMY;  Surgeon: Amee Rose MD;  Location: [facility];  Service: OB/GYN;  Laterality: Bilateral;  attmepted, opened     TOTAL ABDOMINAL HYSTERECTOMY W/ BILATERAL SALPINGOOPHORECTOMY Bilateral 12/28/2018    Procedure: HYSTERECTOMY, TOTAL, ABDOMINAL, WITH BILATERAL SALPINGO-OOPHORECTOMY;  Surgeon: Amee Rose MD;  Location: [facility];  Service: OB/GYN;  Laterality: Bilateral;   She is tolerating only one bottle water daily with dulcolax for constipation and antinausea meds which she takes only occasionally     Current Outpatient Medications:     acetaminophen (TYLENOL) 500 MG tablet, Take 2 tablets (1,000 mg total) by mouth every 8 (eight) hours., Disp: , Rfl: 0    bisacodyl (DULCOLAX) 5 mg EC tablet, Take 5 mg by mouth daily as needed for Constipation., Disp: , Rfl:     calcium-vitamin D3 (CALCIUM 500 + D) 500 mg(1,250mg) -200 unit per tablet, Take 1 tablet by mouth once daily. , Disp: , Rfl:     carboxymethylcellulose (REFRESH PLUS) 0.5 % Dpet, 1 drop daily as needed., Disp: , Rfl:     cyanocobalamin (VITAMIN B-12) 1000 MCG tablet, Take 500 mcg by mouth once daily. , Disp: , Rfl:     dexAMETHasone (DECADRON) 4 MG Tab, Take one tab po BID the day before chemo and then for two days after chemo (Patient not taking: Reported on 1/24/2020), Disp: 12 tablet, Rfl: 0    docusate sodium (COLACE) 100 MG capsule, Take 100 mg by mouth daily as needed for Constipation., Disp: , Rfl:     esomeprazole (NEXIUM) 20 MG capsule, Take 1 capsule (20 mg total) by mouth before breakfast., Disp: 30 capsule, Rfl: 11    FLAXSEED ORAL, Take 650 mg by mouth once daily. , Disp: , Rfl:     FLUAD 4315-6236, 65 YR UP,,PF, 45 mcg (15 mcg x 3)/0.5 mL Syrg, ADM 0.5ML IM UTD, Disp: , Rfl: 0    ketotifen (ZADITOR) 0.025 % (0.035 %) ophthalmic solution, Place 1 drop into both eyes 2 (two) times daily as needed (Pt reports use approx once/week). , Disp: , Rfl:     methylcellulose, laxative, (CITRUCEL) 500 mg Tab, Take 500 mg by mouth daily as needed., Disp: , Rfl:     MULTIVITAMIN W-MINERALS/LUTEIN (CENTRUM SILVER ORAL), Take 1 tablet by mouth once  "daily. , Disp: , Rfl:     ondansetron (ZOFRAN) 8 MG tablet, Take 1 tablet (8 mg total) by mouth every 6 (six) hours as needed., Disp: 30 tablet, Rfl: 3    ondansetron (ZOFRAN-ODT) 4 MG TbDL, Take 8 mg by mouth every 8 (eight) hours as needed., Disp: , Rfl:     pregabalin (LYRICA) 75 MG capsule, Take 1 capsule (75 mg total) by mouth 2 (two) times daily., Disp: 60 capsule, Rfl: 11    prochlorperazine (COMPAZINE) 10 MG tablet, Take 1 tablet (10 mg total) by mouth every 6 (six) hours as needed., Disp: 30 tablet, Rfl: 1    sucralfate (CARAFATE) 1 gram tablet, Take 1 tablet (1 g total) by mouth 4 (four) times daily., Disp: 40 tablet, Rfl: 0  Review of patient's allergies indicates:   Allergen Reactions    Pcn [penicillins]      Can not remember reaction     Tetanus vaccines and toxoid Swelling     Localized swelling to injection site     Social History     Tobacco Use    Smoking status: Never Smoker    Smokeless tobacco: Never Used   Substance Use Topics    Alcohol use: No     Alcohol/week: 0.0 standard drinks     Comment: rare    Drug use: No         CONSTITUTIONAL: No fevers, chills, night sweats, wt. loss, + weakness and appetite changes  SKIN: + rash   ENT: No headaches, head trauma, vision changes, or eye pain  LYMPH NODES: None noticed   CV: No chest pain, palpitations.   RESP: No dyspnea on exertion, cough, wheezing, or hemoptysis  GI: +nausea, emesis, diarrhea, constipation, no melena, hematochezia, pain.   : No dysuria, hematuria, urgency, or frequency   HEME: No easy bruising, bleeding problems  PSYCHIATRIC: No depression, anxiety, psychosis, hallucinations.  NEURO: No seizures, memory loss, dizziness or difficulty sleeping  MSK: No arthralgias or joint swelling         /62 (BP Location: Right arm, Patient Position: Sitting)   Pulse 93   Temp 97.7 °F (36.5 °C) (Oral)   Resp 16   Ht 5' 3" (1.6 m)   Wt 54 kg (119 lb 0.8 oz)   SpO2 98%   BMI 21.09 kg/m²   Gen: NAD, A and O x3, weak " appearance + dizziness  Psych: pleasant affect, normal thought process  Eyes: Pupils round and non dilated, EOM intact  Nose: Nares patent  OP clear, mucosa patent  Neck: suppple, no JVD, trachea midline, no palpable mass, no adenopathy  Lungs: CTAB, no wheezes, no use of accessory muscles  CV: REGULAR RATE AND RHYTHM   Abd: soft, NTND, + BS, No HSM, no ascites  Extr: No CCE, ARGELIA, strength normal, good capillary refill ++ edema   Neuro: steady gait, CNs grossly intact  Skin:can see where there were bullae and dry skin with flakes and some excoriations on her legs and periumbilical region   Rheum: No joint swelling    Lab Results   Component Value Date    WBC 20.83 (H) 11/06/2019    HGB 12.8 11/06/2019    HCT 39.8 11/06/2019    MCV 90 11/06/2019     11/06/2019     Lab Results   Component Value Date    WBC 6.33 01/06/2020    HGB 13.2 01/06/2020    HCT 41.9 01/06/2020    MCV 95 01/06/2020     01/06/2020           CMP  Sodium   Date Value Ref Range Status   02/03/2020 139 136 - 145 mmol/L Final     Potassium   Date Value Ref Range Status   02/03/2020 4.2 3.5 - 5.1 mmol/L Final     Chloride   Date Value Ref Range Status   02/03/2020 99 95 - 110 mmol/L Final     CO2   Date Value Ref Range Status   02/03/2020 30 (H) 23 - 29 mmol/L Final     Glucose   Date Value Ref Range Status   02/03/2020 134 (H) 70 - 110 mg/dL Final     BUN, Bld   Date Value Ref Range Status   02/03/2020 12 8 - 23 mg/dL Final     Creatinine   Date Value Ref Range Status   02/03/2020 0.8 0.5 - 1.4 mg/dL Final     Calcium   Date Value Ref Range Status   02/03/2020 10.1 8.7 - 10.5 mg/dL Final     Total Protein   Date Value Ref Range Status   02/03/2020 7.5 6.0 - 8.4 g/dL Final     Albumin   Date Value Ref Range Status   02/03/2020 4.0 3.5 - 5.2 g/dL Final     Total Bilirubin   Date Value Ref Range Status   02/03/2020 0.4 0.1 - 1.0 mg/dL Final     Comment:     For infants and newborns, interpretation of results should be based  on gestational  age, weight and in agreement with clinical  observations.  Premature Infant recommended reference ranges:  Up to 24 hours.............<8.0 mg/dL  Up to 48 hours............<12.0 mg/dL  3-5 days..................<15.0 mg/dL  6-29 days.................<15.0 mg/dL       Alkaline Phosphatase   Date Value Ref Range Status   02/03/2020 135 55 - 135 U/L Final     AST   Date Value Ref Range Status   02/03/2020 23 10 - 40 U/L Final     ALT   Date Value Ref Range Status   02/03/2020 15 10 - 44 U/L Final     Anion Gap   Date Value Ref Range Status   02/03/2020 10 8 - 16 mmol/L Final     eGFR if    Date Value Ref Range Status   02/03/2020 >60 >60 mL/min/1.73 m^2 Final     eGFR if non    Date Value Ref Range Status   02/03/2020 >60 >60 mL/min/1.73 m^2 Final     Comment:     Calculation used to obtain the estimated glomerular filtration  rate (eGFR) is the CKD-EPI equation.        Lengthy discussion about mixed results on PET CT   Increased growth of liver mass, lung nodules have decreased in size, no new findings of mets in any other organ   There are no diagnoses linked to this encounter. encounter for chemo       Progression after doxil and avastin  avastin can cause demyelination HOLD avastin  Stop doxil  discusison of topotecan: some studies suggest benefit by adding sorafenib: I believe this would be too toxic for her  Submit to insurance company  Spent more serenity 50 minutes with her with Nurse Mane and patient's daughter with more than 50% in counseling   Increase protein and proper hydration  Cont  lyrica for neuropathy and carafate as needed for gastritis   RTC 2 weeks   Discussed diet and hydratio for over 15 minutes  Chemo topotecan days 1-5 IV every 21 days    Current Outpatient Medications:     acetaminophen (TYLENOL) 500 MG tablet, Take 2 tablets (1,000 mg total) by mouth every 8 (eight) hours., Disp: , Rfl: 0    bisacodyl (DULCOLAX) 5 mg EC tablet, Take 5 mg by mouth daily as  needed for Constipation., Disp: , Rfl:     calcium-vitamin D3 (CALCIUM 500 + D) 500 mg(1,250mg) -200 unit per tablet, Take 1 tablet by mouth once daily. , Disp: , Rfl:     carboxymethylcellulose (REFRESH PLUS) 0.5 % Dpet, 1 drop daily as needed., Disp: , Rfl:     cyanocobalamin (VITAMIN B-12) 1000 MCG tablet, Take 500 mcg by mouth once daily. , Disp: , Rfl:     dexAMETHasone (DECADRON) 4 MG Tab, Take one tab po BID the day before chemo and then for two days after chemo, Disp: 12 tablet, Rfl: 0    docusate sodium (COLACE) 100 MG capsule, Take 100 mg by mouth daily as needed for Constipation., Disp: , Rfl:     esomeprazole (NEXIUM) 20 MG capsule, Take 1 capsule (20 mg total) by mouth before breakfast., Disp: 30 capsule, Rfl: 11    FLAXSEED ORAL, Take 650 mg by mouth once daily. , Disp: , Rfl:     FLUAD 5014-4145, 65 YR UP,,PF, 45 mcg (15 mcg x 3)/0.5 mL Syrg, ADM 0.5ML IM UTD, Disp: , Rfl: 0    ketotifen (ZADITOR) 0.025 % (0.035 %) ophthalmic solution, Place 1 drop into both eyes 2 (two) times daily as needed (Pt reports use approx once/week). , Disp: , Rfl:     methylcellulose, laxative, (CITRUCEL) 500 mg Tab, Take 500 mg by mouth daily as needed., Disp: , Rfl:     MULTIVITAMIN W-MINERALS/LUTEIN (CENTRUM SILVER ORAL), Take 1 tablet by mouth once daily. , Disp: , Rfl:     ondansetron (ZOFRAN) 8 MG tablet, Take 1 tablet (8 mg total) by mouth every 6 (six) hours as needed., Disp: 30 tablet, Rfl: 3    ondansetron (ZOFRAN-ODT) 4 MG TbDL, Take 8 mg by mouth every 8 (eight) hours as needed., Disp: , Rfl:     pregabalin (LYRICA) 75 MG capsule, Take 1 capsule (75 mg total) by mouth 2 (two) times daily., Disp: 60 capsule, Rfl: 11    prochlorperazine (COMPAZINE) 10 MG tablet, Take 1 tablet (10 mg total) by mouth every 6 (six) hours as needed., Disp: 30 tablet, Rfl: 1    sucralfate (CARAFATE) 1 gram tablet, Take 1 tablet (1 g total) by mouth 4 (four) times daily., Disp: 40 tablet, Rfl: 0      Current Outpatient  Medications:     acetaminophen (TYLENOL) 500 MG tablet, Take 2 tablets (1,000 mg total) by mouth every 8 (eight) hours., Disp: , Rfl: 0    bisacodyl (DULCOLAX) 5 mg EC tablet, Take 5 mg by mouth daily as needed for Constipation., Disp: , Rfl:     calcium-vitamin D3 (CALCIUM 500 + D) 500 mg(1,250mg) -200 unit per tablet, Take 1 tablet by mouth once daily. , Disp: , Rfl:     carboxymethylcellulose (REFRESH PLUS) 0.5 % Dpet, 1 drop daily as needed., Disp: , Rfl:     cyanocobalamin (VITAMIN B-12) 1000 MCG tablet, Take 500 mcg by mouth once daily. , Disp: , Rfl:     dexAMETHasone (DECADRON) 4 MG Tab, Take one tab po BID the day before chemo and then for two days after chemo (Patient not taking: Reported on 1/24/2020), Disp: 12 tablet, Rfl: 0    docusate sodium (COLACE) 100 MG capsule, Take 100 mg by mouth daily as needed for Constipation., Disp: , Rfl:     esomeprazole (NEXIUM) 20 MG capsule, Take 1 capsule (20 mg total) by mouth before breakfast., Disp: 30 capsule, Rfl: 11    FLAXSEED ORAL, Take 650 mg by mouth once daily. , Disp: , Rfl:     FLUAD 2270-2525, 65 YR UP,,PF, 45 mcg (15 mcg x 3)/0.5 mL Syrg, ADM 0.5ML IM UTD, Disp: , Rfl: 0    ketotifen (ZADITOR) 0.025 % (0.035 %) ophthalmic solution, Place 1 drop into both eyes 2 (two) times daily as needed (Pt reports use approx once/week). , Disp: , Rfl:     methylcellulose, laxative, (CITRUCEL) 500 mg Tab, Take 500 mg by mouth daily as needed., Disp: , Rfl:     MULTIVITAMIN W-MINERALS/LUTEIN (CENTRUM SILVER ORAL), Take 1 tablet by mouth once daily. , Disp: , Rfl:     ondansetron (ZOFRAN) 8 MG tablet, Take 1 tablet (8 mg total) by mouth every 6 (six) hours as needed., Disp: 30 tablet, Rfl: 3    ondansetron (ZOFRAN-ODT) 4 MG TbDL, Take 8 mg by mouth every 8 (eight) hours as needed., Disp: , Rfl:     pregabalin (LYRICA) 75 MG capsule, Take 1 capsule (75 mg total) by mouth 2 (two) times daily., Disp: 60 capsule, Rfl: 11    prochlorperazine (COMPAZINE) 10 MG  tablet, Take 1 tablet (10 mg total) by mouth every 6 (six) hours as needed., Disp: 30 tablet, Rfl: 1    sucralfate (CARAFATE) 1 gram tablet, Take 1 tablet (1 g total) by mouth 4 (four) times daily., Disp: 40 tablet, Rfl: 0    Thank you for allowing me to evaluate and participate in the care of this pleasant patient. Please fell free to call me with any questions or concerns.    Warmly,  Kenyatta Tejeda MD    This note was dictated with Dragon and briefly proofread. Please excuse any sentences that may be unclear or words misspelled

## 2020-02-05 ENCOUNTER — TELEPHONE (OUTPATIENT)
Dept: INFUSION THERAPY | Facility: HOSPITAL | Age: 85
End: 2020-02-05

## 2020-02-05 ENCOUNTER — TELEPHONE (OUTPATIENT)
Dept: FAMILY MEDICINE | Facility: CLINIC | Age: 85
End: 2020-02-05

## 2020-02-05 DIAGNOSIS — R54 AGE-RELATED PHYSICAL DEBILITY: Primary | ICD-10-CM

## 2020-02-05 RX ORDER — DEXAMETHASONE 0.5 MG/1
4 TABLET ORAL
Status: CANCELLED | OUTPATIENT
Start: 2020-02-06

## 2020-02-05 RX ORDER — SODIUM CHLORIDE 0.9 % (FLUSH) 0.9 %
10 SYRINGE (ML) INJECTION
Status: CANCELLED | OUTPATIENT
Start: 2020-02-09

## 2020-02-05 RX ORDER — HEPARIN 100 UNIT/ML
500 SYRINGE INTRAVENOUS
Status: CANCELLED | OUTPATIENT
Start: 2020-02-07

## 2020-02-05 RX ORDER — HEPARIN 100 UNIT/ML
500 SYRINGE INTRAVENOUS
Status: CANCELLED | OUTPATIENT
Start: 2020-02-09

## 2020-02-05 RX ORDER — HEPARIN 100 UNIT/ML
500 SYRINGE INTRAVENOUS
Status: CANCELLED | OUTPATIENT
Start: 2020-02-05

## 2020-02-05 RX ORDER — SODIUM CHLORIDE 0.9 % (FLUSH) 0.9 %
10 SYRINGE (ML) INJECTION
Status: CANCELLED | OUTPATIENT
Start: 2020-02-07

## 2020-02-05 RX ORDER — DEXAMETHASONE 0.5 MG/1
4 TABLET ORAL
Status: CANCELLED | OUTPATIENT
Start: 2020-02-09

## 2020-02-05 RX ORDER — DEXAMETHASONE 0.5 MG/1
4 TABLET ORAL
Status: CANCELLED | OUTPATIENT
Start: 2020-02-07

## 2020-02-05 RX ORDER — SODIUM CHLORIDE 0.9 % (FLUSH) 0.9 %
10 SYRINGE (ML) INJECTION
Status: CANCELLED | OUTPATIENT
Start: 2020-02-05

## 2020-02-05 RX ORDER — HEPARIN 100 UNIT/ML
500 SYRINGE INTRAVENOUS
Status: CANCELLED | OUTPATIENT
Start: 2020-02-08

## 2020-02-05 RX ORDER — DEXAMETHASONE 0.5 MG/1
4 TABLET ORAL
Status: CANCELLED | OUTPATIENT
Start: 2020-02-05

## 2020-02-05 RX ORDER — HEPARIN 100 UNIT/ML
500 SYRINGE INTRAVENOUS
Status: CANCELLED | OUTPATIENT
Start: 2020-02-06

## 2020-02-05 RX ORDER — SODIUM CHLORIDE 0.9 % (FLUSH) 0.9 %
10 SYRINGE (ML) INJECTION
Status: CANCELLED | OUTPATIENT
Start: 2020-02-08

## 2020-02-05 RX ORDER — SODIUM CHLORIDE 0.9 % (FLUSH) 0.9 %
10 SYRINGE (ML) INJECTION
Status: CANCELLED | OUTPATIENT
Start: 2020-02-06

## 2020-02-05 RX ORDER — DEXAMETHASONE 0.5 MG/1
4 TABLET ORAL
Status: CANCELLED | OUTPATIENT
Start: 2020-02-08

## 2020-02-05 NOTE — PLAN OF CARE
DISCONTINUE ON PATHWAY REGIMEN - Ovarian    OVOS81        Liposomal doxorubicin (Doxil(R))       Bevacizumab-xxxx     **Always confirm dose/schedule in your pharmacy ordering system**    REASON: Disease Progression  PRIOR TREATMENT: OVOS81: Liposomal Doxorubicin (Doxil) 40 mg/m2 D1 + Bevacizumab   10 mg/kg D1, 15 q28 Days; Stop Treatment After 6 Cycles If Complete Response,   Otherwise Continue Treatment Until Progression or Unacceptable Toxicity  TREATMENT RESPONSE: Partial Response ([place])    START OFF PATHWAY REGIMEN - Ovarian            Topotecan (Hycamtin(R))           Additional Orders: Although rates of neutropenia exceeded 90% with this   regimen, the risk of febrile neutropenia was very low. Topotecan dose requires   adjustment based on renal function, performance status, and resulting myeloid   toxicity.  Caar TJ et al. Gynecol Oncol [id number].; Emily et al, J   Clin Oncol. 2011 Jeremie 10;29(2):242-8.    **Always confirm dose/schedule in your pharmacy ordering system**    Patient Characteristics:  Recurrent or Progressive Disease, Second Line Therapy, Platinum Resistant or < 6   Months Since Last Therapy  Therapeutic Status: Recurrent or Progressive Disease  BRCA Mutation Status: Did Not Order Test  Line of Therapy: Second Line  Intent of Therapy:  Non-Curative / Palliative Intent, Discussed with Patient

## 2020-02-05 NOTE — TELEPHONE ENCOUNTER
----- Message from Ellen Tuttle sent at 2/5/2020 10:28 AM CST -----  Type: Needs Medical Advice    Who Called:  Kendra Mo - daughter  Best Call Back Number: [phone] home, [phone] cell  Additional Information: caller is requesting home health, physical therapy, due to several falls. Caller requesting nurse not to contact patient, as she gets confused.

## 2020-02-05 NOTE — TELEPHONE ENCOUNTER
----- Message from Daiana Mane RN sent at 2/5/2020  9:15 AM CST -----  Regarding: RE: Cancel Doxil chemo?  Yes subhash  ----- Message -----  From: Niurka Morillo  Sent: 2/5/2020   8:50 AM CST  To: Nikhil HEREDIA Staff  Subject: Cancel Doxil chemo?                              I noticed the authorization for the doxil chemo has been closed and a new auth submitted for the topotecan.  Can I cancel this patients currently scheduled chemo for the doxil chemotherapy as we await the approval for the topotecan?    Thank you,  Niurka

## 2020-02-06 ENCOUNTER — TELEPHONE (OUTPATIENT)
Dept: HEMATOLOGY/ONCOLOGY | Facility: CLINIC | Age: 85
End: 2020-02-06

## 2020-02-06 ENCOUNTER — TELEPHONE (OUTPATIENT)
Dept: FAMILY MEDICINE | Facility: CLINIC | Age: 85
End: 2020-02-06

## 2020-02-06 DIAGNOSIS — C78.7 LIVER METASTASES: Primary | ICD-10-CM

## 2020-02-06 NOTE — TELEPHONE ENCOUNTER
----- Message from Caryn Shepard sent at 2/6/2020 10:11 AM CST -----  Type:  Patient Returning Call    Who Called:  Daughter/Kendra Mo  Who Left Message for Patient:  n/a  Does the patient know what this is regarding?:  Home Health  Best Call Back Number:  [phone]

## 2020-02-07 NOTE — TELEPHONE ENCOUNTER
See Other message      ----- Message from Caryn Shepard sent at 2/6/2020 10:13 AM CST -----  Type: Needs Medical Advice    Who Called:  Daughter/Kendra    Best Call Back Number: [phone]  Additional Information: States that they are ready to bring patient to a neurologist and they need your recommendation as to who to bring her to. Please call to advise. Patient did take a fall on 2/4/20. Her legs collapsed on her but she did not hurt herself.

## 2020-02-07 NOTE — TELEPHONE ENCOUNTER
Returned call and spoke with Rola.   Patient fell and didn't sustain any injuries as she fell on another resident. Patient is c/o that her legs are getting weaker and weaker.   Rola is concerned that starting the new regimen of drug may make some symptoms worse.   Advised I will talk with Dr Tejeda when she returns to the office and I will call her back.     ----- Message from Leonor Smith sent at 2/6/2020  2:30 PM CST -----  Contact: patient daughter rola kamilah #[phone]   patient daughter rola kamilah #[phone] stated just call her in regards to her mother, requesting to discuss patient's fall and weakness.

## 2020-02-08 ENCOUNTER — TELEPHONE (OUTPATIENT)
Dept: FAMILY MEDICINE | Facility: CLINIC | Age: 85
End: 2020-02-08

## 2020-02-08 NOTE — TELEPHONE ENCOUNTER
----- Message from Ellen Zepeda sent at 2/7/2020  3:44 PM CST -----  Type: Needs Home Health Services ordered    Who Called:  Daughter (June)  Best Call Back Number: [phone]  Additional Information: Calling back concerning previous requests sent for home health services for patient/has not heard from anyone/please call back at [phone] to advise.

## 2020-02-10 ENCOUNTER — TELEPHONE (OUTPATIENT)
Dept: FAMILY MEDICINE | Facility: CLINIC | Age: 85
End: 2020-02-10

## 2020-02-10 NOTE — TELEPHONE ENCOUNTER
Spoke to daughter, June. She reports that home health was ordered last week but no one has called her yet to schedule.  Order is in for [facility].  Phoned [place] office and requested order be pulled and processed.  Daughter is aware.

## 2020-02-10 NOTE — TELEPHONE ENCOUNTER
----- Message from Lizz Crawford sent at 2/10/2020 10:09 AM CST -----  Contact: Kendra /Daughter  Type: Needs Medical Advice    Who Called:  Kendra  Best Call Back Number: [phone]  Additional Information: Kendra states that the patient needs orders for homehelath and that she requested this last week her mother is weak from chemo and has fallen three times.  [facility] and was told nothing has been put in no orders. Kendra states office can leave details on voicemail if they can not reach her.Please call to advise.  Thanks!

## 2020-02-11 ENCOUNTER — TELEPHONE (OUTPATIENT)
Dept: HEMATOLOGY/ONCOLOGY | Facility: CLINIC | Age: 85
End: 2020-02-11

## 2020-02-11 PROCEDURE — G0180 MD CERTIFICATION HHA PATIENT: HCPCS | Mod: ,,, | Performed by: FAMILY MEDICINE

## 2020-02-11 PROCEDURE — G0180 PR HOME HEALTH MD CERTIFICATION: ICD-10-PCS | Mod: ,,, | Performed by: FAMILY MEDICINE

## 2020-02-11 NOTE — TELEPHONE ENCOUNTER
Returned call but had to leave VM message.        ----- Message from Maki Omer sent at 2/11/2020 10:06 AM CST -----  Contact: Kendra, daughter  Kendra calling in regards to her mother Pau is due for  3rd round of chemo, Pt is showing weakness and she is now on level 1 assistant living Care. They now have home health coming out to do Occupational and physical therapy for her.     Kendra needs to know from provider since her mother is experiencing extreme weakness should she have her 3rd round of chemo or should they give it a rest right now. She states she spoke to Daiana in office of provider about her Mother condition     Kendra needs nurse or provider to contact her ASAP please at [phone]

## 2020-02-12 ENCOUNTER — TELEPHONE (OUTPATIENT)
Dept: FAMILY MEDICINE | Facility: CLINIC | Age: 85
End: 2020-02-12

## 2020-02-12 DIAGNOSIS — R54 AGE-RELATED PHYSICAL DEBILITY: Primary | ICD-10-CM

## 2020-02-12 NOTE — TELEPHONE ENCOUNTER
Not for this one since she has a history of osteoarthritis. Is there any paperwork for me to sign?

## 2020-02-12 NOTE — TELEPHONE ENCOUNTER
"----- Message from Jeremie Jenkins sent at 2/12/2020  2:11 PM CST -----  Contact: Dtr/June June called in regarding patient & stated patients insurance company informed her patient needed an appointment for a "electric wheelchair evaluation" due to patient falling more frequently.    June stated patient needs to be seen before the next available in May.  Kendra would like a call back at [phone]  "

## 2020-02-12 NOTE — TELEPHONE ENCOUNTER
Patient is a resident at [facility] and is having difficulty with ambulating to function within the facility.Daughter is requesting that an electric wheelchair order be placed for patient and a mobility assessment to help her qualify for ins coverage.  You saw patient in December. Will patient need an appt with you as well?

## 2020-02-13 NOTE — TELEPHONE ENCOUNTER
Orders for the wheelchair need to be put In epic as well as an order for a mobility assessment to be done by home Health PT.

## 2020-02-14 ENCOUNTER — TELEPHONE (OUTPATIENT)
Dept: HEMATOLOGY/ONCOLOGY | Facility: CLINIC | Age: 85
End: 2020-02-14

## 2020-02-14 NOTE — TELEPHONE ENCOUNTER
Spoke with Kendra. Appointment made to discuss starting chemo, Friday 2/21/20 at 9:40 AM        ----- Message from Clifton Porter sent at 2/14/2020 10:04 AM [facility] -----  Type:  Sooner Apoointment Request    Caller is requesting a sooner appointment.  Caller declined first available appointment listed below.  Caller will not accept being placed on the waitlist and is requesting a message be sent to doctor.    Name of Caller:  Daughter-June   When is the first available appointment? NA  Symptoms:  NA  Best Call Back Number: [phone]-mohw/cell [phone]  Additional Information:  The daughter requesting to schedule chemo therapy for the patient.

## 2020-02-14 NOTE — TELEPHONE ENCOUNTER
Mobility assessment order has been placed. Wheelchair order will be placed once assessment is complete.  Order for mobility assessment faxed to Home Health.  Daughter has been in formed of status.

## 2020-02-17 ENCOUNTER — EXTERNAL HOME HEALTH (OUTPATIENT)
Dept: HOME HEALTH SERVICES | Facility: HOSPITAL | Age: 85
End: 2020-02-17
Payer: MEDICARE

## 2020-02-21 ENCOUNTER — TELEPHONE (OUTPATIENT)
Dept: HEMATOLOGY/ONCOLOGY | Facility: CLINIC | Age: 85
End: 2020-02-21

## 2020-02-21 NOTE — TELEPHONE ENCOUNTER
Called and spoke with patient's daughter, June to reschedule appointment for today as Dr Tejeda is out ill. R/S for Friday 2/28/20 at 10:00 AM. Requesting to have lab drawn downstairs before appointment. Advised I will bring lab slips downstairs for her.

## 2020-02-24 ENCOUNTER — TELEPHONE (OUTPATIENT)
Dept: FAMILY MEDICINE | Facility: CLINIC | Age: 85
End: 2020-02-24

## 2020-02-24 DIAGNOSIS — R53.81 PHYSICAL DEBILITY: Primary | ICD-10-CM

## 2020-02-24 NOTE — TELEPHONE ENCOUNTER
----- Message from Geal Cobb sent at 2/24/2020 10:08 AM CST -----  Contact: Clementine (Home Health Nurse)  Type: Needs Medical Advice    Who Called:  Clementine  Best Call Back Number: [phone]  Additional Information: Caller would like to discuss order for motorized scooter/electric wheelchair. Please call to advise. Thanks!

## 2020-02-24 NOTE — TELEPHONE ENCOUNTER
Please place order for mobility assessment that states specifically that it is being done for a motorized scooter or electric wc.

## 2020-02-27 RX ORDER — HYDROGEN PEROXIDE 3 %
20 SOLUTION, NON-ORAL MISCELLANEOUS
Qty: 90 CAPSULE | Refills: 3 | Status: SHIPPED | OUTPATIENT
Start: 2020-02-27 | End: 2021-02-26

## 2020-02-28 ENCOUNTER — OFFICE VISIT (OUTPATIENT)
Dept: HEMATOLOGY/ONCOLOGY | Facility: CLINIC | Age: 85
End: 2020-02-28
Payer: MEDICARE

## 2020-02-28 ENCOUNTER — TELEPHONE (OUTPATIENT)
Dept: HOME HEALTH SERVICES | Facility: HOSPITAL | Age: 85
End: 2020-02-28

## 2020-02-28 ENCOUNTER — LAB VISIT (OUTPATIENT)
Dept: LAB | Facility: HOSPITAL | Age: 85
End: 2020-02-28
Attending: INTERNAL MEDICINE
Payer: MEDICARE

## 2020-02-28 ENCOUNTER — TELEPHONE (OUTPATIENT)
Dept: PHARMACY | Facility: CLINIC | Age: 85
End: 2020-02-28

## 2020-02-28 VITALS
TEMPERATURE: 98 F | WEIGHT: 121.25 LBS | BODY MASS INDEX: 21.48 KG/M2 | RESPIRATION RATE: 16 BRPM | SYSTOLIC BLOOD PRESSURE: 141 MMHG | OXYGEN SATURATION: 99 % | HEIGHT: 63 IN | DIASTOLIC BLOOD PRESSURE: 65 MMHG | HEART RATE: 96 BPM

## 2020-02-28 DIAGNOSIS — M85.80 OSTEOPENIA, UNSPECIFIED LOCATION: ICD-10-CM

## 2020-02-28 DIAGNOSIS — C56.9 MALIGNANT NEOPLASM OF OVARY, UNSPECIFIED LATERALITY: Primary | ICD-10-CM

## 2020-02-28 DIAGNOSIS — E44.0 MALNUTRITION OF MODERATE DEGREE: ICD-10-CM

## 2020-02-28 DIAGNOSIS — C78.7 LIVER METASTASES: ICD-10-CM

## 2020-02-28 DIAGNOSIS — Z91.81 RISK FOR FALLS: ICD-10-CM

## 2020-02-28 DIAGNOSIS — C56.9 MALIGNANT NEOPLASM OF OVARY, UNSPECIFIED LATERALITY: ICD-10-CM

## 2020-02-28 DIAGNOSIS — C78.00 MALIGNANT NEOPLASM METASTATIC TO LUNG, UNSPECIFIED LATERALITY: ICD-10-CM

## 2020-02-28 DIAGNOSIS — Z09 ONCOLOGY FOLLOW-UP ENCOUNTER: ICD-10-CM

## 2020-02-28 LAB
ALBUMIN SERPL BCP-MCNC: 4.2 G/DL (ref 3.5–5.2)
ALP SERPL-CCNC: 82 U/L (ref 55–135)
ALT SERPL W/O P-5'-P-CCNC: 22 U/L (ref 10–44)
ANION GAP SERPL CALC-SCNC: 7 MMOL/L (ref 8–16)
AST SERPL-CCNC: 36 U/L (ref 10–40)
BASOPHILS # BLD AUTO: 0.04 K/UL (ref 0–0.2)
BASOPHILS NFR BLD: 0.6 % (ref 0–1.9)
BILIRUB SERPL-MCNC: 0.7 MG/DL (ref 0.1–1)
BUN SERPL-MCNC: 13 MG/DL (ref 8–23)
CALCIUM SERPL-MCNC: 9.6 MG/DL (ref 8.7–10.5)
CHLORIDE SERPL-SCNC: 97 MMOL/L (ref 95–110)
CO2 SERPL-SCNC: 31 MMOL/L (ref 23–29)
CREAT SERPL-MCNC: 0.7 MG/DL (ref 0.5–1.4)
DIFFERENTIAL METHOD: ABNORMAL
EOSINOPHIL # BLD AUTO: 0.1 K/UL (ref 0–0.5)
EOSINOPHIL NFR BLD: 1.3 % (ref 0–8)
ERYTHROCYTE [DISTWIDTH] IN BLOOD BY AUTOMATED COUNT: 14.2 % (ref 11.5–14.5)
EST. GFR  (AFRICAN AMERICAN): >60 ML/MIN/1.73 M^2
EST. GFR  (NON AFRICAN AMERICAN): >60 ML/MIN/1.73 M^2
GLUCOSE SERPL-MCNC: 132 MG/DL (ref 70–110)
HCT VFR BLD AUTO: 42.2 % (ref 37–48.5)
HGB BLD-MCNC: 13.6 G/DL (ref 12–16)
IMM GRANULOCYTES # BLD AUTO: 0.02 K/UL (ref 0–0.04)
IMM GRANULOCYTES NFR BLD AUTO: 0.3 % (ref 0–0.5)
LYMPHOCYTES # BLD AUTO: 0.8 K/UL (ref 1–4.8)
LYMPHOCYTES NFR BLD: 12.5 % (ref 18–48)
MCH RBC QN AUTO: 30.4 PG (ref 27–31)
MCHC RBC AUTO-ENTMCNC: 32.2 G/DL (ref 32–36)
MCV RBC AUTO: 94 FL (ref 82–98)
MONOCYTES # BLD AUTO: 0.4 K/UL (ref 0.3–1)
MONOCYTES NFR BLD: 6.2 % (ref 4–15)
NEUTROPHILS # BLD AUTO: 5.3 K/UL (ref 1.8–7.7)
NEUTROPHILS NFR BLD: 79.1 % (ref 38–73)
NRBC BLD-RTO: 0 /100 WBC
PLATELET # BLD AUTO: 253 K/UL (ref 150–350)
PMV BLD AUTO: 8.4 FL (ref 9.2–12.9)
POTASSIUM SERPL-SCNC: 3.9 MMOL/L (ref 3.5–5.1)
PROT SERPL-MCNC: 7.4 G/DL (ref 6–8.4)
RBC # BLD AUTO: 4.48 M/UL (ref 4–5.4)
SODIUM SERPL-SCNC: 135 MMOL/L (ref 136–145)
WBC # BLD AUTO: 6.73 K/UL (ref 3.9–12.7)

## 2020-02-28 PROCEDURE — 1125F AMNT PAIN NOTED PAIN PRSNT: CPT | Mod: S$GLB,,, | Performed by: INTERNAL MEDICINE

## 2020-02-28 PROCEDURE — 1101F PR PT FALLS ASSESS DOC 0-1 FALLS W/OUT INJ PAST YR: ICD-10-PCS | Mod: S$GLB,,, | Performed by: INTERNAL MEDICINE

## 2020-02-28 PROCEDURE — 80053 COMPREHEN METABOLIC PANEL: CPT

## 2020-02-28 PROCEDURE — 1101F PT FALLS ASSESS-DOCD LE1/YR: CPT | Mod: S$GLB,,, | Performed by: INTERNAL MEDICINE

## 2020-02-28 PROCEDURE — 1159F PR MEDICATION LIST DOCUMENTED IN MEDICAL RECORD: ICD-10-PCS | Mod: S$GLB,,, | Performed by: INTERNAL MEDICINE

## 2020-02-28 PROCEDURE — 1125F PR PAIN SEVERITY QUANTIFIED, PAIN PRESENT: ICD-10-PCS | Mod: S$GLB,,, | Performed by: INTERNAL MEDICINE

## 2020-02-28 PROCEDURE — 1159F MED LIST DOCD IN RCRD: CPT | Mod: S$GLB,,, | Performed by: INTERNAL MEDICINE

## 2020-02-28 PROCEDURE — 99215 OFFICE O/P EST HI 40 MIN: CPT | Mod: S$GLB,,, | Performed by: INTERNAL MEDICINE

## 2020-02-28 PROCEDURE — 36415 COLL VENOUS BLD VENIPUNCTURE: CPT

## 2020-02-28 PROCEDURE — 99999 PR PBB SHADOW E&M-EST. PATIENT-LVL III: ICD-10-PCS | Mod: PBBFAC,,, | Performed by: INTERNAL MEDICINE

## 2020-02-28 PROCEDURE — 85025 COMPLETE CBC W/AUTO DIFF WBC: CPT

## 2020-02-28 PROCEDURE — 99999 PR PBB SHADOW E&M-EST. PATIENT-LVL III: CPT | Mod: PBBFAC,,, | Performed by: INTERNAL MEDICINE

## 2020-02-28 PROCEDURE — 99215 PR OFFICE/OUTPT VISIT, EST, LEVL V, 40-54 MIN: ICD-10-PCS | Mod: S$GLB,,, | Performed by: INTERNAL MEDICINE

## 2020-02-28 NOTE — TELEPHONE ENCOUNTER
LVM for callback to inform patient that [facility] received prescription for HYCAMTIN (2) and benefits investigation is required.  [facility] will be back in touch once insurance determination is received.

## 2020-02-28 NOTE — PROGRESS NOTES
CC I had 4 falls in a week     Pau Cook is a 89 y.o.  Pt with recurrent ovarian cancer here for chemo clearance for second-line therapy.     Here to discuss Iv vs orally     Past Medical History:   Diagnosis Date    Allergy     Arthritis     Breast cyst     Closed intertrochanteric fracture of hip, left, initial encounter     Foot pain     GERD (gastroesophageal reflux disease)     Joint pain     Ovarian cancer     Peripheral vascular disease 1/15/2018    Squamous cell carcinoma 04/2016    Right Lower Leg    MUNIR (stress urinary incontinence, female) 4/17/2017     Past Surgical History:   Procedure Laterality Date    APPENDECTOMY      COLON SURGERY      removed about 10 inch    COLONOSCOPY N/A 12/24/2018    Procedure: COLONOSCOPY;  Surgeon: Nirmal Davila MD;  Location: [facility] ENDO;  Service: Endoscopy;  Laterality: N/A;    FINGER MASS EXCISION Right 7/9/2018    Procedure: EXCISION, MASS, FINGER;  Surgeon: Fabio Seay MD;  Location: [facility] OR;  Service: Orthopedics;  Laterality: Right;    hemorhids      HYSTERECTOMY      LYSIS OF ADHESIONS N/A 12/28/2018    Procedure: LYSIS, ADHESIONS;  Surgeon: Amee Rose MD;  Location: [facility];  Service: OB/GYN;  Laterality: N/A;    OPEN REDUCTION AND INTERNAL FIXATION (ORIF) OF INTERTROCHANTERIC FRACTURE OF FEMUR Left 5/31/2019    Procedure: ORIF, FRACTURE, FEMUR, INTERTROCHANTERIC;  Surgeon: Bacilio Lockwood II, MD;  Location: [facility] OR;  Service: Orthopedics;  Laterality: Left;    ROBOT-ASSISTED LAPAROSCOPIC ABDOMINAL HYSTERECTOMY USING DA MARK XI N/A 12/28/2018    Procedure: XI ROBOTIC HYSTERECTOMY;  Surgeon: Amee Rose MD;  Location: [facility] OR;  Service: OB/GYN;  Laterality: N/A;  attempted, opened    ROBOT-ASSISTED LAPAROSCOPIC SALPINGO-OOPHORECTOMY USING DA MARK XI Bilateral 12/28/2018    Procedure: XI ROBOTIC SALPINGO-OOPHORECTOMY;  Surgeon: Amee Rose MD;  Location: [facility];  Service: OB/GYN;  Laterality: Bilateral;   attmepted, opened    TOTAL ABDOMINAL HYSTERECTOMY W/ BILATERAL SALPINGOOPHORECTOMY Bilateral 12/28/2018    Procedure: HYSTERECTOMY, TOTAL, ABDOMINAL, WITH BILATERAL SALPINGO-OOPHORECTOMY;  Surgeon: Amee Rose MD;  Location: [facility];  Service: OB/GYN;  Laterality: Bilateral;   She is tolerating only one bottle water daily with dulcolax for constipation and antinausea meds which she takes only occasionally     Current Outpatient Medications:     acetaminophen (TYLENOL) 500 MG tablet, Take 2 tablets (1,000 mg total) by mouth every 8 (eight) hours., Disp: , Rfl: 0    bisacodyl (DULCOLAX) 5 mg EC tablet, Take 5 mg by mouth daily as needed for Constipation., Disp: , Rfl:     calcium-vitamin D3 (CALCIUM 500 + D) 500 mg(1,250mg) -200 unit per tablet, Take 1 tablet by mouth once daily. , Disp: , Rfl:     carboxymethylcellulose (REFRESH PLUS) 0.5 % Dpet, 1 drop daily as needed., Disp: , Rfl:     cyanocobalamin (VITAMIN B-12) 1000 MCG tablet, Take 500 mcg by mouth once daily. , Disp: , Rfl:     dexAMETHasone (DECADRON) 4 MG Tab, Take one tab po BID the day before chemo and then for two days after chemo, Disp: 12 tablet, Rfl: 0    docusate sodium (COLACE) 100 MG capsule, Take 100 mg by mouth daily as needed for Constipation., Disp: , Rfl:     esomeprazole (NEXIUM) 20 MG capsule, Take 1 capsule (20 mg total) by mouth before breakfast., Disp: 90 capsule, Rfl: 3    FLAXSEED ORAL, Take 650 mg by mouth once daily. , Disp: , Rfl:     FLUAD [id number], 65 YR UP,,PF, 45 mcg (15 mcg x 3)/0.5 mL Syrg, ADM 0.5ML IM UTD, Disp: , Rfl: 0    ketotifen (ZADITOR) 0.025 % (0.035 %) ophthalmic solution, Place 1 drop into both eyes 2 (two) times daily as needed (Pt reports use approx once/week). , Disp: , Rfl:     methylcellulose, laxative, (CITRUCEL) 500 mg Tab, Take 500 mg by mouth daily as needed., Disp: , Rfl:     MULTIVITAMIN W-MINERALS/LUTEIN (CENTRUM SILVER ORAL), Take 1 tablet by mouth once daily. , Disp: , Rfl:      "ondansetron (ZOFRAN) 8 MG tablet, Take 1 tablet (8 mg total) by mouth every 6 (six) hours as needed., Disp: 30 tablet, Rfl: 3    ondansetron (ZOFRAN-ODT) 4 MG TbDL, Take 8 mg by mouth every 8 (eight) hours as needed., Disp: , Rfl:     pregabalin (LYRICA) 75 MG capsule, Take 1 capsule (75 mg total) by mouth 2 (two) times daily., Disp: 60 capsule, Rfl: 11    prochlorperazine (COMPAZINE) 10 MG tablet, Take 1 tablet (10 mg total) by mouth every 6 (six) hours as needed., Disp: 30 tablet, Rfl: 1    sucralfate (CARAFATE) 1 gram tablet, Take 1 tablet (1 g total) by mouth 4 (four) times daily., Disp: 40 tablet, Rfl: 0  Review of patient's allergies indicates:   Allergen Reactions    Pcn [penicillins]      Can not remember reaction     Tetanus vaccines and toxoid Swelling     Localized swelling to injection site     Social History     Tobacco Use    Smoking status: Never Smoker    Smokeless tobacco: Never Used   Substance Use Topics    Alcohol use: No     Alcohol/week: 0.0 standard drinks     Comment: rare    Drug use: No         CONSTITUTIONAL: No fevers, chills, night sweats, wt. loss, + weakness and appetite changes   SKIN: + rash   ENT: No headaches, head trauma, vision changes, or eye pain  LYMPH NODES: None noticed   CV: No chest pain, palpitations.   RESP: No dyspnea on exertion, cough, wheezing, or hemoptysis  GI: +nausea, emesis, diarrhea, constipation, no melena, hematochezia, pain.   : No dysuria, hematuria, urgency, or frequency   HEME: No easy bruising, bleeding problems  PSYCHIATRIC: No depression, anxiety, psychosis, hallucinations.  NEURO: No seizures, memory loss, dizziness or difficulty sleeping  MSK: elbow with brush burn          BP (!) 141/65 (BP Location: Left arm, Patient Position: Sitting)   Pulse 96   Temp 97.8 °F (36.6 °C) (Oral)   Resp 16   Ht 5' 3" (1.6 m)   Wt 55 kg (121 lb 4.1 oz)   SpO2 99%   BMI 21.48 kg/m²   Gen: NAD, A and O x3, weak appearance + dizziness  Psych: pleasant " affect, normal thought process  Eyes: Pupils round and non dilated, EOM intact  Nose: Nares patent  OP clear, mucosa patent  Neck: suppple, no JVD, trachea midline, no palpable mass, no adenopathy  Lungs: CTAB, no wheezes, no use of accessory muscles  CV: REGULAR RATE AND RHYTHM   Abd: soft, NTND, + BS, No HSM, no ascites  Extr: No CC ARGELIA, strength normal, good capillary refill ++ edema   Neuro: steady gait with walker , CNs grossly intact  Skin:can see where there were bullae and dry skin with flakes and some excoriations   Rheum: No joint swelling    Lab Results   Component Value Date    WBC 20.83 (H) 11/06/2019    HGB 12.8 11/06/2019    HCT 39.8 11/06/2019    MCV 90 11/06/2019     11/06/2019     Lab Results   Component Value Date    WBC 6.33 01/06/2020    HGB 13.2 01/06/2020    HCT 41.9 01/06/2020    MCV 95 01/06/2020     01/06/2020     Lab Results   Component Value Date    WBC 8.23 02/03/2020    RBC 4.56 02/03/2020    HGB 13.8 02/03/2020    HCT 43.2 02/03/2020    MCV 95 02/03/2020    MCH 30.3 02/03/2020    MCHC 31.9 (L) 02/03/2020    RDW 14.7 (H) 02/03/2020     02/03/2020    MPV 8.6 (L) 02/03/2020    GRAN 6.1 02/03/2020    GRAN 73.7 (H) 02/03/2020    LYMPH 1.3 02/03/2020    LYMPH 16.2 (L) 02/03/2020    MONO 0.7 02/03/2020    MONO 8.6 02/03/2020    EOS 0.0 02/03/2020    BASO 0.05 02/03/2020    EOSINOPHIL 0.5 02/03/2020    BASOPHIL 0.6 02/03/2020           CMP  Sodium   Date Value Ref Range Status   02/03/2020 139 136 - 145 mmol/L Final     Potassium   Date Value Ref Range Status   02/03/2020 4.2 3.5 - 5.1 mmol/L Final     Chloride   Date Value Ref Range Status   02/03/2020 99 95 - 110 mmol/L Final     CO2   Date Value Ref Range Status   02/03/2020 30 (H) 23 - 29 mmol/L Final     Glucose   Date Value Ref Range Status   02/03/2020 134 (H) 70 - 110 mg/dL Final     BUN, Bld   Date Value Ref Range Status   02/03/2020 12 8 - 23 mg/dL Final     Creatinine   Date Value Ref Range Status   02/03/2020  0.8 0.5 - 1.4 mg/dL Final     Calcium   Date Value Ref Range Status   02/03/2020 10.1 8.7 - 10.5 mg/dL Final     Total Protein   Date Value Ref Range Status   02/03/2020 7.5 6.0 - 8.4 g/dL Final     Albumin   Date Value Ref Range Status   02/03/2020 4.0 3.5 - 5.2 g/dL Final     Total Bilirubin   Date Value Ref Range Status   02/03/2020 0.4 0.1 - 1.0 mg/dL Final     Comment:     For infants and newborns, interpretation of results should be based  on gestational age, weight and in agreement with clinical  observations.  Premature Infant recommended reference ranges:  Up to 24 hours.............<8.0 mg/dL  Up to 48 hours............<12.0 mg/dL  3-5 days..................<15.0 mg/dL  6-29 days.................<15.0 mg/dL       Alkaline Phosphatase   Date Value Ref Range Status   02/03/2020 135 55 - 135 U/L Final     AST   Date Value Ref Range Status   02/03/2020 23 10 - 40 U/L Final     ALT   Date Value Ref Range Status   02/03/2020 15 10 - 44 U/L Final     Anion Gap   Date Value Ref Range Status   02/03/2020 10 8 - 16 mmol/L Final     eGFR if    Date Value Ref Range Status   02/03/2020 >60 >60 mL/min/1.73 m^2 Final     eGFR if non    Date Value Ref Range Status   02/03/2020 >60 >60 mL/min/1.73 m^2 Final     Comment:     Calculation used to obtain the estimated glomerular filtration  rate (eGFR) is the CKD-EPI equation.        Lengthy discussion about mixed results on PET CT   Increased growth of liver mass, lung nodules have decreased in size, no new findings of mets in any other organ   Malignant neoplasm of ovary, unspecified laterality  -     topotecan (HYCAMTIN) 1 mg Cap; Take 2 tablets by mouth once daily Monday through Friday every 28 days.  Dispense: 10 each; Refill: 6  -     topotecan (HYCAMTIN) 0.25 mg Cap; Take 1 tablet by mouth once daily Monday through Friday every 28 days.  Dispense: 5 each; Refill: 6    Malignant neoplasm metastatic to lung, unspecified laterality  -      topotecan (HYCAMTIN) 1 mg Cap; Take 2 tablets by mouth once daily Monday through Friday every 28 days.  Dispense: 10 each; Refill: 6  -     topotecan (HYCAMTIN) 0.25 mg Cap; Take 1 tablet by mouth once daily Monday through Friday every 28 days.  Dispense: 5 each; Refill: 6    Liver metastases    Malnutrition of moderate degree    Osteopenia, unspecified location    Oncology follow-up encounter    Risk for falls     encounter for chemo     Progression after doxil and avastin   avastin on hold   Stop doxil  discusison of topotecan: some studies suggest benefit by adding sorafenib: I believe this would be too toxic for her  Submit to insurance company  Spent more than 50 minutes with her with Nurse Antonietta and patient's daughter with more than 50% in counseling   Increase protein and proper hydration  Cont  lyrica for neuropathy and carafate as needed for gastritis   Pt having difficulty understanding chemo indefinitely   discussed chest port placement  Reiterated stage 4   Discussed diet and hydration for over 15 minutes    Pt wants oral therapy  Will need labs before strating such     Thank you for allowing me to evaluate and participate in the care of this pleasant patient. Please fell free to call me with any questions or concerns.    Warmly,  Keynatta Tejeda MD    This note was dictated with Dragon and briefly proofread. Please excuse any sentences that may be unclear or words misspelled

## 2020-03-02 ENCOUNTER — TELEPHONE (OUTPATIENT)
Dept: FAMILY MEDICINE | Facility: CLINIC | Age: 85
End: 2020-03-02

## 2020-03-02 NOTE — PROGRESS NOTES
Ovarian cancer, metastatic: IV: 1.5 mg/m2/day for 5 consecutive days every 21 days, continue until disease progression or unacceptable toxicity (rosa Giron 2004) or (off-label dosing) 1.25 mg/m2/day for 5 days every 21 days until disease progression or unacceptable toxicity or a maximum of 12 months (Emily 2011) or (weekly administration; off-label dosing) 4 mg/m2 on days 1, 8, and 15 every 28 days until disease progression or unacceptable toxicity or a maximum of 12 months (Emily 2011).  May change script to every 21 days   Must see how patient tolerates first

## 2020-03-02 NOTE — TELEPHONE ENCOUNTER
----- Message from John Whitehead sent at 3/2/2020 11:45 AM CST -----  Contact: pt daughter Kendra   Type: Needs Medical Advice    Who Called:  Pt daughter Kendra   Symptoms (please be specific):    How long has patient had these symptoms:    Pharmacy name and phone #:   Best Call Back Number: [phone]   Additional Information: the pharmacy [facility] scripts reached out to pt saying she needs a 90 day refill onesomeprazole (NEXIUM) 20 MG capsule, wanted to know if you guys did it or not

## 2020-03-03 ENCOUNTER — TELEPHONE (OUTPATIENT)
Dept: INFUSION THERAPY | Facility: HOSPITAL | Age: 85
End: 2020-03-03

## 2020-03-03 ENCOUNTER — TELEPHONE (OUTPATIENT)
Dept: FAMILY MEDICINE | Facility: CLINIC | Age: 85
End: 2020-03-03

## 2020-03-03 NOTE — TELEPHONE ENCOUNTER
IV Chemo scheduling request has been deferred for now.  Per Daiana with Dr Tejeda's office, the patient and daughter are still deciding whether to do IV or oral chemo.    Will await instructions from clinic when patient is ready to resume IV chemo.

## 2020-03-03 NOTE — TELEPHONE ENCOUNTER
Patient has appointment tomorrow for wheelchair evaluation, I advised daughter that family practice does not do wheelchair evaluations anymore, she may have to see a physical med doctor. I advised we do not have a physical med doctor that does these evals.

## 2020-03-03 NOTE — TELEPHONE ENCOUNTER
DOCUMENTATION ONLY:   Hycamtin 0.25 mg Capsule #5/21 does not require  A prior authorization through the patient's insurance.  Case ID: [id number]    Co-pay: $100.62    Patient Assistance IS required. Sending to the financial assistance team to investigate assistance options. - CAZ    DOCUMENTATION ONLY:   Hycamtin 1 mg Capsule #10/21 does not require a prior authorization through the patient's insurance.  Case ID: [id number]    Co-pay: $804.36    Patient Assistance IS required. Sending to the financial assistance team to investigate assistance options. - CAZ

## 2020-03-04 ENCOUNTER — TELEPHONE (OUTPATIENT)
Dept: FAMILY MEDICINE | Facility: CLINIC | Age: 85
End: 2020-03-04

## 2020-03-04 NOTE — TELEPHONE ENCOUNTER
Shwetha Abraham called back and requested she fax us a copy of the notes for the appointment tomorrow.

## 2020-03-04 NOTE — TELEPHONE ENCOUNTER
"Insurance requires a face to face mobility evaluation by PCP that is basically saying "I'm seeing the patient for ___ today and agree with PT evaluation. Patient cant safely use cane/walker due to ____." in chart note. Then after the appointment they (PT/OT)  will send 3 forms for us to fill out.    "

## 2020-03-04 NOTE — TELEPHONE ENCOUNTER
----- Message from Mina Whitfield sent at 3/4/2020  9:19 AM CST -----  Contact: pt daughter June  Type: Needs Medical Advice    Who Called:  June    Best Call Back Number: [phone]  Additional Information: states the pt needs 3 forms filled out to support the medical necessity of the power wheelchair. They are requesting the office to call Shwetha Abraahm to get the info needed. Phone: [phone]

## 2020-03-05 ENCOUNTER — OFFICE VISIT (OUTPATIENT)
Dept: FAMILY MEDICINE | Facility: CLINIC | Age: 85
End: 2020-03-05
Payer: MEDICARE

## 2020-03-05 VITALS
BODY MASS INDEX: 21.76 KG/M2 | WEIGHT: 122.81 LBS | SYSTOLIC BLOOD PRESSURE: 110 MMHG | OXYGEN SATURATION: 95 % | HEART RATE: 100 BPM | HEIGHT: 63 IN | DIASTOLIC BLOOD PRESSURE: 62 MMHG | TEMPERATURE: 98 F

## 2020-03-05 DIAGNOSIS — R53.81 PHYSICAL DEBILITY: Primary | ICD-10-CM

## 2020-03-05 PROCEDURE — 1159F PR MEDICATION LIST DOCUMENTED IN MEDICAL RECORD: ICD-10-PCS | Mod: S$GLB,,, | Performed by: FAMILY MEDICINE

## 2020-03-05 PROCEDURE — 1101F PR PT FALLS ASSESS DOC 0-1 FALLS W/OUT INJ PAST YR: ICD-10-PCS | Mod: S$GLB,,, | Performed by: FAMILY MEDICINE

## 2020-03-05 PROCEDURE — 99999 PR PBB SHADOW E&M-EST. PATIENT-LVL III: CPT | Mod: PBBFAC,,, | Performed by: FAMILY MEDICINE

## 2020-03-05 PROCEDURE — 99213 OFFICE O/P EST LOW 20 MIN: CPT | Mod: S$GLB,,, | Performed by: FAMILY MEDICINE

## 2020-03-05 PROCEDURE — 99999 PR PBB SHADOW E&M-EST. PATIENT-LVL III: ICD-10-PCS | Mod: PBBFAC,,, | Performed by: FAMILY MEDICINE

## 2020-03-05 PROCEDURE — 99213 PR OFFICE/OUTPT VISIT, EST, LEVL III, 20-29 MIN: ICD-10-PCS | Mod: S$GLB,,, | Performed by: FAMILY MEDICINE

## 2020-03-05 PROCEDURE — 1101F PT FALLS ASSESS-DOCD LE1/YR: CPT | Mod: S$GLB,,, | Performed by: FAMILY MEDICINE

## 2020-03-05 PROCEDURE — 1159F MED LIST DOCD IN RCRD: CPT | Mod: S$GLB,,, | Performed by: FAMILY MEDICINE

## 2020-03-05 NOTE — PROGRESS NOTES
Subjective:       Patient ID: Pau Cook is a 89 y.o. female.    Chief Complaint: Follow-up    HPI     Mrs. Cook presents to clinic to have paperwork filled out for her wheelchair.  Has issues with walking long distances.  Is restarting chemotherapy again as well (history of ovarian cancer with mets).       Past Medical History:   Diagnosis Date    Allergy     Arthritis     Breast cyst     Closed intertrochanteric fracture of hip, left, initial encounter     Foot pain     GERD (gastroesophageal reflux disease)     Joint pain     Ovarian cancer     Peripheral vascular disease 1/15/2018    Squamous cell carcinoma 04/2016    Right Lower Leg    MUNIR (stress urinary incontinence, female) 4/17/2017       Past Surgical History:   Procedure Laterality Date    APPENDECTOMY      COLON SURGERY      removed about 10 inch    COLONOSCOPY N/A 12/24/2018    Procedure: COLONOSCOPY;  Surgeon: Nirmal Davila MD;  Location: [facility];  Service: Endoscopy;  Laterality: N/A;    FINGER MASS EXCISION Right 7/9/2018    Procedure: EXCISION, MASS, FINGER;  Surgeon: Fabio Seay MD;  [facility];  Service: Orthopedics;  Laterality: Right;    hemorhids      HYSTERECTOMY      LYSIS OF ADHESIONS N/A 12/28/2018    Procedure: LYSIS, ADHESIONS;  Surgeon: Amee Rose MD;  Location: [facility] OR;  Service: OB/GYN;  Laterality: N/A;    OPEN REDUCTION AND INTERNAL FIXATION (ORIF) OF INTERTROCHANTERIC FRACTURE OF FEMUR Left 5/31/2019    Procedure: ORIF, FRACTURE, FEMUR, INTERTROCHANTERIC;  Surgeon: Bacilio Lockwood II, MD;  Location: [facility] OR;  Service: Orthopedics;  Laterality: Left;    ROBOT-ASSISTED LAPAROSCOPIC ABDOMINAL HYSTERECTOMY USING DA MARK XI N/A 12/28/2018    Procedure: XI ROBOTIC HYSTERECTOMY;  Surgeon: Amee Rose MD;  Location: [facility] OR;  Service: OB/GYN;  Laterality: N/A;  attempted, opened    ROBOT-ASSISTED LAPAROSCOPIC SALPINGO-OOPHORECTOMY USING DA MARK XI Bilateral 12/28/2018     Procedure: XI ROBOTIC SALPINGO-OOPHORECTOMY;  Surgeon: Amee Rose MD;  Location: [facility] OR;  Service: OB/GYN;  Laterality: Bilateral;  attmepted, opened    TOTAL ABDOMINAL HYSTERECTOMY W/ BILATERAL SALPINGOOPHORECTOMY Bilateral 12/28/2018    Procedure: HYSTERECTOMY, TOTAL, ABDOMINAL, WITH BILATERAL SALPINGO-OOPHORECTOMY;  Surgeon: Amee Rose MD;  Location: [facility] OR;  Service: OB/GYN;  Laterality: Bilateral;       Family History   Adopted: Yes   Problem Relation Age of Onset    Cancer Mother     Stroke Father     Breast cancer Sister     Cancer Sister     No Known Problems Daughter     No Known Problems Brother     No Known Problems Daughter     Diabetes Brother     Colon cancer Neg Hx     Ovarian cancer Neg Hx        Social History     Tobacco Use    Smoking status: Never Smoker    Smokeless tobacco: Never Used   Substance Use Topics    Alcohol use: No     Alcohol/week: 0.0 standard drinks     Comment: rare    Drug use: No       Social History     Substance and Sexual Activity   Sexual Activity Not Currently          Current Outpatient Medications:     acetaminophen (TYLENOL) 500 MG tablet, Take 2 tablets (1,000 mg total) by mouth every 8 (eight) hours., Disp: , Rfl: 0    bisacodyl (DULCOLAX) 5 mg EC tablet, Take 5 mg by mouth daily as needed for Constipation., Disp: , Rfl:     calcium-vitamin D3 (CALCIUM 500 + D) 500 mg(1,250mg) -200 unit per tablet, Take 1 tablet by mouth once daily. , Disp: , Rfl:     cyanocobalamin (VITAMIN B-12) 1000 MCG tablet, Take 500 mcg by mouth once daily. , Disp: , Rfl:     docusate sodium (COLACE) 100 MG capsule, Take 100 mg by mouth daily as needed for Constipation., Disp: , Rfl:     esomeprazole (NEXIUM) 20 MG capsule, Take 1 capsule (20 mg total) by mouth before breakfast., Disp: 90 capsule, Rfl: 3    FLUAD [id number], 65 YR UP,,PF, 45 mcg (15 mcg x 3)/0.5 mL Syrg, ADM 0.5ML IM UTD, Disp: , Rfl: 0    ketotifen (ZADITOR) 0.025 % (0.035 %) ophthalmic  solution, Place 1 drop into both eyes 2 (two) times daily as needed (Pt reports use approx once/week). , Disp: , Rfl:     MAGNESIUM ASPARTATE HCL ORAL, Take by mouth., Disp: , Rfl:     methylcellulose, laxative, (CITRUCEL) 500 mg Tab, Take 500 mg by mouth daily as needed., Disp: , Rfl:     MULTIVITAMIN W-MINERALS/LUTEIN (CENTRUM SILVER ORAL), Take 1 tablet by mouth once daily. , Disp: , Rfl:     ondansetron (ZOFRAN-ODT) 4 MG TbDL, Take 8 mg by mouth every 8 (eight) hours as needed., Disp: , Rfl:     pregabalin (LYRICA) 75 MG capsule, Take 1 capsule (75 mg total) by mouth 2 (two) times daily., Disp: 60 capsule, Rfl: 11    prochlorperazine (COMPAZINE) 10 MG tablet, Take 1 tablet (10 mg total) by mouth every 6 (six) hours as needed., Disp: 30 tablet, Rfl: 1    carboxymethylcellulose (REFRESH PLUS) 0.5 % Dpet, 1 drop daily as needed., Disp: , Rfl:     dexAMETHasone (DECADRON) 4 MG Tab, Take one tab po BID the day before chemo and then for two days after chemo (Patient not taking: Reported on 3/5/2020), Disp: 12 tablet, Rfl: 0    FLAXSEED ORAL, Take 650 mg by mouth once daily. , Disp: , Rfl:     ondansetron (ZOFRAN) 8 MG tablet, Take 1 tablet (8 mg total) by mouth every 6 (six) hours as needed. (Patient not taking: Reported on 3/5/2020), Disp: 30 tablet, Rfl: 3    sucralfate (CARAFATE) 1 gram tablet, Take 1 tablet (1 g total) by mouth 4 (four) times daily. (Patient not taking: Reported on 3/5/2020), Disp: 40 tablet, Rfl: 0     Review of patient's allergies indicates:   Allergen Reactions    Pcn [penicillins]      Can not remember reaction     Tetanus vaccines and toxoid Swelling     Localized swelling to injection site            Review of Systems   Constitutional: Negative for chills and fever.   HENT: Negative for congestion and sore throat.    Eyes: Negative for visual disturbance.   Respiratory: Negative for cough and shortness of breath.    Cardiovascular: Negative for chest pain.  "  Gastrointestinal: Negative for abdominal pain, constipation, diarrhea, nausea and vomiting.   Genitourinary: Negative for dysuria.   Musculoskeletal: Negative for joint swelling.   Skin: Negative for rash and wound.   Neurological: Positive for weakness. Negative for dizziness and headaches.   Hematological: Does not bruise/bleed easily.           Objective:          Vitals:    03/05/20 1350   BP: 110/62   Pulse: 100   Temp: 97.6 °F (36.4 °C)   SpO2: 95%   Weight: 55.7 kg (122 lb 12.7 oz)   Height: 5' 3" (1.6 m)       Physical Exam   Constitutional: She appears well-developed and well-nourished.   HENT:   Head: Normocephalic and atraumatic.   Eyes: Conjunctivae are normal.   Cardiovascular: Normal rate, regular rhythm and normal heart sounds.   Pulmonary/Chest: Effort normal and breath sounds normal.   Abdominal: Soft. Bowel sounds are normal. There is no tenderness.   Musculoskeletal: Normal range of motion.   Neurological: She is alert.   Skin: Skin is warm.   Psychiatric: She has a normal mood and affect. Her behavior is normal.   Vitals reviewed.              Assessment/Plan     Pau NELSON was seen today for follow-up.    Diagnoses and all orders for this visit:    Physical debility   - Paperwork filled out for power wheelchair.    - Could benefit from power wheelchair due to debility 2/2 to age and cancer and osteoarthritis in multiple joints (including hands).  Due to OA of multiple joints and weakness she will have issues with cane or manual wheelchair.  - Mobility assessment reviewed.   - Can help patient with quality of life because she will able to go longer distances without stopping and pain.       Follow up in about 6 months (around 9/5/2020) for check up.    Future Appointments   Date Time Provider Department Center   4/20/2020 10:00 AM Bacilio Lockwood II, MD [facility] [place] [facility]   6/12/2020 10:00 AM Rosi Ramires MD [facility] [place] MD  [facility]     "

## 2020-03-05 NOTE — PATIENT INSTRUCTIONS
Kegel Exercises  Kegel exercises dont need special clothing or equipment. Theyre easy to learn and simple to do. And if you do them right, no one can tell youre doing them, so they can be done almost anywhere. Your healthcare provider, nurse, or physical therapist can answer any questions you have and help you get started.    A weak pelvic floor   The pelvic floor muscles may weaken due to aging, pregnancy and vaginal childbirth, injury, surgery, chronic cough, or lack of exercise. If the pelvic floor is weak, your bladder and other pelvic organs may sag out of place. The urethra may also open too easily and allow urine to leak out. Kegel exercises can help you strengthen your pelvic floor muscles. Then they can better support the pelvic organs and control urine flow.  How Kegel exercises are done  Try each of the Kegel exercises described below. When youre doing them, try not to move your leg, buttock, or stomach muscles.  · Contract as if you were stopping your urine stream. But do it when youre not urinating.  · Tighten your rectum as if trying not to pass gas. Contract your anus, but dont move your buttocks.  · You may place a finger or 2 in the vagina and squeeze your finger with your vagina to learn which muscles to tighten.  Try to hold each Kegel for a slow count to 5. You probably wont be able to hold them for that long at first. But keep practicing. It will get easier as your pelvic floor gets stronger. Eventually, special weights that you place in your vagina may be recommended to help make your Kegels even more effective. Visit your healthcare provider if you have difficulties doing Kegel exercises.  Helpful hints  Here are some tips to follow:  · Do your Kegels as often as you can. The more you do them, the faster youll feel the results.  · Pick an activity you do often as a reminder. For instance, do your Kegels every time you sit down.  · Tighten your pelvic floor before you sneeze, get up  from a chair, cough, laugh, or lift. This protects your pelvic floor from injury and can help prevent urine leakage.   Date Last Reviewed: 8/5/2015  © 9568-6745 [employer]. [address] [place] [address], [place] [address]. All rights reserved. This information is not intended as a substitute for professional medical care. Always follow your healthcare professional's instructions.

## 2020-03-10 ENCOUNTER — TELEPHONE (OUTPATIENT)
Dept: ORTHOPEDICS | Facility: CLINIC | Age: 85
End: 2020-03-10

## 2020-03-10 NOTE — TELEPHONE ENCOUNTER
----- Message from Eduardo Brewster sent at 3/10/2020 10:29 AM CDT -----  Contact: Kendra James 3372 [phone]  Need Rx called in to new pharmacy please call Kendra James 3372 [phone]

## 2020-03-11 ENCOUNTER — TELEPHONE (OUTPATIENT)
Dept: FAMILY MEDICINE | Facility: CLINIC | Age: 85
End: 2020-03-11

## 2020-03-11 NOTE — TELEPHONE ENCOUNTER
----- Message from Lizz Crawford sent at 3/11/2020 12:40 PM CDT -----  Contact: Kendra/Daughter  Type: Needs Medical Advice  Who Called:  June  Pharmacy name and phone #:    [facility] - [place], [place] - [address]  [address] [address]  Phone: [phone] Fax: [phone]  Best Call Back Number: [phone]/  2nd contact #[phone]  Additional Information: June states that pregabalin (LYRICA) 75 MG capsule states Dr. Lockwood was prescribing for patient but June would like Dr. Ortiz to send to [facility] for 30 day Rx and then [facility] day .  Please call to advise.  Thanks!

## 2020-03-11 NOTE — TELEPHONE ENCOUNTER
Daughter called for clarification.   Pt goes to dr cabrera for hip surgery a year ago. When called them to refill Lyrica and Dr Cabrera says he is done with hip surgery and now the meds should be filled with PCP (Dr Ramires). Medication last seen to be 30 day supply with 11 refills. Insurance changed to [employer] so [facility] but if cannot get it changed over that its ok.   Kendra states when she gets the refill she puts the medication in the original bottle that she got the medication in. Kendra was educated that Rx number on bottle may have changed and to make sure she is requesting a refill on the new Rx. Kendra states she doesn't use the Rx number on the bottle she speaks to the pharmacy personal. Kendra states she will call the pharmacy and make sure they are looking at the correct Rx because she thought it had refills and I confirmed it was ordered with 11 refills.    Kendra requests a call back tomorrow afternoon so she can speak to pharmacy.

## 2020-03-11 NOTE — TELEPHONE ENCOUNTER
Called Shwetha Abraham, who is out of office today, Jannet the physician note form the face to face appointment with the patient needs to be Faxed to Shwetha Abraham at [phone]

## 2020-03-13 NOTE — TELEPHONE ENCOUNTER
Kendra called back to make sure medication was figured out. Kendra verified she did finally get the refill and it has 4 refills left on it. Kendra also updated on motorized scooter request at this time. Very thankful for update.

## 2020-03-18 RX ORDER — HEPARIN 100 UNIT/ML
500 SYRINGE INTRAVENOUS
Status: CANCELLED | OUTPATIENT
Start: 2020-03-18

## 2020-03-18 RX ORDER — SODIUM CHLORIDE 0.9 % (FLUSH) 0.9 %
10 SYRINGE (ML) INJECTION
Status: CANCELLED | OUTPATIENT
Start: 2020-03-18

## 2020-03-18 NOTE — PLAN OF CARE
DISCONTINUE OFF PATHWAY REGIMEN - Ovarian            Topotecan (Hycamtin(R))           Additional Orders: Although rates of neutropenia exceeded 90% with this   regimen, the risk of febrile neutropenia was very low. Topotecan dose requires   adjustment based on renal function, performance status, and resulting myeloid   toxicity.  Cara HERRING et al. Gynecol Oncol [id number].; Emily et al, J   Clin Oncol. 2011 Jeremie 10;29(2):242-8.    **Always confirm dose/schedule in your pharmacy ordering system**    REASON: Disease Progression  PRIOR TREATMENT: Off Pathway: Topotecan 1.25 mg/m2 IV D1-5 q21 Days  TREATMENT RESPONSE: N/A - Adjuvant Therapy    START OFF PATHWAY REGIMEN - Ovarian            Pembrolizumab (Keytruda(R))           Additional Orders: Severe immune-mediated reactions can occur. See   prescribing information for more details and required immediate management with   steroids. Monitor thyroid, renal, liver function tests, glucose, and sodium at   baseline and before each dose of pembrolizumab. Ref: Keytruda(R) (pembrolizumab)   prescribing information, 2016.    **Always confirm dose/schedule in your pharmacy ordering system**    Patient Characteristics:  Recurrent or Progressive Disease, Third Line Therapy, [place] Resistant or < 6   Months Since Last [place] Therapy  Therapeutic Status: Recurrent or Progressive Disease  BRCA Mutation Status: Did Not Order Test  Line of Therapy: Third Line    Intent of Therapy:  Non-Curative / Palliative Intent, Discussed with Patient

## 2020-03-23 ENCOUNTER — TELEPHONE (OUTPATIENT)
Dept: HEMATOLOGY/ONCOLOGY | Facility: CLINIC | Age: 85
End: 2020-03-23

## 2020-03-24 ENCOUNTER — TELEPHONE (OUTPATIENT)
Dept: FAMILY MEDICINE | Facility: CLINIC | Age: 85
End: 2020-03-24

## 2020-03-24 NOTE — TELEPHONE ENCOUNTER
Per Amparo at [facility], in order for pt to be approved for Keytruda, pt must be MSI high or DMMR. They are denying her treatment plan for now, but we can submit an appeal if she will be tested.     Please advise.     ----- Message from Lizz Crawford sent at 3/23/2020  3:26 PM CDT -----  Contact: Amparo/[employer]  Type: Needs Medical Advice  Who Called:  amparo  Best Call Back Number: [phone] direct line  Additional Information: amparo states she needs nurse to return her call concerning request is for keytruda is the tumor msih or dmmr.  Please call to advise. Amparo states the number given above is a confidential line and voicemail may be  Left.  Please call to advise.  Thanks!

## 2020-03-24 NOTE — TELEPHONE ENCOUNTER
----- Message from Meeta Prado sent at 3/24/2020  3:28 PM CDT -----  Contact: [facility]  Type: Needs Medical Advice    Who Called: Ana  Bobby Call Back Number:[phone] Ext [phone]  Additional Information: the caller is calling in regards to some papers she sent over for the patient a wheel chair and she has not heard anything back about it as of yet please call to advise asap thanks

## 2020-03-24 NOTE — TELEPHONE ENCOUNTER
Called back left a message. See telephone encounter from 3/4/2020. They were supposed to fax me forms for Dr Ramires to sign and have not received those forms yet.

## 2020-03-31 ENCOUNTER — TELEPHONE (OUTPATIENT)
Dept: HEMATOLOGY/ONCOLOGY | Facility: CLINIC | Age: 85
End: 2020-03-31

## 2020-03-31 NOTE — TELEPHONE ENCOUNTER
This nurse notified pt daughter that pt would need to have liquid bx done in order to begin Keytruda, per insurance company. Pt daughter states pt is supposed to be on Topotecan, and she has not been told anything regarding Keytruda. Informed pt daughter about what a liquid bx is and that pt would need blood draw at lab and then sample would be sent out. Pt daughter states she is not sure pt will even continue with treatment, but would like clarification on tx plan. This nurse will send staff message to  for clarification, and update pt daughter. Pt daughter verbalized understanding.

## 2020-04-02 ENCOUNTER — TELEPHONE (OUTPATIENT)
Dept: HEMATOLOGY/ONCOLOGY | Facility: CLINIC | Age: 85
End: 2020-04-02

## 2020-04-02 NOTE — TELEPHONE ENCOUNTER
Called to speak with Kendra regarding treatment for patient. She advised me that patient has decided not to undergo any further treatment at this time. I advised her to call us back after all this COVID is over and we can make f/u appointment for monitoring.

## 2020-04-03 ENCOUNTER — TELEPHONE (OUTPATIENT)
Dept: FAMILY MEDICINE | Facility: CLINIC | Age: 85
End: 2020-04-03

## 2020-04-03 NOTE — TELEPHONE ENCOUNTER
----- Message from Marcio Rodrigez sent at 4/3/2020 10:39 AM CDT -----  Contact: Ana  Type:  Patient Returning Call    Who Called:  Ana  Who Left Message for Patient:  Renard  Does the patient know what this is regarding?:  yes  Best Call Back Number:    Additional Information:  Requesting a call back

## 2020-04-03 NOTE — TELEPHONE ENCOUNTER
Received fax.  Fax states the documentation that was received required additional information.   It appears that the office note from Dr Ramires was updated already. Copy of adjusted office note was faxed back as requested.

## 2020-04-03 NOTE — TELEPHONE ENCOUNTER
Spoke to Ana with DME company. Advised that no paperwork has been received for this patient. She will be re faxing the paperwork for this patients wheelchair.

## 2020-05-01 NOTE — PLAN OF CARE
Patient/Chart(s) Problem: Adult Inpatient Plan of Care  Goal: Plan of Care Review  Outcome: Ongoing (interventions implemented as appropriate)  Plan of Care reviewed with patient and pt's daughters. Pt free from falls or injury. Pt ambulated in the wasserman and room several times today with assist. Tolerated well. Voiding adequately without difficulty noted. Midline abdominal incision dsg with scant dried serosanguineous drainage noted. Nausea somewhat controlled with Zofran and Reglan as ordered. Refuses pain medication. Purposeful rounding done. Safety maintained. Bed in lowest position and locked. Pt has non skid socks on. Daughters @ bedside. VSS. Call light in reach.

## 2020-05-08 ENCOUNTER — TELEPHONE (OUTPATIENT)
Dept: HEMATOLOGY/ONCOLOGY | Facility: CLINIC | Age: 85
End: 2020-05-08

## 2020-05-08 NOTE — TELEPHONE ENCOUNTER
"This nurse spoke to patient's daughter, June.  She reports the patient's mother is "in lock down" at her community and is doing well.  She reports patient has been thinking about Keytruda and would like to speak to Dr. Tejeda about if "her cancer is still there"  She has reqeusted a tele visit with her mom, but requests that Dr. Tejeda call her first on her cell phone so that she can call her mom and remind her to  unknown number, as it takes her a while to get to phone at her community.  All this information and phone numbers have been placed in the "appt note" section for provider viewing.  No further questions/concerns noted at this time.    ----- Message from Marsha Loaiza sent at 5/8/2020 11:11 AM CDT -----  Contact: Pt daughter  Type: Needs medical advice      Who Called:  Pt daughter  Best Call Back Number:   [phone]  Additional Information:  Pt daughter requesting a call back to touch basis with Dr. Tejeda       Please advise. Thank you      "

## 2020-05-11 ENCOUNTER — TELEPHONE (OUTPATIENT)
Dept: FAMILY MEDICINE | Facility: CLINIC | Age: 85
End: 2020-05-11

## 2020-05-11 NOTE — TELEPHONE ENCOUNTER
----- Message from Herminia Wills sent at 5/11/2020 10:59 AM CDT -----  Contact: [facility]   Type: Patient Call Back    Who called: [facility]     What is the request in detail:  [place] [facility] is requesting a call back in regards to the pt electrical wheel chair.     Can the clinic reply by [facility]?    Would the patient rather a call back or a response via [facility]? Call back     Best call back number:[phone]  Ext [phone]

## 2020-05-21 ENCOUNTER — TELEPHONE (OUTPATIENT)
Dept: HEMATOLOGY/ONCOLOGY | Facility: CLINIC | Age: 85
End: 2020-05-21

## 2020-05-21 ENCOUNTER — OFFICE VISIT (OUTPATIENT)
Dept: HEMATOLOGY/ONCOLOGY | Facility: CLINIC | Age: 85
End: 2020-05-21
Payer: MEDICARE

## 2020-05-21 DIAGNOSIS — C78.00 MALIGNANT NEOPLASM METASTATIC TO LUNG, UNSPECIFIED LATERALITY: ICD-10-CM

## 2020-05-21 DIAGNOSIS — C56.9 MALIGNANT NEOPLASM OF OVARY, UNSPECIFIED LATERALITY: Primary | ICD-10-CM

## 2020-05-21 DIAGNOSIS — C78.7 LIVER METASTASES: ICD-10-CM

## 2020-05-21 DIAGNOSIS — Z09 CHEMOTHERAPY FOLLOW-UP EXAMINATION: ICD-10-CM

## 2020-05-21 PROCEDURE — 1159F MED LIST DOCD IN RCRD: CPT | Mod: ,,, | Performed by: INTERNAL MEDICINE

## 2020-05-21 PROCEDURE — 1126F AMNT PAIN NOTED NONE PRSNT: CPT | Mod: ,,, | Performed by: INTERNAL MEDICINE

## 2020-05-21 PROCEDURE — 99214 PR OFFICE/OUTPT VISIT, EST, LEVL IV, 30-39 MIN: ICD-10-PCS | Mod: 95,,, | Performed by: INTERNAL MEDICINE

## 2020-05-21 PROCEDURE — 1101F PR PT FALLS ASSESS DOC 0-1 FALLS W/OUT INJ PAST YR: ICD-10-PCS | Mod: ,,, | Performed by: INTERNAL MEDICINE

## 2020-05-21 PROCEDURE — 1101F PT FALLS ASSESS-DOCD LE1/YR: CPT | Mod: ,,, | Performed by: INTERNAL MEDICINE

## 2020-05-21 PROCEDURE — 1159F PR MEDICATION LIST DOCUMENTED IN MEDICAL RECORD: ICD-10-PCS | Mod: ,,, | Performed by: INTERNAL MEDICINE

## 2020-05-21 PROCEDURE — 1126F PR PAIN SEVERITY QUANTIFIED, NO PAIN PRESENT: ICD-10-PCS | Mod: ,,, | Performed by: INTERNAL MEDICINE

## 2020-05-21 PROCEDURE — 99214 OFFICE O/P EST MOD 30 MIN: CPT | Mod: 95,,, | Performed by: INTERNAL MEDICINE

## 2020-05-21 NOTE — TELEPHONE ENCOUNTER
Informed pt daughter this nurse does not have orders for pt from Dr Tejeda just yet. This nurse and staff will f/up when orders received. Pt daughter verbalized understanding.

## 2020-05-21 NOTE — PROGRESS NOTES
"The patient location is:  At home  The chief complaint leading to consultation is:  How are my blood counts  Patient has consented to this visit via televisit  Visit type: Virtual visit with synchronous audio and video plus interrupted audio   Total time spent with patient: over 20  min with more than 90% on medical discussion, counseling and coordination of care.  Each patient to whom he or she provides medical services by telemedicine is:  (1) informed of the relationship between the physician and patient and the respective role of any other health care provider with respect to management of the patient; and (2) notified that he or she may decline to receive medical services by telemedicine and may withdraw from such care at any time. The reason for the audio only service rather than synchronous audio and video virtual visit was related to technical difficulties or patient preference/necessity.  This visit is to also involve counseling, patient education, informed consent for ordered diagnostic and laboratory tests and motivational interviewing    This document has been created using [employer] dictation software and free typing.  It has been checked for errors but some errors may still exist.      CC: I want to know what is going on with me     Pau Cook is a 89 y.o.  Pt with recurrent ovarian cancer here for chemo clearance for second-line therapy.     Reiterated disease progression in past noted in January    "I feel like the cancer is spreading and I am considering not taking further thearpy "    Past Medical History:   Diagnosis Date    Allergy     Arthritis     Breast cyst     Closed intertrochanteric fracture of hip, left, initial encounter     Foot pain     GERD (gastroesophageal reflux disease)     Joint pain     Ovarian cancer     Peripheral vascular disease 1/15/2018    Squamous cell carcinoma 04/2016    Right Lower Leg    MUNIR (stress urinary incontinence, female) 4/17/2017     Past " Surgical History:   Procedure Laterality Date    APPENDECTOMY      COLON SURGERY      removed about 10 inch    COLONOSCOPY N/A 12/24/2018    Procedure: COLONOSCOPY;  Surgeon: Nirmal Davila MD;  Location: [facility];  Service: Endoscopy;  Laterality: N/A;    FINGER MASS EXCISION Right 7/9/2018    Procedure: EXCISION, MASS, FINGER;  Surgeon: Fabio Seay MD;  Location: [facility] OR;  Service: Orthopedics;  Laterality: Right;    hemorhids      HYSTERECTOMY      LYSIS OF ADHESIONS N/A 12/28/2018    Procedure: LYSIS, ADHESIONS;  Surgeon: Amee Rose MD;  Location: [facility] OR;  Service: OB/GYN;  Laterality: N/A;    OPEN REDUCTION AND INTERNAL FIXATION (ORIF) OF INTERTROCHANTERIC FRACTURE OF FEMUR Left 5/31/2019    Procedure: ORIF, FRACTURE, FEMUR, INTERTROCHANTERIC;  Surgeon: Bacilio Lockwood II, MD;  Location: [facility] OR;  Service: Orthopedics;  Laterality: Left;    ROBOT-ASSISTED LAPAROSCOPIC ABDOMINAL HYSTERECTOMY USING DA MARK XI N/A 12/28/2018    Procedure: XI ROBOTIC HYSTERECTOMY;  Surgeon: Amee Rose MD;  Location: [facility];  Service: OB/GYN;  Laterality: N/A;  attempted, opened    ROBOT-ASSISTED LAPAROSCOPIC SALPINGO-OOPHORECTOMY USING DA MARK XI Bilateral 12/28/2018    Procedure: XI ROBOTIC SALPINGO-OOPHORECTOMY;  Surgeon: Amee Rose MD;  Location: [facility];  Service: OB/GYN;  Laterality: Bilateral;  attmepted, opened    TOTAL ABDOMINAL HYSTERECTOMY W/ BILATERAL SALPINGOOPHORECTOMY Bilateral 12/28/2018    Procedure: HYSTERECTOMY, TOTAL, ABDOMINAL, WITH BILATERAL SALPINGO-OOPHORECTOMY;  Surgeon: Amee Rose MD;  Location: [facility];  Service: OB/GYN;  Laterality: Bilateral;   She is tolerating only one bottle water daily with dulcolax for constipation and antinausea meds which she takes only occasionally     Current Outpatient Medications:     acetaminophen (TYLENOL) 500 MG tablet, Take 2 tablets (1,000 mg total) by mouth every 8 (eight) hours., Disp: , Rfl: 0    bisacodyl (DULCOLAX)  5 mg EC tablet, Take 5 mg by mouth daily as needed for Constipation., Disp: , Rfl:     calcium-vitamin D3 (CALCIUM 500 + D) 500 mg(1,250mg) -200 unit per tablet, Take 1 tablet by mouth once daily. , Disp: , Rfl:     carboxymethylcellulose (REFRESH PLUS) 0.5 % Dpet, 1 drop daily as needed., Disp: , Rfl:     cyanocobalamin (VITAMIN B-12) 1000 MCG tablet, Take 500 mcg by mouth once daily. , Disp: , Rfl:     dexAMETHasone (DECADRON) 4 MG Tab, Take one tab po BID the day before chemo and then for two days after chemo (Patient not taking: Reported on 3/5/2020), Disp: 12 tablet, Rfl: 0    docusate sodium (COLACE) 100 MG capsule, Take 100 mg by mouth daily as needed for Constipation., Disp: , Rfl:     esomeprazole (NEXIUM) 20 MG capsule, Take 1 capsule (20 mg total) by mouth before breakfast., Disp: 90 capsule, Rfl: 3    FLAXSEED ORAL, Take 650 mg by mouth once daily. , Disp: , Rfl:     FLUAD [id number], 65 YR UP,,PF, 45 mcg (15 mcg x 3)/0.5 mL Syrg, ADM 0.5ML IM UTD, Disp: , Rfl: 0    ketotifen (ZADITOR) 0.025 % (0.035 %) ophthalmic solution, Place 1 drop into both eyes 2 (two) times daily as needed (Pt reports use approx once/week). , Disp: , Rfl:     MAGNESIUM ASPARTATE HCL ORAL, Take by mouth., Disp: , Rfl:     methylcellulose, laxative, (CITRUCEL) 500 mg Tab, Take 500 mg by mouth daily as needed., Disp: , Rfl:     MULTIVITAMIN W-MINERALS/LUTEIN (CENTRUM SILVER ORAL), Take 1 tablet by mouth once daily. , Disp: , Rfl:     ondansetron (ZOFRAN) 8 MG tablet, Take 1 tablet (8 mg total) by mouth every 6 (six) hours as needed. (Patient not taking: Reported on 3/5/2020), Disp: 30 tablet, Rfl: 3    ondansetron (ZOFRAN-ODT) 4 MG TbDL, Take 8 mg by mouth every 8 (eight) hours as needed., Disp: , Rfl:     pregabalin (LYRICA) 75 MG capsule, Take 1 capsule (75 mg total) by mouth 2 (two) times daily., Disp: 60 capsule, Rfl: 11    prochlorperazine (COMPAZINE) 10 MG tablet, Take 1 tablet (10 mg total) by mouth every 6  (six) hours as needed., Disp: 30 tablet, Rfl: 1    sucralfate (CARAFATE) 1 gram tablet, Take 1 tablet (1 g total) by mouth 4 (four) times daily. (Patient not taking: Reported on 3/5/2020), Disp: 40 tablet, Rfl: 0  Review of patient's allergies indicates:   Allergen Reactions    Pcn [penicillins]      Can not remember reaction     Tetanus vaccines and toxoid Swelling     Localized swelling to injection site     Social History     Tobacco Use    Smoking status: Never Smoker    Smokeless tobacco: Never Used   Substance Use Topics    Alcohol use: No     Alcohol/week: 0.0 standard drinks     Comment: rare    Drug use: No         CONSTITUTIONAL: No fevers, chills, night sweats, wt. loss, denies  weakness and appetite changes   SKIN: + rash   ENT: No headaches, head trauma, vision changes, or eye pain  LYMPH NODES: None noticed   CV: No chest pain, palpitations.   RESP: No dyspnea on exertion, cough, wheezing, or hemoptysis  GI: +nausea, emesis, diarrhea, constipation, no melena, hematochezia, pain.   : No dysuria, hematuria, urgency, or frequency   HEME: No easy bruising, bleeding problems  PSYCHIATRIC: No depression, anxiety, psychosis, hallucinations.  NEURO: No seizures, memory loss, dizziness or difficulty sleeping  MSK: elbow with brush burn           Over the  phone patient does not sound as if they were in any distress.  Breathing is not labored.  Speech is audible.  No hoarseness.  No evidence of choking           Lab Results   Component Value Date    WBC 20.83 (H) 11/06/2019    HGB 12.8 11/06/2019    HCT 39.8 11/06/2019    MCV 90 11/06/2019     11/06/2019     Lab Results   Component Value Date    WBC 6.33 01/06/2020    HGB 13.2 01/06/2020    HCT 41.9 01/06/2020    MCV 95 01/06/2020     01/06/2020     Lab Results   Component Value Date    WBC 6.73 02/28/2020    RBC 4.48 02/28/2020    HGB 13.6 02/28/2020    HCT 42.2 02/28/2020    MCV 94 02/28/2020    MCH 30.4 02/28/2020    MCHC 32.2  02/28/2020    RDW 14.2 02/28/2020     02/28/2020    MPV 8.4 (L) 02/28/2020    GRAN 5.3 02/28/2020    GRAN 79.1 (H) 02/28/2020    LYMPH 0.8 (L) 02/28/2020    LYMPH 12.5 (L) 02/28/2020    MONO 0.4 02/28/2020    MONO 6.2 02/28/2020    EOS 0.1 02/28/2020    BASO 0.04 02/28/2020    EOSINOPHIL 1.3 02/28/2020    BASOPHIL 0.6 02/28/2020           CMP  Sodium   Date Value Ref Range Status   02/28/2020 135 (L) 136 - 145 mmol/L Final     Potassium   Date Value Ref Range Status   02/28/2020 3.9 3.5 - 5.1 mmol/L Final     Chloride   Date Value Ref Range Status   02/28/2020 97 95 - 110 mmol/L Final     CO2   Date Value Ref Range Status   02/28/2020 31 (H) 23 - 29 mmol/L Final     Glucose   Date Value Ref Range Status   02/28/2020 132 (H) 70 - 110 mg/dL Final     BUN, Bld   Date Value Ref Range Status   02/28/2020 13 8 - 23 mg/dL Final     Creatinine   Date Value Ref Range Status   02/28/2020 0.7 0.5 - 1.4 mg/dL Final     Calcium   Date Value Ref Range Status   02/28/2020 9.6 8.7 - 10.5 mg/dL Final     Total Protein   Date Value Ref Range Status   02/28/2020 7.4 6.0 - 8.4 g/dL Final     Albumin   Date Value Ref Range Status   02/28/2020 4.2 3.5 - 5.2 g/dL Final     Total Bilirubin   Date Value Ref Range Status   02/28/2020 0.7 0.1 - 1.0 mg/dL Final     Comment:     For infants and newborns, interpretation of results should be based  on gestational age, weight and in agreement with clinical  observations.  Premature Infant recommended reference ranges:  Up to 24 hours.............<8.0 mg/dL  Up to 48 hours............<12.0 mg/dL  3-5 days..................<15.0 mg/dL  6-29 days.................<15.0 mg/dL       Alkaline Phosphatase   Date Value Ref Range Status   02/28/2020 82 55 - 135 U/L Final     AST   Date Value Ref Range Status   02/28/2020 36 10 - 40 U/L Final     ALT   Date Value Ref Range Status   02/28/2020 22 10 - 44 U/L Final     Anion Gap   Date Value Ref Range Status   02/28/2020 7 (L) 8 - 16 mmol/L Final      eGFR if    Date Value Ref Range Status   02/28/2020 >60.0 >60 mL/min/1.73 m^2 Final     eGFR if non    Date Value Ref Range Status   02/28/2020 >60.0 >60 mL/min/1.73 m^2 Final     Comment:     Calculation used to obtain the estimated glomerular filtration  rate (eGFR) is the CKD-EPI equation.        Lengthy discussion about mixed results on PET CT   Increased growth of liver mass, lung nodules have decreased in size, no new findings of mets in any other organ   Malignant neoplasm of ovary, unspecified laterality  -     NM PET CT Routine Skull to Mid Thigh; Future; Expected date: 06/04/2020  -     CMP; Future; Expected date: 05/21/2020  -     CBC auto differential; Future; Expected date: 05/21/2020  -     ; Future; Expected date: 05/21/2020    Liver metastases  -     NM PET CT Routine Skull to Mid Thigh; Future; Expected date: 06/04/2020  -     CMP; Future; Expected date: 05/21/2020  -     CBC auto differential; Future; Expected date: 05/21/2020  -     ; Future; Expected date: 05/21/2020    Malignant neoplasm metastatic to lung, unspecified laterality  -     NM PET CT Routine Skull to Mid Thigh; Future; Expected date: 06/04/2020  -     CMP; Future; Expected date: 05/21/2020  -     CBC auto differential; Future; Expected date: 05/21/2020  -     ; Future; Expected date: 05/21/2020    Chemotherapy follow-up examination  -     NM PET CT Routine Skull to Mid Thigh; Future; Expected date: 06/04/2020  -     CMP; Future; Expected date: 05/21/2020  -     CBC auto differential; Future; Expected date: 05/21/2020  -     ; Future; Expected date: 05/21/2020     encounter for chemo     Progression after doxil and avastin   avastin on hold   Stop doxil     Increase protein and proper hydration  Cont  lyrica for neuropathy and carafate as needed for gastritis   Pt having difficulty understanding chemo indefinitely   discussed chest port placement  Reiterated stage 4    Discussed diet and hydration for over 15 minutes     rtc 2 weeks after pet ct and labs    Thank you for allowing me to evaluate and participate in the care of this pleasant patient. Please fell free to call me with any questions or concerns.    Warmly,  Kenyatta Tejeda MD    This note was dictated with Dragon and briefly proofread. Please excuse any sentences that may be unclear or words misspelled             This service was not originating from a related E/M service provided within the previous 7 days nor will  to an E/M service or procedure within the next 24 hours or my soonest available appointment.  Prevailing standard of care was able to be met in this audio-only visit.

## 2020-05-21 NOTE — TELEPHONE ENCOUNTER
----- Message from John Kay sent at 5/21/2020  3:12 PM CDT -----  Contact: Daughter, June June want to speak with a nurse regarding scheduling appointment for PET Scan please call back at [phone]    Case number [id number]

## 2020-05-24 ENCOUNTER — HOSPITAL ENCOUNTER (EMERGENCY)
Facility: HOSPITAL | Age: 85
Discharge: HOME | End: 2020-05-24
Attending: EMERGENCY MEDICINE
Payer: MEDICARE

## 2020-05-24 DIAGNOSIS — S39.012A LUMBAR STRAIN, INITIAL ENCOUNTER: Primary | ICD-10-CM

## 2020-05-24 DIAGNOSIS — R52 PAIN: ICD-10-CM

## 2020-05-24 PROCEDURE — 99283 EMERGENCY DEPT VISIT LOW MDM: CPT | Mod: 25

## 2020-05-24 PROCEDURE — 25000003 PHARM REV CODE 250: Performed by: EMERGENCY MEDICINE

## 2020-05-24 RX ORDER — METHOCARBAMOL 500 MG/1
500 TABLET, FILM COATED ORAL
Status: COMPLETED | OUTPATIENT
Start: 2020-05-24 | End: 2020-05-24

## 2020-05-24 RX ORDER — METHOCARBAMOL 500 MG/1
500 TABLET, FILM COATED ORAL EVERY 12 HOURS PRN
Qty: 4 TABLET | Refills: 0 | Status: SHIPPED | OUTPATIENT
Start: 2020-05-24 | End: 2020-05-27 | Stop reason: SDUPTHER

## 2020-05-24 RX ADMIN — METHOCARBAMOL 500 MG: 500 TABLET ORAL at 07:05

## 2020-05-24 NOTE — ED NOTES
Updated daughter on patient's status.  Informed her patient has only been her a short while and will be being evaluated by a MD soon.

## 2020-05-25 ENCOUNTER — TELEPHONE (OUTPATIENT)
Dept: FAMILY MEDICINE | Facility: CLINIC | Age: 85
End: 2020-05-25

## 2020-05-25 ENCOUNTER — TELEPHONE (OUTPATIENT)
Dept: HEMATOLOGY/ONCOLOGY | Facility: CLINIC | Age: 85
End: 2020-05-25

## 2020-05-25 VITALS
HEIGHT: 63 IN | DIASTOLIC BLOOD PRESSURE: 92 MMHG | TEMPERATURE: 99 F | WEIGHT: 112 LBS | RESPIRATION RATE: 18 BRPM | SYSTOLIC BLOOD PRESSURE: 152 MMHG | HEART RATE: 82 BPM | BODY MASS INDEX: 19.84 KG/M2 | OXYGEN SATURATION: 96 %

## 2020-05-25 NOTE — TELEPHONE ENCOUNTER
This nurse spoke to patient's daughter June to scheduled labs, PET and in office f/u visit with Dr. Tejeda.  Kendra tentatively scheduled all appts, but reports her mother took a bad fall and is on bedrest at this time.  She reports she will call week of PET scan (6/8/2020) and let office staff know if appts need to be postponed.  No further questions/concerns noted at this time.      ----- Message from Kenyatta Tejeda MD sent at 5/24/2020  2:30 PM CDT -----  Face to face 2 weeks or so after pet ct and labs

## 2020-05-25 NOTE — TELEPHONE ENCOUNTER
----- Message from Mike Segovia sent at 5/25/2020  7:55 AM CDT -----  Contact: pt's daughter June  Type: Needs Medical Advice    Who Called:  June    Best Call Back Number: [phone]  Additional Information: Pt went to [facility] ER last night. States she went to the ER for her sciatic nerve. Would like to discuss this. Please call to advise.

## 2020-05-25 NOTE — TELEPHONE ENCOUNTER
Spoke to patient's daughter. She reports that the patient was seen at [facility] ER overnight for low back pain. She was diagnosed wit sciatica and was given Robaxin which she took at 3 pm. Also taking tylenol. Daughter got her a soft ice pack for her back. She has not had much relief yet from the pain.     Daughter wanted to be sure you were aware and inquire if you had any other recommendations.

## 2020-05-25 NOTE — DISCHARGE INSTRUCTIONS
Please read and follow discharge instructions and return precautions.  Tylenol over-the-counter as directed if needed for pain.  Robaxin as directed if needed for possible muscle spasms--if taking Robaxin it may make you sleepy--change positions slowly, do not set up quickly when you are sitting or standing.  If you are unable to tolerate this medication please stop taking it.  Attempt use of a heating pad for possible comfort.  Important to see your physician in the next 1-2 days.  Return immediately if you develop new or worsening symptoms or if you have any new problems or concerns.

## 2020-05-25 NOTE — ED PROVIDER NOTES
Encounter Date: 5/24/2020       History     Chief Complaint   Patient presents with    Back Pain     patient made a twisting motion after lunch and felt extreme pain in lower left back, lying flat is no pain movement causes pain     This is a 89-year-old female who presents complaining of left lower back pain.  The pain started today when she was turning at the waist while standing to place an object on a shelf.  She felt a sudden pain and spasm to her left lower back.  The pain now has been moderate to severe in nature and is much improved and actually totally alleviated when she is lying still and is worse with certain movements.  The pain does not radiate into her legs.  She has no associated lower extremity weakness numbness tingling or paresthesias.  She has no saddle paresthesias.  She denies any bladder or bowel incontinence or urinary retention type symptoms.  She has had back sprains in the past.  She was concerned about her left hip as she has had a previous hip surgery although she specifically denies having any hip pain or injury.  She did not fall down to the ground.  She denies any associated abdominal pain fever or weight loss.  She feels well otherwise and denies any other problems or complaints.  She took Tylenol prior to arrival and has had improvement in symptoms with Tylenol.        Review of patient's allergies indicates:   Allergen Reactions    Pcn [penicillins]      Can not remember reaction     Tetanus vaccines and toxoid Swelling     Localized swelling to injection site     Past Medical History:   Diagnosis Date    Allergy     Arthritis     Breast cyst     Closed intertrochanteric fracture of hip, left, initial encounter     Foot pain     GERD (gastroesophageal reflux disease)     Joint pain     Ovarian cancer     Peripheral vascular disease 1/15/2018    Squamous cell carcinoma 04/2016    Right Lower Leg    MUNIR (stress urinary incontinence, female) 4/17/2017     Past Surgical  History:   Procedure Laterality Date    APPENDECTOMY      COLON SURGERY      removed about 10 inch    COLONOSCOPY N/A 12/24/2018    Procedure: COLONOSCOPY;  Surgeon: Nirmal Davila MD;  Location: [facility];  Service: Endoscopy;  Laterality: N/A;    FINGER MASS EXCISION Right 7/9/2018    Procedure: EXCISION, MASS, FINGER;  Surgeon: Fabio Seay MD;  Location: [facility] OR;  Service: Orthopedics;  Laterality: Right;    hemorhids      HYSTERECTOMY      LYSIS OF ADHESIONS N/A 12/28/2018    Procedure: LYSIS, ADHESIONS;  Surgeon: Amee Rose MD;  Location: [facility];  Service: OB/GYN;  Laterality: N/A;    OPEN REDUCTION AND INTERNAL FIXATION (ORIF) OF INTERTROCHANTERIC FRACTURE OF FEMUR Left 5/31/2019    Procedure: ORIF, FRACTURE, FEMUR, INTERTROCHANTERIC;  Surgeon: Bacilio Lockwood II, MD;  Location: [facility] OR;  Service: Orthopedics;  Laterality: Left;    ROBOT-ASSISTED LAPAROSCOPIC ABDOMINAL HYSTERECTOMY USING DA MARK XI N/A 12/28/2018    Procedure: XI ROBOTIC HYSTERECTOMY;  Surgeon: Amee Rose MD;  Location: [facility];  Service: OB/GYN;  Laterality: N/A;  attempted, opened    ROBOT-ASSISTED LAPAROSCOPIC SALPINGO-OOPHORECTOMY USING DA MARK XI Bilateral 12/28/2018    Procedure: XI ROBOTIC SALPINGO-OOPHORECTOMY;  Surgeon: Amee Rose MD;  Location: [facility];  Service: OB/GYN;  Laterality: Bilateral;  attmepted, opened    TOTAL ABDOMINAL HYSTERECTOMY W/ BILATERAL SALPINGOOPHORECTOMY Bilateral 12/28/2018    Procedure: HYSTERECTOMY, TOTAL, ABDOMINAL, WITH BILATERAL SALPINGO-OOPHORECTOMY;  Surgeon: Amee Rose MD;  Location: [facility];  Service: OB/GYN;  Laterality: Bilateral;     Family History   Adopted: Yes   Problem Relation Age of Onset    Cancer Mother     Stroke Father     Breast cancer Sister     Cancer Sister     No Known Problems Daughter     No Known Problems Brother     No Known Problems Daughter     Diabetes Brother     Colon cancer Neg Hx     Ovarian cancer Neg Hx       Social History     Tobacco Use    Smoking status: Never Smoker    Smokeless tobacco: Never Used   Substance Use Topics    Alcohol use: No     Alcohol/week: 0.0 standard drinks     Comment: rare    Drug use: No     Review of Systems   Constitutional: Negative.  Negative for activity change, appetite change, chills, fatigue and fever.   HENT: Negative.  Negative for congestion, dental problem, ear pain, rhinorrhea, sinus pressure, sinus pain, sore throat and trouble swallowing.    Eyes: Negative.  Negative for photophobia, pain, redness and visual disturbance.   Respiratory: Negative.  Negative for cough, chest tightness, shortness of breath, wheezing and stridor.         Denies any upper or lower respiratory type symptoms--review of systems is negative for any COVID-19 type symptoms.   Cardiovascular: Negative.  Negative for chest pain, palpitations and leg swelling.   Gastrointestinal: Negative.  Negative for abdominal distention, abdominal pain, anal bleeding, blood in stool, constipation, diarrhea, nausea and vomiting.   Endocrine: Negative.    Genitourinary: Negative.  Negative for decreased urine volume, difficulty urinating, dysuria, flank pain, frequency, hematuria, pelvic pain and urgency.   Musculoskeletal: Positive for back pain. Negative for arthralgias, gait problem, joint swelling, myalgias, neck pain and neck stiffness.   Skin: Negative.  Negative for color change, pallor, rash and wound.   Neurological: Negative.  Negative for dizziness, tremors, seizures, syncope, facial asymmetry, speech difficulty, weakness, light-headedness, numbness and headaches.   Hematological: Negative.  Does not bruise/bleed easily.   Psychiatric/Behavioral: Negative.  Negative for confusion.   All other systems reviewed and are negative.      Physical Exam     Initial Vitals [05/24/20 1812]   BP Pulse Resp Temp SpO2   120/62 98 18 98.7 °F (37.1 °C) 98 %      MAP       --         Physical Exam    Nursing note and  vitals reviewed.  Constitutional: She appears well-developed and well-nourished. She is not diaphoretic. She is active and cooperative.  Non-toxic appearance. She does not have a sickly appearance. She does not appear ill. No distress.   HENT:   Head: Normocephalic and atraumatic.   Right Ear: Tympanic membrane normal.   Left Ear: Tympanic membrane normal.   Nose: Nose normal.   Mouth/Throat: Uvula is midline, oropharynx is clear and moist and mucous membranes are normal. No oral lesions. No uvula swelling. No oropharyngeal exudate, posterior oropharyngeal edema or posterior oropharyngeal erythema.   Eyes: Conjunctivae, EOM and lids are normal. Pupils are equal, round, and reactive to light. No scleral icterus.   Neck: Trachea normal, normal range of motion, full passive range of motion without pain and phonation normal. Neck supple. No thyroid mass and no thyromegaly present. No stridor present. No spinous process tenderness and no muscular tenderness present. No tracheal deviation, no edema, no erythema and normal range of motion present. No neck rigidity. No JVD present.   Cardiovascular: Normal rate, regular rhythm, normal heart sounds, intact distal pulses and normal pulses. Exam reveals no gallop and no friction rub.    No murmur heard.  Pulmonary/Chest: Effort normal and breath sounds normal. No accessory muscle usage or stridor. No tachypnea. No respiratory distress. She has no wheezes. She has no rhonchi. She has no rales.   Abdominal: Soft. Normal appearance and bowel sounds are normal. She exhibits no distension, no pulsatile midline mass and no mass. There is no tenderness. There is no rigidity, no rebound, no guarding and no CVA tenderness.   Musculoskeletal: Normal range of motion. She exhibits tenderness. She exhibits no edema.        Cervical back: Normal. She exhibits normal range of motion, no tenderness, no bony tenderness, no swelling and no edema.        Thoracic back: Normal. She exhibits  normal range of motion, no tenderness, no bony tenderness, no swelling and no edema.        Lumbar back: Normal. She exhibits normal range of motion, no tenderness, no bony tenderness, no swelling, no edema, no deformity, no spasm and normal pulse.        Back:    Pulses are 2+ throughout, cap refill is less than 2 sec throughout, extremities are nontender throughout with full range of motion. There is no spinal tenderness to palpation.  Bilateral hips are nontender with full range of motion.  Extremities are nontender throughout with full range of motion and are neurovascularly intact throughout   Lymphadenopathy:     She has no cervical adenopathy.   Neurological: She is alert and oriented to person, place, and time. She has normal strength. She displays normal reflexes. No cranial nerve deficit or sensory deficit.   No focal deficits.   Skin: Skin is warm, dry and intact. Capillary refill takes less than 2 seconds. No ecchymosis, no petechiae and no rash noted. No erythema. No pallor.   Psychiatric: She has a normal mood and affect. Her speech is normal and behavior is normal. Judgment and thought content normal. Cognition and memory are normal.         ED Course   Procedures  Labs Reviewed - No data to display       Imaging Results          X-Ray Lumbar Spine Complete 5 View (Final result)  Result time 05/24/20 19:50:19    Final result by Bin Castillo MD (05/24/20 19:50:19)                 Narrative:    Lumbar spine 5 views    CLINICAL DATA: Lower back pain    FINDINGS: 5 views are submitted. Comparison is made to 2017.    There is moderate lumbar dextroscoliosis. There is no radiographic  evidence of fracture or significant subluxation. No osseous  destructive lesion is identified.    There is multilevel loss of intervertebral disc height compatible with  degenerative disc disease, most pronounced at L1-2 and L2-3.    IMPRESSION:  1. Lumbar dextroscoliosis and multilevel degenerative disc disease.  2. No  acute abnormalities are identified.    Electronically Signed by Sy Castillo M.D. on 5/24/2020 7:54 PM                             X-Ray Hip 2 View Left (Final result)  Result time 05/24/20 19:50:31    Final result by Bin Castillo MD (05/24/20 19:50:31)                 Impression:      1. No interval changes when compared to October 22, 2019.  2. Previous fixation of intertrochanteric left femoral neck fracture.  Fracture cleft appears incompletely united, similar to multiple prior exams.  3. No new abnormalities.      Electronically signed by: Bin Castillo MD  Date:    05/24/2020  Time:    19:50             Narrative:    EXAMINATION:  XR HIP 2 VIEW LEFT    CLINICAL HISTORY:  .  Pain, unspecified    COMPARISON:  October 2019    FINDINGS:  Three views are submitted.    There has been previous fixation of left intertrochanteric femoral neck fracture.  Orthopedic instrumentation appears appropriately positioned.  Fracture cleft appears incompletely united, similar to the prior study.    There is no evidence of acute fracture or dislocation.  No osseous destructive lesion is identified.  Soft tissues are unremarkable.                                 Medical Decision Making:   Clinical Tests:   Radiological Study: Reviewed  ED Management:  Patient had improvement in symptoms prior to my evaluation with Tylenol.  Presents with low back pain and spasm like pain that occurred after turning at her torso.  Symptoms are highly suggestive of a lumbosacral sprain.  X-rays are negative for evidence of acute fracture.  Patient has had improvement in symptoms with 500 mg Robaxin and did tolerate this medication without any problems or complications.  I have written a prescription for Robaxin 500 mg to be given every 12 hr if needed for possible muscle spasms with 3 pills written for.  I have also recommended a heating pad and have discussed avoidance of certain movements.  I have recommended the patient have someone  with her during this time.  To assist her.  She reports understanding this.  I have recommended close outpatient evaluation with her primary care physician.  Return precautions have been discussed in detail and importance of close outpatient evaluation has been discussed.                                 Clinical Impression:       ICD-10-CM ICD-9-CM   1. Lumbar strain, initial encounter S39.012A 847.2   2. Pain R52 780.96             ED Disposition Condition    Discharge Stable        ED Prescriptions     Medication Sig Dispense Start Date End Date Auth. Provider    methocarbamoL (ROBAXIN) 500 MG Tab Take 1 tablet (500 mg total) by mouth every 12 (twelve) hours as needed (possible muscle spasms). 4 tablet 5/24/2020 5/26/2020 Madonna José MD        Follow-up Information     Follow up With Specialties Details Why Contact Info    Rosi Ramires MD Family Medicine Schedule an appointment as soon as possible for a visit in 2 days  [address] [facility]  [place] [address]  [phone]                                       Madonna José MD  05/24/20 5925

## 2020-05-26 ENCOUNTER — TELEPHONE (OUTPATIENT)
Dept: FAMILY MEDICINE | Facility: CLINIC | Age: 85
End: 2020-05-26

## 2020-05-26 NOTE — TELEPHONE ENCOUNTER
Spoke to patient's daughter, June. She reports that the patient did do a little better today and she was comfortable enough to get up for a while. Inquiring if home health for PT would be an option if the sciatic pain does not improve. Advised that this would be up to the discretion of the doctor and she may need to be seen in the office prior to order being placed.  Understanding was verbalized.

## 2020-05-26 NOTE — TELEPHONE ENCOUNTER
----- Message from Lyly Ford sent at 5/26/2020  9:26 AM CDT -----  Daughter spoke to the nurse yesterday but has more questions about her mom's back issues  Please call ms Gardner @ [phone] after noon today  Thanks !

## 2020-05-27 RX ORDER — METHOCARBAMOL 500 MG/1
500 TABLET, FILM COATED ORAL EVERY 12 HOURS PRN
Qty: 30 TABLET | Refills: 0 | Status: SHIPPED | OUTPATIENT
Start: 2020-05-27 | End: 2020-07-30

## 2020-05-27 NOTE — TELEPHONE ENCOUNTER
Patient's daughter, June, reports that patient has lost her bottle of Robaxin that she was given at her ER visit the other day.  Patient is a resident at assisted living and they are confined to their apartments due to Covid 19.  Patient dropped the bottle last night while on phone with her daughter. She was unable to get up to look for it due to her back pain. This AM her sitter looked for the bottle but did not find it. Thinks it may have been dropped into the trash bin next to her chair. Trash was taken out this morning. Requesting a refill as she is still having pain in her lower back. Had 2 pills left out of 4.

## 2020-05-28 ENCOUNTER — TELEPHONE (OUTPATIENT)
Dept: ORTHOPEDICS | Facility: CLINIC | Age: 85
End: 2020-05-28

## 2020-05-28 NOTE — TELEPHONE ENCOUNTER
----- Message from Abigail Cuellar MA sent at 5/28/2020  8:08 AM CDT -----  Contact: daughter, rola   Wants to know if next visit 06/02 can be phone call visit   Call back

## 2020-06-02 ENCOUNTER — OFFICE VISIT (OUTPATIENT)
Dept: ORTHOPEDICS | Facility: CLINIC | Age: 85
End: 2020-06-02
Payer: MEDICARE

## 2020-06-02 ENCOUNTER — TELEPHONE (OUTPATIENT)
Dept: HEMATOLOGY/ONCOLOGY | Facility: CLINIC | Age: 85
End: 2020-06-02

## 2020-06-02 ENCOUNTER — TELEPHONE (OUTPATIENT)
Dept: ORTHOPEDICS | Facility: CLINIC | Age: 85
End: 2020-06-02

## 2020-06-02 DIAGNOSIS — S72.142D INTERTROCHANTERIC FRACTURE OF LEFT HIP, CLOSED, WITH ROUTINE HEALING, SUBSEQUENT ENCOUNTER: Primary | ICD-10-CM

## 2020-06-02 DIAGNOSIS — C56.9 MALIGNANT NEOPLASM OF OVARY, UNSPECIFIED LATERALITY: Primary | ICD-10-CM

## 2020-06-02 PROCEDURE — 99999 PR PBB SHADOW E&M-EST. PATIENT-LVL I: ICD-10-PCS | Mod: PBBFAC,,, | Performed by: ORTHOPAEDIC SURGERY

## 2020-06-02 PROCEDURE — 99024 POSTOP FOLLOW-UP VISIT: CPT | Mod: S$GLB,,, | Performed by: ORTHOPAEDIC SURGERY

## 2020-06-02 PROCEDURE — 99999 PR PBB SHADOW E&M-EST. PATIENT-LVL I: CPT | Mod: PBBFAC,,, | Performed by: ORTHOPAEDIC SURGERY

## 2020-06-02 PROCEDURE — 99024 PR POST-OP FOLLOW-UP VISIT: ICD-10-PCS | Mod: S$GLB,,, | Performed by: ORTHOPAEDIC SURGERY

## 2020-06-02 NOTE — PROGRESS NOTES
CC:  89-year-old female follows up with complaints of problems with her left leg.  She underwent intramedullary nailing of a left intertrochanteric hip fracture about 12 months ago.  She is currently having complaints of sciatic nerve pain.  We did a phone call with the patient and her complaints are of sciatica.  [facility]  She can follow up with us p.r.n..

## 2020-06-02 NOTE — TELEPHONE ENCOUNTER
----- Message from Amparo Lowery sent at 6/2/2020  2:34 PM CDT -----  Contact: Kendra sandoval  Type:  Same Day Appointment Request    Caller is requesting a same day appointment.  Caller declined first available appointment listed below.      Name of Caller:  Kendra sandoval  When is the first available appointment?  06/08/20  Symptoms:  labs  Best Call Back Number:  [phone] (home)   Additional Information: Daughter states patient needs labs done at the cancer center. She states if has done at hospital she will be quarrantined for 14 day at [place]. Please call daughter to reschedule. Thanks!

## 2020-06-08 ENCOUNTER — LAB VISIT (OUTPATIENT)
Dept: LAB | Facility: HOSPITAL | Age: 85
End: 2020-06-08
Attending: INTERNAL MEDICINE
Payer: MEDICARE

## 2020-06-08 DIAGNOSIS — C56.9 MALIGNANT NEOPLASM OF OVARY, UNSPECIFIED LATERALITY: Primary | ICD-10-CM

## 2020-06-08 DIAGNOSIS — C56.9 MALIGNANT NEOPLASM OF OVARY, UNSPECIFIED LATERALITY: ICD-10-CM

## 2020-06-08 LAB
ALBUMIN SERPL BCP-MCNC: 4.3 G/DL (ref 3.5–5.2)
ALP SERPL-CCNC: 137 U/L (ref 55–135)
ALT SERPL W/O P-5'-P-CCNC: 24 U/L (ref 10–44)
ANION GAP SERPL CALC-SCNC: 12 MMOL/L (ref 8–16)
AST SERPL-CCNC: 40 U/L (ref 10–40)
BASOPHILS # BLD AUTO: 0.03 K/UL (ref 0–0.2)
BASOPHILS NFR BLD: 0.4 % (ref 0–1.9)
BILIRUB SERPL-MCNC: 0.8 MG/DL (ref 0.1–1)
BUN SERPL-MCNC: 16 MG/DL (ref 8–23)
CALCIUM SERPL-MCNC: 9.5 MG/DL (ref 8.7–10.5)
CHLORIDE SERPL-SCNC: 94 MMOL/L (ref 95–110)
CO2 SERPL-SCNC: 30 MMOL/L (ref 23–29)
CREAT SERPL-MCNC: 0.9 MG/DL (ref 0.5–1.4)
DIFFERENTIAL METHOD: ABNORMAL
EOSINOPHIL # BLD AUTO: 0.1 K/UL (ref 0–0.5)
EOSINOPHIL NFR BLD: 0.6 % (ref 0–8)
ERYTHROCYTE [DISTWIDTH] IN BLOOD BY AUTOMATED COUNT: 14 % (ref 11.5–14.5)
EST. GFR  (AFRICAN AMERICAN): >60 ML/MIN/1.73 M^2
EST. GFR  (NON AFRICAN AMERICAN): 56.5 ML/MIN/1.73 M^2
GLUCOSE SERPL-MCNC: 109 MG/DL (ref 70–110)
HCT VFR BLD AUTO: 41.4 % (ref 37–48.5)
HGB BLD-MCNC: 13.4 G/DL (ref 12–16)
IMM GRANULOCYTES # BLD AUTO: 0.04 K/UL (ref 0–0.04)
IMM GRANULOCYTES NFR BLD AUTO: 0.5 % (ref 0–0.5)
LYMPHOCYTES # BLD AUTO: 1.2 K/UL (ref 1–4.8)
LYMPHOCYTES NFR BLD: 14.2 % (ref 18–48)
MCH RBC QN AUTO: 29.8 PG (ref 27–31)
MCHC RBC AUTO-ENTMCNC: 32.4 G/DL (ref 32–36)
MCV RBC AUTO: 92 FL (ref 82–98)
MONOCYTES # BLD AUTO: 0.6 K/UL (ref 0.3–1)
MONOCYTES NFR BLD: 7.8 % (ref 4–15)
NEUTROPHILS # BLD AUTO: 6.3 K/UL (ref 1.8–7.7)
NEUTROPHILS NFR BLD: 76.5 % (ref 38–73)
NRBC BLD-RTO: 0 /100 WBC
PLATELET # BLD AUTO: 277 K/UL (ref 150–350)
PMV BLD AUTO: 9.1 FL (ref 9.2–12.9)
POTASSIUM SERPL-SCNC: 4.5 MMOL/L (ref 3.5–5.1)
PROT SERPL-MCNC: 7.7 G/DL (ref 6–8.4)
RBC # BLD AUTO: 4.5 M/UL (ref 4–5.4)
SODIUM SERPL-SCNC: 136 MMOL/L (ref 136–145)
WBC # BLD AUTO: 8.25 K/UL (ref 3.9–12.7)

## 2020-06-08 PROCEDURE — 86304 IMMUNOASSAY TUMOR CA 125: CPT

## 2020-06-08 PROCEDURE — 36415 COLL VENOUS BLD VENIPUNCTURE: CPT

## 2020-06-08 PROCEDURE — 80053 COMPREHEN METABOLIC PANEL: CPT

## 2020-06-08 PROCEDURE — 85025 COMPLETE CBC W/AUTO DIFF WBC: CPT

## 2020-06-09 LAB — CANCER AG125 SERPL-ACNC: 62.8 U/ML (ref 0–38.1)

## 2020-06-10 ENCOUNTER — HOSPITAL ENCOUNTER (OUTPATIENT)
Dept: RADIOLOGY | Facility: HOSPITAL | Age: 85
Discharge: HOME | End: 2020-06-10
Attending: INTERNAL MEDICINE
Payer: MEDICARE

## 2020-06-10 VITALS — HEIGHT: 63 IN | WEIGHT: 110 LBS | BODY MASS INDEX: 19.49 KG/M2

## 2020-06-10 DIAGNOSIS — C78.7 LIVER METASTASES: ICD-10-CM

## 2020-06-10 DIAGNOSIS — C56.9 MALIGNANT NEOPLASM OF OVARY, UNSPECIFIED LATERALITY: ICD-10-CM

## 2020-06-10 DIAGNOSIS — Z09 CHEMOTHERAPY FOLLOW-UP EXAMINATION: ICD-10-CM

## 2020-06-10 DIAGNOSIS — C78.00 MALIGNANT NEOPLASM METASTATIC TO LUNG, UNSPECIFIED LATERALITY: ICD-10-CM

## 2020-06-10 LAB — GLUCOSE SERPL-MCNC: 81 MG/DL (ref 70–110)

## 2020-06-10 PROCEDURE — A9552 F18 FDG: HCPCS | Mod: PO

## 2020-06-10 PROCEDURE — 78815 PET IMAGE W/CT SKULL-THIGH: CPT | Mod: TC,PO

## 2020-06-12 ENCOUNTER — OFFICE VISIT (OUTPATIENT)
Dept: HEMATOLOGY/ONCOLOGY | Facility: CLINIC | Age: 85
End: 2020-06-12
Payer: MEDICARE

## 2020-06-12 ENCOUNTER — OFFICE VISIT (OUTPATIENT)
Dept: FAMILY MEDICINE | Facility: CLINIC | Age: 85
End: 2020-06-12
Payer: MEDICARE

## 2020-06-12 VITALS
BODY MASS INDEX: 20.51 KG/M2 | HEIGHT: 63 IN | WEIGHT: 115.75 LBS | SYSTOLIC BLOOD PRESSURE: 104 MMHG | HEART RATE: 103 BPM | OXYGEN SATURATION: 97 % | DIASTOLIC BLOOD PRESSURE: 58 MMHG | TEMPERATURE: 98 F

## 2020-06-12 DIAGNOSIS — K59.00 CONSTIPATION, UNSPECIFIED CONSTIPATION TYPE: Primary | ICD-10-CM

## 2020-06-12 DIAGNOSIS — C78.00 MALIGNANT NEOPLASM METASTATIC TO LUNG, UNSPECIFIED LATERALITY: Primary | ICD-10-CM

## 2020-06-12 PROCEDURE — 1159F MED LIST DOCD IN RCRD: CPT | Mod: S$GLB,,, | Performed by: FAMILY MEDICINE

## 2020-06-12 PROCEDURE — 1159F PR MEDICATION LIST DOCUMENTED IN MEDICAL RECORD: ICD-10-PCS | Mod: S$GLB,,, | Performed by: FAMILY MEDICINE

## 2020-06-12 PROCEDURE — 1159F PR MEDICATION LIST DOCUMENTED IN MEDICAL RECORD: ICD-10-PCS | Mod: 95,,, | Performed by: INTERNAL MEDICINE

## 2020-06-12 PROCEDURE — 1101F PT FALLS ASSESS-DOCD LE1/YR: CPT | Mod: 95,,, | Performed by: INTERNAL MEDICINE

## 2020-06-12 PROCEDURE — 99214 PR OFFICE/OUTPT VISIT, EST, LEVL IV, 30-39 MIN: ICD-10-PCS | Mod: S$GLB,,, | Performed by: FAMILY MEDICINE

## 2020-06-12 PROCEDURE — 99999 PR PBB SHADOW E&M-EST. PATIENT-LVL V: ICD-10-PCS | Mod: PBBFAC,,, | Performed by: FAMILY MEDICINE

## 2020-06-12 PROCEDURE — 1101F PR PT FALLS ASSESS DOC 0-1 FALLS W/OUT INJ PAST YR: ICD-10-PCS | Mod: 95,,, | Performed by: INTERNAL MEDICINE

## 2020-06-12 PROCEDURE — 99999 PR PBB SHADOW E&M-EST. PATIENT-LVL V: CPT | Mod: PBBFAC,,, | Performed by: FAMILY MEDICINE

## 2020-06-12 PROCEDURE — 99214 OFFICE O/P EST MOD 30 MIN: CPT | Mod: S$GLB,,, | Performed by: FAMILY MEDICINE

## 2020-06-12 PROCEDURE — 1101F PR PT FALLS ASSESS DOC 0-1 FALLS W/OUT INJ PAST YR: ICD-10-PCS | Mod: S$GLB,,, | Performed by: FAMILY MEDICINE

## 2020-06-12 PROCEDURE — 1159F MED LIST DOCD IN RCRD: CPT | Mod: 95,,, | Performed by: INTERNAL MEDICINE

## 2020-06-12 PROCEDURE — 99442 PR PHYSICIAN TELEPHONE EVALUATION 11-20 MIN: ICD-10-PCS | Mod: 95,,, | Performed by: INTERNAL MEDICINE

## 2020-06-12 PROCEDURE — 1101F PT FALLS ASSESS-DOCD LE1/YR: CPT | Mod: S$GLB,,, | Performed by: FAMILY MEDICINE

## 2020-06-12 PROCEDURE — 99442 PR PHYSICIAN TELEPHONE EVALUATION 11-20 MIN: CPT | Mod: 95,,, | Performed by: INTERNAL MEDICINE

## 2020-06-12 RX ORDER — POLYETHYLENE GLYCOL 3350 17 G/17G
17 POWDER, FOR SOLUTION ORAL DAILY
Qty: 30 EACH | Refills: 5 | Status: SHIPPED | OUTPATIENT
Start: 2020-06-12

## 2020-06-12 NOTE — PATIENT INSTRUCTIONS
Eating a High-Fiber Diet  Fiber is what gives strength and structure to plants. Most grains, beans, vegetables, and fruits contain fiber. Foods rich in fiber are often low in calories and fat, and they fill you up more. They may also reduce your risks for certain health problems. To find out the amount of fiber in canned, packaged, or frozen foods, read the Nutrition Facts label. It tells you how much fiber is in a serving.    Types of fiber and their benefits  There are two types of fiber: insoluble and soluble. They both aid digestion and help you maintain a healthy weight.  · Insoluble fiber. This is found in whole grains, cereals, certain fruits and vegetables such as apple skin, corn, and carrots. Insoluble fiber may prevent constipation and reduce the risk for certain types of cancer.  · Soluble fiber. This type of fiber is in oats, beans, and certain fruits and vegetables such as strawberries and peas. Soluble fiber can reduce cholesterol, which may help lower the risk for heart disease. It also helps control blood sugar levels.  Look for high-fiber foods  Try these foods to add fiber to your diet:  · Whole-grain breads and cereals. Try to eat 6 to 8 ounces a day. Include wheat and oat bran cereals, whole-wheat muffins or toast, and corn tortillas in your meals.  · Fruits. Try to eat 2 cups a day. Apples, oranges, strawberries, pears, and bananas are good sources. (Note: Fruit juice is low in fiber.)  · Vegetables. Try to eat at least 2.5 cups a day. Add asparagus, carrots, broccoli, peas, and corn to your meals.  · Beans. One cup of cooked lentils gives you over 15 grams of fiber. Try navy beans, lentils, and chickpeas.  · Seeds. A small handful of seeds gives you about 3 grams of fiber. Try sunflower seeds.  Keep track of your fiber  Keep track of how much fiber you eat. Start by reading food labels. Then eat a variety of foods high in fiber. As you begin to eat more fiber, ask your healthcare provider  how much water you should be drinking to keep your digestive system working smoothly.  You should aim for a certain amount of fiber in your diet each day. If you are a woman, that amount is between 25 and 28 grams per day. Men should aim for 30 to 33 grams per day. After age 50, your daily fiber needs drop to 22 grams for women and 28 grams for men.  Before you reach for the fiber supplements, think about this. Fiber is found naturally in healthy whole foods. It gives you that feeling of fullness after you eat. Taking fiber supplements or eating fiber-enriched foods will not give you this full feeling.  Your fiber intake is a good measure for the quality of your overall diet. If you are missing out on your daily amount of fiber, you may be lacking other important nutrients as well.  Date Last Reviewed: 5/11/2015 © 2000-2017 [employer]. [address] [place] [address]. All rights reserved. This information is not intended as a substitute for professional medical care. Always follow your healthcare professional's instructions.        Polyethylene Glycol powder  What is this medicine?  POLYETHYLENE GLYCOL 3350 (helen ee ETH i ekta; GLYE col) powder is a laxative used to treat constipation. It increases the amount of water in the stool. Bowel movements become easier and more frequent.  How should I use this medicine?  Take this medicine by mouth. The bottle has a measuring cap that is marked with a line. Pour the powder into the cap up to the marked line (the dose is about 1 heaping tablespoon). Add the powder in the cap to a full glass (4 to 8 ounces or 120 to 240 ml) of water, juice, soda, coffee or tea. Mix the powder well. Drink the solution. Take exactly as directed. Do not take your medicine more often than directed.  Talk to your pediatrician regarding the use of this medicine in children. Special care may be needed.  What side effects may I notice from receiving this medicine?  Side  effects that you should report to your doctor or health care professional as soon as possible:  · diarrhea  · difficulty breathing  · itching of the skin, hives, or skin rash  · severe bloating, pain, or distension of the stomach  · vomiting  Side effects that usually do not require medical attention (report to your doctor or health care professional if they continue or are bothersome):  · bloating or gas  · lower abdominal discomfort or cramps  · nausea  What may interact with this medicine?  Interactions are not expected.  What if I miss a dose?  If you miss a dose, take it as soon as you can. If it is almost time for your next dose, take only that dose. Do not take double or extra doses.  Where should I keep my medicine?  Keep out of the reach of children.  Store between 15 and 30 degrees C (59 and 86 degrees F). Throw away any unused medicine after the expiration date.  What should I tell my health care provider before I take this medicine?  They need to know if you have any of these conditions:  · a history of blockage of the stomach or intestine  · current abdomen distension or pain  · difficulty swallowing  · diverticulitis, ulcerative colitis, or other chronic bowel disease  · phenylketonuria  · an unusual or allergic reaction to polyethylene glycol, other medicines, dyes, or preservatives  · pregnant or trying to get pregnant  · breast-feeding  What should I watch for while using this medicine?  Do not use for more than 2 weeks without advice from your doctor or health care professional. It can take 2 to 4 days to have a bowel movement and to experience improvement in constipation. See your health care professional for any changes in bowel habits, including constipation, that are severe or last longer than three weeks.  Always take this medicine with plenty of water.  NOTE:This sheet is a summary. It may not cover all possible information. If you have questions about this medicine, talk to your doctor,  pharmacist, or health care provider. Copyright© 2017 [employer]

## 2020-06-12 NOTE — PROGRESS NOTES
"The patient location is:  At home  The chief complaint leading to consultation is:  How are my blood counts  Patient has consented to this visit via televisit  Visit type: Virtual visit  audio   Total time spent with patient: over 20  min with more than 90% on medical discussion, counseling and coordination of care.  Each patient to whom he or she provides medical services by telemedicine is:  (1) informed of the relationship between the physician and patient and the respective role of any other health care provider with respect to management of the patient; and (2) notified that he or she may decline to receive medical services by telemedicine and may withdraw from such care at any time. The reason for the audio only service rather than synchronous audio and video virtual visit was related to technical difficulties or patient preference/necessity.  This visit is to also involve counseling, patient education, informed consent for ordered diagnostic and laboratory tests and motivational interviewing    This document has been created using [employer] dictation software and free typing.  It has been checked for errors but some errors may still exist.      CC:how was my scan    Pau Cook is a 89 y.o.  Pt with recurrent ovarian cancer here for chemo clearance for second-line therapy.     Reiterated disease progression in past noted in January    "I feel like the cancer is spreading and I am considering not taking further thearpy "    Past Medical History:   Diagnosis Date    Allergy     Arthritis     Breast cyst     Closed intertrochanteric fracture of hip, left, initial encounter     Foot pain     GERD (gastroesophageal reflux disease)     Joint pain     Ovarian cancer     Peripheral vascular disease 1/15/2018    Squamous cell carcinoma 04/2016    Right Lower Leg    MUNIR (stress urinary incontinence, female) 4/17/2017     Past Surgical History:   Procedure Laterality Date    APPENDECTOMY      COLON " SURGERY      removed about 10 inch    COLONOSCOPY N/A 12/24/2018    Procedure: COLONOSCOPY;  Surgeon: Nirmal Davila MD;  Location: [facility] ENDO;  Service: Endoscopy;  Laterality: N/A;    FINGER MASS EXCISION Right 7/9/2018    Procedure: EXCISION, MASS, FINGER;  Surgeon: Fabio Seay MD;  Location: [facility] OR;  Service: Orthopedics;  Laterality: Right;    hemorhids      HYSTERECTOMY      LYSIS OF ADHESIONS N/A 12/28/2018    Procedure: LYSIS, ADHESIONS;  Surgeon: Amee Rose MD;  Location: [facility] OR;  Service: OB/GYN;  Laterality: N/A;    OPEN REDUCTION AND INTERNAL FIXATION (ORIF) OF INTERTROCHANTERIC FRACTURE OF FEMUR Left 5/31/2019    Procedure: ORIF, FRACTURE, FEMUR, INTERTROCHANTERIC;  Surgeon: Bacilio Lockwood II, MD;  Location: [facility] OR;  Service: Orthopedics;  Laterality: Left;    ROBOT-ASSISTED LAPAROSCOPIC ABDOMINAL HYSTERECTOMY USING DA MARK XI N/A 12/28/2018    Procedure: XI ROBOTIC HYSTERECTOMY;  Surgeon: Amee Rose MD;  Location: [facility];  Service: OB/GYN;  Laterality: N/A;  attempted, opened    ROBOT-ASSISTED LAPAROSCOPIC SALPINGO-OOPHORECTOMY USING DA MARK XI Bilateral 12/28/2018    Procedure: XI ROBOTIC SALPINGO-OOPHORECTOMY;  Surgeon: Amee Rose MD;  Location: [facility];  Service: OB/GYN;  Laterality: Bilateral;  attmepted, opened    TOTAL ABDOMINAL HYSTERECTOMY W/ BILATERAL SALPINGOOPHORECTOMY Bilateral 12/28/2018    Procedure: HYSTERECTOMY, TOTAL, ABDOMINAL, WITH BILATERAL SALPINGO-OOPHORECTOMY;  Surgeon: Amee Rose MD;  Location: [facility];  Service: OB/GYN;  Laterality: Bilateral;   She is tolerating only one bottle water daily with dulcolax for constipation and antinausea meds which she takes only occasionally     Current Outpatient Medications:     acetaminophen (TYLENOL) 500 MG tablet, Take 2 tablets (1,000 mg total) by mouth every 8 (eight) hours., Disp: , Rfl: 0    bisacodyl (DULCOLAX) 5 mg EC tablet, Take 5 mg by mouth daily as needed for Constipation., Disp:  , Rfl:     calcium-vitamin D3 (CALCIUM 500 + D) 500 mg(1,250mg) -200 unit per tablet, Take 1 tablet by mouth once daily. , Disp: , Rfl:     carboxymethylcellulose (REFRESH PLUS) 0.5 % Dpet, 1 drop daily as needed., Disp: , Rfl:     cyanocobalamin (VITAMIN B-12) 1000 MCG tablet, Take 500 mcg by mouth once daily. , Disp: , Rfl:     dexAMETHasone (DECADRON) 4 MG Tab, Take one tab po BID the day before chemo and then for two days after chemo (Patient not taking: Reported on 6/12/2020), Disp: 12 tablet, Rfl: 0    docusate sodium (COLACE) 100 MG capsule, Take 100 mg by mouth daily as needed for Constipation., Disp: , Rfl:     esomeprazole (NEXIUM) 20 MG capsule, Take 1 capsule (20 mg total) by mouth before breakfast., Disp: 90 capsule, Rfl: 3    FLAXSEED ORAL, Take 650 mg by mouth once daily. , Disp: , Rfl:     FLUAD 4040-7336, 65 YR UP,,PF, 45 mcg (15 mcg x 3)/0.5 mL Syrg, ADM 0.5ML IM UTD, Disp: , Rfl: 0    ketotifen (ZADITOR) 0.025 % (0.035 %) ophthalmic solution, Place 1 drop into both eyes 2 (two) times daily as needed (Pt reports use approx once/week). , Disp: , Rfl:     MAGNESIUM ASPARTATE HCL ORAL, Take by mouth., Disp: , Rfl:     methocarbamoL (ROBAXIN) 500 MG Tab, Take 1 tablet (500 mg total) by mouth every 12 (twelve) hours as needed (possible muscle spasms)., Disp: 30 tablet, Rfl: 0    MULTIVITAMIN W-MINERALS/LUTEIN (CENTRUM SILVER ORAL), Take 1 tablet by mouth once daily. , Disp: , Rfl:     ondansetron (ZOFRAN) 8 MG tablet, Take 1 tablet (8 mg total) by mouth every 6 (six) hours as needed., Disp: 30 tablet, Rfl: 3    ondansetron (ZOFRAN-ODT) 4 MG TbDL, Take 8 mg by mouth every 8 (eight) hours as needed., Disp: , Rfl:     polyethylene glycol (GLYCOLAX) 17 gram PwPk, Take 17 g by mouth once daily., Disp: 30 each, Rfl: 5    prochlorperazine (COMPAZINE) 10 MG tablet, Take 1 tablet (10 mg total) by mouth every 6 (six) hours as needed., Disp: 30 tablet, Rfl: 1    sucralfate (CARAFATE) 1 gram  tablet, Take 1 tablet (1 g total) by mouth 4 (four) times daily., Disp: 40 tablet, Rfl: 0  Review of patient's allergies indicates:   Allergen Reactions    Pcn [penicillins]      Can not remember reaction     Tetanus vaccines and toxoid Swelling     Localized swelling to injection site     Social History     Tobacco Use    Smoking status: Never Smoker    Smokeless tobacco: Never Used   Substance Use Topics    Alcohol use: No     Alcohol/week: 0.0 standard drinks     Comment: rare    Drug use: No         CONSTITUTIONAL: No fevers, chills, night sweats, wt. loss, denies  weakness and appetite changes   SKIN: + rash   ENT: No headaches, head trauma, vision changes, or eye pain  LYMPH NODES: None noticed   CV: No chest pain, palpitations.   RESP: No dyspnea on exertion, cough, wheezing, or hemoptysis  GI: +nausea, emesis, diarrhea, constipation, no melena, hematochezia, pain.   : No dysuria, hematuria, urgency, or frequency   HEME: No easy bruising, bleeding problems  PSYCHIATRIC: No depression, anxiety, psychosis, hallucinations.  NEURO: No seizures, memory loss, dizziness or difficulty sleeping  MSK: elbow with brush burn           Over the  phone patient does not sound as if they were in any distress.  Breathing is not labored.  Speech is audible.  No hoarseness.  No evidence of choking           Lab Results   Component Value Date    WBC 20.83 (H) 11/06/2019    HGB 12.8 11/06/2019    HCT 39.8 11/06/2019    MCV 90 11/06/2019     11/06/2019     Lab Results   Component Value Date    WBC 6.33 01/06/2020    HGB 13.2 01/06/2020    HCT 41.9 01/06/2020    MCV 95 01/06/2020     01/06/2020     Lab Results   Component Value Date    WBC 8.25 06/08/2020    RBC 4.50 06/08/2020    HGB 13.4 06/08/2020    HCT 41.4 06/08/2020    MCV 92 06/08/2020    MCH 29.8 06/08/2020    MCHC 32.4 06/08/2020    RDW 14.0 06/08/2020     06/08/2020    MPV 9.1 (L) 06/08/2020    GRAN 6.3 06/08/2020    GRAN 76.5 (H) 06/08/2020     LYMPH 1.2 06/08/2020    LYMPH 14.2 (L) 06/08/2020    MONO 0.6 06/08/2020    MONO 7.8 06/08/2020    EOS 0.1 06/08/2020    BASO 0.03 06/08/2020    EOSINOPHIL 0.6 06/08/2020    BASOPHIL 0.4 06/08/2020           CMP  Sodium   Date Value Ref Range Status   06/08/2020 136 136 - 145 mmol/L Final     Potassium   Date Value Ref Range Status   06/08/2020 4.5 3.5 - 5.1 mmol/L Final     Chloride   Date Value Ref Range Status   06/08/2020 94 (L) 95 - 110 mmol/L Final     CO2   Date Value Ref Range Status   06/08/2020 30 (H) 23 - 29 mmol/L Final     Glucose   Date Value Ref Range Status   06/08/2020 109 70 - 110 mg/dL Final     BUN, Bld   Date Value Ref Range Status   06/08/2020 16 8 - 23 mg/dL Final     Creatinine   Date Value Ref Range Status   06/08/2020 0.9 0.5 - 1.4 mg/dL Final     Calcium   Date Value Ref Range Status   06/08/2020 9.5 8.7 - 10.5 mg/dL Final     Total Protein   Date Value Ref Range Status   06/08/2020 7.7 6.0 - 8.4 g/dL Final     Albumin   Date Value Ref Range Status   06/08/2020 4.3 3.5 - 5.2 g/dL Final     Total Bilirubin   Date Value Ref Range Status   06/08/2020 0.8 0.1 - 1.0 mg/dL Final     Comment:     For infants and newborns, interpretation of results should be based  on gestational age, weight and in agreement with clinical  observations.  Premature Infant recommended reference ranges:  Up to 24 hours.............<8.0 mg/dL  Up to 48 hours............<12.0 mg/dL  3-5 days..................<15.0 mg/dL  6-29 days.................<15.0 mg/dL       Alkaline Phosphatase   Date Value Ref Range Status   06/08/2020 137 (H) 55 - 135 U/L Final     AST   Date Value Ref Range Status   06/08/2020 40 10 - 40 U/L Final     ALT   Date Value Ref Range Status   06/08/2020 24 10 - 44 U/L Final     Anion Gap   Date Value Ref Range Status   06/08/2020 12 8 - 16 mmol/L Final     eGFR if    Date Value Ref Range Status   06/08/2020 >60.0 >60 mL/min/1.73 m^2 Final     eGFR if non    Date  Value Ref Range Status   06/08/2020 56.5 (A) >60 mL/min/1.73 m^2 Final     Comment:     Calculation used to obtain the estimated glomerular filtration  rate (eGFR) is the CKD-EPI equation.        Lengthy discussion about mixed results on PET CT   Increased growth of liver mass, lung nodules have decreased in size, no new findings of mets in any other organ   Malignant neoplasm metastatic to lung, unspecified laterality          Progression after doxil and avastin   Pt wants comfort measures   End of life discussions  She does not want hospice  Will think about palliation and let me know   She just lost her daughter unexpectedly and wants to pass peacefully        Thank you for allowing me to evaluate and participate in the care of this pleasant patient. Please fell free to call me with any questions or concerns.    Warmly,  Kenyatta Tejeda MD    This note was dictated with Dragon and briefly proofread. Please excuse any sentences that may be unclear or words misspelled        Time spent with patient : over 50 minutes  Over 50% in counseling       This service was not originating from a related E/M service provided within the previous 7 days nor will  to an E/M service or procedure within the next 24 hours or my soonest available appointment.  Prevailing standard of care was able to be met in this audio-only visit.               This service was not originating from a related E/M service provided within the previous 7 days nor will  to an E/M service or procedure within the next 24 hours or my soonest available appointment.  Prevailing standard of care was able to be met in this audio-only visit.

## 2020-06-12 NOTE — PROGRESS NOTES
Subjective:       Patient ID: Pau Cook is a 89 y.o. female.    Chief Complaint: Follow-up    HPI     Mrs. Cook presents to clinic for constipation. Patient seems a little confused on if she takes colace or dulcolax. Called daughter and she states she takes dulcolax. Didn't work so she took two and she had diarrhea. States that she likes to eat apples and oranges.     Patient would also like to stop lyrica. Stopped taking it a week ago and didn't noticed anything different. Looks like last refill was done by ortho.     Past Medical History:   Diagnosis Date    Allergy     Arthritis     Breast cyst     Closed intertrochanteric fracture of hip, left, initial encounter     Foot pain     GERD (gastroesophageal reflux disease)     Joint pain     Ovarian cancer     Peripheral vascular disease 1/15/2018    Squamous cell carcinoma 04/2016    Right Lower Leg    MUNIR (stress urinary incontinence, female) 4/17/2017       Past Surgical History:   Procedure Laterality Date    APPENDECTOMY      COLON SURGERY      removed about 10 inch    COLONOSCOPY N/A 12/24/2018    Procedure: COLONOSCOPY;  Surgeon: Nirmal Davila MD;  Location: [facility];  Service: Endoscopy;  Laterality: N/A;    FINGER MASS EXCISION Right 7/9/2018    Procedure: EXCISION, MASS, FINGER;  Surgeon: Fabio Seay MD;  Location: [facility] OR;  Service: Orthopedics;  Laterality: Right;    hemorhids      HYSTERECTOMY      LYSIS OF ADHESIONS N/A 12/28/2018    Procedure: LYSIS, ADHESIONS;  Surgeon: Amee Rose MD;  Location: [facility] OR;  Service: OB/GYN;  Laterality: N/A;    OPEN REDUCTION AND INTERNAL FIXATION (ORIF) OF INTERTROCHANTERIC FRACTURE OF FEMUR Left 5/31/2019    Procedure: ORIF, FRACTURE, FEMUR, INTERTROCHANTERIC;  Surgeon: Bacilio Lockwood II, MD;  Location: [facility] OR;  Service: Orthopedics;  Laterality: Left;    ROBOT-ASSISTED LAPAROSCOPIC ABDOMINAL HYSTERECTOMY USING DA MARK XI N/A 12/28/2018    Procedure: XI ROBOTIC  HYSTERECTOMY;  Surgeon: Amee Rose MD;  Location: [facility] OR;  Service: OB/GYN;  Laterality: N/A;  attempted, opened    ROBOT-ASSISTED LAPAROSCOPIC SALPINGO-OOPHORECTOMY USING DA MARK XI Bilateral 12/28/2018    Procedure: XI ROBOTIC SALPINGO-OOPHORECTOMY;  Surgeon: Amee Rose MD;  Location: [facility] OR;  Service: OB/GYN;  Laterality: Bilateral;  attmepted, opened    TOTAL ABDOMINAL HYSTERECTOMY W/ BILATERAL SALPINGOOPHORECTOMY Bilateral 12/28/2018    Procedure: HYSTERECTOMY, TOTAL, ABDOMINAL, WITH BILATERAL SALPINGO-OOPHORECTOMY;  Surgeon: Amee Rose MD;  Location: [facility] OR;  Service: OB/GYN;  Laterality: Bilateral;       Family History   Adopted: Yes   Problem Relation Age of Onset    Cancer Mother     Stroke Father     Breast cancer Sister     Cancer Sister     No Known Problems Daughter     No Known Problems Brother     No Known Problems Daughter     Diabetes Brother     Colon cancer Neg Hx     Ovarian cancer Neg Hx        Social History     Tobacco Use    Smoking status: Never Smoker    Smokeless tobacco: Never Used   Substance Use Topics    Alcohol use: No     Alcohol/week: 0.0 standard drinks     Comment: rare    Drug use: No       Social History     Substance and Sexual Activity   Sexual Activity Not Currently          Current Outpatient Medications:     acetaminophen (TYLENOL) 500 MG tablet, Take 2 tablets (1,000 mg total) by mouth every 8 (eight) hours., Disp: , Rfl: 0    bisacodyl (DULCOLAX) 5 mg EC tablet, Take 5 mg by mouth daily as needed for Constipation., Disp: , Rfl:     calcium-vitamin D3 (CALCIUM 500 + D) 500 mg(1,250mg) -200 unit per tablet, Take 1 tablet by mouth once daily. , Disp: , Rfl:     carboxymethylcellulose (REFRESH PLUS) 0.5 % Dpet, 1 drop daily as needed., Disp: , Rfl:     cyanocobalamin (VITAMIN B-12) 1000 MCG tablet, Take 500 mcg by mouth once daily. , Disp: , Rfl:     docusate sodium (COLACE) 100 MG capsule, Take 100 mg by mouth daily as needed for  Constipation., Disp: , Rfl:     esomeprazole (NEXIUM) 20 MG capsule, Take 1 capsule (20 mg total) by mouth before breakfast., Disp: 90 capsule, Rfl: 3    FLAXSEED ORAL, Take 650 mg by mouth once daily. , Disp: , Rfl:     FLUAD 2411-6777, 65 YR UP,,PF, 45 mcg (15 mcg x 3)/0.5 mL Syrg, ADM 0.5ML IM UTD, Disp: , Rfl: 0    ketotifen (ZADITOR) 0.025 % (0.035 %) ophthalmic solution, Place 1 drop into both eyes 2 (two) times daily as needed (Pt reports use approx once/week). , Disp: , Rfl:     MAGNESIUM ASPARTATE HCL ORAL, Take by mouth., Disp: , Rfl:     methocarbamoL (ROBAXIN) 500 MG Tab, Take 1 tablet (500 mg total) by mouth every 12 (twelve) hours as needed (possible muscle spasms)., Disp: 30 tablet, Rfl: 0    MULTIVITAMIN W-MINERALS/LUTEIN (CENTRUM SILVER ORAL), Take 1 tablet by mouth once daily. , Disp: , Rfl:     ondansetron (ZOFRAN) 8 MG tablet, Take 1 tablet (8 mg total) by mouth every 6 (six) hours as needed., Disp: 30 tablet, Rfl: 3    ondansetron (ZOFRAN-ODT) 4 MG TbDL, Take 8 mg by mouth every 8 (eight) hours as needed., Disp: , Rfl:     pregabalin (LYRICA) 75 MG capsule, Take 1 capsule (75 mg total) by mouth 2 (two) times daily., Disp: 60 capsule, Rfl: 11    prochlorperazine (COMPAZINE) 10 MG tablet, Take 1 tablet (10 mg total) by mouth every 6 (six) hours as needed., Disp: 30 tablet, Rfl: 1    sucralfate (CARAFATE) 1 gram tablet, Take 1 tablet (1 g total) by mouth 4 (four) times daily., Disp: 40 tablet, Rfl: 0    dexAMETHasone (DECADRON) 4 MG Tab, Take one tab po BID the day before chemo and then for two days after chemo (Patient not taking: Reported on 6/12/2020), Disp: 12 tablet, Rfl: 0    methylcellulose, laxative, (CITRUCEL) 500 mg Tab, Take 500 mg by mouth daily as needed., Disp: , Rfl:      Review of patient's allergies indicates:   Allergen Reactions    Pcn [penicillins]      Can not remember reaction     Tetanus vaccines and toxoid Swelling     Localized swelling to injection site     "        Review of Systems   Constitutional: Negative for chills and fever.   HENT: Negative for congestion and sore throat.    Eyes: Negative for visual disturbance.   Respiratory: Negative for cough and shortness of breath.    Cardiovascular: Negative for chest pain.   Gastrointestinal: Negative for abdominal pain, constipation, diarrhea, nausea and vomiting.   Genitourinary: Negative for dysuria.   Musculoskeletal: Negative for joint swelling.   Skin: Negative for rash and wound.   Neurological: Negative for dizziness and headaches.   Hematological: Does not bruise/bleed easily.           Objective:          Vitals:    06/12/20 0952   BP: (!) 104/58   Pulse: 103   Temp: 97.5 °F (36.4 °C)   SpO2: 97%   Weight: 52.5 kg (115 lb 11.9 oz)   Height: 5' 3" (1.6 m)       Physical Exam  Vitals signs reviewed.   Constitutional:       General: She is not in acute distress.     Appearance: Normal appearance. She is well-developed.   HENT:      Head: Normocephalic and atraumatic.      Right Ear: Hearing and external ear normal.      Left Ear: Hearing and external ear normal.   Eyes:      General: Lids are normal.      Conjunctiva/sclera: Conjunctivae normal.   Cardiovascular:      Rate and Rhythm: Normal rate and regular rhythm.      Pulses: Normal pulses.      Heart sounds: Normal heart sounds.   Pulmonary:      Effort: Pulmonary effort is normal.      Breath sounds: Normal breath sounds.   Abdominal:      General: Bowel sounds are normal.      Palpations: Abdomen is soft.      Tenderness: There is no abdominal tenderness.   Musculoskeletal: Normal range of motion.   Skin:     General: Skin is warm.      Findings: No rash.   Neurological:      Mental Status: She is alert.   Psychiatric:         Speech: Speech normal.         Behavior: Behavior normal. Behavior is cooperative.                 Assessment/Plan     Pau NELSON was seen today for follow-up.    Diagnoses and all orders for this visit:    Constipation, " unspecified constipation type  -     Will try polyethylene glycol (GLYCOLAX) 17 gram PwPk; Take 17 g by mouth once daily. Told to increase fiber intake and hydrate with water.     F/u in about two weeks for constipation. 3 months f/u for check up    Future Appointments   Date Time Provider Department Center   9/11/2020  9:20 AM Rosi Ramires MD [facility]       Rosi Ramires MD  [facility]

## 2020-06-17 ENCOUNTER — TELEPHONE (OUTPATIENT)
Dept: HEMATOLOGY/ONCOLOGY | Facility: CLINIC | Age: 85
End: 2020-06-17

## 2020-06-17 NOTE — TELEPHONE ENCOUNTER
Informed pt daughter she can drop off EOL papers and Dr Tejeda will complete.     ----- Message from Meeta Prado sent at 6/17/2020  4:20 PM CDT -----  Type: Needs Medical Advice  Who Called:  Patient's Daughter  Best Call Back Number: [phone]  Additional Information: caller is wanting a call back about some paper work she needs filled out about her mother EOL wishes and she has some questions.

## 2020-06-22 ENCOUNTER — TELEPHONE (OUTPATIENT)
Dept: HEMATOLOGY/ONCOLOGY | Facility: CLINIC | Age: 85
End: 2020-06-22

## 2020-06-22 ENCOUNTER — TELEPHONE (OUTPATIENT)
Dept: FAMILY MEDICINE | Facility: CLINIC | Age: 85
End: 2020-06-22

## 2020-06-22 DIAGNOSIS — K21.9 GASTROESOPHAGEAL REFLUX DISEASE, ESOPHAGITIS PRESENCE NOT SPECIFIED: ICD-10-CM

## 2020-06-22 DIAGNOSIS — C56.9 MALIGNANT NEOPLASM OF OVARY, UNSPECIFIED LATERALITY: Primary | ICD-10-CM

## 2020-06-22 NOTE — TELEPHONE ENCOUNTER
[place] papers are on your desk to be signed.   Patient is no longer taking chemotherapy to treat her cancer. Daughter is requesting an order for paliative care through [facility]. Please place order.

## 2020-06-22 NOTE — TELEPHONE ENCOUNTER
----- Message from Marcio Rodrigez sent at 6/22/2020  9:21 AM CDT -----  Type: Needs Medical Advice  Who Called:  June patient's daughter  Symptoms (please be specific):    How long has patient had these symptoms:    Pharmacy name and phone #:   Best Call Back Number: [phone]  Additional Information: stated she dropped off a donot  resuscitate order for  to signed. Wanted to know if letter was received and signed requesting a call back

## 2020-06-22 NOTE — TELEPHONE ENCOUNTER
Returned call to patient. She thinks that the Nexium may not be working as well as it did before. She has occasional nausea. She can eat breakfast with no problem but sometimes has problems with lunch and dinner. Spoke with Dr Tejeda who advised that patient should call her PCP. Patient advised.             ----- Message from Marcio Rodrigez sent at 6/22/2020  9:16 AM CDT -----  Type: Needs Medical Advice  Who Called:  June patient's daughter  Symptoms (please be specific):    How long has patient had these symptoms:    Pharmacy name and phone #:    Best Call Back Number:   Additional Information: requesting a call back to patient has a question? Patient stated reflux medication is not working,need to know what else she can take

## 2020-06-23 NOTE — TELEPHONE ENCOUNTER
Spoke to Kendra, patients daughter, she stated that patient is having acid reflux and needs a new medication called in to the pharmacy. Please advise.

## 2020-06-24 RX ORDER — FAMOTIDINE 20 MG/1
20 TABLET, FILM COATED ORAL 2 TIMES DAILY
Qty: 60 TABLET | Refills: 3 | Status: SHIPPED | OUTPATIENT
Start: 2020-06-24 | End: 2021-06-24

## 2020-06-24 NOTE — TELEPHONE ENCOUNTER
----- Message from Jeremie Jenkins sent at 6/24/2020  9:16 AM CDT -----  Regarding: Nausea Rx  Contact: Dtr/June June called in and stated that patient is requesting to get a Rx for nausea.  June stated patient did not like the Zofran & stated not sure if it work.  Dr. Tejeda had previously prescribed the Zofran but June was told that patient would have to reach out to PCP to get a Rx.      [facility] [address]  [place] - [address]  [address] [place] [address]  Phone: [phone] Fax: [phone]    June's call back number is [phone]

## 2020-06-24 NOTE — TELEPHONE ENCOUNTER
Spoke to patient's daughter, June,  regarding patient's reflux/nausea symptoms. She reports that the patient takes esomeprazole daily. She eats breakfast and does fine. After lunch she experiences reflux to the top of her throat that makes her feel nauseated. Feels like she has to keep swallowing to keep from throwing up.    Has used zofran for nausea In the past but was not much help.     Completed [place] post papers have been place at the  for patient's daughter to pick upi. Copy has been sent to scanning.

## 2020-06-29 ENCOUNTER — CARE AT HOME (OUTPATIENT)
Dept: HOME HEALTH SERVICES | Facility: CLINIC | Age: 85
End: 2020-06-29
Payer: MEDICARE

## 2020-06-29 VITALS
OXYGEN SATURATION: 97 % | WEIGHT: 110 LBS | RESPIRATION RATE: 16 BRPM | BODY MASS INDEX: 19.49 KG/M2 | DIASTOLIC BLOOD PRESSURE: 70 MMHG | HEART RATE: 88 BPM | TEMPERATURE: 99 F | SYSTOLIC BLOOD PRESSURE: 122 MMHG

## 2020-06-29 DIAGNOSIS — R11.0 NAUSEA: ICD-10-CM

## 2020-06-29 DIAGNOSIS — Z63.4 GRIEF AT LOSS OF CHILD: ICD-10-CM

## 2020-06-29 DIAGNOSIS — C78.00 MALIGNANT NEOPLASM METASTATIC TO LUNG, UNSPECIFIED LATERALITY: ICD-10-CM

## 2020-06-29 DIAGNOSIS — C56.9 MALIGNANT NEOPLASM OF OVARY, UNSPECIFIED LATERALITY: ICD-10-CM

## 2020-06-29 DIAGNOSIS — M15.9 PRIMARY OSTEOARTHRITIS INVOLVING MULTIPLE JOINTS: ICD-10-CM

## 2020-06-29 DIAGNOSIS — C78.7 LIVER METASTASES: ICD-10-CM

## 2020-06-29 DIAGNOSIS — Z91.81 RISK FOR FALLS: ICD-10-CM

## 2020-06-29 DIAGNOSIS — Z51.5 PALLIATIVE CARE ENCOUNTER: ICD-10-CM

## 2020-06-29 DIAGNOSIS — F43.21 GRIEF AT LOSS OF CHILD: ICD-10-CM

## 2020-06-29 PROCEDURE — 99350 HOME/RES VST EST HIGH MDM 60: CPT | Mod: 25,,, | Performed by: NURSE PRACTITIONER

## 2020-06-29 PROCEDURE — 99497 PR ADVNCD CARE PLAN 30 MIN: ICD-10-PCS | Mod: ,,, | Performed by: NURSE PRACTITIONER

## 2020-06-29 PROCEDURE — 99350 PR HOME VISIT,ESTAB PATIENT,LEVEL IV: ICD-10-PCS | Mod: 25,,, | Performed by: NURSE PRACTITIONER

## 2020-06-29 PROCEDURE — 99497 ADVNCD CARE PLAN 30 MIN: CPT | Mod: ,,, | Performed by: NURSE PRACTITIONER

## 2020-06-29 RX ORDER — PREGABALIN 75 MG/1
75 CAPSULE ORAL 2 TIMES DAILY
COMMUNITY

## 2020-06-29 SDOH — SOCIAL DETERMINANTS OF HEALTH (SDOH): DISSAPEARANCE AND DEATH OF FAMILY MEMBER: Z63.4

## 2020-06-29 NOTE — PROGRESS NOTES
"[facility]  Palliative Care Home Visit    Visit Date: 2020  Encounter Provider: Madai Perla NP  PCP:  Rosi Ramires MD    Subjective:      Patient ID: Pau Cook is a 89 y.o. female.    Consult Requested By:  Dr. 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     Pau is being seen today to establish Palliative Care. With the visit she found in her apartment alone at [facility]. She is washing dishes upon my arrival. She appears unsteady on her feet as she walks to her chair to sit, not using her rollator next to her. She is AAO x 3 with some forgetfulness, very pleasant and cooperative. She states that she was born in [place] but has lived in the [place] "for a very long time". 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 One daughter, Kendra Veronica ([place]) is her HPOA and her other daughter, Ellen Greer ([place]) just passed away from cancer 2 weeks ago at age 65. Patient is very tearful talking about this today. She just attended her daughter's services over the weekend and is now on "Covid quarantine" at the [facility] because she was around a large group of people for the  services. She denies any Covid symptoms presently.    Pau reports that she had ovarian cancer in 2018 and had chemotherapy. She reports that recently it was found that she has liver and lung mets from the ovarian cancer. She does not want to have any further treatment for her cancer. She reports frequent nausea and decreased appetite over the last few weeks. Her daughter feels as though she has lost weight over the last few weeks as well. Patient states she " "weighs 115 lbs. She has no scale in her apartment and appears thinner than 115 lbs today.     She goes on to report that she fractured her left hip a few years ago and has difficulty with limitation in range of motion and has developed arthritis in the left leg that bothers her frequently. She states that her gait of not very steady at times because of her left leg issues. She reports trying to remember to use her rollator at all times but does forget. Denies any falls in the last 6 months.     VSS. Denies fever, chest pain, shortness of breath, vomiting, diarrhea. Denies any acute issues, concerns or complaints to address on today's visit. Reports taking all medications as prescribed. No other needs identified at this time. Risks of environmental exposure to coronavirus discussed including: social distancing, hand hygiene, and limiting departures from the home for necessities only.  Reports understanding and willingness to comply.        Goals of care are discussed with Pau today in person and her daughter June via telephone. Patient wishes to be a DNR and comfort care. She does not want any further treatments for her cancer. At this time she does not want Hospice services. Hospice benefits and care was discussed today potentially for the future. Patient wants to be as independent and as active as she can "until my time is up". HPOA and Living Will is present in [place]. Patient feels as though she has filled out a [place] recently with her PCP.       Palliative Care Instructions:  - [facility] Nurse Practitioner to schedule home follow-up visit with patient in 4 weeks or as needed.  - Continue all medications, treatments and therapies as ordered.   - Follow all instructions, recommendations as discussed.  - Maintain Safety Precautions at all times.  - Attend all medical appointments as scheduled.  - For worsening symptoms: call Primary Care Physician or Nurse Practitioner.  - For emergencies, call 911 or " immediately report to the nearest emergency room.  - Limit Risks of environmental exposure to coronavirus/COVID-19 as discussed including: social distancing, hand hygiene, and limiting departures from the home for necessities only.       Review of Systems   Constitutional: Negative for chills and fever. Positive for fatigue, decreased appetite, weight loss.  HENT: Negative for congestion and sore throat.  Positive for dry mouth.  Eyes: Negative for visual disturbance.   Respiratory: Negative for cough and shortness of breath.    Cardiovascular: Negative for chest pain.   Gastrointestinal: Negative for abdominal pain, constipation, diarrhea and vomiting. Positive for nausea.  Genitourinary: Negative for dysuria. Positive for urinary incontinence.  Musculoskeletal: Negative for joint swelling.   Skin: Negative for rash and wound.   Neurological: Negative for dizziness and headaches.   Hematological: Does bruise/bleed easily.     Assessments:  · Environmental: patient lives at [facility] in her own apartment. Apartment is clean with adequate lighting and temperature.  · Functional Status: mostly independent. She has help with cleaning and someone helps her bathe and get dressed due to left hip limitations from an old fracture   Safety: fall risk  · Nutritional: has adequate access, reports appetite is decreased, daughter reports weight loss. Patient reports drinking 1 Ensure per day for meal supplementation when she isn't nauseated.  · Home Health: No Home Health.  · DME: rollator, BSC, cane, wheelchair  ·   Symptom Assessment (ESAS 0-10 scale)      ESAS 0 1 2 3 4 5 6 7 8 9 10   Pain x                       Dyspnea x                       Anxiety x                       Nausea         x               Depression    x                     Anorexia   x                     Fatigue         x               Insomnia x                       Restlessness   x                      Agitation  x                     Constipation    yes  Bowel Management Plan (BMP): yes  Diarrhea        no  Comments: Usually has BM qod, takes Miralax and stool softener daily    Performance Status: PPS Score 50  Karnofsky Score:  50  EGOC:  3    History:  Past Medical History:   Diagnosis Date    Allergy     Arthritis     Breast cyst     Closed intertrochanteric fracture of hip, left, initial encounter     Foot pain     GERD (gastroesophageal reflux disease)     Joint pain     Ovarian cancer     Peripheral vascular disease 1/15/2018    Squamous cell carcinoma 04/2016    Right Lower Leg    MUNIR (stress urinary incontinence, female) 4/17/2017     Family History   Adopted: Yes   Problem Relation Age of Onset    Cancer Mother     Stroke Father     Breast cancer Sister     Cancer Sister     No Known Problems Daughter     No Known Problems Brother     Cancer Daughter     Diabetes Brother     Colon cancer Neg Hx     Ovarian cancer Neg Hx      Past Surgical History:   Procedure Laterality Date    APPENDECTOMY      COLON SURGERY      removed about 10 inch    COLONOSCOPY N/A 12/24/2018    Procedure: COLONOSCOPY;  Surgeon: Nirmal Davila MD;  [facility];  Service: Endoscopy;  Laterality: N/A;    FINGER MASS EXCISION Right 7/9/2018    Procedure: EXCISION, MASS, FINGER;  Surgeon: Fabio Seay MD;  Location: [facility];  Service: Orthopedics;  Laterality: Right;    hemorhids      HYSTERECTOMY      LYSIS OF ADHESIONS N/A 12/28/2018    Procedure: LYSIS, ADHESIONS;  Surgeon: Amee Rose MD;  Location: [facility];  Service: OB/GYN;  Laterality: N/A;    OPEN REDUCTION AND INTERNAL FIXATION (ORIF) OF INTERTROCHANTERIC FRACTURE OF FEMUR Left 5/31/2019    Procedure: ORIF, FRACTURE, FEMUR, INTERTROCHANTERIC;  Surgeon: Bacilio Lockwood II, MD;  Location: [facility];  Service: Orthopedics;  Laterality: Left;    ROBOT-ASSISTED LAPAROSCOPIC ABDOMINAL HYSTERECTOMY USING DA MARK XI N/A 12/28/2018    Procedure: XI ROBOTIC  HYSTERECTOMY;  Surgeon: Amee Rose MD;  Location: [facility] OR;  Service: OB/GYN;  Laterality: N/A;  attempted, opened    ROBOT-ASSISTED LAPAROSCOPIC SALPINGO-OOPHORECTOMY USING DA MARK XI Bilateral 12/28/2018    Procedure: XI ROBOTIC SALPINGO-OOPHORECTOMY;  Surgeon: Amee Rose MD;  Location: [facility] OR;  Service: OB/GYN;  Laterality: Bilateral;  attmepted, opened    TOTAL ABDOMINAL HYSTERECTOMY W/ BILATERAL SALPINGOOPHORECTOMY Bilateral 12/28/2018    Procedure: HYSTERECTOMY, TOTAL, ABDOMINAL, WITH BILATERAL SALPINGO-OOPHORECTOMY;  Surgeon: Amee Rose MD;  Location: [facility] OR;  Service: OB/GYN;  Laterality: Bilateral;     Review of patient's allergies indicates:   Allergen Reactions    Pcn [penicillins]      Can not remember reaction     Tetanus vaccines and toxoid Swelling     Localized swelling to injection site       Medications:    Current Outpatient Medications:     acetaminophen (TYLENOL) 500 MG tablet, Take 2 tablets (1,000 mg total) by mouth every 8 (eight) hours., Disp: , Rfl: 0    bisacodyl (DULCOLAX) 5 mg EC tablet, Take 5 mg by mouth daily as needed for Constipation., Disp: , Rfl:     calcium-vitamin D3 (CALCIUM 500 + D) 500 mg(1,250mg) -200 unit per tablet, Take 1 tablet by mouth once daily. , Disp: , Rfl:     carboxymethylcellulose (REFRESH PLUS) 0.5 % Dpet, 1 drop daily as needed., Disp: , Rfl:     cyanocobalamin (VITAMIN B-12) 1000 MCG tablet, Take 500 mcg by mouth once daily. , Disp: , Rfl:     docusate sodium (COLACE) 100 MG capsule, Take 100 mg by mouth daily as needed for Constipation., Disp: , Rfl:     esomeprazole (NEXIUM) 20 MG capsule, Take 1 capsule (20 mg total) by mouth before breakfast., Disp: 90 capsule, Rfl: 3    famotidine (PEPCID) 20 MG tablet, Take 1 tablet (20 mg total) by mouth 2 (two) times daily., Disp: 60 tablet, Rfl: 3    FLAXSEED ORAL, Take 650 mg by mouth once daily. , Disp: , Rfl:     ketotifen (ZADITOR) 0.025 % (0.035 %) ophthalmic solution, Place 1  drop into both eyes 2 (two) times daily as needed (Pt reports use approx once/week). , Disp: , Rfl:     MAGNESIUM ASPARTATE HCL ORAL, Take by mouth., Disp: , Rfl:     MULTIVITAMIN W-MINERALS/LUTEIN (CENTRUM SILVER ORAL), Take 1 tablet by mouth once daily. , Disp: , Rfl:     ondansetron (ZOFRAN) 8 MG tablet, Take 1 tablet (8 mg total) by mouth every 6 (six) hours as needed., Disp: 30 tablet, Rfl: 3    ondansetron (ZOFRAN-ODT) 4 MG TbDL, Take 8 mg by mouth every 8 (eight) hours as needed., Disp: , Rfl:     polyethylene glycol (GLYCOLAX) 17 gram PwPk, Take 17 g by mouth once daily., Disp: 30 each, Rfl: 5    pregabalin (LYRICA) 75 MG capsule, Take 75 mg by mouth 2 (two) times daily., Disp: , Rfl:     prochlorperazine (COMPAZINE) 10 MG tablet, Take 1 tablet (10 mg total) by mouth every 6 (six) hours as needed., Disp: 30 tablet, Rfl: 1    dexAMETHasone (DECADRON) 4 MG Tab, Take one tab po BID the day before chemo and then for two days after chemo (Patient not taking: Reported on 6/12/2020), Disp: 12 tablet, Rfl: 0    FLUAD [id number], 65 YR UP,,PF, 45 mcg (15 mcg x 3)/0.5 mL Syrg, ADM 0.5ML IM UTD, Disp: , Rfl: 0    methocarbamoL (ROBAXIN) 500 MG Tab, Take 1 tablet (500 mg total) by mouth every 12 (twelve) hours as needed (possible muscle spasms). (Patient not taking: Reported on 6/29/2020), Disp: 30 tablet, Rfl: 0    sucralfate (CARAFATE) 1 gram tablet, Take 1 tablet (1 g total) by mouth 4 (four) times daily. (Patient not taking: Reported on 6/29/2020), Disp: 40 tablet, Rfl: 0    24h Oral Morphine Equivalents (OME):  N/A    Objective:     Physical Exam:  Vitals:    06/29/20 1228   BP: 122/70   Pulse: 88   Resp: 16   Temp: 98.8 °F (37.1 °C)   TempSrc: Temporal   SpO2: 97%   Weight: 49.9 kg (110 lb)   PainSc:   2   PainLoc: Rib Cage     Body mass index is 19.49 kg/m².    Physical Exam  Constitutional:       General: She is not in acute distress.     Appearance: Thin, frail, elderly female.  HENT:      Head:  Normocephalic and atraumatic.      Right Ear: Hearing and external ear normal.      Left Ear: Hearing and external ear normal.   Eyes:      General: Lids are normal.      Conjunctiva/sclera: Conjunctivae normal.   Cardiovascular:      Rate and Rhythm: Normal rate and regular rhythm.      Pulses: Normal pulses.      Heart sounds: Normal heart sounds.   Pulmonary:      Effort: Pulmonary effort is normal.      Breath sounds: Normal breath sounds.   Abdominal:      General: Bowel sounds are normal.      Palpations: Abdomen is soft.      Tenderness: There is no abdominal tenderness.   Musculoskeletal: Normal range of motion. Thoracic kyphosis. Unsteady gait.  Skin:     General: Skin is warm. Intact.     Findings: No rash.   Neurological:      Mental Status: She is alert and oriented x 3 with some forgetfulness.   Psychiatric:         Speech: Speech normal.         Behavior: Behavior normal. Behavior is cooperative. She is tearful at times discussing the recent death of her daughter.         Performance Status: PPS Score 50  Karnofsky Score:  50  EGOC:  3    Labs:  CBC:   WBC   Date Value Ref Range Status   06/08/2020 8.25 3.90 - 12.70 K/uL Final       Hemoglobin   Date Value Ref Range Status   06/08/2020 13.4 12.0 - 16.0 g/dL Final       Hematocrit   Date Value Ref Range Status   06/08/2020 41.4 37.0 - 48.5 % Final       Mean Corpuscular Volume   Date Value Ref Range Status   06/08/2020 92 82 - 98 fL Final       Platelets   Date Value Ref Range Status   06/08/2020 277 150 - 350 K/uL Final       LFT:   Lab Results   Component Value Date    AST 40 06/08/2020    ALKPHOS 137 (H) 06/08/2020    BILITOT 0.8 06/08/2020       Albumin:   Albumin   Date Value Ref Range Status   06/08/2020 4.3 3.5 - 5.2 g/dL Final     Protein:   Total Protein   Date Value Ref Range Status   06/08/2020 7.7 6.0 - 8.4 g/dL Final       Radiology:  I have reviewed all pertinent imaging results/findings within the past 24  hours.    Psychosocial/Cultural/Spiritual:   · F- Charlee and Belief:  Non-Samaritan   · I - Importance: very important  · C - Community: cannot remember name of [employer], kraig Miller in [place]  · A - Address in Care: no issues     Assessment:     1. Lives in assisted living facility    2. Malignant neoplasm of ovary, unspecified laterality    3. Palliative care encounter    4. Grief at loss of child    5. Malignant neoplasm metastatic to lung, unspecified laterality    6. Liver metastases    7. Risk for falls    8. Primary osteoarthritis involving multiple joints    9. Nausea        Plan:     Ethical / Legal: Advance Care Planning   · Surrogate decision maker:  Name Kendra Veronica, Relationship: Daughter  · Code Status:  DNR  · [place]:  pt believes she filled this out recently with PCP  · Other advance directive:  HPOA, Living will Capacity to make medical decisions: intact Conflict: none    Pau NELSON was seen today to establish palliative care.    Diagnoses and all orders for this visit:    Lives in assisted living facility    Malignant neoplasm of ovary, unspecified laterality  -     Ambulatory referral/consult to [facility]    Palliative care encounter    Grief at loss of child    Malignant neoplasm metastatic to lung, unspecified laterality    Liver metastases    Risk for falls    Primary osteoarthritis involving multiple joints    Nausea    Time allowed for questions, all questions answered. [facility] contact information left with patient today for any future concerns.    Were controlled substances prescribed?  No    > 50% of 90 min visit spent in chart review, face to face discussion of goals of care,  symptom assessment, fall prevention, grief and sadness, coordination of care and emotional support.    30 min of 90 min visit spent discussing palliative care, advance directives, goals of care, hospice and hospice benefits.    Follow Up Appointments:   Future  Appointments   Date Time Provider Department Center   9/11/2020  9:20 AM Rosi Ramires MD [facility]   Verbal consent for visit obtained from patient on visit today.    Signature:  Madai Perla NP     Attestation:   Screening criteria to assess the level of the patient's risk for infection with COVID-19 as recommended by the CDC at the time of the above documented home visit concluded appropriateness to proceed. Universal precautions were maintained at all times, including provider wearing a mask and gloves during entire visit and use of 60% alcohol gel hand  immediately prior to entry and upon departure of patient's home as well as cleaning of equipment used in home visit with antibacterial/germicidal disposable wipes.

## 2020-06-30 PROBLEM — Z63.4 GRIEF AT LOSS OF CHILD: Status: ACTIVE | Noted: 2020-06-30

## 2020-06-30 PROBLEM — Z51.5 PALLIATIVE CARE ENCOUNTER: Status: ACTIVE | Noted: 2020-06-30

## 2020-06-30 PROBLEM — F43.21 GRIEF AT LOSS OF CHILD: Status: ACTIVE | Noted: 2020-06-30

## 2020-07-01 ENCOUNTER — TELEPHONE (OUTPATIENT)
Dept: FAMILY MEDICINE | Facility: CLINIC | Age: 85
End: 2020-07-01

## 2020-07-01 NOTE — PATIENT INSTRUCTIONS
Oncology: Controlling Nausea and Vomiting     Taken before meals, medicines can help ease nausea.    Nausea and vomiting are common side effects of chemotherapy and radiation therapy. Side effects happen when treatment changes some normal cells as well as cancer cells. In this case, the cells lining your stomach and the part of your brain that controls vomiting are affected.  Nausea is feeling that you need to throw up. Vomiting is when you actually do throw up. This is when your body forces food that is in your stomach out through your mouth.  Nausea and vomiting are common. They can be caused by many things. These include:  · Stomach flu (gastroenteritis)  · Food poisoning  · Stomach pain (gastritis)  · Blockages in the digestive system  · Constipation  · Infection  · Anxiety and stress  They can also be caused by a head injury, an infection in the brain or inside the ear, or migraines. Other common causes of nausea and vomiting include:  · Brain tumor  · Brain bruise or injury  · Motion sickness  · Alcohol, pain medicines such as morphine, and cancer medicines (chemotherapy)  · Certain medical treatments, such as radiation therapy  · Poisonous things (toxins) such as plants or liquids that are swallowed by accident  · Advanced types of cancer  · Movement problems (psychogenic problems)  Extra pressure in the fluid that surrounds the brain and spinal cord (elevated intracranial pressure)  Sometimes belly (abdominal) pain and cramps are experienced along with nausea and vomiting. The symptoms can be mild and go away by themselves. Other symptoms can be serious and must be treated.  When to seek medical advice  Nausea and vomiting can happen before, during, or after cancer treatments. But it can be controlled. Dont consider it a normal part of cancer and cancer treatment. If not managed, it can become serious. It can change the fluid and chemical balances in your body, and could even keep you from getting cancer  treatment. Call your healthcare provider right away if any of the following occur:  · You have nausea or vomiting that lasts 24 hours or more  · You cant take your antiemetics, or they are not working  · You have trouble keeping fluids down  · You become dizzy, lightheaded, or confused  · You have very dark urine or you stop urinating  Talk to your healthcare provider about your treatment and the nausea and vomiting management plan that's best for you. Be sure you know how and when to use antiemetics and when to call your provider.   Medicines can help  Nausea or vomiting can often be prevented or controlled with medicines called antiemetics. Your provider may give you antiemetics before or after treatment if you are getting chemotherapy or other treatments that cause nausea or vomiting. You may have to try different medicines or different combinations of medicines to get relief. But in nearly all cases, nausea and vomiting can be relieved.  Eating tips  · If you have medicines to control nausea, take them before meals as directed.  · Avoid fatty or greasy foods while nauseated.  · Eat small meals slowly throughout the day.  · Ask someone to sit with you while you eat to keep you from thinking about feeling nauseated.  · Eat foods at room temperature or colder to avoid strong smells.  · Eat dry foods, such as toast, crackers, or pretzels. Also eat cool, light foods, such as applesauce, and bland foods, such as oatmeal or skinned chicken.  · Try to keep taking in clear fluids in small sips, or as ice chips, gelatin, or ice pops.  Other ways to feel better  · Get a little fresh air. Take a short walk.  · Talk to a friend, listen to music, or watch TV.  · Take a few deep, slow breaths.  · Eat by candlelight or in surroundings that you find relaxing.  · Use a method to help you relax, such as guided imagery. Imagine yourself in a beautiful, restful scene. Or daydream about the place youd most like to be.  Date Last  Reviewed: 12/1/2016  © 5744-3006 [employer], [employer]. [address] [place] [address], [place] [address]. All rights reserved. This information is not intended as a substitute for professional medical care. Always follow your healthcare professional's instructions.

## 2020-07-01 NOTE — TELEPHONE ENCOUNTER
"PAtient is still trying to obtain a power wheelchair.    DME provider has sent communication that indicates your office note from 3/5/20 needs to state  -you saw the patient for a mobility assessment  -r/o why patient cannot use a manual wheelchair  -How power chair will benefit patient inside home with MRAL's   -Must rule out other equipment. (Cannot say "may have issues with manual w/c")  Please adjust office note.  "

## 2020-07-01 NOTE — TELEPHONE ENCOUNTER
----- Message from Chen Youssef LPN sent at 6/29/2020  4:52 PM CDT -----  Regarding: FW: MAGDALENA WITH [facility]  Home Medical  Contact: [phone]     ----- Message -----  From: Courtney De La Garza  Sent: 6/29/2020  11:29 AM CDT  To: [facility] Staff  Subject: MAGDALENA WITH [facility]  Home Medical                    Type: Needs Medical Advice  Who Called:  MAGDALENA   Best Call Back Number: [phone]   Additional Information: requesting a call back in regards to needing to verify if office received orders for Power Wheel chair for patient ALDO NEGRON

## 2020-07-06 ENCOUNTER — TELEPHONE (OUTPATIENT)
Dept: FAMILY MEDICINE | Facility: CLINIC | Age: 85
End: 2020-07-06

## 2020-07-06 NOTE — TELEPHONE ENCOUNTER
----- Message from Cricket Anna sent at 7/6/2020  4:49 PM CDT -----  Regarding: Orders for wheel chair  Contact: [facility]  Type:  Patient Returning Call    Who Called:  Rosi  Does the patient know what this is regarding?:  orders faxed over for power wheel chair. Orders faxed over 3x and messages have been left  Best Call Back Number:   Ext [phone]  Additional Information:  Please follow up. Thank you

## 2020-07-07 NOTE — TELEPHONE ENCOUNTER
Adended note has been printed and faxed to [facility] along with  Their written order, product description and LCMP exam that were signed by Dr Ramires.

## 2020-07-20 ENCOUNTER — LAB VISIT (OUTPATIENT)
Dept: LAB | Facility: OTHER | Age: 85
End: 2020-07-20
Attending: INTERNAL MEDICINE
Payer: MEDICARE

## 2020-07-20 DIAGNOSIS — Z11.59 SPECIAL SCREENING EXAMINATION FOR UNSPECIFIED VIRAL DISEASE: ICD-10-CM

## 2020-07-20 PROCEDURE — U0003 INFECTIOUS AGENT DETECTION BY NUCLEIC ACID (DNA OR RNA); SEVERE ACUTE RESPIRATORY SYNDROME CORONAVIRUS 2 (SARS-COV-2) (CORONAVIRUS DISEASE [COVID-19]), AMPLIFIED PROBE TECHNIQUE, MAKING USE OF HIGH THROUGHPUT TECHNOLOGIES AS DESCRIBED BY CMS-2020-01-R: HCPCS

## 2020-07-24 LAB — SARS-COV-2 RNA RESP QL NAA+PROBE: NORMAL

## 2020-07-27 ENCOUNTER — TELEPHONE (OUTPATIENT)
Dept: HEMATOLOGY/ONCOLOGY | Facility: CLINIC | Age: 85
End: 2020-07-27

## 2020-07-27 NOTE — TELEPHONE ENCOUNTER
Pt reports she bent down to pick something up last Tuesday and her ribs have been hurting since. Pt states it hurts when she moves and takes too deep of breaths. Pt states she has only taken Tylenol one time and it helped with the pain. Encouraged pt to take tylenol prn, and to alternate heating pad and ice to the sore area, as pt states she cannot use ice because she gets too cold. Encouraged pt to notify her PCP for appt if she does not feel better. Pt verbalized understanding.     This nurse sent message to Dr Ramires staff to make them aware.     ----- Message from Zay Bettencourt sent at 7/27/2020  8:57 AM CDT -----  Type: Needs Medical Advice  Who Called:  Kendra Chely (Daughter)  Symptoms (please be specific):  Pain in ribcage-shoulders  How long has patient had these symptoms:  4 days    Pharmacy name and phone #:    [facility] - [place], [place] - [address]  [address] [place] [address]  Phone: [phone] Fax: [phone]-Patient        Best Call Back Number: [phone]  Additional Information: Please call to advise-Daughter states that patient is slow to answer and to call more than once if no answer

## 2020-07-27 NOTE — TELEPHONE ENCOUNTER
Spoke to pt daughter, reiterated what was told to the pt earlier re: pain control. Pt daughter states Palliative Care nurse will see pt tomorrow. Encouraged pt daughter to call this nurse to schedule f/up if recommended by Palliative nurse. Pt daughter verbalized understanding.     ----- Message from Mina Whitfield sent at 7/27/2020 12:18 PM CDT -----  Contact: pt daughter June  Type: Needs Medical Advice  Who Called:  June    Best Call Back Number: [phone]  Additional Information: requesting a call to speak with the office about the conversation that was this morning with the pt. Please call to advise

## 2020-07-28 ENCOUNTER — CARE AT HOME (OUTPATIENT)
Dept: HOME HEALTH SERVICES | Facility: CLINIC | Age: 85
End: 2020-07-28
Payer: MEDICARE

## 2020-07-28 VITALS
HEART RATE: 70 BPM | SYSTOLIC BLOOD PRESSURE: 130 MMHG | OXYGEN SATURATION: 97 % | TEMPERATURE: 98 F | RESPIRATION RATE: 16 BRPM | DIASTOLIC BLOOD PRESSURE: 72 MMHG | WEIGHT: 110 LBS | BODY MASS INDEX: 19.49 KG/M2

## 2020-07-28 DIAGNOSIS — Z51.5 PALLIATIVE CARE ENCOUNTER: Primary | ICD-10-CM

## 2020-07-28 DIAGNOSIS — C78.7 LIVER METASTASES: ICD-10-CM

## 2020-07-28 DIAGNOSIS — Z63.4 GRIEF AT LOSS OF CHILD: ICD-10-CM

## 2020-07-28 DIAGNOSIS — C56.9 MALIGNANT NEOPLASM OF OVARY, UNSPECIFIED LATERALITY: ICD-10-CM

## 2020-07-28 DIAGNOSIS — M54.9 ACUTE BACK PAIN, UNSPECIFIED BACK LOCATION, UNSPECIFIED BACK PAIN LATERALITY: ICD-10-CM

## 2020-07-28 DIAGNOSIS — C78.00 MALIGNANT NEOPLASM METASTATIC TO LUNG, UNSPECIFIED LATERALITY: ICD-10-CM

## 2020-07-28 DIAGNOSIS — F43.21 GRIEF AT LOSS OF CHILD: ICD-10-CM

## 2020-07-28 PROCEDURE — 99350 HOME/RES VST EST HIGH MDM 60: CPT | Mod: ,,, | Performed by: NURSE PRACTITIONER

## 2020-07-28 PROCEDURE — 99350 PR HOME VISIT,ESTAB PATIENT,LEVEL IV: ICD-10-PCS | Mod: ,,, | Performed by: NURSE PRACTITIONER

## 2020-07-28 SDOH — SOCIAL DETERMINANTS OF HEALTH (SDOH): DISSAPEARANCE AND DEATH OF FAMILY MEMBER: Z63.4

## 2020-07-29 ENCOUNTER — TELEPHONE (OUTPATIENT)
Dept: FAMILY MEDICINE | Facility: CLINIC | Age: 85
End: 2020-07-29

## 2020-07-29 NOTE — TELEPHONE ENCOUNTER
----- Message from Tracie Brito RN sent at 7/27/2020 11:29 AM CDT -----  Regarding: Pt f/up  Hello!    Pt left a message for us (Dr Tejeda's staff) this morning re: pain in her ribs. Pt reports she bent over to pick something up last Tuesday and she's had pain ever since. What the pt is describing to me sounds like a pulled muscle and not cancer related pain. Could you please reach out to her and schedule her for a f/up if she's interested?     Thank you!

## 2020-07-29 NOTE — TELEPHONE ENCOUNTER
Patient has been scheduled for eval with Teofilo COATES  On 7/31/20 at 9 AM.  Daughter, June, has been informed.

## 2020-07-29 NOTE — TELEPHONE ENCOUNTER
----- Message from Caryn Shepard sent at 7/29/2020  3:44 PM CDT -----  Regarding: advice  Contact: Daughter/June/[phone]  Type: Needs Medical Advice  Who Called:  Daughter/June/[phone]    Additional Information: Patient is sore between the ribs and hips. She gets a sharp pain in the rib cage when she breathes out. She has cancer. The Palliative nurse came and said she does not think this is from the cancer and would like the doctor to put in an xray in for this area to be done before Friday. If the office can get the xray scheduled for Friday 8/1/20, the home can transport her. Please place order and call daughter to schedule.

## 2020-07-29 NOTE — TELEPHONE ENCOUNTER
"----- Message from Rosi Ramires MD sent at 7/29/2020  1:00 PM CDT -----  Can we get her scheduled with me or my NP?   ----- Message -----  From: Madai Perla NP  Sent: 7/28/2020  10:41 AM CDT  To: Rosi Ramires MD    Good morning. I am seeing this patient today for lower back pain. I have seen her once in the last few weeks for Palliative Care due to her Ovarian cancer with mets. This back pain started a week ago after she bent down to  something off of the carpet. She has been taking Tylenol (which helps) and lying on the heating pad. Today is a week and the pain is no better. On exam she seems to have tenderness along the lumbar paraspinal muscles upon palpation. She reports pain under her ribs that radiates to her back with deep breathing and movement.     Her vitals are stable, no N/V/D. No recent falls. She is apprehensive at leaving [facility] for an MD appointment/xray because she worries they will put her on quarantine for COVID. Her daughter June says this isn't true. So can we get her scheduled with you for Friday and if she is feeling better by then she can cancel? She has Robaxin but hasn't taken any. I am instructing her to take 1/2 tablet BID over the next few days to see if that helps.     She has no follow up appointments with Dr. Tejeda and isn't sure she wants to make one. She doesn't want to take anymore treatment or medications if this cancer related. She is open to hospice services "when the time is right" but one week ago she states she "felt fine".     Thank you.  JAXON Hawkins  [facility]"

## 2020-07-30 ENCOUNTER — LAB VISIT (OUTPATIENT)
Dept: LAB | Facility: OTHER | Age: 85
End: 2020-07-30
Payer: MEDICARE

## 2020-07-30 DIAGNOSIS — Z03.818 ENCOUNTER FOR OBSERVATION FOR SUSPECTED EXPOSURE TO OTHER BIOLOGICAL AGENTS RULED OUT: ICD-10-CM

## 2020-07-30 DIAGNOSIS — Z20.822 SUSPECTED COVID-19 VIRUS INFECTION: ICD-10-CM

## 2020-07-30 PROBLEM — C78.00 MALIGNANT NEOPLASM METASTATIC TO LUNG: Status: ACTIVE | Noted: 2020-07-30

## 2020-07-30 PROBLEM — M54.9 ACUTE BACK PAIN: Status: ACTIVE | Noted: 2020-07-30

## 2020-07-30 PROCEDURE — U0003 INFECTIOUS AGENT DETECTION BY NUCLEIC ACID (DNA OR RNA); SEVERE ACUTE RESPIRATORY SYNDROME CORONAVIRUS 2 (SARS-COV-2) (CORONAVIRUS DISEASE [COVID-19]), AMPLIFIED PROBE TECHNIQUE, MAKING USE OF HIGH THROUGHPUT TECHNOLOGIES AS DESCRIBED BY CMS-2020-01-R: HCPCS

## 2020-07-30 RX ORDER — METHOCARBAMOL 500 MG/1
500 TABLET, FILM COATED ORAL EVERY 12 HOURS PRN
Qty: 30 TABLET | Refills: 0 | OUTPATIENT
Start: 2020-07-30

## 2020-07-30 NOTE — PATIENT INSTRUCTIONS
Back Pain (Acute or Chronic)    Back pain is one of the most common problems. The good news is that most people feel better in 1 to 2 weeks, and most of the rest in 1 to 2 months. Most people can remain active.  People experience and describe pain differently; not everyone is the same.  · The pain can be sharp, stabbing, shooting, aching, cramping or burning.  · Movement, standing, bending, lifting, sitting, or walking may worsen pain.  · It can be localized to one spot or area, or it can be more generalized.  · It can spread or radiate upwards, to the front, or go down your arms or legs (sciatica).  · It can cause muscle spasm.  Most of the time, mechanical problems with the muscles or spine cause the pain. Mechanical problems are usually caused by an injury to the muscles or ligaments. While illness can cause back pain, it is usually not caused by a serious illness. Mechanical problems include:   · Physical activity such as sports, exercise, work, or normal activity  · Overexertion, lifting, pushing, pulling incorrectly or too aggressively  · Sudden twisting, bending, or stretching from an accident, or accidental movement  · Poor posture  · Stretching or moving wrong, without noticing pain at the time  · Poor coordination, lack of regular exercise (check with your doctor about this)  · Spinal disc disease or arthritis  · Stress  Pain can also be related to pregnancy, or illness like appendicitis, bladder or kidney infections, pelvic infections, and many other things.  Acute back pain usually gets better in 1 to 2 weeks. Back pain related to disk disease, arthritis in the spinal joints or spinal stenosis (narrowing of the spinal canal) can become chronic and last for months or years.  Unless you had a physical injury (for example, a car accident or fall) X-rays are usually not needed for the initial evaluation of back pain. If pain continues and does not respond to medical treatment, X-rays and other tests may be  needed.  Home care  Try these home care recommendations:  · When in bed, try to find a position of comfort. A firm mattress is best. Try lying flat on your back with pillows under your knees. You can also try lying on your side with your knees bent up towards your chest and a pillow between your knees.  · At first, do not try to stretch out the sore spots. If there is a strain, it is not like the good soreness you get after exercising without an injury. In this case, stretching may make it worse.  · Avoid prolong sitting, long car rides, or travel. This puts more stress on the lower back than standing or walking.  · During the first 24 to 72 hours after an acute injury or flare up of chronic back pain, apply an ice pack to the painful area for 20 minutes and then remove it for 20 minutes. Do this over a period of 60 to 90 minutes or several times a day. This will reduce swelling and pain. Wrap the ice pack in a thin towel or plastic to protect your skin.  · You can start with ice, then switch to heat. Heat (hot shower, hot bath, or heating pad) reduces pain and works well for muscle spasms. Heat can be applied to the painful area for 20 minutes then remove it for 20 minutes. Do this over a period of 60 to 90 minutes or several times a day. Do not sleep on a heating pad. It can lead to skin burns or tissue damage.  · You can alternate ice and heat therapy. Talk with your doctor about the best treatment for your back pain.  · Therapeutic massage can help relax the back muscles without stretching them.  · Be aware of safe lifting methods and do not lift anything without stretching first.  Medicines  Talk to your doctor before using medicine, especially if you have other medical problems or are taking other medicines.  · You may use over-the-counter medicine as directed on the bottle to control pain, unless another pain medicine was prescribed. If you have chronic conditions like diabetes, liver or kidney disease,  stomach ulcers, or gastrointestinal bleeding, or are taking blood thinners, talk to your doctor before taking any medicine.  · Be careful if you are given a prescription medicines, narcotics, or medicine for muscle spasms. They can cause drowsiness, affect your coordination, reflexes, and judgement. Do not drive or operate heavy machinery.  Follow-up care  Follow up with your healthcare provider, or as advised.   A radiologist will review any X-rays that were taken. Your provide will notify you of any new findings that may affect your care.  Call 911  Call emergency services if any of the following occur:  · Trouble breathing  · Confusion  · Very drowsy or trouble awakening  · Fainting or loss of consciousness  · Rapid or very slow heart rate  · Loss of bowel or bladder control  When to seek medical advice  Call your healthcare provider right away if any of these occur:   · Pain becomes worse or spreads to your legs  · Weakness or numbness in one or both legs  · Numbness in the groin or genital area  Date Last Reviewed: 7/1/2016  © 4245-6175 [employer]. [address] [place] [address], [place] [address]. All rights reserved. This information is not intended as a substitute for professional medical care. Always follow your healthcare professional's instructions.

## 2020-07-30 NOTE — PROGRESS NOTES
"[facility]  Palliative Care Home Visit    Visit Date: 7/28/2020  Encounter Provider: Madai Perla NP  PCP:  Rosi Ramires MD    Subjective:      Patient ID: Pau Cook is a 89 y.o. female.    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      Pau is being seen today for a Palliative Care visit. Patient's daughter Kendra called me 4 days ago to report that her mother was complaining of lower back pain. She is unsure is the pain could be related to her cancer.     With the visit today, Pau is found in her apartment alone at [facility]. She is lying in bed on a heating pad. She is AAO x 3 with some forgetfulness but reports 1 week ago she bent down to pick something up off of the carpet and feels as though she pulled a muscle in her right lower back. She states that is she takes a deep breath is radiates to under her ribs.Tylenol and heat help the pain but it returns with movement and deep breathing. She denies any recent fall, no fevers, chills,  Vomiting or diarrhea. She reports that she has intermittent nausea chronically. Pau reports that she has been lying in bed more but does still get up frequently to ambulate because "I don't want to get to stiff". She reports that the pain is worse in the morning after she has been lying down all night.     Kendra reports that patient had an episode of sciatica a few months ago and that she still has an rx of Robaxin for her. She was instructed to give patient 1/2 tablet prn to see if any relief results. If patient is no better by Friday, she was recommended to follow up with PCP. Patient is very leary about leaving [facility] " "for an MD appointment because she thinks they will quarantine her to her room for 14 days in case she was exposed to COVID-19 in the community. Kendra states that this isn't true necessarily.          Pau reports that she had ovarian cancer in 2018 and had chemotherapy. She reports that recently it was found that she has liver and lung mets from the ovarian cancer. She does not want to have any further treatment for her cancer. She reports frequent nausea and decreased appetite over the last month.      She goes on to report that she fractured her left hip a few years ago and has difficulty with limitation in range of motion and has developed arthritis in the left leg that bothers her frequently. She states that her gait of not very steady at times because of her left leg issues. She reports trying to remember to use her rollator at all times but does forget. Denies any falls in the last 6 months.      VSS.  Reports taking all medications as prescribed. No other needs identified at this time. Risks of environmental exposure to coronavirus discussed including: social distancing, hand hygiene, and limiting departures from the home for necessities only.  Reports understanding and willingness to comply.          Goals of care are discussed with Pau today in person and her daughter June via telephone. Patient wishes to be a DNR and comfort care. She does not want any further treatments for her cancer. At this time she does not want Hospice services. Hospice benefits and care was discussed today potentially for the future. Patient wants to be as independent and as active as she can "until my time is up". HPOA and Living Will is present in [facility]. Patient feels as though she has filled out a [place]ost recently with her PCP.         Palliative Care Instructions:  - [facility] Nurse Practitioner to schedule home follow-up visit with patient in 4 weeks or as needed.  - Continue all medications, treatments and therapies as " ordered.   - Follow all instructions, recommendations as discussed.  - Maintain Safety Precautions at all times.  - Attend all medical appointments as scheduled.  - For worsening symptoms: call Primary Care Physician or Nurse Practitioner.  - For emergencies, call 911 or immediately report to the nearest emergency room.  - Limit Risks of environmental exposure to coronavirus/COVID-19 as discussed including: social distancing, hand hygiene, and limiting departures from the home for necessities only.   - Take 1/2 tablet of Robaxin twice a day/every 12 hours for back pain/spasm. Follow up with PCP if not better by 7/31/2020. Heating pad safety discussed-use low setting and for short periods of time.        Review of Systems   Constitutional: Negative for chills and fever. Positive for fatigue, decreased appetite, weight loss.  HENT: Negative for congestion and sore throat.  Positive for dry mouth.  Eyes: Negative for visual disturbance.   Respiratory: Negative for cough and shortness of breath.    Cardiovascular: Negative for chest pain.   Gastrointestinal: Negative for abdominal pain, constipation, diarrhea and vomiting. Positive for nausea.  Genitourinary: Negative for dysuria. Positive for urinary incontinence.  Musculoskeletal: Negative for joint swelling. Positive for back pain x 1 week.  Skin: Negative for rash and wound.   Neurological: Negative for dizziness and headaches.   Hematological: Does bruise/bleed easily.      Assessments:  · Environmental: patient lives at [facility] in her own apartment. Apartment is clean with adequate lighting and temperature.  · Functional Status: mostly independent. She has help with cleaning and someone helps her bathe and get dressed due to left hip limitations from an old fracture              Safety: fall risk  · Nutritional: has adequate access, reports appetite is decreased, daughter reports weight loss. Patient reports drinking 1 Ensure per day  for meal supplementation when she isn't nauseated.  · Home Health: No Home Health.  · DME: rollator, BSC, cane, wheelchair  ·    Symptom Assessment (ESAS 0-10 scale)      ESAS 0 1 2 3 4 5 6 7 8 9 10   Pain           x             Dyspnea x                       Anxiety x                       Nausea          x               Depression     x                     Anorexia    x                     Fatigue          x               Insomnia x                       Restlessness   x                       Agitation  x                             Constipation    yes  Bowel Management Plan (BMP): yes  Diarrhea        no  Comments: Usually has BM qod, takes Miralax and stool softener daily     Performance Status: PPS Score 50  Karnofsky Score:  50  EGOC:  3     History:  Past Medical History:   Diagnosis Date    Allergy     Arthritis     Breast cyst     Closed intertrochanteric fracture of hip, left, initial encounter     Foot pain     GERD (gastroesophageal reflux disease)     Joint pain     Ovarian cancer     Peripheral vascular disease 1/15/2018    Squamous cell carcinoma 04/2016    Right Lower Leg    MUNIR (stress urinary incontinence, female) 4/17/2017     Family History   Adopted: Yes   Problem Relation Age of Onset    Cancer Mother     Stroke Father     Breast cancer Sister     Cancer Sister     No Known Problems Daughter     No Known Problems Brother     Cancer Daughter     Diabetes Brother     Colon cancer Neg Hx     Ovarian cancer Neg Hx      Past Surgical History:   Procedure Laterality Date    APPENDECTOMY      COLON SURGERY      removed about 10 inch    COLONOSCOPY N/A 12/24/2018    Procedure: COLONOSCOPY;  Surgeon: Nirmal Davila MD;  Location: [facility];  Service: Endoscopy;  Laterality: N/A;    FINGER MASS EXCISION Right 7/9/2018    Procedure: EXCISION, MASS, FINGER;  Surgeon: Fabio Seay MD;  Location: [facility] OR;  Service: Orthopedics;  Laterality: Right;    hemorhids       HYSTERECTOMY      LYSIS OF ADHESIONS N/A 12/28/2018    Procedure: LYSIS, ADHESIONS;  Surgeon: Amee Rose MD;  Location: [facility] OR;  Service: OB/GYN;  Laterality: N/A;    OPEN REDUCTION AND INTERNAL FIXATION (ORIF) OF INTERTROCHANTERIC FRACTURE OF FEMUR Left 5/31/2019    Procedure: ORIF, FRACTURE, FEMUR, INTERTROCHANTERIC;  Surgeon: Bacilio Lockwood II, MD;  Location: [facility] OR;  Service: Orthopedics;  Laterality: Left;    ROBOT-ASSISTED LAPAROSCOPIC ABDOMINAL HYSTERECTOMY USING DA MARK XI N/A 12/28/2018    Procedure: XI ROBOTIC HYSTERECTOMY;  Surgeon: Amee Rose MD;  Location: [facility] OR;  Service: OB/GYN;  Laterality: N/A;  attempted, opened    ROBOT-ASSISTED LAPAROSCOPIC SALPINGO-OOPHORECTOMY USING DA MARK XI Bilateral 12/28/2018    Procedure: XI ROBOTIC SALPINGO-OOPHORECTOMY;  Surgeon: Amee Rose MD;  Location: [facility] OR;  Service: OB/GYN;  Laterality: Bilateral;  attmepted, opened    TOTAL ABDOMINAL HYSTERECTOMY W/ BILATERAL SALPINGOOPHORECTOMY Bilateral 12/28/2018    Procedure: HYSTERECTOMY, TOTAL, ABDOMINAL, WITH BILATERAL SALPINGO-OOPHORECTOMY;  Surgeon: Amee Rose MD;  Location: [facility] OR;  Service: OB/GYN;  Laterality: Bilateral;     Review of patient's allergies indicates:   Allergen Reactions    Pcn [penicillins]      Can not remember reaction     Tetanus vaccines and toxoid Swelling     Localized swelling to injection site       Medications:    Current Outpatient Medications:     acetaminophen (TYLENOL) 500 MG tablet, Take 2 tablets (1,000 mg total) by mouth every 8 (eight) hours., Disp: , Rfl: 0    bisacodyl (DULCOLAX) 5 mg EC tablet, Take 5 mg by mouth daily as needed for Constipation., Disp: , Rfl:     calcium-vitamin D3 (CALCIUM 500 + D) 500 mg(1,250mg) -200 unit per tablet, Take 1 tablet by mouth once daily. , Disp: , Rfl:     carboxymethylcellulose (REFRESH PLUS) 0.5 % Dpet, 1 drop daily as needed., Disp: , Rfl:     cyanocobalamin (VITAMIN B-12) 1000 MCG tablet, Take 500  mcg by mouth once daily. , Disp: , Rfl:     docusate sodium (COLACE) 100 MG capsule, Take 100 mg by mouth daily as needed for Constipation., Disp: , Rfl:     esomeprazole (NEXIUM) 20 MG capsule, Take 1 capsule (20 mg total) by mouth before breakfast., Disp: 90 capsule, Rfl: 3    famotidine (PEPCID) 20 MG tablet, Take 1 tablet (20 mg total) by mouth 2 (two) times daily., Disp: 60 tablet, Rfl: 3    FLAXSEED ORAL, Take 650 mg by mouth once daily. , Disp: , Rfl:     ketotifen (ZADITOR) 0.025 % (0.035 %) ophthalmic solution, Place 1 drop into both eyes 2 (two) times daily as needed (Pt reports use approx once/week). , Disp: , Rfl:     MAGNESIUM ASPARTATE HCL ORAL, Take by mouth., Disp: , Rfl:     MULTIVITAMIN W-MINERALS/LUTEIN (CENTRUM SILVER ORAL), Take 1 tablet by mouth once daily. , Disp: , Rfl:     ondansetron (ZOFRAN) 8 MG tablet, Take 1 tablet (8 mg total) by mouth every 6 (six) hours as needed., Disp: 30 tablet, Rfl: 3    ondansetron (ZOFRAN-ODT) 4 MG TbDL, Take 8 mg by mouth every 8 (eight) hours as needed., Disp: , Rfl:     polyethylene glycol (GLYCOLAX) 17 gram PwPk, Take 17 g by mouth once daily., Disp: 30 each, Rfl: 5    pregabalin (LYRICA) 75 MG capsule, Take 75 mg by mouth 2 (two) times daily., Disp: , Rfl:     dexAMETHasone (DECADRON) 4 MG Tab, Take one tab po BID the day before chemo and then for two days after chemo (Patient not taking: Reported on 6/12/2020), Disp: 12 tablet, Rfl: 0    FLUAD [id number], 65 YR UP,,PF, 45 mcg (15 mcg x 3)/0.5 mL Syrg, ADM 0.5ML IM UTD, Disp: , Rfl: 0    methocarbamoL (ROBAXIN) 500 MG Tab, Take 1 tablet (500 mg total) by mouth every 12 (twelve) hours as needed (possible muscle spasms take 1/2 tablet)., Disp: 30 tablet, Rfl: 0    prochlorperazine (COMPAZINE) 10 MG tablet, Take 1 tablet (10 mg total) by mouth every 6 (six) hours as needed. (Patient not taking: Reported on 7/28/2020), Disp: 30 tablet, Rfl: 1    sucralfate (CARAFATE) 1 gram tablet, Take 1 tablet  (1 g total) by mouth 4 (four) times daily. (Patient not taking: Reported on 6/29/2020), Disp: 40 tablet, Rfl: 0    24h Oral Morphine Equivalents (OME):  N/A    Objective:     Physical Exam:  Vitals:    07/28/20 1032   BP: 130/72   Pulse: 70   Resp: 16   Temp: 98.1 °F (36.7 °C)   TempSrc: Temporal   SpO2: 97%   Weight: 49.9 kg (110 lb)   PainSc:   4   PainLoc: Back     Body mass index is 19.49 kg/m².    Physical Exam  Constitutional:       General: She is not in acute distress.     Appearance: Thin, frail, elderly female.  HENT:      Head: Normocephalic and atraumatic.      Right Ear: Hearing and external ear normal.      Left Ear: Hearing and external ear normal.   Eyes:      General: Lids are normal.      Conjunctiva/sclera: Conjunctivae normal.   Cardiovascular:      Rate and Rhythm: Normal rate and regular rhythm.      Pulses: Normal pulses.      Heart sounds: Normal heart sounds.   Pulmonary:      Effort: Pulmonary effort is normal.      Breath sounds: Normal breath sounds.   Abdominal:      General: Bowel sounds are normal.      Palpations: Abdomen is soft.      Tenderness: There is no abdominal tenderness.   Musculoskeletal: Normal range of motion. Thoracic kyphosis. Unsteady gait. + lumbar paraspinal tenderness on right upon palpation.  Skin:     General: Skin is warm. Intact.     Findings: No rash.   Neurological:      Mental Status: She is alert and oriented x 3 with some forgetfulness.   Psychiatric:         Speech: Speech normal.         Behavior: Behavior normal. Behavior is cooperative. She is tearful at times discussing the recent death of her daughter.     Lower back is red from lying on heating pad- no blistering noted.                   Labs:  CBC:   WBC   Date Value Ref Range Status   06/08/2020 8.25 3.90 - 12.70 K/uL Final       Hemoglobin   Date Value Ref Range Status   06/08/2020 13.4 12.0 - 16.0 g/dL Final       Hematocrit   Date Value Ref Range Status   06/08/2020 41.4 37.0 - 48.5 % Final        Mean Corpuscular Volume   Date Value Ref Range Status   06/08/2020 92 82 - 98 fL Final       Platelets   Date Value Ref Range Status   06/08/2020 277 150 - 350 K/uL Final       LFT:   Lab Results   Component Value Date    AST 40 06/08/2020    ALKPHOS 137 (H) 06/08/2020    BILITOT 0.8 06/08/2020       Albumin:   Albumin   Date Value Ref Range Status   06/08/2020 4.3 3.5 - 5.2 g/dL Final     Protein:   Total Protein   Date Value Ref Range Status   06/08/2020 7.7 6.0 - 8.4 g/dL Final       Radiology:  I have reviewed all pertinent imaging results/findings within the past 24 hours.    Psychosocial/Cultural/Spiritual:   · F- Charlee and Belief:  Non-Congregational   · I - Importance: very important  · C - Community: cannot remember name of [employer], on [address] in [place]  · A - Address in Care: no issues     Ethical / Legal: Advance Care Planning   · Surrogate decision maker:  Name Kendra Veronica, Relationship: Daughter  · Code Status:  DNR  · [place]:  pt believes she filled this out recently with PCP  · Other advance directive:  HPOA, Living will Capacity to make medical decisions: intact Conflict: none    Assessment:     1. Palliative care encounter    2. Malignant neoplasm of ovary, unspecified laterality    3. Lives in assisted living facility    4. Grief at loss of child    5. Malignant neoplasm metastatic to lung, unspecified laterality    6. Liver metastases    7. Acute back pain, unspecified back location, unspecified back pain laterality        Plan:   Pau NELSON was seen today for follow-up and low-back pain.    Diagnoses and all orders for this visit:    Palliative care encounter    Malignant neoplasm of ovary, unspecified laterality  -     Ambulatory referral/consult to [facility]    Lives in assisted living facility    Grief at loss of child    Malignant neoplasm metastatic to lung, unspecified laterality    Liver metastases    Acute back pain, unspecified back  location, unspecified back pain laterality  -     methocarbamoL (ROBAXIN) 500 MG Tab; Take 1 tablet (500 mg total) by mouth every 12 (twelve) hours as needed (possible muscle spasms take 1/2 tablet).    Time allowed for questions, all questions answered. [facility] contact information left with patient today for any future concerns.    Were controlled substances prescribed?  No    60 min visit spent in chart review, face to face discussion of goals of care with patient in person and daughter by phone both prior to and after visit,  symptom assessment, medication recommendation, communication with PCP, fall precautions, coordination of care and emotional support.    Follow Up Appointments:   Future Appointments   Date Time Provider Department Center   7/31/2020  9:00 AM Teofilo Payne NP [facility] [place]   9/11/2020  9:20 AM Rosi Ramires MD [facility] [place]   Verbal consent for visit obtained from patient and her daughter June today.    Signature:  Madai Perla NP     Attestation:   Screening criteria to assess the level of the patient's risk for infection with COVID-19 as recommended by the CDC at the time of the above documented home visit concluded appropriateness to proceed. Universal precautions were maintained at all times, including provider wearing a mask and gloves during entire visit and use of 60% alcohol gel hand  immediately prior to entry and upon departure of patient's home as well as cleaning of equipment used in home visit with antibacterial/germicidal disposable wipes.

## 2020-07-31 ENCOUNTER — OFFICE VISIT (OUTPATIENT)
Dept: FAMILY MEDICINE | Facility: CLINIC | Age: 85
End: 2020-07-31
Payer: MEDICARE

## 2020-07-31 ENCOUNTER — TELEPHONE (OUTPATIENT)
Dept: FAMILY MEDICINE | Facility: CLINIC | Age: 85
End: 2020-07-31

## 2020-07-31 ENCOUNTER — HOSPITAL ENCOUNTER (OUTPATIENT)
Dept: RADIOLOGY | Facility: CLINIC | Age: 85
Discharge: HOME | End: 2020-07-31
Attending: NURSE PRACTITIONER
Payer: MEDICARE

## 2020-07-31 VITALS
HEIGHT: 63 IN | DIASTOLIC BLOOD PRESSURE: 68 MMHG | BODY MASS INDEX: 20.23 KG/M2 | HEART RATE: 71 BPM | SYSTOLIC BLOOD PRESSURE: 138 MMHG | TEMPERATURE: 98 F | WEIGHT: 114.19 LBS

## 2020-07-31 DIAGNOSIS — M54.50 ACUTE BILATERAL LOW BACK PAIN WITHOUT SCIATICA: ICD-10-CM

## 2020-07-31 DIAGNOSIS — R10.11 RUQ ABDOMINAL PAIN: ICD-10-CM

## 2020-07-31 DIAGNOSIS — R10.12 LUQ ABDOMINAL PAIN: ICD-10-CM

## 2020-07-31 DIAGNOSIS — M54.6 THORACIC BACK PAIN, UNSPECIFIED BACK PAIN LATERALITY, UNSPECIFIED CHRONICITY: ICD-10-CM

## 2020-07-31 DIAGNOSIS — R10.11 RUQ ABDOMINAL PAIN: Primary | ICD-10-CM

## 2020-07-31 PROCEDURE — 1101F PT FALLS ASSESS-DOCD LE1/YR: CPT | Mod: S$GLB,,, | Performed by: NURSE PRACTITIONER

## 2020-07-31 PROCEDURE — 99214 PR OFFICE/OUTPT VISIT, EST, LEVL IV, 30-39 MIN: ICD-10-PCS | Mod: S$GLB,,, | Performed by: NURSE PRACTITIONER

## 2020-07-31 PROCEDURE — 74019 RADEX ABDOMEN 2 VIEWS: CPT | Mod: 26,,, | Performed by: RADIOLOGY

## 2020-07-31 PROCEDURE — 74019 RADEX ABDOMEN 2 VIEWS: CPT | Mod: TC,FY,PO

## 2020-07-31 PROCEDURE — 1159F PR MEDICATION LIST DOCUMENTED IN MEDICAL RECORD: ICD-10-PCS | Mod: S$GLB,,, | Performed by: NURSE PRACTITIONER

## 2020-07-31 PROCEDURE — 74019 XR ABDOMEN FLAT AND ERECT: ICD-10-PCS | Mod: 26,,, | Performed by: RADIOLOGY

## 2020-07-31 PROCEDURE — 99214 OFFICE O/P EST MOD 30 MIN: CPT | Mod: S$GLB,,, | Performed by: NURSE PRACTITIONER

## 2020-07-31 PROCEDURE — 99999 PR PBB SHADOW E&M-EST. PATIENT-LVL V: CPT | Mod: PBBFAC,,, | Performed by: NURSE PRACTITIONER

## 2020-07-31 PROCEDURE — 76700 US EXAM ABDOM COMPLETE: CPT | Mod: 26,,, | Performed by: RADIOLOGY

## 2020-07-31 PROCEDURE — 1125F PR PAIN SEVERITY QUANTIFIED, PAIN PRESENT: ICD-10-PCS | Mod: S$GLB,,, | Performed by: NURSE PRACTITIONER

## 2020-07-31 PROCEDURE — 76700 US EXAM ABDOM COMPLETE: CPT | Mod: TC,PO

## 2020-07-31 PROCEDURE — 99999 PR PBB SHADOW E&M-EST. PATIENT-LVL V: ICD-10-PCS | Mod: PBBFAC,,, | Performed by: NURSE PRACTITIONER

## 2020-07-31 PROCEDURE — 1125F AMNT PAIN NOTED PAIN PRSNT: CPT | Mod: S$GLB,,, | Performed by: NURSE PRACTITIONER

## 2020-07-31 PROCEDURE — 1159F MED LIST DOCD IN RCRD: CPT | Mod: S$GLB,,, | Performed by: NURSE PRACTITIONER

## 2020-07-31 PROCEDURE — 1101F PR PT FALLS ASSESS DOC 0-1 FALLS W/OUT INJ PAST YR: ICD-10-PCS | Mod: S$GLB,,, | Performed by: NURSE PRACTITIONER

## 2020-07-31 PROCEDURE — 76700 US ABDOMEN COMPLETE: ICD-10-PCS | Mod: 26,,, | Performed by: RADIOLOGY

## 2020-07-31 RX ORDER — CELECOXIB 100 MG/1
100 CAPSULE ORAL DAILY
Qty: 30 CAPSULE | Refills: 0 | Status: SHIPPED | OUTPATIENT
Start: 2020-07-31

## 2020-07-31 NOTE — TELEPHONE ENCOUNTER
Daughter Kendra has been informed. She will discuss with the patient and call back to let us know if they will need a referral.

## 2020-07-31 NOTE — TELEPHONE ENCOUNTER
No acute fracture noted.  Abdominal x-ray fairly unremarkable except for known chronic problems such as scoliosis.  No bowel obstruction noted.  Ultrasound does note gallstones.  We can refer her to a surgeon for evaluation.  I  know she has an extensive history and may not be interested in gallbladder surgery however this could be a source for her pain.  She can let us know.  It is not currently emergent however for abdominal pain worsens going to the ER is acceptable.  Route back to me if she wants to see a surgeon I will place referral.

## 2020-07-31 NOTE — PROGRESS NOTES
This dictation has been generated using Modal Fluency Dictation some phonetic errors may occur. Please contact author for clarification if needed.     Problem List Items Addressed This Visit     Acute back pain      Other Visit Diagnoses     RUQ abdominal pain    -  Primary    Relevant Orders    X-Ray Abdomen Flat And Erect    US Abdomen Complete    Urinalysis    US Abdomen Complete    LUQ abdominal pain        Relevant Orders    X-Ray Abdomen Flat And Erect    US Abdomen Complete    Urinalysis    US Abdomen Complete    Thoracic back pain, unspecified back pain laterality, unspecified chronicity            Orders Placed This Encounter    X-Ray Abdomen Flat And Erect    US Abdomen Complete    US Abdomen Complete    Urinalysis    celecoxib (CELEBREX) 100 MG capsule     Abdomen rib and back pain check x-ray ultrasound as above.  Urinalysis as above rule out urinary tract involvement.  Brief Celebrex use as above for musculoskeletal pain.    No follow-ups on file.  ________________________________________________________________  ________________________________________________________________      Chief Complaint   Patient presents with    RIB CAGE     History of present illness  This 89 y.o. presents today for complaint of abdomen rib pain.  Onset of symptoms a week ago Tuesday.  She notes that she bent over to  something off of the floor and experience upper abdominal pain under the ribs.  She has also had some low back and thoracic pain with radiation to the abdomen.  Patient denies presence of rash.  No bowel or bladder changes.  Cancer history reviewed with patient.  Osteoporosis reviewed with patient.  Scoliosis reviewed with patient.  Review of systems  No fever chills  No nausea vomiting diarrhea new incontinence    Past medical social surgical history reviewed  Past Medical History:   Diagnosis Date    Allergy     Arthritis     Breast cyst     Closed intertrochanteric fracture of hip, left,  initial encounter     Foot pain     GERD (gastroesophageal reflux disease)     Joint pain     Ovarian cancer     Peripheral vascular disease 1/15/2018    Squamous cell carcinoma 04/2016    Right Lower Leg    MUNIR (stress urinary incontinence, female) 4/17/2017       Past Surgical History:   Procedure Laterality Date    APPENDECTOMY      COLON SURGERY      removed about 10 inch    COLONOSCOPY N/A 12/24/2018    Procedure: COLONOSCOPY;  Surgeon: Nirmal Davila MD;  Location: [facility];  Service: Endoscopy;  Laterality: N/A;    FINGER MASS EXCISION Right 7/9/2018    Procedure: EXCISION, MASS, FINGER;  Surgeon: Fabio Seay MD;  Location: [facility] OR;  Service: Orthopedics;  Laterality: Right;    hemorhids      HYSTERECTOMY      LYSIS OF ADHESIONS N/A 12/28/2018    Procedure: LYSIS, ADHESIONS;  Surgeon: Amee Rose MD;  Location: [facility];  Service: OB/GYN;  Laterality: N/A;    OPEN REDUCTION AND INTERNAL FIXATION (ORIF) OF INTERTROCHANTERIC FRACTURE OF FEMUR Left 5/31/2019    Procedure: ORIF, FRACTURE, FEMUR, INTERTROCHANTERIC;  Surgeon: Bacilio Lockwood II, MD;  Location: [facility] OR;  Service: Orthopedics;  Laterality: Left;    ROBOT-ASSISTED LAPAROSCOPIC ABDOMINAL HYSTERECTOMY USING DA MARK XI N/A 12/28/2018    Procedure: XI ROBOTIC HYSTERECTOMY;  Surgeon: Amee Rose MD;  Location: [facility];  Service: OB/GYN;  Laterality: N/A;  attempted, opened    ROBOT-ASSISTED LAPAROSCOPIC SALPINGO-OOPHORECTOMY USING DA MARK XI Bilateral 12/28/2018    Procedure: XI ROBOTIC SALPINGO-OOPHORECTOMY;  Surgeon: Amee Rose MD;  Location: [facility];  Service: OB/GYN;  Laterality: Bilateral;  attmepted, opened    TOTAL ABDOMINAL HYSTERECTOMY W/ BILATERAL SALPINGOOPHORECTOMY Bilateral 12/28/2018    Procedure: HYSTERECTOMY, TOTAL, ABDOMINAL, WITH BILATERAL SALPINGO-OOPHORECTOMY;  Surgeon: Amee Rose MD;  Location: [facility];  Service: OB/GYN;  Laterality: Bilateral;       Family History   Adopted: Yes    Problem Relation Age of Onset    Cancer Mother     Stroke Father     Breast cancer Sister     Cancer Sister     No Known Problems Daughter     No Known Problems Brother     Cancer Daughter     Diabetes Brother     Colon cancer Neg Hx     Ovarian cancer Neg Hx        Social History     Socioeconomic History    Marital status:      Spouse name: Tegan    Number of children: 1    Years of education: Not on file    Highest education level: Not on file   Occupational History    Occupation: retired     Comment: bookkeeping for [employer]   Social Needs    Financial resource strain: Not on file    Food insecurity     Worry: Not on file     Inability: Not on file    Transportation needs     Medical: Not on file     Non-medical: Not on file   Tobacco Use    Smoking status: Never Smoker    Smokeless tobacco: Never Used   Substance and Sexual Activity    Alcohol use: No     Alcohol/week: 0.0 standard drinks     Comment: rare    Drug use: No    Sexual activity: Not Currently   Lifestyle    Physical activity     Days per week: Not on file     Minutes per session: Not on file    Stress: Not on file   Relationships    Social connections     Talks on phone: Not on file     Gets together: Not on file     Attends [employer]oration[employer] service: Not on file     Active member of club or organization: Not on file     Attends meetings of clubs or organizations: Not on file     Relationship status: Not on file   Other Topics Concern    Are you pregnant or think you may be? No    Breast-feeding No   Social History Narrative    Lives at [facility] independently since 12/2018.        Current Outpatient Medications   Medication Sig Dispense Refill    acetaminophen (TYLENOL) 500 MG tablet Take 2 tablets (1,000 mg total) by mouth every 8 (eight) hours.  0    bisacodyl (DULCOLAX) 5 mg EC tablet Take 5 mg by mouth daily as needed for Constipation.      calcium-vitamin D3 (CALCIUM 500 + D) 500 mg(1,250mg) -200  unit per tablet Take 1 tablet by mouth once daily.       carboxymethylcellulose (REFRESH PLUS) 0.5 % Dpet 1 drop daily as needed.      cyanocobalamin (VITAMIN B-12) 1000 MCG tablet Take 500 mcg by mouth once daily.       esomeprazole (NEXIUM) 20 MG capsule Take 1 capsule (20 mg total) by mouth before breakfast. 90 capsule 3    famotidine (PEPCID) 20 MG tablet Take 1 tablet (20 mg total) by mouth 2 (two) times daily. 60 tablet 3    MAGNESIUM ASPARTATE HCL ORAL Take by mouth.      MULTIVITAMIN W-MINERALS/LUTEIN (CENTRUM SILVER ORAL) Take 1 tablet by mouth once daily.       celecoxib (CELEBREX) 100 MG capsule Take 1 capsule (100 mg total) by mouth once daily. 30 capsule 0    dexAMETHasone (DECADRON) 4 MG Tab Take one tab po BID the day before chemo and then for two days after chemo (Patient not taking: Reported on 6/12/2020) 12 tablet 0    docusate sodium (COLACE) 100 MG capsule Take 100 mg by mouth daily as needed for Constipation.      FLAXSEED ORAL Take 650 mg by mouth once daily.       FLUAD 1241-0740, 65 YR UP,,PF, 45 mcg (15 mcg x 3)/0.5 mL Syrg ADM 0.5ML IM UTD  0    ketotifen (ZADITOR) 0.025 % (0.035 %) ophthalmic solution Place 1 drop into both eyes 2 (two) times daily as needed (Pt reports use approx once/week).       methocarbamoL (ROBAXIN) 500 MG Tab Take 1 tablet (500 mg total) by mouth every 12 (twelve) hours as needed (possible muscle spasms take 1/2 tablet). 30 tablet 0    ondansetron (ZOFRAN) 8 MG tablet Take 1 tablet (8 mg total) by mouth every 6 (six) hours as needed. 30 tablet 3    ondansetron (ZOFRAN-ODT) 4 MG TbDL Take 8 mg by mouth every 8 (eight) hours as needed.      polyethylene glycol (GLYCOLAX) 17 gram PwPk Take 17 g by mouth once daily. 30 each 5    pregabalin (LYRICA) 75 MG capsule Take 75 mg by mouth 2 (two) times daily.      prochlorperazine (COMPAZINE) 10 MG tablet Take 1 tablet (10 mg total) by mouth every 6 (six) hours as needed. (Patient not taking: Reported on  7/28/2020) 30 tablet 1    sucralfate (CARAFATE) 1 gram tablet Take 1 tablet (1 g total) by mouth 4 (four) times daily. (Patient not taking: Reported on 6/29/2020) 40 tablet 0     No current facility-administered medications for this visit.        Review of patient's allergies indicates:   Allergen Reactions    Pcn [penicillins]      Can not remember reaction     Tetanus vaccines and toxoid Swelling     Localized swelling to injection site       Physical examination  Vitals Reviewed  Gen. Well-dressed well-nourished   Skin warm dry and intact.  No rashes noted.  Neck is supple without adenopathy  Chest.  Respirations are even unlabored.  Lungs are clear to auscultation.  Cardiac regular rate and rhythm.  No chest wall adenopathy noted.  Abdomen is soft and not distended.  Bowel sounds are present.  ++ tenderness during palpation of the RUQ abdomen.  No Hepatosplenomegaly noted.  No hernia noted.  No CVA tenderness to percussion.    Neuro. Awake alert oriented x4.  Normal judgment and cognition noted.  Extremities no clubbing cyanosis or edema noted.     Call or return to clinic prn if these symptoms worsen or fail to improve as anticipated.

## 2020-08-02 RX ORDER — CIPROFLOXACIN 500 MG/1
500 TABLET ORAL EVERY 12 HOURS
Qty: 20 TABLET | Refills: 0 | Status: SHIPPED | OUTPATIENT
Start: 2020-08-02 | End: 2020-08-12

## 2020-08-03 ENCOUNTER — TELEPHONE (OUTPATIENT)
Dept: FAMILY MEDICINE | Facility: CLINIC | Age: 85
End: 2020-08-03

## 2020-08-03 LAB — SARS-COV-2 RNA RESP QL NAA+PROBE: NORMAL

## 2020-08-03 NOTE — TELEPHONE ENCOUNTER
----- Message from Lily Roach sent at 8/3/2020  9:48 AM CDT -----  Contact: June at [phone]  Type:  Patient Returning Call    Who Called:  Pt's daughter June  Who Left Message for Patient:  not sure  Does the patient know what this is regarding?:  yes  Best Call Back Number:[phone]

## 2020-08-17 ENCOUNTER — CARE AT HOME (OUTPATIENT)
Dept: HOME HEALTH SERVICES | Facility: CLINIC | Age: 85
End: 2020-08-17
Payer: MEDICARE

## 2020-08-17 VITALS
BODY MASS INDEX: 19.49 KG/M2 | HEART RATE: 98 BPM | WEIGHT: 110 LBS | DIASTOLIC BLOOD PRESSURE: 68 MMHG | OXYGEN SATURATION: 98 % | TEMPERATURE: 99 F | RESPIRATION RATE: 16 BRPM | SYSTOLIC BLOOD PRESSURE: 120 MMHG

## 2020-08-17 DIAGNOSIS — Z91.81 RISK FOR FALLS: ICD-10-CM

## 2020-08-17 DIAGNOSIS — C78.7 LIVER METASTASES: ICD-10-CM

## 2020-08-17 DIAGNOSIS — C56.9 MALIGNANT NEOPLASM OF OVARY, UNSPECIFIED LATERALITY: Primary | ICD-10-CM

## 2020-08-17 DIAGNOSIS — K59.00 CONSTIPATION, UNSPECIFIED CONSTIPATION TYPE: ICD-10-CM

## 2020-08-17 DIAGNOSIS — Z51.5 PALLIATIVE CARE ENCOUNTER: ICD-10-CM

## 2020-08-17 DIAGNOSIS — M15.9 PRIMARY OSTEOARTHRITIS INVOLVING MULTIPLE JOINTS: ICD-10-CM

## 2020-08-17 DIAGNOSIS — C78.00 MALIGNANT NEOPLASM METASTATIC TO LUNG, UNSPECIFIED LATERALITY: ICD-10-CM

## 2020-08-17 DIAGNOSIS — Z87.440 HISTORY OF UTI: ICD-10-CM

## 2020-08-17 PROCEDURE — 99350 PR HOME VISIT,ESTAB PATIENT,LEVEL IV: ICD-10-PCS | Mod: ,,, | Performed by: NURSE PRACTITIONER

## 2020-08-17 PROCEDURE — 99350 HOME/RES VST EST HIGH MDM 60: CPT | Mod: ,,, | Performed by: NURSE PRACTITIONER

## 2020-08-17 NOTE — PROGRESS NOTES
[facility]  Palliative Care Home Visit    Visit Date: 8/17/2020  Encounter Provider: Madai ePrla NP  PCP:  Rosi Ramires MD    Subjective:      Patient ID: Pau Cook is a 89 y.o. female.    Consult Requested By:  No ref. 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      Pau is being seen today for a Palliative Care follow up visit. With the visit today, Pau is found in her apartment alone at [facility]. She is ambulating throughout her apartment using her rollator. Her gait is unsteady and slow. She reports that her back and right flank pain that was present on last visit has nearly resolved and her only complaint is generalized arthritis pain in her shoulders and hips. She was found to have further metastasis in her liver as well as gallstones and a UTI upon work up for her right flank pain. She did not desire any further follow up for the gallstones or liver metastasis. She has completed Cipro for UTI and has not urinary complaints.     Fatigue and constipation are also a complaint today. She reports sleeping more lately during the day despite sleeping all night. Progression of disease discussed as possible cause. She was encouraged to increase her water and fiber intake to help reduce constipation and voices willingness to do so.    Activity as tolerated and safety discussed to help with osteoarthritis pain. Use of topical creams could also be effective.    VSS.  Reports taking all medications as prescribed. No other needs identified at this time. Risks of environmental exposure to coronavirus discussed including: social distancing, hand hygiene, and limiting departures from the home  "for necessities only.  Reports understanding and willingness to comply.          Goals of care are discussed with Pau today in person and her daughter June via telephone on past visit. Patient wishes to be a DNR and comfort care. She does not want any further treatments for her cancer. At this time she does not want Hospice services. Hospice benefits and care has been discussed for the future. Patient wants to be as independent and as active as she can "until my time is up". HPOA and Living Will is present in [employer]. Patient feels as though she has filled out a Lapost recently with her PCP.         Palliative Care Instructions:  - [facility] to schedule home follow-up visit with patient in 4 weeks or as needed.  - Continue all medications, treatments and therapies as ordered.   - Follow all instructions, recommendations as discussed.  - Maintain Safety Precautions at all times.  - Attend all medical appointments as scheduled.  - For worsening symptoms: call Primary Care Physician or Nurse Practitioner.  - For emergencies, call 911 or immediately report to the nearest emergency room.  - Limit Risks of environmental exposure to coronavirus/COVID-19 as discussed including: social distancing, hand hygiene, and limiting departures from the home for necessities only.   - Increase fiber and water intake to help with constipation.         Review of Systems   Constitutional: Negative for chills and fever. Positive for fatigue, decreased appetite, weight loss.  HENT: Negative for congestion and sore throat.  Positive for dry mouth.  Eyes: Negative for visual disturbance.   Respiratory: Negative for cough and shortness of breath.    Cardiovascular: Negative for chest pain.   Gastrointestinal: Negative for abdominal pain, constipation, diarrhea and vomiting. Positive for nausea.  Genitourinary: Negative for dysuria. Positive for urinary incontinence.  Musculoskeletal: Negative for joint swelling.   Skin: " Negative for rash and wound.   Neurological: Negative for dizziness and headaches.   Hematological: Does bruise/bleed easily.      Assessments:  · Environmental: patient lives at [facility] in her own apartment. Apartment is clean with adequate lighting and temperature.  · Functional Status: mostly independent. She has help with cleaning and someone helps her bathe and get dressed due to left hip limitations from an old fracture              Safety: fall risk  · Nutritional: has adequate access, reports appetite is decreased, daughter reports weight loss. Patient reports drinking 1 Ensure per day for meal supplementation when she isn't nauseated.  · Home Health: No Home Health.  · DME: rollator, BSC, cane, wheelchair  ·    Symptom Assessment (ESAS 0-10 scale)      ESAS 0 1 2 3 4 5 6 7 8 9 10   Pain        x                 Dyspnea x                       Anxiety x                       Nausea      x                   Depression     x                     Anorexia    x                     Fatigue          x               Insomnia x                       Restlessness   x                       Agitation  x                             Constipation    yes  Bowel Management Plan (BMP): yes  Diarrhea        no  Comments: Usually has BM qod, takes Miralax and stool softener daily     Performance Status: PPS Score 50  Karnofsky Score:  50  EGOC:  3    History:  Past Medical History:   Diagnosis Date    Allergy     Arthritis     Breast cyst     Closed intertrochanteric fracture of hip, left, initial encounter     Foot pain     GERD (gastroesophageal reflux disease)     Joint pain     Ovarian cancer     Peripheral vascular disease 1/15/2018    Squamous cell carcinoma 04/2016    Right Lower Leg    MUNIR (stress urinary incontinence, female) 4/17/2017     Family History   Adopted: Yes   Problem Relation Age of Onset    Cancer Mother     Stroke Father     Breast cancer Sister     Cancer  Sister     No Known Problems Daughter     No Known Problems Brother     Cancer Daughter     Diabetes Brother     Colon cancer Neg Hx     Ovarian cancer Neg Hx      Past Surgical History:   Procedure Laterality Date    APPENDECTOMY      COLON SURGERY      removed about 10 inch    COLONOSCOPY N/A 12/24/2018    Procedure: COLONOSCOPY;  Surgeon: Nirmal Davila MD;  Location: [facility];  Service: Endoscopy;  Laterality: N/A;    FINGER MASS EXCISION Right 7/9/2018    Procedure: EXCISION, MASS, FINGER;  Surgeon: Fabio Seay MD;  Location: [facility] OR;  Service: Orthopedics;  Laterality: Right;    hemorhids      HYSTERECTOMY      LYSIS OF ADHESIONS N/A 12/28/2018    Procedure: LYSIS, ADHESIONS;  Surgeon: Amee Rose MD;  Location: [facility] OR;  Service: OB/GYN;  Laterality: N/A;    OPEN REDUCTION AND INTERNAL FIXATION (ORIF) OF INTERTROCHANTERIC FRACTURE OF FEMUR Left 5/31/2019    Procedure: ORIF, FRACTURE, FEMUR, INTERTROCHANTERIC;  Surgeon: Bacilio Lockwood II, MD;  Location: [facility] OR;  Service: Orthopedics;  Laterality: Left;    ROBOT-ASSISTED LAPAROSCOPIC ABDOMINAL HYSTERECTOMY USING DA MARK XI N/A 12/28/2018    Procedure: XI ROBOTIC HYSTERECTOMY;  Surgeon: Amee Rose MD;  Location: [facility] OR;  Service: OB/GYN;  Laterality: N/A;  attempted, opened    ROBOT-ASSISTED LAPAROSCOPIC SALPINGO-OOPHORECTOMY USING DA MARK XI Bilateral 12/28/2018    Procedure: XI ROBOTIC SALPINGO-OOPHORECTOMY;  Surgeon: Amee Rose MD;  Location: [facility] OR;  Service: OB/GYN;  Laterality: Bilateral;  attmepted, opened    TOTAL ABDOMINAL HYSTERECTOMY W/ BILATERAL SALPINGOOPHORECTOMY Bilateral 12/28/2018    Procedure: HYSTERECTOMY, TOTAL, ABDOMINAL, WITH BILATERAL SALPINGO-OOPHORECTOMY;  Surgeon: Amee Rose MD;  Location: [facility] OR;  Service: OB/GYN;  Laterality: Bilateral;     Review of patient's allergies indicates:   Allergen Reactions    Pcn [penicillins]      Can not remember reaction     Tetanus vaccines  and toxoid Swelling     Localized swelling to injection site       Medications:    Current Outpatient Medications:     acetaminophen (TYLENOL) 500 MG tablet, Take 2 tablets (1,000 mg total) by mouth every 8 (eight) hours., Disp: , Rfl: 0    bisacodyl (DULCOLAX) 5 mg EC tablet, Take 5 mg by mouth daily as needed for Constipation., Disp: , Rfl:     calcium-vitamin D3 (CALCIUM 500 + D) 500 mg(1,250mg) -200 unit per tablet, Take 1 tablet by mouth once daily. , Disp: , Rfl:     carboxymethylcellulose (REFRESH PLUS) 0.5 % Dpet, 1 drop daily as needed., Disp: , Rfl:     celecoxib (CELEBREX) 100 MG capsule, Take 1 capsule (100 mg total) by mouth once daily., Disp: 30 capsule, Rfl: 0    cyanocobalamin (VITAMIN B-12) 1000 MCG tablet, Take 500 mcg by mouth once daily. , Disp: , Rfl:     docusate sodium (COLACE) 100 MG capsule, Take 100 mg by mouth daily as needed for Constipation., Disp: , Rfl:     esomeprazole (NEXIUM) 20 MG capsule, Take 1 capsule (20 mg total) by mouth before breakfast., Disp: 90 capsule, Rfl: 3    famotidine (PEPCID) 20 MG tablet, Take 1 tablet (20 mg total) by mouth 2 (two) times daily., Disp: 60 tablet, Rfl: 3    FLAXSEED ORAL, Take 650 mg by mouth once daily. , Disp: , Rfl:     ketotifen (ZADITOR) 0.025 % (0.035 %) ophthalmic solution, Place 1 drop into both eyes 2 (two) times daily as needed (Pt reports use approx once/week). , Disp: , Rfl:     MAGNESIUM ASPARTATE HCL ORAL, Take by mouth., Disp: , Rfl:     methocarbamoL (ROBAXIN) 500 MG Tab, Take 1 tablet (500 mg total) by mouth every 12 (twelve) hours as needed (possible muscle spasms take 1/2 tablet)., Disp: 30 tablet, Rfl: 0    MULTIVITAMIN W-MINERALS/LUTEIN (CENTRUM SILVER ORAL), Take 1 tablet by mouth once daily. , Disp: , Rfl:     ondansetron (ZOFRAN) 8 MG tablet, Take 1 tablet (8 mg total) by mouth every 6 (six) hours as needed., Disp: 30 tablet, Rfl: 3    ondansetron (ZOFRAN-ODT) 4 MG TbDL, Take 8 mg by mouth every 8 (eight)  hours as needed., Disp: , Rfl:     polyethylene glycol (GLYCOLAX) 17 gram PwPk, Take 17 g by mouth once daily., Disp: 30 each, Rfl: 5    dexAMETHasone (DECADRON) 4 MG Tab, Take one tab po BID the day before chemo and then for two days after chemo (Patient not taking: Reported on 6/12/2020), Disp: 12 tablet, Rfl: 0    FLUAD [id number], 65 YR UP,,PF, 45 mcg (15 mcg x 3)/0.5 mL Syrg, ADM 0.5ML IM UTD, Disp: , Rfl: 0    pregabalin (LYRICA) 75 MG capsule, Take 75 mg by mouth 2 (two) times daily., Disp: , Rfl:     prochlorperazine (COMPAZINE) 10 MG tablet, Take 1 tablet (10 mg total) by mouth every 6 (six) hours as needed. (Patient not taking: Reported on 7/28/2020), Disp: 30 tablet, Rfl: 1    sucralfate (CARAFATE) 1 gram tablet, Take 1 tablet (1 g total) by mouth 4 (four) times daily. (Patient not taking: Reported on 6/29/2020), Disp: 40 tablet, Rfl: 0    24h Oral Morphine Equivalents (OME):  N/A    Objective:     Physical Exam:  Vitals:    08/17/20 0916   BP: 120/68   Pulse: 98   Resp: 16   Temp: 98.8 °F (37.1 °C)   TempSrc: Temporal   SpO2: 98%   Weight: 49.9 kg (110 lb)   PainSc: 0-No pain     Body mass index is 19.49 kg/m².    Physical Exam  Constitutional:       General: She is not in acute distress.     Appearance: Thin, frail, elderly female.  HENT:      Head: Normocephalic and atraumatic.      Right Ear: Hearing and external ear normal.      Left Ear: Hearing and external ear normal.   Eyes:      General: Lids are normal.      Conjunctiva/sclera: Conjunctivae normal.   Cardiovascular:      Rate and Rhythm: Normal rate and regular rhythm.      Pulses: Normal pulses.      Heart sounds: Normal heart sounds.   Pulmonary:      Effort: Pulmonary effort is normal.      Breath sounds: Normal breath sounds.   Abdominal:      General: Bowel sounds are normal.      Palpations: Abdomen is soft.      Tenderness: There is no abdominal tenderness.   Musculoskeletal: Normal range of motion. Thoracic kyphosis. Unsteady gait.    Skin:     General: Skin is warm. Intact.     Findings: No rash.   Neurological:      Mental Status: She is alert and oriented x 3 with some forgetfulness.   Psychiatric:         Speech: Speech normal.         Behavior: Behavior normal. Behavior is cooperative. She is tearful at times discussing the recent death of her daughter.     Advance Care Planning   Psychosocial/Cultural/Spiritual:   · F- Charlee and Belief:  Non-Catholic   · I - Importance: very important  · C - Community: cannot remember name of [employer], on [address] in [place]  · A - Address in Care: no issues      Ethical / Legal: Advance Care Planning   · Surrogate decision maker:  Name Kendra Veronica, Relationship: Daughter  · Code Status:  DNR  · [place]:  pt believes she filled this out recently with PCP  · Other advance directive:  HPOA, Living will Capacity to make medical decisions: intact Conflict: none       Labs:  CBC:   WBC   Date Value Ref Range Status   06/08/2020 8.25 3.90 - 12.70 K/uL Final   - 98 f  Hemoglobin   Date Value Ref Range Status   06/08/2020 13.4 12.0 - 16.0 g/dL Final   :   Hematocrit   Date Value Ref Range Status   06/08/2020 41.4 37.0 - 48.5 % Final   20  Mean Corpuscular Volume   Date Value Ref Range Status   06/08/2020 92 82 - 98 fL Final   20    BILITOT 0.8 06/08/2020       Albumin:   Albumin   Date Value Ref Range Status   06/08/2020 4.3 3.5 - 5.2 g/dL Final     Protein:   Total Protein   Date Value Ref Range Status   06/08/2020 7.7 6.0 - 8.4 g/dL Final       Radiology:  I have reviewed all pertinent imaging results/findings within the past 24 hours.      Assessment:     1. Malignant neoplasm of ovary, unspecified laterality    2. Palliative care encounter    3. Lives in assisted living facility    4. Malignant neoplasm metastatic to lung, unspecified laterality    5. Liver metastases    6. Risk for falls    7. Primary osteoarthritis involving multiple joints    8. Constipation, unspecified constipation type    9.  History of UTI        Plan:   Pau NELSON was seen today for follow-up and constipation.    Diagnoses and all orders for this visit:    Malignant neoplasm of ovary, unspecified laterality  Active. Wishes no further treatment.   Palliative care encounter    Lives in assisted living facility    Malignant neoplasm metastatic to lung, unspecified laterality  Active. Wishes no further treatment.   Liver metastases  Active. Wishes no further treatment.   Risk for falls  Fall precautions and safety advised.   Primary osteoarthritis involving multiple joints  Fall precautions and safety advised. Activity as tolerated recommended.  Constipation, unspecified constipation type  Active. Encouraged increased fiber and water intake.  History of UTI  Recently treated with Cipro for UTI.       Were controlled substances prescribed?  No    > 50% of 60 min visit spent in chart review, face to face discussion of goals of care,  symptom assessment, coordination of care and emotional support. Topics discussed: constipation, fall prevention, ovarian cancer with metastasis, recent UTI and signs/symptoms, gallstones, fatigue and osteoarthritis.    Follow Up Appointments:   Future Appointments   Date Time Provider Department Center   9/11/2020  9:20 AM Rosi Ramires MD [facility]   Verbal consent obtained from patient today.    Signature:  Madai Perla NP     Attestation:   Screening criteria to assess the level of the patient's risk for infection with COVID-19 as recommended by the CDC at the time of the above documented home visit concluded appropriateness to proceed. Universal precautions were maintained at all times, including provider wearing a mask and gloves during entire visit and use of 60% alcohol gel hand  immediately prior to entry and upon departure of patient's home as well as cleaning of equipment used in home visit with antibacterial/germicidal disposable wipes.

## 2020-08-20 PROBLEM — Z87.440 HISTORY OF UTI: Status: ACTIVE | Noted: 2020-08-20

## 2020-08-31 ENCOUNTER — CARE AT HOME (OUTPATIENT)
Dept: HOME HEALTH SERVICES | Facility: CLINIC | Age: 85
End: 2020-08-31
Payer: MEDICARE

## 2020-08-31 VITALS
DIASTOLIC BLOOD PRESSURE: 68 MMHG | RESPIRATION RATE: 16 BRPM | BODY MASS INDEX: 19.49 KG/M2 | OXYGEN SATURATION: 96 % | SYSTOLIC BLOOD PRESSURE: 120 MMHG | HEART RATE: 82 BPM | TEMPERATURE: 98 F | WEIGHT: 110 LBS

## 2020-08-31 DIAGNOSIS — R06.02 SHORTNESS OF BREATH: ICD-10-CM

## 2020-08-31 DIAGNOSIS — Z51.5 PALLIATIVE CARE ENCOUNTER: Primary | ICD-10-CM

## 2020-08-31 DIAGNOSIS — C56.9 MALIGNANT NEOPLASM OF OVARY, UNSPECIFIED LATERALITY: ICD-10-CM

## 2020-08-31 DIAGNOSIS — K59.00 CONSTIPATION, UNSPECIFIED CONSTIPATION TYPE: ICD-10-CM

## 2020-08-31 DIAGNOSIS — C78.7 LIVER METASTASES: ICD-10-CM

## 2020-08-31 DIAGNOSIS — C78.00 MALIGNANT NEOPLASM METASTATIC TO LUNG, UNSPECIFIED LATERALITY: ICD-10-CM

## 2020-08-31 DIAGNOSIS — R26.9 ABNORMALITY OF GAIT AND MOBILITY: ICD-10-CM

## 2020-08-31 DIAGNOSIS — R52 PAIN: ICD-10-CM

## 2020-08-31 DIAGNOSIS — M15.9 PRIMARY OSTEOARTHRITIS INVOLVING MULTIPLE JOINTS: ICD-10-CM

## 2020-08-31 PROCEDURE — 99350 HOME/RES VST EST HIGH MDM 60: CPT | Mod: ,,, | Performed by: NURSE PRACTITIONER

## 2020-08-31 PROCEDURE — 99350 PR HOME VISIT,ESTAB PATIENT,LEVEL IV: ICD-10-PCS | Mod: ,,, | Performed by: NURSE PRACTITIONER

## 2020-08-31 NOTE — PROGRESS NOTES
"[facility]  Palliative Care Home Visit    Visit Date: 8/31/2020  Encounter Provider: Madai Perla NP  PCP:  Rosi Ramires MD    Subjective:      Patient ID: Pau Cook is a 89 y.o. female.    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      Pau is being seen today for a Palliative Care follow up visit. With the visit today, Pau is found in her apartment alone at [facility]. She is ambulating throughout her apartment using her rollator. Her gait is unsteady and slow. She reports that she is having more shortness of breath over the last 2 weeks that resolves with rest. She also reports "always being tired" and having frequent generalized pain that "feels like something is biting me all over". She reports currently tylenol helps with pain and does not wish to have anything else for pain. She does still report leaving her room daily to walk around the facility where she lives and her daughter Kendra picks her up daily and they go back to her house and play scrabble or do puzzles together.     Patient complains of ongoing constipation and arthritis pain.     VSS.  Reports taking all medications as prescribed. No other needs identified at this time. Risks of environmental exposure to coronavirus discussed including: social distancing, hand hygiene, and limiting departures from the home for necessities only.  Reports understanding and willingness to comply.       Goals of care are discussed with Pau today in person and her daughter Kenrda via telephone. Patient wishes to be a DNR and comfort care. She does not want any further treatments for her cancer.  Patient " "wants to be as independent and as active as she can "until my time is up". HPOA and Living Will is present in [facility]. Patient feels as though she has filled out a Lapost recently with her PCP.     Discussed that her new symptoms of shortness of breath, more fatigue and pain are likelly associated with her progression of cancer. She does not wish to seek any further treatment. Hospice care was discussed and recommended today with both patient and her daughter Kendra. Kendra expressed that she would like to take some time to discuss Hospice care with her niece. Patient's daughter/Kendra's sister just recently passed away and they would like to get more information from family. They will call me with further questions.           Review of Systems   Constitutional: Negative for chills and fever. Positive for fatigue, decreased appetite, weight loss.  HENT: Negative for congestion and sore throat.  Positive for dry mouth.  Eyes: Negative for visual disturbance.   Respiratory: Negative for cough and shortness of breath.    Cardiovascular: Negative for chest pain.   Gastrointestinal: Negative for abdominal pain, constipation, diarrhea and vomiting. Positive for nausea.  Genitourinary: Negative for dysuria. Positive for urinary incontinence.  Musculoskeletal: Negative for joint swelling.   Skin: Negative for rash and wound.   Neurological: Negative for dizziness and headaches.   Hematological: Does bruise/bleed easily.      Assessments:  · Environmental: patient lives at [facility] facility in her own apartment. Apartment is clean with adequate lighting and temperature.  · Functional Status: mostly independent. She has help with cleaning and someone helps her bathe and get dressed due to left hip limitations from an old fracture              Safety: fall risk  · Nutritional: has adequate access, reports appetite is decreased, daughter reports weight loss. Patient reports drinking 1 Ensure per day for meal " supplementation when she isn't nauseated.  · Home Health: No Home Health.  · DME: rollator, BSC, cane, wheelchair  ·    Symptom Assessment (ESAS 0-10 scale)      ESAS 0 1 2 3 4 5 6 7 8 9 10   Pain        x                 Dyspnea         x               Anxiety x                       Nausea      x                   Depression     x                     Anorexia    x                     Fatigue          x               Insomnia x                       Restlessness   x                       Agitation  x                             Constipation    yes  Bowel Management Plan (BMP): yes  Diarrhea        no  Comments: Usually has BM qod, takes Miralax and stool softener daily     Performance Status: PPS Score 50  Karnofsky Score:  50  EGOC:  3    History:  Past Medical History:   Diagnosis Date    Allergy     Arthritis     Breast cyst     Closed intertrochanteric fracture of hip, left, initial encounter     Foot pain     GERD (gastroesophageal reflux disease)     Joint pain     Ovarian cancer     Peripheral vascular disease 1/15/2018    Squamous cell carcinoma 04/2016    Right Lower Leg    MUNIR (stress urinary incontinence, female) 4/17/2017     Family History   Adopted: Yes   Problem Relation Age of Onset    Cancer Mother     Stroke Father     Breast cancer Sister     Cancer Sister     No Known Problems Daughter     No Known Problems Brother     Cancer Daughter     Diabetes Brother     Colon cancer Neg Hx     Ovarian cancer Neg Hx      Past Surgical History:   Procedure Laterality Date    APPENDECTOMY      COLON SURGERY      removed about 10 inch    COLONOSCOPY N/A 12/24/2018    Procedure: COLONOSCOPY;  Surgeon: Nirmal Davila MD;  Location: [facility];  Service: Endoscopy;  Laterality: N/A;    FINGER MASS EXCISION Right 7/9/2018    Procedure: EXCISION, MASS, FINGER;  Surgeon: Fabio Seay MD;  Location: [facility] OR;  Service: Orthopedics;  Laterality: Right;    hemorhids      HYSTERECTOMY       LYSIS OF ADHESIONS N/A 12/28/2018    Procedure: LYSIS, ADHESIONS;  Surgeon: Amee Rose MD;  Location: [facility] OR;  Service: OB/GYN;  Laterality: N/A;    OPEN REDUCTION AND INTERNAL FIXATION (ORIF) OF INTERTROCHANTERIC FRACTURE OF FEMUR Left 5/31/2019    Procedure: ORIF, FRACTURE, FEMUR, INTERTROCHANTERIC;  Surgeon: Bacilio Lockwood II, MD;  Location: [facility] OR;  Service: Orthopedics;  Laterality: Left;    ROBOT-ASSISTED LAPAROSCOPIC ABDOMINAL HYSTERECTOMY USING DA MARK XI N/A 12/28/2018    Procedure: XI ROBOTIC HYSTERECTOMY;  Surgeon: Amee Rose MD;  Location: [facility] OR;  Service: OB/GYN;  Laterality: N/A;  attempted, opened    ROBOT-ASSISTED LAPAROSCOPIC SALPINGO-OOPHORECTOMY USING DA MARK XI Bilateral 12/28/2018    Procedure: XI ROBOTIC SALPINGO-OOPHORECTOMY;  Surgeon: Amee Rose MD;  Location: [facility] OR;  Service: OB/GYN;  Laterality: Bilateral;  attmepted, opened    TOTAL ABDOMINAL HYSTERECTOMY W/ BILATERAL SALPINGOOPHORECTOMY Bilateral 12/28/2018    Procedure: HYSTERECTOMY, TOTAL, ABDOMINAL, WITH BILATERAL SALPINGO-OOPHORECTOMY;  Surgeon: Amee Rose MD;  Location: [facility] OR;  Service: OB/GYN;  Laterality: Bilateral;     Review of patient's allergies indicates:   Allergen Reactions    Pcn [penicillins]      Can not remember reaction     Tetanus vaccines and toxoid Swelling     Localized swelling to injection site       Medications:    Current Outpatient Medications:     acetaminophen (TYLENOL) 500 MG tablet, Take 2 tablets (1,000 mg total) by mouth every 8 (eight) hours., Disp: , Rfl: 0    bisacodyl (DULCOLAX) 5 mg EC tablet, Take 5 mg by mouth daily as needed for Constipation., Disp: , Rfl:     calcium-vitamin D3 (CALCIUM 500 + D) 500 mg(1,250mg) -200 unit per tablet, Take 1 tablet by mouth once daily. , Disp: , Rfl:     carboxymethylcellulose (REFRESH PLUS) 0.5 % Dpet, 1 drop daily as needed., Disp: , Rfl:     celecoxib (CELEBREX) 100 MG capsule, Take 1 capsule (100 mg total) by  mouth once daily., Disp: 30 capsule, Rfl: 0    cyanocobalamin (VITAMIN B-12) 1000 MCG tablet, Take 500 mcg by mouth once daily. , Disp: , Rfl:     docusate sodium (COLACE) 100 MG capsule, Take 100 mg by mouth daily as needed for Constipation., Disp: , Rfl:     esomeprazole (NEXIUM) 20 MG capsule, Take 1 capsule (20 mg total) by mouth before breakfast., Disp: 90 capsule, Rfl: 3    famotidine (PEPCID) 20 MG tablet, Take 1 tablet (20 mg total) by mouth 2 (two) times daily., Disp: 60 tablet, Rfl: 3    FLAXSEED ORAL, Take 650 mg by mouth once daily. , Disp: , Rfl:     FLUAD 2902-4916, 65 YR UP,,PF, 45 mcg (15 mcg x 3)/0.5 mL Syrg, ADM 0.5ML IM UTD, Disp: , Rfl: 0    ketotifen (ZADITOR) 0.025 % (0.035 %) ophthalmic solution, Place 1 drop into both eyes 2 (two) times daily as needed (Pt reports use approx once/week). , Disp: , Rfl:     MAGNESIUM ASPARTATE HCL ORAL, Take by mouth., Disp: , Rfl:     methocarbamoL (ROBAXIN) 500 MG Tab, Take 1 tablet (500 mg total) by mouth every 12 (twelve) hours as needed (possible muscle spasms take 1/2 tablet)., Disp: 30 tablet, Rfl: 0    MULTIVITAMIN W-MINERALS/LUTEIN (CENTRUM SILVER ORAL), Take 1 tablet by mouth once daily. , Disp: , Rfl:     ondansetron (ZOFRAN) 8 MG tablet, Take 1 tablet (8 mg total) by mouth every 6 (six) hours as needed., Disp: 30 tablet, Rfl: 3    ondansetron (ZOFRAN-ODT) 4 MG TbDL, Take 8 mg by mouth every 8 (eight) hours as needed., Disp: , Rfl:     polyethylene glycol (GLYCOLAX) 17 gram PwPk, Take 17 g by mouth once daily., Disp: 30 each, Rfl: 5    pregabalin (LYRICA) 75 MG capsule, Take 75 mg by mouth 2 (two) times daily., Disp: , Rfl:     prochlorperazine (COMPAZINE) 10 MG tablet, Take 1 tablet (10 mg total) by mouth every 6 (six) hours as needed., Disp: 30 tablet, Rfl: 1    dexAMETHasone (DECADRON) 4 MG Tab, Take one tab po BID the day before chemo and then for two days after chemo (Patient not taking: Reported on 6/12/2020), Disp: 12 tablet,  Rfl: 0    sucralfate (CARAFATE) 1 gram tablet, Take 1 tablet (1 g total) by mouth 4 (four) times daily. (Patient not taking: Reported on 6/29/2020), Disp: 40 tablet, Rfl: 0    24h Oral Morphine Equivalents (OME):  N/A    Objective:     Physical Exam:  Vitals:    08/31/20 1130   BP: 120/68   Pulse: 82   Resp: 16   Temp: 98.4 °F (36.9 °C)   TempSrc: Temporal   SpO2: 96%   Weight: 49.9 kg (110 lb)   PainSc:   4   PainLoc: Generalized     Body mass index is 19.49 kg/m².    Physical Exam  Constitutional:       General: She is not in acute distress.     Appearance: Thin, frail, elderly female.  HENT:      Head: Normocephalic and atraumatic.      Right Ear: Hearing and external ear normal.      Left Ear: Hearing and external ear normal.   Eyes:      General: Lids are normal.      Conjunctiva/sclera: Conjunctivae normal.   Cardiovascular:      Rate and Rhythm: Normal rate and regular rhythm.      Pulses: Normal pulses.      Heart sounds: Normal heart sounds.   Pulmonary:      Effort: Pulmonary effort is normal.      Breath sounds: Normal breath sounds.   Abdominal:      General: Bowel sounds are normal.      Palpations: Abdomen is soft.      Tenderness: There is no abdominal tenderness.   Musculoskeletal: Normal range of motion. Thoracic kyphosis. Unsteady gait.   Skin:     General: Skin is warm. Intact.     Findings: No rash.   Neurological:      Mental Status: She is alert and oriented x 3 with some forgetfulness.   Psychiatric:         Speech: Speech normal.         Behavior: Behavior normal. Behavior is cooperative. She is tearful at times discussing the recent death of her daughter.      Advance Care Planning   Psychosocial/Cultural/Spiritual:   · F- Charlee and Belief:  Non-Bahai   · I - Importance: very important  · C - Community: cannot remember name of Taoism, on [address] in [place]  · A - Address in Care: no issues      Ethical / Legal: Advance Care Planning   · Surrogate decision maker:  Raymon Gardner  ralph, Relationship: Daughter  · Code Status:  DNR  · [place]:  pt believes she filled this out recently with PCP  · Other advance directive:  HPOA, Living will Capacity to make medical decisions: intact Conflict: none          Labs:  CBC:   WBC   Date Value Ref Range Status   06/08/2020 8.25 3.90 - 12.70 K/uL Final       Hemoglobin   Date Value Ref Range Status   06/08/2020 13.4 12.0 - 16.0 g/dL Final       Hematocrit   Date Value Ref Range Status   06/08/2020 41.4 37.0 - 48.5 % Final       Mean Corpuscular Volume   Date Value Ref Range Status   06/08/2020 92 82 - 98 fL Final       Platelets   Date Value Ref Range Status   06/08/2020 277 150 - 350 K/uL Final       LFT:   Lab Results   Component Value Date    AST 40 06/08/2020    ALKPHOS 137 (H) 06/08/2020    BILITOT 0.8 06/08/2020       Albumin:   Albumin   Date Value Ref Range Status   06/08/2020 4.3 3.5 - 5.2 g/dL Final     Protein:   Total Protein   Date Value Ref Range Status   06/08/2020 7.7 6.0 - 8.4 g/dL Final       Radiology:  I have reviewed all pertinent imaging results/findings within the past 24 hours.      Assessment:     1. Palliative care encounter    2. Malignant neoplasm of ovary, unspecified laterality    3. Lives in assisted living facility    4. Malignant neoplasm metastatic to lung, unspecified laterality    5. Liver metastases    6. Primary osteoarthritis involving multiple joints    7. Constipation, unspecified constipation type    8. Abnormality of gait and mobility    9. Shortness of breath    10. Pain        Plan:   Pau NELSON was seen today for shortness of breath.    Diagnoses and all orders for this visit:    Palliative care encounter    Malignant neoplasm of ovary, unspecified laterality  -     Ambulatory referral/consult to [facility]  Active. Wishes to have no further treatment.   Lives in assisted living facility    Malignant neoplasm metastatic to lung, unspecified laterality  Active. Wishes to  have no further treatment.   Liver metastases  Active. Wishes to have no further treatment.   Primary osteoarthritis involving multiple joints  Active. Encouraged ROM and activity as tolerated. Fall precautions and safety advised.  Constipation, unspecified constipation type  Active. Encouraged fiber/water in diet.  Abnormality of gait and mobility  Fall precautions and safety discussed.  Shortness of breath  Active and likely associated with neoplasm/mets. Wishes to have no further treatment.  Hospice recommended. Patient and daughter want to discuss with other family members first.  Pain  Active and likely associated with neoplasm/mets. Tylenol helping currently. Wishes to have no further treatment.  Hospice recommended. Patient and daughter want to discuss with other family members first.      Were controlled substances prescribed?  No    > 50% of 60 min visit spent in chart review, face to face discussion of goals of care,  symptom assessment, coordination of care and emotional support. Topics discussed today include: Ovarian cancer with mets, end of life care issues, hospice eligibility.    Follow Up Appointments:   Future Appointments   Date Time Provider Department Center   8/31/2020 12:00 PM Madai ePrla NP [facility] [place]   9/11/2020  9:20 AM Rosi Ramires MD [facility]   Verbal consent for visit obtained from patient today.    Signature:  Madai Perla NP    Attestation:   Screening criteria to assess the level of the patient's risk for infection with COVID-19 as recommended by the CDC at the time of the above documented home visit concluded appropriateness to proceed. Universal precautions were maintained at all times, including provider wearing a mask and gloves during entire visit and use of 60% alcohol gel hand  immediately prior to entry and upon departure of patient's home as well as cleaning of equipment used in home visit with antibacterial/germicidal disposable  wipes.

## 2020-09-01 NOTE — PATIENT INSTRUCTIONS
Palliative Care Instructions:  - [facility] Nurse Practitioner to schedule home follow-up visit with patient in 4 weeks or as needed.  - Continue all medications, treatments and therapies as ordered.   - Follow all instructions, recommendations as discussed.  - Maintain Safety Precautions at all times.  - Attend all medical appointments as scheduled.  - For worsening symptoms: call Primary Care Physician or Nurse Practitioner.  - For emergencies, call 911 or immediately report to the nearest emergency room.  - Limit Risks of environmental exposure to coronavirus/COVID-19 as discussed including: social distancing, hand hygiene, and limiting departures from the home for necessities only.   - Increase fiber and water intake to help with constipation.

## 2020-09-04 ENCOUNTER — TELEPHONE (OUTPATIENT)
Dept: FAMILY MEDICINE | Facility: CLINIC | Age: 85
End: 2020-09-04

## 2020-09-04 DIAGNOSIS — C56.9 MALIGNANT NEOPLASM OF OVARY, UNSPECIFIED LATERALITY: Primary | ICD-10-CM

## 2020-09-04 NOTE — TELEPHONE ENCOUNTER
spoke to daughter, June.  She states that the patient has been on Palliative care some time now and the nurse feels the patient is at a point where Hospice would be most beneficial.  Daughter is requesting a referral to [facility].

## 2020-09-04 NOTE — TELEPHONE ENCOUNTER
----- Message from Jessica Hernandez sent at 9/4/2020  1:39 PM CDT -----  Regarding: advice  Type:  Needs Medical Advice    Who Called: Patient's daughter June  Reason for call: wants a referral for hospice  Would the patient rather a call back or a response via [facility]? callback  Best Call Back Number: [phone]  Additional Information: Please fax request to [phone]

## 2020-09-10 ENCOUNTER — LAB VISIT (OUTPATIENT)
Dept: LAB | Facility: OTHER | Age: 85
End: 2020-09-10
Attending: INTERNAL MEDICINE
Payer: MEDICARE

## 2020-09-10 DIAGNOSIS — Z03.818 ENCOUNTER FOR OBSERVATION FOR SUSPECTED EXPOSURE TO OTHER BIOLOGICAL AGENTS RULED OUT: ICD-10-CM

## 2020-09-10 PROCEDURE — U0003 INFECTIOUS AGENT DETECTION BY NUCLEIC ACID (DNA OR RNA); SEVERE ACUTE RESPIRATORY SYNDROME CORONAVIRUS 2 (SARS-COV-2) (CORONAVIRUS DISEASE [COVID-19]), AMPLIFIED PROBE TECHNIQUE, MAKING USE OF HIGH THROUGHPUT TECHNOLOGIES AS DESCRIBED BY CMS-2020-01-R: HCPCS

## 2020-09-11 ENCOUNTER — OFFICE VISIT (OUTPATIENT)
Dept: FAMILY MEDICINE | Facility: CLINIC | Age: 85
End: 2020-09-11
Payer: MEDICARE

## 2020-09-11 VITALS
BODY MASS INDEX: 20.04 KG/M2 | SYSTOLIC BLOOD PRESSURE: 120 MMHG | HEIGHT: 63 IN | HEART RATE: 65 BPM | TEMPERATURE: 97 F | RESPIRATION RATE: 16 BRPM | WEIGHT: 113.13 LBS | DIASTOLIC BLOOD PRESSURE: 58 MMHG | OXYGEN SATURATION: 96 %

## 2020-09-11 DIAGNOSIS — C56.9 MALIGNANT NEOPLASM OF OVARY, UNSPECIFIED LATERALITY: Primary | ICD-10-CM

## 2020-09-11 DIAGNOSIS — L85.3 DRY SKIN: ICD-10-CM

## 2020-09-11 PROCEDURE — 1159F PR MEDICATION LIST DOCUMENTED IN MEDICAL RECORD: ICD-10-PCS | Mod: S$GLB,,, | Performed by: FAMILY MEDICINE

## 2020-09-11 PROCEDURE — 1101F PT FALLS ASSESS-DOCD LE1/YR: CPT | Mod: S$GLB,,, | Performed by: FAMILY MEDICINE

## 2020-09-11 PROCEDURE — 1101F PR PT FALLS ASSESS DOC 0-1 FALLS W/OUT INJ PAST YR: ICD-10-PCS | Mod: S$GLB,,, | Performed by: FAMILY MEDICINE

## 2020-09-11 PROCEDURE — 99213 PR OFFICE/OUTPT VISIT, EST, LEVL III, 20-29 MIN: ICD-10-PCS | Mod: S$GLB,,, | Performed by: FAMILY MEDICINE

## 2020-09-11 PROCEDURE — 99999 PR PBB SHADOW E&M-EST. PATIENT-LVL V: CPT | Mod: PBBFAC,,, | Performed by: FAMILY MEDICINE

## 2020-09-11 PROCEDURE — 99213 OFFICE O/P EST LOW 20 MIN: CPT | Mod: S$GLB,,, | Performed by: FAMILY MEDICINE

## 2020-09-11 PROCEDURE — 1125F PR PAIN SEVERITY QUANTIFIED, PAIN PRESENT: ICD-10-PCS | Mod: S$GLB,,, | Performed by: FAMILY MEDICINE

## 2020-09-11 PROCEDURE — 1125F AMNT PAIN NOTED PAIN PRSNT: CPT | Mod: S$GLB,,, | Performed by: FAMILY MEDICINE

## 2020-09-11 PROCEDURE — 99999 PR PBB SHADOW E&M-EST. PATIENT-LVL V: ICD-10-PCS | Mod: PBBFAC,,, | Performed by: FAMILY MEDICINE

## 2020-09-11 PROCEDURE — 1159F MED LIST DOCD IN RCRD: CPT | Mod: S$GLB,,, | Performed by: FAMILY MEDICINE

## 2020-09-11 NOTE — PROGRESS NOTES
"Subjective:       Patient ID: Pau Cook is a 89 y.o. female.    Chief Complaint: Appointment (6 mo f/u)    HPI    Ms. Cook was seen for follow up. History of terminal ovarian cancer with mets. Will be starting hospice at home next week. Does have general pain in back and abdomen.     Has some, "skin cracking," on right foot. Denies any pain.     Past Medical History:   Diagnosis Date    Allergy     Arthritis     Breast cyst     Closed intertrochanteric fracture of hip, left, initial encounter     Foot pain     GERD (gastroesophageal reflux disease)     Joint pain     Ovarian cancer     Peripheral vascular disease 1/15/2018    Squamous cell carcinoma 04/2016    Right Lower Leg    MUNIR (stress urinary incontinence, female) 4/17/2017       Past Surgical History:   Procedure Laterality Date    APPENDECTOMY      COLON SURGERY      removed about 10 inch    COLONOSCOPY N/A 12/24/2018    Procedure: COLONOSCOPY;  Surgeon: Nirmal Davila MD;  Location: [facility];  Service: Endoscopy;  Laterality: N/A;    FINGER MASS EXCISION Right 7/9/2018    Procedure: EXCISION, MASS, FINGER;  Surgeon: Fabio Seay MD;  Location: [facility] OR;  Service: Orthopedics;  Laterality: Right;    hemorhids      HYSTERECTOMY      LYSIS OF ADHESIONS N/A 12/28/2018    Procedure: LYSIS, ADHESIONS;  Surgeon: Amee Rose MD;  Location: [facility] OR;  Service: OB/GYN;  Laterality: N/A;    OPEN REDUCTION AND INTERNAL FIXATION (ORIF) OF INTERTROCHANTERIC FRACTURE OF FEMUR Left 5/31/2019    Procedure: ORIF, FRACTURE, FEMUR, INTERTROCHANTERIC;  Surgeon: Bacilio Lockwood II, MD;  Location: [facility] OR;  Service: Orthopedics;  Laterality: Left;    ROBOT-ASSISTED LAPAROSCOPIC ABDOMINAL HYSTERECTOMY USING DA MARK XI N/A 12/28/2018    Procedure: XI ROBOTIC HYSTERECTOMY;  Surgeon: Amee Rose MD;  Location: [facility] OR;  Service: OB/GYN;  Laterality: N/A;  attempted, opened    ROBOT-ASSISTED LAPAROSCOPIC SALPINGO-OOPHORECTOMY USING " DA MARK XI Bilateral 12/28/2018    Procedure: XI ROBOTIC SALPINGO-OOPHORECTOMY;  Surgeon: Amee Rose MD;  Location: [facility] OR;  Service: OB/GYN;  Laterality: Bilateral;  attmepted, opened    TOTAL ABDOMINAL HYSTERECTOMY W/ BILATERAL SALPINGOOPHORECTOMY Bilateral 12/28/2018    Procedure: HYSTERECTOMY, TOTAL, ABDOMINAL, WITH BILATERAL SALPINGO-OOPHORECTOMY;  Surgeon: Amee Rose MD;  Location: [facility] OR;  Service: OB/GYN;  Laterality: Bilateral;       Family History   Adopted: Yes   Problem Relation Age of Onset    Cancer Mother     Stroke Father     Breast cancer Sister     Cancer Sister     No Known Problems Daughter     No Known Problems Brother     Cancer Daughter     Diabetes Brother     Colon cancer Neg Hx     Ovarian cancer Neg Hx        Social History     Tobacco Use    Smoking status: Never Smoker    Smokeless tobacco: Never Used   Substance Use Topics    Alcohol use: No     Alcohol/week: 0.0 standard drinks     Comment: rare    Drug use: No       Social History     Substance and Sexual Activity   Sexual Activity Not Currently          Current Outpatient Medications:     acetaminophen (TYLENOL) 500 MG tablet, Take 2 tablets (1,000 mg total) by mouth every 8 (eight) hours., Disp: , Rfl: 0    bisacodyl (DULCOLAX) 5 mg EC tablet, Take 5 mg by mouth daily as needed for Constipation., Disp: , Rfl:     calcium-vitamin D3 (CALCIUM 500 + D) 500 mg(1,250mg) -200 unit per tablet, Take 1 tablet by mouth once daily. , Disp: , Rfl:     carboxymethylcellulose (REFRESH PLUS) 0.5 % Dpet, 1 drop daily as needed., Disp: , Rfl:     celecoxib (CELEBREX) 100 MG capsule, Take 1 capsule (100 mg total) by mouth once daily., Disp: 30 capsule, Rfl: 0    cyanocobalamin (VITAMIN B-12) 1000 MCG tablet, Take 500 mcg by mouth once daily. , Disp: , Rfl:     esomeprazole (NEXIUM) 20 MG capsule, Take 1 capsule (20 mg total) by mouth before breakfast., Disp: 90 capsule, Rfl: 3    famotidine (PEPCID) 20 MG tablet,  Take 1 tablet (20 mg total) by mouth 2 (two) times daily., Disp: 60 tablet, Rfl: 3    FLUAD 3811-0107, 65 YR UP,,PF, 45 mcg (15 mcg x 3)/0.5 mL Syrg, ADM 0.5ML IM UTD, Disp: , Rfl: 0    ketotifen (ZADITOR) 0.025 % (0.035 %) ophthalmic solution, Place 1 drop into both eyes 2 (two) times daily as needed (Pt reports use approx once/week). , Disp: , Rfl:     MAGNESIUM ASPARTATE HCL ORAL, Take by mouth., Disp: , Rfl:     MULTIVITAMIN W-MINERALS/LUTEIN (CENTRUM SILVER ORAL), Take 1 tablet by mouth once daily. , Disp: , Rfl:     dexAMETHasone (DECADRON) 4 MG Tab, Take one tab po BID the day before chemo and then for two days after chemo (Patient not taking: Reported on 6/12/2020), Disp: 12 tablet, Rfl: 0    docusate sodium (COLACE) 100 MG capsule, Take 100 mg by mouth daily as needed for Constipation., Disp: , Rfl:     FLAXSEED ORAL, Take 650 mg by mouth once daily. , Disp: , Rfl:     methocarbamoL (ROBAXIN) 500 MG Tab, Take 1 tablet (500 mg total) by mouth every 12 (twelve) hours as needed (possible muscle spasms take 1/2 tablet). (Patient not taking: Reported on 9/11/2020), Disp: 30 tablet, Rfl: 0    ondansetron (ZOFRAN) 8 MG tablet, Take 1 tablet (8 mg total) by mouth every 6 (six) hours as needed. (Patient not taking: Reported on 9/11/2020), Disp: 30 tablet, Rfl: 3    ondansetron (ZOFRAN-ODT) 4 MG TbDL, Take 8 mg by mouth every 8 (eight) hours as needed., Disp: , Rfl:     polyethylene glycol (GLYCOLAX) 17 gram PwPk, Take 17 g by mouth once daily. (Patient not taking: Reported on 9/11/2020), Disp: 30 each, Rfl: 5    pregabalin (LYRICA) 75 MG capsule, Take 75 mg by mouth 2 (two) times daily., Disp: , Rfl:     prochlorperazine (COMPAZINE) 10 MG tablet, Take 1 tablet (10 mg total) by mouth every 6 (six) hours as needed. (Patient not taking: Reported on 9/11/2020), Disp: 30 tablet, Rfl: 1    sucralfate (CARAFATE) 1 gram tablet, Take 1 tablet (1 g total) by mouth 4 (four) times daily. (Patient not taking:  "Reported on 6/29/2020), Disp: 40 tablet, Rfl: 0     Review of patient's allergies indicates:   Allergen Reactions    Pcn [penicillins]      Can not remember reaction     Tetanus vaccines and toxoid Swelling     Localized swelling to injection site            Review of Systems   Constitutional: Negative for chills and fever.   HENT: Negative for congestion and sore throat.    Eyes: Negative for visual disturbance.   Respiratory: Negative for cough and shortness of breath.    Cardiovascular: Negative for chest pain.   Gastrointestinal: Negative for abdominal pain, constipation, diarrhea, nausea and vomiting.   Genitourinary: Negative for dysuria.   Musculoskeletal: Negative for joint swelling.   Skin: Negative for rash and wound.   Neurological: Negative for dizziness and headaches.   Hematological: Does not bruise/bleed easily.           Objective:          Vitals:    09/11/20 0907   BP: (!) 120/58   Pulse: 65   Resp: 16   Temp: 97.3 °F (36.3 °C)   TempSrc: Temporal   SpO2: 96%   Weight: 51.3 kg (113 lb 1.5 oz)   Height: 5' 3" (1.6 m)       Physical Exam  HENT:      Head: Normocephalic and atraumatic.      Nose: Nose normal.   Eyes:      Conjunctiva/sclera: Conjunctivae normal.   Cardiovascular:      Rate and Rhythm: Normal rate and regular rhythm.   Pulmonary:      Effort: Pulmonary effort is normal.      Breath sounds: Normal breath sounds.   Abdominal:      General: Abdomen is flat. Distention: no tenderness noted with touch.      Palpations: Abdomen is soft.   Skin:     General: Skin is warm.      Comments: Skin looks dry on feet but no cracking noted   Neurological:      General: No focal deficit present.      Mental Status: She is alert.   Psychiatric:         Mood and Affect: Mood normal.         Behavior: Behavior normal.                 Assessment/Plan     Pau NELSON was seen today for appointment.    Diagnoses and all orders for this visit:    Malignant neoplasm of ovary, unspecified " laterality    Dry skin      Patient would like to be evaluated by [facility] at home for pain management.    Told patient to keep feet moisturized to prevent skin cracking.     Follow up in about 6 weeks (around 10/23/2020) for [facility] home follow up.    Future Appointments   Date Time Provider Department Center   9/28/2020 11:00 AM Madai Perla NP [facility] C[facility] [place]   11/6/2020  9:20 AM Rosi Ramires MD [facility] MED [place]           Rosi Ramires MD  [facility] Family Medicine

## 2020-09-12 LAB — SARS-COV-2 RNA RESP QL NAA+PROBE: NOT DETECTED

## 2020-09-16 ENCOUNTER — TELEPHONE (OUTPATIENT)
Dept: FAMILY MEDICINE | Facility: CLINIC | Age: 85
End: 2020-09-16

## 2020-09-16 NOTE — TELEPHONE ENCOUNTER
Left voice message to inform that our office was closed on yesterday due to daphne Marquez and the hospice orders were faxed this afternoon.

## 2020-09-16 NOTE — TELEPHONE ENCOUNTER
----- Message from Reyna Siddiqi sent at 9/16/2020 11:10 AM CDT -----  Regarding: advice  Contact: Maura with [facility]  ADVICE     Maura called from [facility] [place]   She is calling to find out the status of the orders that   Were faxed to your office. 9/14/2020    Call back  [phone] and fax [phone]

## 2020-09-16 NOTE — TELEPHONE ENCOUNTER
----- Message from Nakia Gutierres sent at 9/16/2020 12:29 PM CDT -----  Regarding: Pts daughter Ms. Gardner Home# [phone]  Ms. Gardner is calling in regards to her saying that [facility] is still waiting for you to fax over her mothers hospice order to them please.

## 2020-09-16 NOTE — TELEPHONE ENCOUNTER
Fax was not received from [facility].  Spoke to Maura. She will refax paperwork. Copy of referral has been faxed to their office.

## 2020-09-22 ENCOUNTER — LAB VISIT (OUTPATIENT)
Dept: LAB | Facility: OTHER | Age: 85
End: 2020-09-22
Attending: INTERNAL MEDICINE
Payer: MEDICARE

## 2020-09-22 DIAGNOSIS — Z03.818 ENCOUNTER FOR OBSERVATION FOR SUSPECTED EXPOSURE TO OTHER BIOLOGICAL AGENTS RULED OUT: ICD-10-CM

## 2020-09-22 PROCEDURE — U0003 INFECTIOUS AGENT DETECTION BY NUCLEIC ACID (DNA OR RNA); SEVERE ACUTE RESPIRATORY SYNDROME CORONAVIRUS 2 (SARS-COV-2) (CORONAVIRUS DISEASE [COVID-19]), AMPLIFIED PROBE TECHNIQUE, MAKING USE OF HIGH THROUGHPUT TECHNOLOGIES AS DESCRIBED BY CMS-2020-01-R: HCPCS

## 2020-09-23 LAB — SARS-COV-2 RNA RESP QL NAA+PROBE: NOT DETECTED

## 2020-09-29 ENCOUNTER — LAB VISIT (OUTPATIENT)
Dept: LAB | Facility: OTHER | Age: 85
End: 2020-09-29
Payer: MEDICARE

## 2020-09-29 DIAGNOSIS — Z03.818 ENCOUNTER FOR OBSERVATION FOR SUSPECTED EXPOSURE TO OTHER BIOLOGICAL AGENTS RULED OUT: ICD-10-CM

## 2020-09-29 PROCEDURE — U0003 INFECTIOUS AGENT DETECTION BY NUCLEIC ACID (DNA OR RNA); SEVERE ACUTE RESPIRATORY SYNDROME CORONAVIRUS 2 (SARS-COV-2) (CORONAVIRUS DISEASE [COVID-19]), AMPLIFIED PROBE TECHNIQUE, MAKING USE OF HIGH THROUGHPUT TECHNOLOGIES AS DESCRIBED BY CMS-2020-01-R: HCPCS

## 2020-10-01 LAB — SARS-COV-2 RNA RESP QL NAA+PROBE: NOT DETECTED

## 2020-10-06 ENCOUNTER — LAB VISIT (OUTPATIENT)
Dept: LAB | Facility: OTHER | Age: 85
End: 2020-10-06
Attending: INTERNAL MEDICINE
Payer: MEDICARE

## 2020-10-06 DIAGNOSIS — Z03.818 ENCOUNTER FOR OBSERVATION FOR SUSPECTED EXPOSURE TO OTHER BIOLOGICAL AGENTS RULED OUT: ICD-10-CM

## 2020-10-06 PROCEDURE — U0003 INFECTIOUS AGENT DETECTION BY NUCLEIC ACID (DNA OR RNA); SEVERE ACUTE RESPIRATORY SYNDROME CORONAVIRUS 2 (SARS-COV-2) (CORONAVIRUS DISEASE [COVID-19]), AMPLIFIED PROBE TECHNIQUE, MAKING USE OF HIGH THROUGHPUT TECHNOLOGIES AS DESCRIBED BY CMS-2020-01-R: HCPCS

## 2020-10-07 LAB — SARS-COV-2 RNA RESP QL NAA+PROBE: NOT DETECTED

## 2020-10-13 ENCOUNTER — LAB VISIT (OUTPATIENT)
Dept: LAB | Facility: OTHER | Age: 85
End: 2020-10-13
Attending: INTERNAL MEDICINE
Payer: MEDICARE

## 2020-10-13 DIAGNOSIS — Z03.818 ENCOUNTER FOR OBSERVATION FOR SUSPECTED EXPOSURE TO OTHER BIOLOGICAL AGENTS RULED OUT: ICD-10-CM

## 2020-10-13 PROCEDURE — U0003 INFECTIOUS AGENT DETECTION BY NUCLEIC ACID (DNA OR RNA); SEVERE ACUTE RESPIRATORY SYNDROME CORONAVIRUS 2 (SARS-COV-2) (CORONAVIRUS DISEASE [COVID-19]), AMPLIFIED PROBE TECHNIQUE, MAKING USE OF HIGH THROUGHPUT TECHNOLOGIES AS DESCRIBED BY CMS-2020-01-R: HCPCS

## 2020-10-14 LAB — SARS-COV-2 RNA RESP QL NAA+PROBE: NOT DETECTED

## 2020-10-20 ENCOUNTER — LAB VISIT (OUTPATIENT)
Dept: LAB | Facility: OTHER | Age: 85
End: 2020-10-20
Payer: MEDICARE

## 2020-10-20 DIAGNOSIS — Z03.818 ENCOUNTER FOR OBSERVATION FOR SUSPECTED EXPOSURE TO OTHER BIOLOGICAL AGENTS RULED OUT: ICD-10-CM

## 2020-10-20 PROCEDURE — U0003 INFECTIOUS AGENT DETECTION BY NUCLEIC ACID (DNA OR RNA); SEVERE ACUTE RESPIRATORY SYNDROME CORONAVIRUS 2 (SARS-COV-2) (CORONAVIRUS DISEASE [COVID-19]), AMPLIFIED PROBE TECHNIQUE, MAKING USE OF HIGH THROUGHPUT TECHNOLOGIES AS DESCRIBED BY CMS-2020-01-R: HCPCS

## 2020-10-22 LAB — SARS-COV-2 RNA RESP QL NAA+PROBE: NOT DETECTED

## 2020-11-05 ENCOUNTER — TELEPHONE (OUTPATIENT)
Dept: FAMILY MEDICINE | Facility: CLINIC | Age: 85
End: 2020-11-05

## 2020-11-05 NOTE — LETTER
November 5, 2020    Family of Mrs.Liselotte OMAR Cook  [address]  [address]  [place] [address]             [place] [facility]  [address]  [place] [address]  Phone: [phone]  Fax: [phone] To the family of Mrs. Cook:    Please accept our deepest condolences in regards to the recent death of your family member, Mrs. Cook. She was a remarkable person, and it was always a pleasure to see her. I hope that we were able to provide her with some relief during the time that she was under our care.     On behalf of myself and my staff, please also extend our condolences to all of your family. If there is anything else that we can do for you, please do not hesitate to contact us.    Sincerely,        Rosi Ramires MD

## 2020-11-05 NOTE — TELEPHONE ENCOUNTER
----- Message from Brook Diaz sent at 2020  9:50 AM CST -----    Regarding:   Contact: Daughter  [phone]  Patient Name - Pau Cook   MRN - [id number]   - [birth year]  DOD - 10-  Contact Daughter Kendra Chely   [phone]      Information has been sent to HIM department.

## 2020-12-20 NOTE — PT/OT/SLP EVAL
"Physical Therapy Evaluation and Discharge Note    Patient Name:  Pau Cook   MRN:  [id number]    Recommendations:     Discharge Recommendations:  (Home Health)   Discharge Equipment Recommendations: none   Barriers to discharge: None    Assessment:     Pau Cook is a 89 y.o. female admitted with a medical diagnosis of S/P MY-BSO. .  At this time, patient is functioning at their prior level of function and does not require further acute PT services.     Recent Surgery: Procedure(s) (LRB):  XI ROBOTIC HYSTERECTOMY (N/A)  XI ROBOTIC SALPINGO-OOPHORECTOMY (Bilateral)  HYSTERECTOMY, TOTAL, ABDOMINAL, WITH BILATERAL SALPINGO-OOPHORECTOMY (Bilateral)  LYSIS, ADHESIONS (N/A) 3 Days Post-Op    Plan:     During this hospitalization, patient does not require further acute PT services.  Please re-consult if situation changes.      Subjective     Chief Complaint: Denied pain. Some discomfort noted with bed mobility   Patient/Family Comments/goals: Patient and patient's family inquired about home health. Patient inquired about when she can return to performing sit ups as therapeutic exercise. PT encouraged patient to wait 6-8 wks prior to abdominal exercises.   Pain/Comfort:  · Pain Rating 1: 0/10  · Pain Rating Post-Intervention 1: 0/10    Patients cultural, spiritual, Holiness conflicts given the current situation: no    Living Environment:  · Lives alone in first floor apartment (assisted living facility). The assisted living facility staff check on the residences every 2 hours   · Bathroom has walk-in shower with seat and grab bars  · Independent with all mobility tasks; was driving; ambulating without an AD  · When PT asked if patient cooks for herself, the patient stated, "They do it all for me at the facility. That's one of the reasons why I moved there!" Despite cooking being an amenity of this facility, patient is capable of cooking herself  · Prior to admission, patients level of function was " independent  · Equipment used at home: none.     Objective:     Communicated with RN prior to session.  Patient found supine in bed upon PT entry to room found with: peripheral IV     General Precautions: Standard, fall   Orthopedic Precautions:N/A   Braces: N/A     · Cognition:   · Patient is oriented to name, , location, and situation   · Pt follows approximately 100% of single-step commands.    · Mood: Pleasant and cooperative.   · Musculoskeletal:  · Posture:    · -       Rounded shoulders  · -       Forward head  · -       Kyphosis  · LE ROM/Strength:   · BLE 5/5 hip flexion, knee ext, knee flx, ankle DF  · Neuromuscular:  · Sensation: Intact to light touch bilateral LEs.   · Coordination/Tone/Reflexes: No impairments identified with functional mobility. No formal testing performed.   · Balance: Standing balance: CGA  · Visual-vestibular: No impairments identified with functional mobility. No formal testing performed.  · Integument: Visible skin intact  · Cardiopulmonary:  · Vital signs:  · BP: 148/71  · HR: 98    Functional Mobility:  · Bed Mobility:   PT taught patient how to log roll to prevent use of abdominal muscles; demonstration and tactile cues provided   · Supine to Sit: contact guard assistance  · Sit to Supine: contact guard assistance  · Transfers:     · Sit to Stand:  stand by assistance with no AD  · Gait:   · 200' with HHA, CGA for safety no AD  · Kyphotic posture noted; verbal cues to correct posture   · Balance:   · standing balance with RW    AM-PAC 6 CLICK MOBILITY  Total Score:19       Therapeutic Activities and Exercises:  · Pt performed supine therapeutic exercises including ankle pumps, quad sets, glute sets x 10 reps with verbal and tactile cues.   · Encouraged to perform ankle pumps throughout the day to promote blood flow  · Discussed with patient HEP for BLE strengthening rather than abdominal muscles     AM-PAC 6 CLICK MOBILITY  Total Score:19     Patient left supine with all  lines intact, call button in reach, RN notified and daughters present.    GOALS:   Multidisciplinary Problems     Physical Therapy Goals        Problem: Physical Therapy Goal    Goal Priority Disciplines Outcome Goal Variances Interventions   Physical Therapy Goal     PT, PT/OT      Description:   Patient is functioning at their prior level of function and does not require further acute PT services.                       History:     Past Medical History:   Diagnosis Date    Allergy     Arthritis     Breast cyst     Foot pain     GERD (gastroesophageal reflux disease)     Joint pain     Peripheral vascular disease 1/15/2018    Squamous cell carcinoma 04/2016    Right Lower Leg    MUNIR (stress urinary incontinence, female) 4/17/2017       Past Surgical History:   Procedure Laterality Date    APPENDECTOMY      COLON SURGERY      removed about 10 inch    COLONOSCOPY N/A 12/24/2018    [facility]O    EXCISION, MASS, FINGER Right 7/9/2018    [facility]    hemorhids      HYSTERECTOMY, TOTAL, ABDOMINAL, WITH BILATERAL SALPINGO-OOPHORECTOMY Bilateral 12/28/2018    [facility]formed by [facility] at [facility] OR    LYSIS, ADHESIONS N/A 12/28/2018    [facility]formed by [facility] at [facility] OR    XI ROBOTIC HYSTERECTOMY N/A 12/28/2018    Performed by [facility] at [facility] OR    XI ROBOTIC SALPINGO-OOPHORECTOMY Bilateral 12/28/2018    Performed by [facility] Rose MD at [facility] OR       Clinical Decision Making:     History  Co-morbidities and personal factors that may impact the plan of care Examination  Body Structures and Functions, activity limitations and participation restrictions that may impact the plan of care Clinical Presentation   Decision Making/ Complexity Score   Co-morbidities:   [] Time since onset of injury / illness / exacerbation  [] Status of current condition  []Patient's cognitive status and safety concerns    [] Multiple  Medical Problems (see med hx)  Personal Factors:   [] Patient's age  [] Prior Level of function   [] Patient's home situation (environment and family support)  [] Patient's level of motivation  [] Expected progression of patient      HISTORY:(criteria)    [] 61558 - no personal factors/history    [] 81154 - has 1-2 personal factor/comorbidity     [] 28301 - has >3 personal factor/comorbidity     Body Regions:  [] Objective examination findings  [] Head     []  Neck  [] Trunk   [] Upper Extremity  [] Lower Extremity    Body Systems:  [] For communication ability, affect, cognition, language, and learning style: the assessment of the ability to make needs known, consciousness, orientation (person, place, and time), expected emotional /behavioral responses, and learning preferences (eg, learning barriers, education  needs)  [] For the neuromuscular system: a general assessment of gross coordinated movement (eg, balance, gait, locomotion, transfers, and transitions) and motor function  (motor control and motor learning)  [] For the musculoskeletal system: the assessment of gross symmetry, gross range of motion, gross strength, height, and weight  [] For the integumentary system: the assessment of pliability(texture), presence of scar formation, skin color, and skin integrity  [] For cardiovascular/pulmonary system: the assessment of heart rate, respiratory rate, blood pressure, and edema     Activity limitations:    [] Patient's cognitive status and saf ety concerns          [] Status of current condition      [] Weight bearing restriction  [] Cardiopulmunary Restriction    Participation Restrictions:   [] Goals and goal agreement with the patient     [] Rehab potential (prognosis) and probable outcome      Examination of Body System: (criteria)    [] 43963 - addressing 1-2 elements    [] 42424 - addressing a total of 3 or more elements     [] 85385 -  Addressing a total of 4 or more elements         Clinical  Presentation: (criteria)  Choose one     On examination of body system using standardized tests and measures patient presents with (CHOOSE ONE) elements from any of the following: body structures and functions, activity limitations, and/or participation restrictions.  Leading to a clinical presentation that is considered (CHOOSE ONE)                              Clinical Decision Making  (Eval Complexity):  Choose One     Time Tracking:     PT Received On: 12/31/18  PT Start Time: 0943     PT Stop Time: 1020  PT Total Time (min): 37 min     Billable Minutes: Evaluation 25 and Therapeutic Activity 12      Elma Gann, PT  12/31/2018   100

## 2021-09-10 NOTE — DISCHARGE SUMMARY
[facility] Hospital Medicine  Discharge Summary      Patient Name: Pau Cook  MRN: [id number]  Admission Date: 5/31/2019  Hospital Length of Stay: 4 days  Discharge Date and Time: 6/4/2019  5:07 PM  Attending Physician: No att. providers found   Discharging Provider: Mina Hills MD  Primary Care Provider: Rosi Ramires MD      HPI:   No notes on file    Procedure(s) (LRB):  ORIF, FRACTURE, FEMUR, INTERTROCHANTERIC (Left)      Hospital Course:    Patient is an 89-year-old  female with a past medical history significant for ovarian cancer who presents the hospital for a left hip fracture.  She underwent ORIF by Orthopedic surgery and did well postoperatively.  Patient was noted to be profoundly weak and was recommended for skilled nursing facility placement for further rehab.  Patient was discharged to skilled nursing facility without difficulty.     Consults:     No new Assessment & Plan notes have been filed under this hospital service since the last note was generated.  Service: Hospital Medicine    Final Active Diagnoses:    Diagnosis Date Noted POA    PRINCIPAL PROBLEM:  Closed intertrochanteric fracture of hip, left, initial encounter [S72.142A]  Yes    Malnutrition of moderate degree [E44.0] 06/03/2019 Yes    Ovarian cancer [C56.9] 01/15/2019 Yes    Gastroesophageal reflux disease with esophagitis [K21.0] 01/08/2016 Yes      Problems Resolved During this Admission:       Discharged Condition: stable    Disposition: Skilled Nursing Facility    Follow Up:  Follow-up Information     Rosi Ramires MD In 2 weeks.    Specialty:  Family Medicine  Contact information:  [address] [place] [address]  [phone]             Bacilio Lockwood II, MD In 2 weeks.    Specialty:  Orthopedic Surgery  Contact information:  [address] [place] [address] [address] [place] [address]  [phone]                 Patient Instructions:      Notify your health care provider if you experience any of  the following:  temperature >100.4     Notify your health care provider if you experience any of the following:  severe uncontrolled pain     Notify your health care provider if you experience any of the following:  difficulty breathing or increased cough     Activity as tolerated       Significant Diagnostic Studies:     Pending Diagnostic Studies:     None         Medications:  Reconciled Home Medications:      Medication List      START taking these medications    acetaminophen 500 MG tablet  Commonly known as:  TYLENOL  Take 2 tablets (1,000 mg total) by mouth every 8 (eight) hours.  Replaces:  acetaminophen 650 MG Tbsr     enoxaparin 40 mg/0.4 mL Syrg  Commonly known as:  LOVENOX  Inject 0.4 mLs (40 mg total) into the skin once daily. for 14 days     HYDROcodone-acetaminophen 5-325 mg per tablet  Commonly known as:  NORCO  Take 1 tablet by mouth every 8 (eight) hours as needed (severe pain unrelieved by tylenol).     pregabalin 75 MG capsule  Commonly known as:  LYRICA  Take 1 capsule (75 mg total) by mouth 2 (two) times daily.        CONTINUE taking these medications    CALCIUM 500 + D 500 mg(1,250mg) -200 unit per tablet  Generic drug:  calcium-vitamin D3  Take 1 tablet by mouth once daily.     carboxymethylcellulose 0.5 % Dpet  Commonly known as:  REFRESH PLUS  1 drop daily as needed.     CENTRUM SILVER ORAL  Take 1 tablet by mouth once daily.     cyanocobalamin 1000 MCG tablet  Commonly known as:  VITAMIN B-12  Take 1,000 mcg by mouth once daily.     FLAXSEED ORAL  Take 650 mg by mouth once daily.     ketotifen 0.025 % (0.035 %) ophthalmic solution  Commonly known as:  ZADITOR  Place 1 drop into both eyes 2 (two) times daily as needed (Pt reports use approx once/week).     NexIUM 20 MG capsule  Generic drug:  esomeprazole  Take 20 mg by mouth before breakfast.     prochlorperazine 10 MG tablet  Commonly known as:  COMPAZINE  Take 1 tablet (10 mg total) by mouth every 6 (six) hours as needed.        STOP  taking these medications    acetaminophen 650 MG Tbsr  Commonly known as:  TYLENOL  Replaced by:  acetaminophen 500 MG tablet            Indwelling Lines/Drains at time of discharge:   Lines/Drains/Airways          None          Time spent on the discharge of patient: 33 minutes  Patient was seen and examined on the date of discharge and determined to be suitable for discharge.         Mina Hills MD  Department of Hospital Medicine  [facility]   Simple: Patient demonstrates quick and easy understanding/Verbalized Understanding

## 2022-05-27 NOTE — TELEPHONE ENCOUNTER
I put in request and bringing paper print out u p to prashanth ([place] on vacation)  
Julia says this was faxed to  yesterday.   
Patient's referral to Dr Will Bess has been authorized by [facility].   Referral #[id number]; 20 visits; visit date range 8/13/2018 to 9/10/2018   
Shantel (phone [phone]) with radiology at   , they did radiation treatments referred by dr jara. ([facility])    cinthia says we need to request a retro authorization and they would pay 70%.    Provider  / dr lynnette tang  At .   [address]  [place] [phone]  Fax [phone]  Tax id#   [id number]        Dates 8/13/18 to 9/10/18  Codes 11742  20 treatments/visits.               Dx  squemous cell   [id number]                                                                                              [id number]  Calculation of treatments.  
No abnormalities

## (undated) DEVICE — ELECTRODE BLADE TEFLON 6

## (undated) DEVICE — NDL SAFETY 21G X 1 1/2 ECLPSE

## (undated) DEVICE — CLOSURE SKIN STERI STRIP 1/2X4

## (undated) DEVICE — DRAPE IOBAN 2 STERI

## (undated) DEVICE — SEE MEDLINE ITEM 152530

## (undated) DEVICE — DRAPE INCISE IOBAN 2 23X17IN

## (undated) DEVICE — SUT PROLENE 0 CT1 30IN BLUE

## (undated) DEVICE — SEE MEDLINE ITEM 146347

## (undated) DEVICE — SEE MEDLINE ITEM 146292

## (undated) DEVICE — PACK CUSTOM UNIV BASIN SLI

## (undated) DEVICE — SUT MCRYL PLUS 4-0 PS2 27IN

## (undated) DEVICE — PORT ACCESS 8MM W/120MM LOW

## (undated) DEVICE — TUBE SUCTION YANKAUER HI CAP

## (undated) DEVICE — Device

## (undated) DEVICE — SUT MONOCRYL 4-0 PS-2

## (undated) DEVICE — SYR ONLY LUER LOCK 20CC

## (undated) DEVICE — SEAL UNIVERSAL 5MM-8MM XI

## (undated) DEVICE — SPONGE SUPER KERLIX 6X6.75IN

## (undated) DEVICE — SOL WATER STRL IRR 1000ML

## (undated) DEVICE — DRAPE C ARM 42 X 120 10/BX

## (undated) DEVICE — WIRE GUIDE 3.2MM 400MM
Type: IMPLANTABLE DEVICE | Site: HIP | Status: NON-FUNCTIONAL
Removed: 2019-05-31

## (undated) DEVICE — INSERT CUSHIONPRONE VIEW LARGE

## (undated) DEVICE — LINER SUCTION 3000CC

## (undated) DEVICE — SUT 0 54IN COATED VICRYL U

## (undated) DEVICE — ADHESIVE DERMABOND ADVANCED

## (undated) DEVICE — SOL NS 1000CC

## (undated) DEVICE — SEE MEDLINE ITEM 157117

## (undated) DEVICE — OBTURATOR BLADELESS 8MM XI

## (undated) DEVICE — COVER TIP CURVED SCISSORS XI

## (undated) DEVICE — SUT V-LOC 180 ABD 2/0 GS-21

## (undated) DEVICE — PAD CAST SPECIALIST STRL 6

## (undated) DEVICE — REAMER STEPPED DHS DCS

## (undated) DEVICE — KIT WING PAD POSITIONING

## (undated) DEVICE — ELECTRODE REM PLYHSV RETURN 9

## (undated) DEVICE — SEE MEDLINE ITEM 156923

## (undated) DEVICE — SUT 1 48IN PDS II VIO MONO

## (undated) DEVICE — SEE MEDLINE ITEM 157166

## (undated) DEVICE — GLOVE 7.5 PROTEXIS PI BLUE

## (undated) DEVICE — APPLICATOR CHLORAPREP ORN 26ML

## (undated) DEVICE — BIT DRILL 4.2MM 3 FLUTD 145MM

## (undated) DEVICE — DRESSING ADH ISLAND 3.6 X 14

## (undated) DEVICE — SOL ELECTROLUBE ANTI-STIC

## (undated) DEVICE — STRAP OR TABLE 5IN X 72IN

## (undated) DEVICE — WRAPON POLAR PAD HIP FOR POLAR

## (undated) DEVICE — DRAPE ARM DAVINCI XI

## (undated) DEVICE — SET TRI-LUMEN FILTERED TUBE

## (undated) DEVICE — BLADE SURG STAINLESS STEEL #15

## (undated) DEVICE — NDL HYPO REG 25G X 1 1/2

## (undated) DEVICE — SEE MEDLINE ITEM 157131

## (undated) DEVICE — SUT 2-0 VICRYL / CT-1

## (undated) DEVICE — NDL INSUF ULTRA VERESS 120MM

## (undated) DEVICE — CLOSURE SKIN 1/4INX1-1/2IN

## (undated) DEVICE — SPONGE LAP 18X18 PREWASHED

## (undated) DEVICE — SOL CLEARIFY VISUALIZATION LAP

## (undated) DEVICE — GLOVE PROTEXIS PI CLASSIC 7.5

## (undated) DEVICE — SEE MEDLINE ITEM 146231

## (undated) DEVICE — SOL 9P NACL IRR PIC IL

## (undated) DEVICE — HEMOSTAT SURGICEL 4X8IN

## (undated) DEVICE — SLEEVE SCD EXPRESS CALF MEDIUM

## (undated) DEVICE — DRESSING LEUKOPLAST FLEX 1X3IN

## (undated) DEVICE — SUT VICRYL PLUS 0 CT1 36IN

## (undated) DEVICE — DRESSING TRANS 4X4 TEGADERM

## (undated) DEVICE — SEE MEDLINE ITEM 153151

## (undated) DEVICE — CUBE COLD THERAPY POLAR CARE

## (undated) DEVICE — DRAPE COLUMN DAVINCI XI

## (undated) DEVICE — MANIPULATOR VCARE PLUS 34MM

## (undated) DEVICE — JELLY KY LUBRICATING 5G PACKET

## (undated) DEVICE — SEE MEDLINE ITEM 157110

## (undated) DEVICE — IRRIGATOR ENDOSCOPY DISP.

## (undated) DEVICE — SUT 0 VICRYL / CT-1

## (undated) DEVICE — STAPLER SKIN ROTATING HEAD

## (undated) DEVICE — DEVICE ANC SW STAT FOLEY 6-24

## (undated) DEVICE — GOWN XX-LARGE

## (undated) DEVICE — DRAPE C-ARMOR EQUIPMENT COVER

## (undated) DEVICE — GLOVE BIOGEL SKINSENSE PI 6.5

## (undated) DEVICE — GLOVE PROTEXIS PI CLASSIC 8.0

## (undated) DEVICE — SEE MEDLINE ITEM 152622

## (undated) DEVICE — SUT 0 8-27IN VICRYL PL CT-1

## (undated) DEVICE — SYR 10CC LUER LOCK

## (undated) DEVICE — POSITIONER HEAD ADULT

## (undated) DEVICE — SEE MEDLINE ITEM 157116